# Patient Record
Sex: MALE | Race: WHITE | Employment: UNEMPLOYED | URBAN - METROPOLITAN AREA
[De-identification: names, ages, dates, MRNs, and addresses within clinical notes are randomized per-mention and may not be internally consistent; named-entity substitution may affect disease eponyms.]

---

## 2018-08-22 ENCOUNTER — APPOINTMENT (OUTPATIENT)
Dept: CT IMAGING | Age: 58
DRG: 872 | End: 2018-08-22
Attending: EMERGENCY MEDICINE
Payer: MEDICARE

## 2018-08-22 ENCOUNTER — HOSPITAL ENCOUNTER (INPATIENT)
Age: 58
LOS: 4 days | Discharge: NURSING, REHAB OR LONG-TERM CARE | DRG: 872 | End: 2018-08-26
Attending: EMERGENCY MEDICINE | Admitting: FAMILY MEDICINE
Payer: MEDICARE

## 2018-08-22 ENCOUNTER — APPOINTMENT (OUTPATIENT)
Dept: GENERAL RADIOLOGY | Age: 58
DRG: 872 | End: 2018-08-22
Attending: EMERGENCY MEDICINE
Payer: MEDICARE

## 2018-08-22 DIAGNOSIS — T83.511A URINARY TRACT INFECTION ASSOCIATED WITH CATHETERIZATION OF URINARY TRACT, UNSPECIFIED INDWELLING URINARY CATHETER TYPE, INITIAL ENCOUNTER (HCC): Primary | ICD-10-CM

## 2018-08-22 DIAGNOSIS — N39.0 URINARY TRACT INFECTION ASSOCIATED WITH CATHETERIZATION OF URINARY TRACT, UNSPECIFIED INDWELLING URINARY CATHETER TYPE, INITIAL ENCOUNTER (HCC): Primary | ICD-10-CM

## 2018-08-22 PROBLEM — K56.41 FECAL IMPACTION (HCC): Status: ACTIVE | Noted: 2018-08-22

## 2018-08-22 PROBLEM — K59.00 CONSTIPATION: Status: ACTIVE | Noted: 2018-08-22

## 2018-08-22 PROBLEM — A41.9 SEPSIS (HCC): Status: ACTIVE | Noted: 2018-08-22

## 2018-08-22 PROBLEM — K44.9 HIATAL HERNIA: Status: ACTIVE | Noted: 2018-08-22

## 2018-08-22 LAB
ALBUMIN SERPL-MCNC: 3.5 G/DL (ref 3.5–5)
ALBUMIN/GLOB SERPL: 0.8 {RATIO} (ref 1.1–2.2)
ALP SERPL-CCNC: 102 U/L (ref 45–117)
ALT SERPL-CCNC: 35 U/L (ref 12–78)
ANION GAP SERPL CALC-SCNC: 7 MMOL/L (ref 5–15)
APPEARANCE UR: ABNORMAL
AST SERPL-CCNC: 20 U/L (ref 15–37)
BACTERIA URNS QL MICRO: ABNORMAL /HPF
BASOPHILS # BLD: 0 K/UL (ref 0–0.1)
BASOPHILS NFR BLD: 0 % (ref 0–1)
BILIRUB SERPL-MCNC: 0.3 MG/DL (ref 0.2–1)
BILIRUB UR QL: NEGATIVE
BUN SERPL-MCNC: 19 MG/DL (ref 6–20)
BUN/CREAT SERPL: 21 (ref 12–20)
CALCIUM SERPL-MCNC: 8.7 MG/DL (ref 8.5–10.1)
CHLORIDE SERPL-SCNC: 105 MMOL/L (ref 97–108)
CO2 SERPL-SCNC: 24 MMOL/L (ref 21–32)
COLOR UR: YELLOW
CREAT SERPL-MCNC: 0.9 MG/DL (ref 0.7–1.3)
DIFFERENTIAL METHOD BLD: ABNORMAL
EOSINOPHIL # BLD: 0.1 K/UL (ref 0–0.4)
EOSINOPHIL NFR BLD: 1 % (ref 0–7)
EPITH CASTS URNS QL MICRO: ABNORMAL /LPF
ERYTHROCYTE [DISTWIDTH] IN BLOOD BY AUTOMATED COUNT: 16.3 % (ref 11.5–14.5)
GLOBULIN SER CALC-MCNC: 4.5 G/DL (ref 2–4)
GLUCOSE SERPL-MCNC: 106 MG/DL (ref 65–100)
GLUCOSE UR STRIP.AUTO-MCNC: NEGATIVE MG/DL
HCT VFR BLD AUTO: 40.2 % (ref 36.6–50.3)
HGB BLD-MCNC: 12.5 G/DL (ref 12.1–17)
HGB UR QL STRIP: ABNORMAL
IMM GRANULOCYTES # BLD: 0 K/UL (ref 0–0.04)
IMM GRANULOCYTES NFR BLD AUTO: 0 % (ref 0–0.5)
KETONES UR QL STRIP.AUTO: NEGATIVE MG/DL
LEUKOCYTE ESTERASE UR QL STRIP.AUTO: ABNORMAL
LIPASE SERPL-CCNC: 60 U/L (ref 73–393)
LYMPHOCYTES # BLD: 2.2 K/UL (ref 0.8–3.5)
LYMPHOCYTES NFR BLD: 17 % (ref 12–49)
MCH RBC QN AUTO: 27.5 PG (ref 26–34)
MCHC RBC AUTO-ENTMCNC: 31.1 G/DL (ref 30–36.5)
MCV RBC AUTO: 88.4 FL (ref 80–99)
MONOCYTES # BLD: 0.9 K/UL (ref 0–1)
MONOCYTES NFR BLD: 7 % (ref 5–13)
MUCOUS THREADS URNS QL MICRO: ABNORMAL /LPF
NEUTS SEG # BLD: 10 K/UL (ref 1.8–8)
NEUTS SEG NFR BLD: 75 % (ref 32–75)
NITRITE UR QL STRIP.AUTO: POSITIVE
NRBC # BLD: 0 K/UL (ref 0–0.01)
NRBC BLD-RTO: 0 PER 100 WBC
PH UR STRIP: 7 [PH] (ref 5–8)
PLATELET # BLD AUTO: 661 K/UL (ref 150–400)
PMV BLD AUTO: 9 FL (ref 8.9–12.9)
POTASSIUM SERPL-SCNC: 4.5 MMOL/L (ref 3.5–5.1)
PROT SERPL-MCNC: 8 G/DL (ref 6.4–8.2)
PROT UR STRIP-MCNC: 30 MG/DL
RBC # BLD AUTO: 4.55 M/UL (ref 4.1–5.7)
RBC #/AREA URNS HPF: ABNORMAL /HPF (ref 0–5)
RBC MORPH BLD: ABNORMAL
RBC MORPH BLD: ABNORMAL
SODIUM SERPL-SCNC: 136 MMOL/L (ref 136–145)
SP GR UR REFRACTOMETRY: 1.01 (ref 1–1.03)
UROBILINOGEN UR QL STRIP.AUTO: 0.2 EU/DL (ref 0.2–1)
WBC # BLD AUTO: 13.2 K/UL (ref 4.1–11.1)
WBC URNS QL MICRO: >100 /HPF (ref 0–4)

## 2018-08-22 PROCEDURE — 51702 INSERT TEMP BLADDER CATH: CPT

## 2018-08-22 PROCEDURE — 85025 COMPLETE CBC W/AUTO DIFF WBC: CPT | Performed by: EMERGENCY MEDICINE

## 2018-08-22 PROCEDURE — 74011000258 HC RX REV CODE- 258: Performed by: EMERGENCY MEDICINE

## 2018-08-22 PROCEDURE — 80053 COMPREHEN METABOLIC PANEL: CPT | Performed by: EMERGENCY MEDICINE

## 2018-08-22 PROCEDURE — 36415 COLL VENOUS BLD VENIPUNCTURE: CPT | Performed by: EMERGENCY MEDICINE

## 2018-08-22 PROCEDURE — 96365 THER/PROPH/DIAG IV INF INIT: CPT

## 2018-08-22 PROCEDURE — 99284 EMERGENCY DEPT VISIT MOD MDM: CPT

## 2018-08-22 PROCEDURE — 74011636320 HC RX REV CODE- 636/320: Performed by: EMERGENCY MEDICINE

## 2018-08-22 PROCEDURE — 96360 HYDRATION IV INFUSION INIT: CPT

## 2018-08-22 PROCEDURE — 83690 ASSAY OF LIPASE: CPT | Performed by: EMERGENCY MEDICINE

## 2018-08-22 PROCEDURE — 77030034849

## 2018-08-22 PROCEDURE — 74177 CT ABD & PELVIS W/CONTRAST: CPT

## 2018-08-22 PROCEDURE — 65210000002 HC RM PRIVATE GYN

## 2018-08-22 PROCEDURE — 81001 URINALYSIS AUTO W/SCOPE: CPT | Performed by: EMERGENCY MEDICINE

## 2018-08-22 PROCEDURE — 71045 X-RAY EXAM CHEST 1 VIEW: CPT

## 2018-08-22 PROCEDURE — 74011250636 HC RX REV CODE- 250/636: Performed by: EMERGENCY MEDICINE

## 2018-08-22 RX ORDER — NORTRIPTYLINE HYDROCHLORIDE 10 MG/5ML
50 SOLUTION ORAL
COMMUNITY

## 2018-08-22 RX ORDER — SODIUM CHLORIDE 0.9 % (FLUSH) 0.9 %
10 SYRINGE (ML) INJECTION
Status: COMPLETED | OUTPATIENT
Start: 2018-08-22 | End: 2018-08-22

## 2018-08-22 RX ORDER — FERROUS SULFATE 220 (44)/5
8 SOLUTION, ORAL ORAL DAILY
COMMUNITY
End: 2018-08-26

## 2018-08-22 RX ORDER — MELATONIN
2000 DAILY
COMMUNITY

## 2018-08-22 RX ORDER — HYDROCODONE BITARTRATE AND ACETAMINOPHEN 7.5; 325 MG/15ML; MG/15ML
5 SOLUTION ORAL
COMMUNITY
End: 2022-08-30

## 2018-08-22 RX ORDER — ASCORBIC ACID 500 MG
500 TABLET ORAL DAILY
COMMUNITY

## 2018-08-22 RX ORDER — ACETAMINOPHEN 160 MG/5ML
640 LIQUID ORAL
COMMUNITY

## 2018-08-22 RX ORDER — CHOLESTYRAMINE 4 G/4.8G
4 POWDER, FOR SUSPENSION ORAL
COMMUNITY
End: 2018-08-26

## 2018-08-22 RX ADMIN — SODIUM CHLORIDE 1000 ML: 900 INJECTION, SOLUTION INTRAVENOUS at 19:00

## 2018-08-22 RX ADMIN — IOPAMIDOL 100 ML: 755 INJECTION, SOLUTION INTRAVENOUS at 19:52

## 2018-08-22 RX ADMIN — Medication 10 ML: at 19:52

## 2018-08-22 RX ADMIN — SODIUM CHLORIDE 100 ML: 900 INJECTION, SOLUTION INTRAVENOUS at 19:52

## 2018-08-22 RX ADMIN — MEROPENEM 1 G: 1 INJECTION, POWDER, FOR SOLUTION INTRAVENOUS at 20:24

## 2018-08-22 NOTE — Clinical Note
Status[de-identified] Inpatient [101] Type of Bed: Telemetry [19] Inpatient Hospitalization Certified Necessary for the Following Reasons: 3. Patient receiving treatment that can only be provided in an inpatient setting (further clarification in H&P documentation) Admitting Diagnosis: UTI (urinary tract infection) [[id number]] Admitting Physician: Cali James Attending Physician: Cali James Estimated Length of Stay: 2 Midnights Discharge Plan[de-identified] Home with Office Follow-up

## 2018-08-22 NOTE — ED TRIAGE NOTES
Patient presents from [place] home via EMS with complaints of Abdominal pain.  Per EMs patient has peg tube, but mother still likes to give him food and [facility] is [facility] if that is what is going on

## 2018-08-22 NOTE — IP AVS SNAPSHOT
[address] 
[phone] Patient: Vidhya Gillis MRN: [id number] :1960 You are allergic to the following Allergen Reactions Avelox (Moxifloxacin) Rash Demerol (Meperidine) Other (comments) Spastic movements Amikacin Unknown (comments) Amoxicillin Rash Betadine Surgi-Prep Rash Mother states this does not happen all the time and if betadine is washed off, it's ok Ciprofloxacin Unknown (comments) Effexor (Venlafaxine) Unknown (comments) Fentanyl Unknown (comments) Keflex (Cephalexin) Rash Lyrica (Pregabalin) Unknown (comments) Morphine Unknown (comments) Neurontin (Gabapentin) Unknown (comments) Prozac (Fluoxetine) Unknown (comments) Reglan (Metoclopramide) Unknown (comments) Serzone (Nefazodone) Unknown (comments) Sulfa (Sulfonamide Antibiotics) Unknown (comments) Tetracycline Unknown (comments) Tricyclamol Chloride Unknown (comments) Valium (Diazepam) Unknown (comments) Tolerates Lorazepam  
  
Recent Documentation Height Weight BMI Smoking Status 1.702 m 46.3 kg 15.98 kg/m2 Never Smoker Emergency Contacts  (Rel.) Home Phone Work Phone Mobile Phone Hanny Pop (Mother) [phone] -- -- Dc Borja (Brother) [phone] -- -- About your hospitalization You were admitted on:  2018 You last received care in the:  [facility] You were discharged on:  2018 Why you were hospitalized Your primary diagnosis was:  Abdominal Pain Your diagnoses also included:  Uti (Urinary Tract Infection), Sepsis ([facility]), Constipation, Fecal Impaction ([facility]), Hiatal Hernia, Quadriplegic Cerebral Palsy (Hcc), Cp (Cerebral Palsy) ([facility]) Providers Seen During Your Hospitalization Provider Specialty Primary office phone Araseli Chance MD Emergency Medicine [phone] Homer Alvarado MD Family Practice [phone] Pavan Mcgrath MD Internal Medicine [phone] Your Primary Care Physician (PCP) Primary Care Physician Office Phone Office Fax Isela Parker [phone] Follow-up Information Follow up With Details Comments Contact Info Parvez Obrien MD [facility] 1 [facility] 
[phone] Appointments for Next 14 days 8/31/2018  1:15 PM  NEW PATIENT [employer] [facility] My Medications STOP taking these medications   
 bismuth subsalicylate 213 KM/60 mL suspension Commonly known as:  PEPTO-BISMOL  
   
  
 ferrous sulfate 220 mg (44 mg iron)/5 mL solution PREVALITE 4 gram packet Generic drug:  cholestyramine-aspartame TAKE these medications as instructed Instructions Each Dose to Equal  
 Morning Noon Evening Bedtime  
 acetaminophen 160 mg/5 mL liquid Commonly known as:  TYLENOL Your last dose was: Your next dose is:    
   
   
 640 mg by Per G Tube route every four (4) hours as needed for Fever or Pain. 640 mg  
    
   
   
   
  
 ascorbic acid (vitamin C) 500 mg tablet Commonly known as:  VITAMIN C Your last dose was: Your next dose is:    
   
   
 500 mg by Per G Tube route daily. 500 mg  
    
   
   
   
  
 ertapenem 1 gram injection Commonly known as:  Nicholas Sheppard Start taking on:  8/27/2018 Your last dose was: Your next dose is:    
   
   
 1 g by IntraVENous route every twenty-four (24) hours for 4 days.  Mix with lidocaine for order: Ertapenum 1 g vial for IM injection with lidocaine 1% 3.2 mL  Indications: Klebsiella Complicated UTI  
 1 g  
    
   
   
   
  
 FLONASE 50 mcg/actuation nasal spray Generic drug:  fluticasone Your last dose was: Your next dose is: 2 Sprays by Both Nostrils route daily. 2 [place] HYDROcodone-acetaminophen 0.5-21.7 mg/mL oral solution Commonly known as:  HYCET Your last dose was: Your next dose is:    
   
   
 5 mL by Per G Tube route four (4) times daily as needed for Pain. 5 mL LORazepam 1 mg tablet Commonly known as:  ATIVAN Your last dose was: Your next dose is: 0.75 mg by Per G Tube route every six (6) hours. 0.75 mg MAXALT 10 mg tablet Generic drug:  rizatriptan Your last dose was: Your next dose is:    
   
   
 10 mg by SubLINGual route as needed for Migraine. May repeat dose after 2 hours not to exceed 3 tablets per 24hr 10 mg [address] [facility] [address] Generic drug:  Esomeprazole Magnesium Your last dose was: Your next dose is:    
   
   
 40 mg by Per G Tube route daily. Indications: gastroesophageal reflux disease 40 mg  
    
   
   
   
  
 nortriptyline 10 mg/5 mL solution Commonly known as:  PAMELOR Your last dose was: Your next dose is:    
   
   
 50 mg by Per G Tube route nightly. 50 mg  
    
   
   
   
  
 VITAMIN D3 1,000 unit tablet Generic drug:  cholecalciferol Your last dose was: Your next dose is:    
   
   
 2,000 Units by Per G Tube route daily. Indications: Vitamin D Deficiency 2000 Units Where to Get Your Medications Information on where to get these meds will be given to you by the nurse or doctor. ! Ask your nurse or doctor about these medications  
  ertapenem 1 gram injection Discharge Instructions Discharge Summary  
  
PATIENT ID: Dillon Garsia MRN: [id number] YOB: 1960 DATE OF ADMISSION: 8/22/2018  5:40 PM   
DATE OF DISCHARGE: 8/26/2018 PRIMARY CARE PROVIDER: Scott Webb MD  
ATTENDING PHYSICIAN: Inderjit Medrano MD 
DISCHARGING PROVIDER: Elizabeth Gomez NP. To contact this individual call [phone] and ask the  to page. If unavailable ask to be transferred the [address]:  
Pt presented to the ED with abdominal pain. Patients brother reported that he had been having abdominal pain x 1 day. Other symptoms were nausea and non-bloody diarrhea. 
  
DISCHARGE DIAGNOSES / PLAN:   
  
Sepsis (leukocytosis and tachycardia with UTI) (POA):  
- received 1 Liter bolus in ED 
- lactic acid 0.6 
- pt started on meropenum in the ED, as he has multiple allergies and there were reports he had ESBL in his urine in the past 
- no blood cultures obtained on admit 
- afebrile and leukocytosis normalized 
- urine culture with ESBL Klebsiella Pneumoniae. Sensitive to meropenum.  
- Plans for ertapenum on discharge x 4 days (IM) as Midline placement unsuccessful 
  
Abdominal Pain:  
- CT abdomen showed a fecal impaction.   
- has received enema with good results, last BM 8/23 
- Pain resolved 
   
Fecal Impaction/Hx of Diarrhea - pt on pepto bismol and cholestyramine at home for chronic diarrhea 
- on iron supplements daily 
- [facility] with [facility]: will stop all pepto/iron supplements and questran on discharge. She will monitor and adjust meds as necessary. 
   
UTI complicated:  
- s/p radical cystoprostatectomy with ileo neobladder requiring intermittent catheterization and hx ESBL  
- usually has to be straight cathed every 6 hours at [facility] and follow urine culture. Growing GNR (colony count 40,000 after receiving 2 doses of merrem). UC with ESBL Klebsiella Pneumoniae 
- plan for IM Ertapenum as above 
  
Hx Dysphagia:  
- PEG feedings have resumed with flushes - RD has seen - appreciate consultation - pt does eat some food at the [place] home. Also drinks liquids  
   
Hiatal hernia:  
- large per CT abd report 
- continue PPI 
   
Hx Cerebral palsy / chronic pain:  
- pain control as needed - Pamelor 
- ativan  
   
Functional Quadriplegia:  
- due to the above. - turn/repositioning and support care for comfort and ADLs 
   
Hx bladder cancer:  
- s/p radical cystoprostatectomy with ileo neobladder requiring intermittent catheterization   
- remove Short prior to d/c, resume intermittent catheterization 
   
Hx anxiety/depression: ativan  
   
Underweight: - BMI 17.4 
- pt receiving TF 
- dietitian has seen/evaluated and ordered TF  
  
FOLLOW UP APPOINTMENTS:   
Follow-up Information Follow up With Details Comments Contact Info  
  Steven Penn MD In 1 week or Dr. Shira Burns [address] 
[phone] ADDITIONAL CARE RECOMMENDATIONS:  
- to have IM doses of Ertapenum over the next 4 days to complete antibiotics course 
  
Resume straight cath schedule at [address]: po diet for pleasure TF: 1 bottle Ensure Enlive (240 mL) 4x/day (0700, 1100, 1500, 1900) + 250 mL flush after feeding 
  
ACTIVITY: Activity as tolerated 
  
DISCHARGE MEDICATIONS: 
     
Current Discharge Medication List  
   
     
START taking these medications  
  Details  
ertapenem (INVANZ) 1 gram injection 1 g by IntraVENous route every twenty-four (24) hours for 4 days. Mix with lidocaine for order: Ertapenum 1 g vial for IM injection with lidocaine 1% 3.2 mL  Indications: Klebsiella Complicated UTI Qty: 4 g, Refills: 0  
   
   
     
CONTINUE these medications which have NOT CHANGED  
  Details  
nortriptyline (PAMELOR) 10 mg/5 mL solution 50 mg by Per G Tube route nightly.  
   
ascorbic acid, vitamin C, (VITAMIN C) 500 mg tablet 500 mg by Per G Tube route daily.  
   
cholecalciferol (VITAMIN D3) 1,000 unit tablet 2,000 Units by Per G Tube route daily. Indications: Vitamin D Deficiency  
   
acetaminophen (TYLENOL) 160 mg/5 mL liquid 640 mg by Per G Tube route every four (4) hours as needed for Fever or Pain.  
   
HYDROcodone-acetaminophen (HYCET) 0.5-21.7 mg/mL oral solution 5 mL by Per G Tube route four (4) times daily as needed for Pain.  
   
LORazepam (ATIVAN) 1 mg tablet 0.75 mg by Per G Tube route every six (6) hours.  
   
fluticasone (FLONASE) 50 mcg/actuation nasal spray 2 Sprays by Both Nostrils route daily.  
   
Esomeprazole Magnesium (NEXIUM PACKET) 40 mg by Per G Tube route daily. Indications: gastroesophageal reflux disease  
   
rizatriptan (MAXALT) 10 mg tablet 10 mg by SubLINGual route as needed for Migraine. May repeat dose after 2 hours not to exceed 3 tablets per 24hr  
   
   
    
STOP taking these medications  
   
  ferrous sulfate 220 mg (44 mg iron)/5 mL solution Comments:  
Reason for Stopping:   
     
  cholestyramine-aspartame (PREVALITE) 4 gram packet Comments:  
Reason for Stopping:   
     
  bismuth subsalicylate (PEPTO-BISMOL) 262 mg/15 mL suspension Comments:  
Reason for Stopping:   
     
   
  
NOTIFY YOUR PHYSICIAN FOR ANY OF THE FOLLOWING:  
Fever over 101 degrees for 24 hours. Chest pain, shortness of breath, fever, chills, nausea, vomiting, diarrhea, change in mentation, falling, weakness, bleeding. Severe pain or pain not relieved by medications.  
Or, any other signs or symptoms that you may have questions about. 
  
DISPOSITION: 
   Home With: 
  OT   PT   HH   RN  
  
  Long term SNF/Inpatient Rehab  
  Independent/assisted living  
  Hospice  
xx Other: [facility]  
  
PATIENT CONDITION AT DISCHARGE:  
Functional status  
xx Poor (baseline)  
  Deconditioned   
  Independent   
  
Cognition  
xx  Lucid   
  Forgetful   
  Dementia   
  
Catheters/lines (plus indication)  
  Short   
  PICC   
xx PEG (TF)  
  None   
  
Code status  
   Full code   
xx DNR   
  
PHYSICAL EXAMINATION AT DISCHARGE: 
 /68 (BP 1 Location: Left arm, BP Patient Position: At rest)  Pulse 98  Temp 97.6 °F (36.4 °C)  Resp 18  Ht 5' 7\" (1.702 m)  Wt 46.3 kg (102 lb)  SpO2 98%  BMI 15.98 kg/m2 
  
Pt in bed, pts mother at bedside. Preparing for discharge today and discussed plan for IM abx.  has also been working on outpatient IM abx to start tomorrow.  
  
Constitutional:  No acute distress, cooperative, pleasant   
ENT:  Oral mucous membranes dry Resp:  No accessory muscle use and on RA  
 GI:  + BM 8/23  
 Musculoskeletal:  No edema  
 Neurologic:  Moves all extremities.  AAOx3. UE contractures. Abnormal head/body movements due to CP. Impaired speech Lines: Short placed on admit, draining clear yellow urine. Short to be removed prior to d/c.  
   
CHRONIC MEDICAL DIAGNOSES: 
Problem List as of 8/26/2018  Date Reviewed: 1/2/2014  
          Codes Class Noted - Resolved  
  UTI (urinary tract infection) ICD-10-CM: N39.0 ICD-9-CM: 599.0   8/22/2018 - Present  
     
  Sepsis ([facility] [address] ICD-10-CM: A41.9 ICD-9-CM: 038.9, 995.91   8/22/2018 - Present  
     
  Constipation ICD-10-CM: K59.00 ICD-9-CM: 564.00   8/22/2018 - Present  
     
  Fecal impaction (HCC) ICD-10-CM: K56.41 
ICD-9-CM: 560.32   8/22/2018 - Present  
     
  Hiatal hernia ICD-10-CM: K44.9 ICD-9-CM: 385. 3   8/22/2018 - Present  
     
  Abdominal pain, LLQ (left lower quadrant) ICD-10-CM: R10.32 
ICD-9-CM: 789.04   12/5/2011 - Present  
     
  Quadriplegic cerebral palsy (HCC) ICD-10-CM: G80.8 ICD-9-CM: 163. 2   7/28/2011 - Present  
     
  Esophageal stricture ICD-10-CM: K22.2 ICD-9-CM: 034. 3   7/28/2011 - Present  
     
  Abdominal pain ICD-10-CM: R10.9 ICD-9-CM: 789.00   7/25/2011 - Present  
     
  CP (cerebral palsy) (HCC) (Chronic) ICD-10-CM: G80.9 ICD-9-CM: 538. 9   7/25/2011 - Present  
     
  Abdominal pain, other specified site ICD-10-CM: R10.9 ICD-9-CM: 789.09   6/3/2011 - Present  
     
   * (Principal)Abdominal pain ICD-10-CM: R10.9 ICD-9-CM: 789.00   12/9/2010 - Present  
     
  RESOLVED: Contracture of joint of multiple sites ICD-10-CM: M24.50 ICD-9-CM: 718.49   1/3/2014 - 1/3/2014  
     
  RESOLVED: Sepsis(995.91) ICD-10-CM: A41.9 ICD-9-CM: 995.91   8/22/2013 - 8/25/2013  
     
  RESOLVED: UTI (lower urinary tract infection) ICD-10-CM: N39.0 ICD-9-CM: 599.0   8/22/2013 - 8/25/2013  
     
  RESOLVED: Tachycardia ICD-10-CM: R00.0 ICD-9-CM: 223. 0   8/22/2013 - 8/25/2013  
     
  RESOLVED: Thrombocytosis (HCC) ICD-10-CM: D47.3 ICD-9-CM: 238.71   8/22/2013 - 8/25/2013  
     
  RESOLVED: Unspecified constipation ICD-10-CM: K59.00 ICD-9-CM: 564.00   5/13/2013 - 5/15/2013  
     
  RESOLVED: Sepsis(995.91) ICD-10-CM: A41.9 ICD-9-CM: 995.91   7/14/2012 - 7/21/2012  
     
  RESOLVED: UTI (lower urinary tract infection) ICD-10-CM: N39.0 ICD-9-CM: 599.0   7/14/2012 - 7/21/2012  
     
  RESOLVED: Abdominal pain, other specified site ICD-10-CM: R10.9 ICD-9-CM: 789.09   2/14/2012 - 3/3/2012  
     
  
  
Greater than 30 minutes were spent with the patient on counseling and coordination of care 
  
Signed:  
Leilani Natarajan NP 
8/26/2018 10:40 AM 
  
   
   
  
     
 
 
 
Discharge Orders None [employer] Announcement We are excited to announce that we are making your provider's discharge notes available to you in [employer]. You will see these notes when they are completed and signed by the physician that discharged you from your recent hospital stay. If you have any questions or concerns about any information you see in [employer], please call the Health Information Department where you were seen or reach out to your Primary Care Provider for more information about your plan of care. Introducing [facility] SERVICES! [employer] introduces [employer] patient portal. Now you can access parts of your medical record, email your doctor's office, and request medication refills online. 1. In your internet browser, go to [email or web address] 2. Click on the First Time User? Click Here link in the Sign In box. You will see the New Member Sign Up page. 3. Enter your [employer] Access Code exactly as it appears below. You will not need to use this code after youve completed the sign-up process. If you do not sign up before the expiration date, you must request a new code. · [employer] Access Code: [email or web address] Expires: 11/20/2018  5:45 PM 
 
4. Enter the last four digits of your Social Security Number (xxxx) and Date of Birth (mm/dd/yyyy) as indicated and click Submit. You will be taken to the next sign-up page. 5. Create a [employer] ID. This will be your [employer] login ID and cannot be changed, so think of one that is secure and easy to remember. 6. Create a [employer] password. You can change your password at any time. 7. Enter your Password Reset Question and Answer. This can be used at a later time if you forget your password. 8. Enter your e-mail address. You will receive e-mail notification when new information is available in [address] 9. Click Sign Up. You can now view and download portions of your medical record. 10. Click the Download Summary menu link to download a portable copy of your medical information. If you have questions, please visit the Frequently Asked Questions section of the [employer] website. Remember, [employer] is NOT to be used for urgent needs. For medical emergencies, dial 911. Now available from your iPhone and Android! General Information Please provide this summary of care documentation to your next provider. Patient Signature:  ____________________________________________________________ Date:  ____________________________________________________________  
  
Donna  Provider Signature:  ____________________________________________________________ Date:  ____________________________________________________________

## 2018-08-22 NOTE — ED NOTES
Bedside shift change report given to [facility] (oncoming nurse) by Ayush Reyes (offgoing nurse). Report included the following information SBAR and Kardex.

## 2018-08-23 LAB
ANION GAP SERPL CALC-SCNC: 8 MMOL/L (ref 5–15)
BASOPHILS # BLD: 0.1 K/UL (ref 0–0.1)
BASOPHILS NFR BLD: 1 % (ref 0–1)
BUN SERPL-MCNC: 14 MG/DL (ref 6–20)
BUN/CREAT SERPL: 21 (ref 12–20)
CALCIUM SERPL-MCNC: 7.9 MG/DL (ref 8.5–10.1)
CHLORIDE SERPL-SCNC: 112 MMOL/L (ref 97–108)
CO2 SERPL-SCNC: 20 MMOL/L (ref 21–32)
CREAT SERPL-MCNC: 0.68 MG/DL (ref 0.7–1.3)
DIFFERENTIAL METHOD BLD: ABNORMAL
EOSINOPHIL # BLD: 0.1 K/UL (ref 0–0.4)
EOSINOPHIL NFR BLD: 1 % (ref 0–7)
ERYTHROCYTE [DISTWIDTH] IN BLOOD BY AUTOMATED COUNT: 16.3 % (ref 11.5–14.5)
GLUCOSE SERPL-MCNC: 114 MG/DL (ref 65–100)
HCT VFR BLD AUTO: 35.8 % (ref 36.6–50.3)
HGB BLD-MCNC: 11.3 G/DL (ref 12.1–17)
IMM GRANULOCYTES # BLD: 0.1 K/UL (ref 0–0.04)
IMM GRANULOCYTES NFR BLD AUTO: 1 % (ref 0–0.5)
LACTATE SERPL-SCNC: 0.6 MMOL/L (ref 0.4–2)
LYMPHOCYTES # BLD: 1.7 K/UL (ref 0.8–3.5)
LYMPHOCYTES NFR BLD: 15 % (ref 12–49)
MCH RBC QN AUTO: 27.6 PG (ref 26–34)
MCHC RBC AUTO-ENTMCNC: 31.6 G/DL (ref 30–36.5)
MCV RBC AUTO: 87.3 FL (ref 80–99)
MONOCYTES # BLD: 0.8 K/UL (ref 0–1)
MONOCYTES NFR BLD: 8 % (ref 5–13)
NEUTS SEG # BLD: 8.4 K/UL (ref 1.8–8)
NEUTS SEG NFR BLD: 76 % (ref 32–75)
NRBC # BLD: 0 K/UL (ref 0–0.01)
NRBC BLD-RTO: 0 PER 100 WBC
PLATELET # BLD AUTO: 583 K/UL (ref 150–400)
PMV BLD AUTO: 9.3 FL (ref 8.9–12.9)
POTASSIUM SERPL-SCNC: 3.5 MMOL/L (ref 3.5–5.1)
RBC # BLD AUTO: 4.1 M/UL (ref 4.1–5.7)
SODIUM SERPL-SCNC: 140 MMOL/L (ref 136–145)
WBC # BLD AUTO: 11 K/UL (ref 4.1–11.1)

## 2018-08-23 PROCEDURE — 87086 URINE CULTURE/COLONY COUNT: CPT | Performed by: NURSE PRACTITIONER

## 2018-08-23 PROCEDURE — 65210000002 HC RM PRIVATE GYN

## 2018-08-23 PROCEDURE — 83605 ASSAY OF LACTIC ACID: CPT

## 2018-08-23 PROCEDURE — 74011000258 HC RX REV CODE- 258: Performed by: FAMILY MEDICINE

## 2018-08-23 PROCEDURE — 65270000029 HC RM PRIVATE

## 2018-08-23 PROCEDURE — 74011250637 HC RX REV CODE- 250/637: Performed by: FAMILY MEDICINE

## 2018-08-23 PROCEDURE — 36415 COLL VENOUS BLD VENIPUNCTURE: CPT

## 2018-08-23 PROCEDURE — 87077 CULTURE AEROBIC IDENTIFY: CPT | Performed by: NURSE PRACTITIONER

## 2018-08-23 PROCEDURE — 74011250636 HC RX REV CODE- 250/636: Performed by: FAMILY MEDICINE

## 2018-08-23 PROCEDURE — 85025 COMPLETE CBC W/AUTO DIFF WBC: CPT

## 2018-08-23 PROCEDURE — 87186 SC STD MICRODIL/AGAR DIL: CPT | Performed by: NURSE PRACTITIONER

## 2018-08-23 PROCEDURE — 3E0G76Z INTRODUCTION OF NUTRITIONAL SUBSTANCE INTO UPPER GI, VIA NATURAL OR ARTIFICIAL OPENING: ICD-10-PCS | Performed by: HOSPITALIST

## 2018-08-23 PROCEDURE — 74011000250 HC RX REV CODE- 250: Performed by: FAMILY MEDICINE

## 2018-08-23 PROCEDURE — 80048 BASIC METABOLIC PNL TOTAL CA: CPT

## 2018-08-23 RX ORDER — LORAZEPAM 0.5 MG/1
0.75 TABLET ORAL EVERY 6 HOURS
Status: DISCONTINUED | OUTPATIENT
Start: 2018-08-23 | End: 2018-08-26 | Stop reason: HOSPADM

## 2018-08-23 RX ORDER — BISMUTH SUBSALICYLATE 262 MG/15ML
30 LIQUID ORAL 2 TIMES DAILY
Status: DISCONTINUED | OUTPATIENT
Start: 2018-08-23 | End: 2018-08-23

## 2018-08-23 RX ORDER — SODIUM CHLORIDE 0.9 % (FLUSH) 0.9 %
5-10 SYRINGE (ML) INJECTION AS NEEDED
Status: DISCONTINUED | OUTPATIENT
Start: 2018-08-23 | End: 2018-08-26 | Stop reason: HOSPADM

## 2018-08-23 RX ORDER — DEXTROSE MONOHYDRATE AND SODIUM CHLORIDE 5; .9 G/100ML; G/100ML
50 INJECTION, SOLUTION INTRAVENOUS CONTINUOUS
Status: DISCONTINUED | OUTPATIENT
Start: 2018-08-23 | End: 2018-08-26 | Stop reason: HOSPADM

## 2018-08-23 RX ORDER — NORTRIPTYLINE HYDROCHLORIDE 25 MG/1
50 CAPSULE ORAL
Status: DISCONTINUED | OUTPATIENT
Start: 2018-08-23 | End: 2018-08-26 | Stop reason: HOSPADM

## 2018-08-23 RX ORDER — ESOMEPRAZOLE MAGNESIUM 20 MG/1
40 FOR SUSPENSION ORAL DAILY
Status: DISCONTINUED | OUTPATIENT
Start: 2018-08-23 | End: 2018-08-23 | Stop reason: CLARIF

## 2018-08-23 RX ORDER — FERROUS SULFATE 300 MG/5ML
4 LIQUID (ML) ORAL DAILY
Status: DISCONTINUED | OUTPATIENT
Start: 2018-08-23 | End: 2018-08-23

## 2018-08-23 RX ORDER — HYDROCODONE BITARTRATE AND ACETAMINOPHEN 7.5; 325 MG/15ML; MG/15ML
5 SOLUTION ORAL
Status: DISCONTINUED | OUTPATIENT
Start: 2018-08-23 | End: 2018-08-26 | Stop reason: HOSPADM

## 2018-08-23 RX ORDER — FLUTICASONE PROPIONATE 50 MCG
2 SPRAY, SUSPENSION (ML) NASAL DAILY
Status: DISCONTINUED | OUTPATIENT
Start: 2018-08-23 | End: 2018-08-26 | Stop reason: HOSPADM

## 2018-08-23 RX ORDER — SODIUM CHLORIDE 0.9 % (FLUSH) 0.9 %
5-10 SYRINGE (ML) INJECTION EVERY 8 HOURS
Status: DISCONTINUED | OUTPATIENT
Start: 2018-08-23 | End: 2018-08-26 | Stop reason: HOSPADM

## 2018-08-23 RX ORDER — ENOXAPARIN SODIUM 100 MG/ML
40 INJECTION SUBCUTANEOUS
Status: DISCONTINUED | OUTPATIENT
Start: 2018-08-23 | End: 2018-08-26 | Stop reason: HOSPADM

## 2018-08-23 RX ADMIN — NORTRIPTYLINE HYDROCHLORIDE 50 MG: 25 CAPSULE ORAL at 06:44

## 2018-08-23 RX ADMIN — MEROPENEM 500 MG: 500 INJECTION, POWDER, FOR SOLUTION INTRAVENOUS at 06:44

## 2018-08-23 RX ADMIN — Medication 10 ML: at 22:25

## 2018-08-23 RX ADMIN — PANTOPRAZOLE SODIUM 40 MG: 40 TABLET, DELAYED RELEASE ORAL at 11:52

## 2018-08-23 RX ADMIN — DEXTROSE MONOHYDRATE AND SODIUM CHLORIDE 100 ML/HR: 5; .9 INJECTION, SOLUTION INTRAVENOUS at 22:25

## 2018-08-23 RX ADMIN — LORAZEPAM 0.75 MG: 0.5 TABLET ORAL at 09:14

## 2018-08-23 RX ADMIN — Medication 10 ML: at 15:22

## 2018-08-23 RX ADMIN — Medication 5 ML: at 06:45

## 2018-08-23 RX ADMIN — MINERAL SUPPLEMENT IRON 300 MG / 5 ML STRENGTH LIQUID 100 PER BOX UNFLAVORED 240 MG: at 09:20

## 2018-08-23 RX ADMIN — MEROPENEM 500 MG: 500 INJECTION, POWDER, FOR SOLUTION INTRAVENOUS at 11:42

## 2018-08-23 RX ADMIN — ENOXAPARIN SODIUM 40 MG: 100 INJECTION SUBCUTANEOUS at 22:25

## 2018-08-23 RX ADMIN — DEXTROSE MONOHYDRATE AND SODIUM CHLORIDE 100 ML/HR: 5; .9 INJECTION, SOLUTION INTRAVENOUS at 00:45

## 2018-08-23 RX ADMIN — NORTRIPTYLINE HYDROCHLORIDE 50 MG: 25 CAPSULE ORAL at 22:25

## 2018-08-23 RX ADMIN — FAMOTIDINE 20 MG: 10 INJECTION, SOLUTION INTRAVENOUS at 09:15

## 2018-08-23 RX ADMIN — Medication 5 ML: at 00:48

## 2018-08-23 RX ADMIN — LORAZEPAM 0.75 MG: 0.5 TABLET ORAL at 15:21

## 2018-08-23 RX ADMIN — MEROPENEM 500 MG: 500 INJECTION, POWDER, FOR SOLUTION INTRAVENOUS at 18:13

## 2018-08-23 RX ADMIN — LORAZEPAM 0.75 MG: 0.5 TABLET ORAL at 01:43

## 2018-08-23 RX ADMIN — ENOXAPARIN SODIUM 40 MG: 100 INJECTION SUBCUTANEOUS at 00:45

## 2018-08-23 RX ADMIN — LORAZEPAM 0.75 MG: 0.5 TABLET ORAL at 20:45

## 2018-08-23 RX ADMIN — Medication 30 ML: at 09:20

## 2018-08-23 NOTE — PROGRESS NOTES
NUTRITION COMPLETE ASSESSMENT    RECOMMENDATIONS:   1. Resume normal tube feeding regimen will substitute Ensure Enlive Vanilla while in the hospital)  Ensure Enlive, 240 mL (1 bottle) 4x/day (0700, 1100, 1500, 1900) + 250 mL flush after each feeding    2. Weekly weight     Interventions/Plan:   Food/Nutrient Delivery:  EN support via PEG as sole source of nutrition     Assessment:   Reason for Assessment:   [x] Provider Consult  [x] BPA/MST Referral     Diet: NPO  Nutritionally Significant Medications: [x] Reviewed & Includes: D5 0.9% NaCl @ 100 mL/hr; ferrous sulfate 300 mg daily; merrem q 6 hours; protonix daily      Pre-Hospitalization:  Diet at Home: Regular (mom feeds once/day)    Current Hospitalization:   Fluid Restriction:  N/A  Appetite:  N/A  PO Ability: Total feed      Subjective:  Spoke with pt and brother at the bedside. Brother is very helpful with translating pt's report. The pt is alert and understands all questions and conversation. Brother states his mom goes to [facility] daily to feed him Regular foods (he is NPO in the hospital). Objective:  Chart reviewed, discussed with RN and NP. Pt admitted with abdominal pain. PMHx: CP, PEG dependent, bladder cancer, cholecystectomy, others noted. Pt is usually on Ensure Plus Vanilla, 4x/day + 250 mL flush after each feeding. Also ordered for 30 mL before and after each med. Pt found to have UTI and fecal impaction. He was having loose stools prior to admission (suspect having stool around the impaction) and had another large bowel movement this morning. May need a bowel regimen short term going forward. Intake at the facility (liquids and regular foods, mostly from his mother) is limited, but for pleasure. He admits that he was having fullness and nausea with large volume feedings.   Initially suggested  his free water flushes; however, by the end of the visit, the pt preferred to continue with his usual tube feeding regimen. Home tube feedings provide 1400 kcal (28 kcal/kg), 52 g protein (1 g/kg), 1710 mL total free water (tf + flush)-- meeting 99% and 95% of estimated kcal and protein needs, respectively. While in the hospital, will substitute Ensure Enlive vanilla. This will increase protein provided to 80 g/day (1.6 g/kg). Will monitor labs and tolerance. Estimated Nutrition Needs:   Kcals/day: 1411 Kcals/day (2321-5252 kcal/day (28-30 kcal/kg))  Protein: 55 g (55-60 g/day (1.1-1.2 g/kg))  Fluid: 1500 ml (30 mL/kg)  Based On: Kcal/kg - specify (Comment)  Weight Used: Actual wt (50.4 kg)    Pt expected to meet estimated nutrient needs:  [x]   Yes     []  No [] Unable to predict at this time    Nutrition Diagnosis:   1. Altered GI function related to unknown etiology as evidenced by fecal impaction    Goals:     Pt to meet eat least 90% of estimated needs via enteral nutrition support over the next 5-7 days     Monitoring & Evaluation:    - Enteral/parenteral nutrition intake   - Weight/weight change     Previous Nutrition Goals Met:   N/A  Previous Recommendations:    N/A    Education & Discharge Needs:   [] None Identified   [x] Identified and addressed    [x] Participated in care plan, discharge planning, and/or interdisciplinary rounds        Cultural, Methodist and ethnic food preferences identified: None    Skin Integrity: [x]Intact  []Other  Edema: [x]None []Other  Last BM: 8/23/18  Food Allergies: [x]None []Other  Diet Restrictions: Cultural/B[employer]ist Preference(s): None     Anthropometrics:    Weight Loss Metrics 8/23/2018 1/2/2014 12/18/2013 8/25/2013 8/15/2013 8/8/2013 5/13/2013   Today's Wt 111 lb 1.8 oz 99 lb 102 lb 113 lb 1.5 oz 117 lb 15.1 oz 118 lb 115 lb   BMI 17.4 kg/m2 16.47 kg/m2 16.97 kg/m2 18.82 kg/m2 19.63 kg/m2 19.64 kg/m2 18.57 kg/m2      Weight Source: Bed  Height: 5' 7\" (170.2 cm),    Body mass index is 17.4 kg/(m^2).    ,    Usual Body Weight:  (125# (2013)),      Labs:    Lab Results   Component Value Date/Time    Sodium 140 08/23/2018 05:05 AM    Potassium 3.5 08/23/2018 05:05 AM    Chloride 112 (H) 08/23/2018 05:05 AM    CO2 20 (L) 08/23/2018 05:05 AM    Glucose 114 (H) 08/23/2018 05:05 AM    BUN 14 08/23/2018 05:05 AM    Creatinine 0.68 (L) 08/23/2018 05:05 AM    Calcium 7.9 (L) 08/23/2018 05:05 AM    Magnesium 1.7 08/23/2013 03:58 AM    Phosphorus 3.1 08/23/2013 03:58 AM    Albumin 3.5 08/22/2018 06:20 PM     Lab Results   Component Value Date/Time    Hemoglobin A1c 5.1 05/14/2013 08:30 AM     Ahsan Sanders [address]

## 2018-08-23 NOTE — PROGRESS NOTES
Hospital follow-up new PCP transitional care appointment has been scheduled with Dr. Chris Walters for Friday, 8/31/18 at 1:15 p.m. Pending patient discharge.   Rae Gong, Care Management Specialist.

## 2018-08-23 NOTE — CDMP QUERY
Please clarify if this patient is (was) being treated/managed for:     => Functional Quadriplegia in the setting of cerebral palsy requiring pressure injury precautions (turn/reposition every 2 hrs with pillows and wedges, float heels, etc)  => Other explanation of clinical findings  => Clinically Undetermined (no explanation for clinical findings)    The medical record reflects the following clinical findings, treatment, and risk factors. Risk Factors:  CP  Clinical Indicators:  H&P- Contracture UL and LL; Nurse Flowsheet- documents atrophy/contractures of extremities; immobile  Treatment: pressure injury precautions    Please clarify and document your clinical opinion in the progress notes and discharge summary including the definitive and/or presumptive diagnosis, (suspected or probable), related to the above clinical findings. Please include clinical findings supporting your diagnosis. REFERENCE: Functional quadriplegia is the inability to use ones limbs and ambulate due to extreme debility or frailty by another medical condition (e.g. dementia, arthritis, contractures, etc.) without physical injury or damage to the spinal cord. Typically the patient requires total care.   The usual findings are: bedridden, inability to turn, unable to feed or groom self, urinary/fecal incontinence, flexion contractures.       Thank you,    Migdalia Victor RN  [facility]  [phone]

## 2018-08-23 NOTE — ED PROVIDER NOTES
HPI Comments: 62 y.o. male with past medical history significant for cerebral palsy, bladder cancer, and hernia who presents from the [facility] via EMS with chief complaint of abdominal pain. Pt's family member reports pt has been c/o abdominal pain for 1 day accompanied by N/V/D. He states Pt had bladder cancer removed >20 years ago and he gets recurrent adhesions (had obstruction in the past). Per family member, Pt is able to swallow but occasionally requires esophageal dilation. Pt's family member denies fever and bloody stool. There are no other acute medical concerns at this time. PCP: Myra Ruth MD    Full history, physical exam, and ROS unable to be obtained due to:  Condition of Pt. Note written by Noah Choe, as dictated by Julio Leavitt MD 6:29 PM    The history is provided by the patient. Past Medical History:   Diagnosis Date    Anxiety     Bladder cancer ([address]     Bladder cancer ([address]     Cancer ([address]     BLADDER    CP (cerebral palsy) ([address]     Depression     Hernia     Nausea & vomiting     Other ill-defined conditions(799.89)     Cerebral palsy    Psychiatric disorder     ANXIETY DEPRESSION    Unspecified constipation 5/13/2013       Past Surgical History:   Procedure Laterality Date    HX APPENDECTOMY      HX CHOLECYSTECTOMY      [place] HR, PEG TUBE 2012    HX ORTHOPAEDIC  2013    Right knee surgery on 8/15 with Dr. Chelsi Oneil.     HX UROLOGICAL      BLADDER REMOVED, HAS NADYA-BLADDER     LAP,CHOLECYSTECTOMY      LAP,LYSIS OF ADHESIONS      CT LAP PROCEDURE, UNLISTED, BLADDER      neobladder    CT REMOVE MESH FROM ABD WALL      due to infection         Family History:   Problem Relation Age of Onset    Heart Disease Mother      TACHYCARDIA    Anesth Problems Neg Hx        Social History     Social History    Marital status: SINGLE     Spouse name: N/A    Number of children: N/A    Years of education: N/A     Occupational History    Not on file. Social History Main Topics    Smoking status: Never Smoker    Smokeless tobacco: Never Used    Alcohol use No    Drug use: No    Sexual activity: Not on file     Other Topics Concern    Not on file     Social History Narrative         ALLERGIES: Avelox [moxifloxacin]; Demerol [meperidine]; Amikacin; Amoxicillin; Betadine surgi-prep; Ciprofloxacin; Effexor [venlafaxine]; Fentanyl; Keflex [cephalexin]; Lyrica [pregabalin]; Morphine; Neurontin [gabapentin]; Prozac [fluoxetine]; Reglan [metoclopramide]; Serzone [nefazodone]; Sulfa (sulfonamide antibiotics); Tetracycline; Tricyclamol chloride; and Valium [diazepam]    Review of Systems   Unable to perform ROS: Other       Vitals:    08/22/18 1747   BP: (!) 136/113   Pulse: (!) 118   Resp: 18   Temp: 97.5 °F (36.4 °C)   SpO2: 100%            Physical Exam   Constitutional: He appears well-developed. No distress. HENT:   Head: Normocephalic and atraumatic. Eyes: Pupils are equal, round, and reactive to light. No scleral icterus. Neck: Normal range of motion. Neck supple. Cardiovascular: Normal rate and regular rhythm. Pulmonary/Chest: Effort normal and breath sounds normal.   Abdominal: He exhibits no distension. There is no tenderness. There is no rebound and no guarding. Pump over R abdomen. Abdomen is tense w/ many surgical scars. Musculoskeletal: Normal range of motion. Neurological: He is alert. Skin: Skin is warm and dry. He is not diaphoretic. Psychiatric: He has a normal mood and affect. His behavior is normal. Thought content normal.   Nursing note and vitals reviewed. Note written by Noah Castorena, as dictated by Jalyn Kim MD 6:29 PM    MDM  Number of Diagnoses or Management Options  Urinary tract infection associated with catheterization of urinary tract, unspecified indwelling urinary catheter type, initial encounter [facility]): new and requires workup  Diagnosis management comments:  The patient is resting comfortably and feels better, is alert and in no distress. The history, exam, diagnostic testing, and current condition do not suggest acute appendicitis, bowel obstruction, incarcerated hernia, acute cholecystitis, bowel perforation, major gastrointestinal bleeding, or severe diverticulitis. Workup significant for complicated UTI with associated tachycardia. Patient admitted for additional workup. ED Course       Procedures      PROGRESS NOTE:  9:13 PM  Pt is still tachycardic. Plan to admit Pt. CONSULT NOTE:  9:21 PM Fallon Zepeda MD spoke with Dr. Chaz Mendiola, Consult for Hospitalist.  Discussed available diagnostic tests and clinical findings. Dr. Chza Mendiola will admit Pt.

## 2018-08-23 NOTE — PROGRESS NOTES
Hospitalist Progress Note  Jose Cruz NP  Answering service: [phone] OR 36 from in house phone  Cell: [phone]   Date of Service:  2018  NAME:  Sascha Saravia  :  1960  MRN:  [id number]    Admission Summary:   Pt presented to the ED with abdominal pain. Patients brother reported that he had been having abdominal pain x 1 day. Other symptoms were nausea and non-bloody diarrhea. Interval history / Subjective:      Pt in bed, his brother is at his bedside. No new complaints and feeling better today after having 2 BMs. Discussed plan of care with Dr. Osmar Dugan from the [facility]. Updated her on current labs results and discussed CT scan. She indicates pt has chronic diarrhea and the only medication that has changed is the liquids Iron she started a few months ago. Assessment & Plan:     Sepsis (leukocytosis and tachycardia with UTI) (POA):   - received 1 Liter bolus in ED  - lactic acid 0.6  - pt started on meropenum in the ED, as he has multiple allergies. Reported ESBL in his urine.  - no blood cultures obtained on admit  - urine culture was ordered this am  - no fever, leukocytosis has normalized     Abdominal Pain:   - CT abdomen showed a fecal impaction.    - has received an enema with good results and had another BM this am  - Pain control as needed - he reports pain is controlled - just has a small amount of suprapubic pain    Fecal Impaction/Hx of Diarrhea  - pt on pepto bismol and cholestyramine at home for chronic diarrhea  - on iron supplements daily  - [facility]: will stop all pepto/iron supplements and questran on discharge. She will monitor and adjust meds as necessary.     UTI complicated:   - s/p radical cystoprostatectomy with ileo neobladder requiring intermittent catheterization and hx ESBL   - usually has to be straight cathed every 6 hours at [address] and follow urine culture  - noted urine culture was ordered but not pending: re-ordered for culture in lab      Hx Dysphagia:   - PEG tube for feeding, may resume feedings (RD to order)  - nutritional consulted  - pt does eat some food at the [place] home. Also drinks liquids      Hiatal hernia:   - large per CT abd report  - on nexium at home, continuing PPI  - stop pepcid     Hx Cerebral palsy / chronic pain:   - pain control as needed  - Pamelor  - ativan      Hx bladder cancer:   - s/p radical cystoprostatectomy with ileo neobladder requiring intermittent catheterization       Hx anxiety/depression: ativan      Code Status: DNR  DVT prophylaxis: lovenox  [facility]     Plan of care discussed with pts brother, patient, RD and RN    Spoke with Dr. Sarai De Jesus from the [address] - pt has baseline anxiety and a hx of tachycardia with HR up to 120's. Hospital Problems  Date Reviewed: 1/2/2014          Codes Class Noted POA    UTI (urinary tract infection) ICD-10-CM: N39.0  ICD-9-CM: 599.0  8/22/2018 Unknown        Sepsis ([facility] ICD-10-CM: A41.9  ICD-9-CM: 038.9, 995.91  8/22/2018 Yes        Constipation ICD-10-CM: K59.00  ICD-9-CM: 564.00  8/22/2018 Yes        Fecal impaction ([facility] ICD-10-CM: K56.41  ICD-9-CM: 560.32  8/22/2018 Unknown        Hiatal hernia ICD-10-CM: K44.9  ICD-9-CM: 553.3  8/22/2018 Yes        Quadriplegic cerebral palsy ([facility] ICD-10-CM: G80.8  ICD-9-CM: 343.2  7/28/2011 Yes        CP (cerebral palsy) (HCC) (Chronic) ICD-10-CM: G80.9  ICD-9-CM: 343.9  7/25/2011 Yes        * (Principal)Abdominal pain ICD-10-CM: R10.9  ICD-9-CM: 789.00  12/9/2010 Yes            Review of Systems:   Denies HA. No chest pain or pressure. No respiratory complaints. Abdominal pain for the most part is resolved, small amount located suprapubically. Vital Signs:    Last 24hrs VS reviewed since prior progress note.  Most recent are:  Visit Vitals    /78 (BP 1 Location: Left arm, BP Patient Position: At rest)    Pulse (!) 107    Temp 97.7 °F (36.5 °C)    Resp 16    Ht 5' 7\" (1.702 m)    Wt 50.4 kg (111 lb 1.8 oz)    SpO2 98%    BMI 17.4 kg/m2       Intake/Output Summary (Last 24 hours) at 08/23/18 0920  Last data filed at 08/23/18 0730   Gross per 24 hour   Intake                0 ml   Output              900 ml   Net             -900 ml        Physical Examination:         Constitutional:  No acute distress, cooperative, pleasant    ENT:  Oral mucous membranes dry   Resp:  CTA bilaterally. No wheezing/rhonchi/rales. No accessory muscle use and on RA   CV:  Regular rhythm, no murmurs. Tele review: ST    GI:  Firm, multiple scarred areas and large device can be palpated to R lower abdomen. Tender suprapubically. Normoactive bowel sounds + BM    Musculoskeletal:  No edema, warm, 2+ pulses throughout    Neurologic:  Moves all extremities. AAOx3. UE contractures. Abnormal head/body movements due to CP. Impaired speech    Lines: Short placed on admit, draining clear yellow urine       Data Review:   Review and/or order of clinical lab test  Review and/or order of tests in the radiology section of CPT  Review and/or order of tests in the medicine section of CPT    Labs:     Recent Labs      08/23/18   0505  08/22/18   1820   WBC  11.0  13.2*   HGB  11.3*  12.5   HCT  35.8*  40.2   PLT  583*  661*     Recent Labs      08/23/18   0505  08/22/18   1820   NA  140  136   K  3.5  4.5   CL  112*  105   CO2  20*  24   BUN  14  19   CREA  0.68*  0.90   GLU  114*  106*   CA  7.9*  8.7     Recent Labs      08/22/18   1820   SGOT  20   ALT  35   AP  102   TBILI  0.3   TP  8.0   ALB  3.5   GLOB  4.5*   LPSE  60*     No results for input(s): INR, PTP, APTT in the last 72 hours. No lab exists for component: INREXT   No results for input(s): FE, TIBC, PSAT, FERR in the last 72 hours. No results found for: FOL, RBCF   No results for input(s): PH, PCO2, PO2 in the last 72 hours.   No results for input(s): CPK, CKNDX, TROIQ in the last 72 hours.    No lab exists for component: CPKMB  No results found for: CHOL, CHOLX, CHLST, CHOLV, HDL, LDL, LDLC, DLDLP, TGLX, TRIGL, TRIGP, CHHD, CHHDX  Lab Results   Component Value Date/Time    Glucose (POC) 114 (H) 03/02/2012 12:39 PM    Glucose (POC) 88 03/02/2012 06:00 AM    Glucose (POC) 117 (H) 03/01/2012 08:41 PM    Glucose (POC) 116 (H) 03/01/2012 04:50 PM    Glucose (POC) 117 (H) 03/01/2012 11:40 AM     Lab Results   Component Value Date/Time    Color YELLOW 08/22/2018 08:14 PM    Appearance (A) 08/22/2018 08:14 PM     Macroscopic performed on spun urine due to gross blood  or mucus    Specific gravity 1.010 08/22/2018 08:14 PM    Specific gravity 1.010 08/24/2013 09:30 AM    pH (UA) 7.0 08/22/2018 08:14 PM    Protein 30 (A) 08/22/2018 08:14 PM    Glucose NEGATIVE  08/22/2018 08:14 PM    Ketone NEGATIVE  08/22/2018 08:14 PM    Bilirubin NEGATIVE  08/22/2018 08:14 PM    Urobilinogen 0.2 08/22/2018 08:14 PM    Nitrites POSITIVE (A) 08/22/2018 08:14 PM    Leukocyte Esterase MODERATE (A) 08/22/2018 08:14 PM    Epithelial cells FEW 08/22/2018 08:14 PM    Bacteria 3+ (A) 08/22/2018 08:14 PM    WBC >100 (H) 08/22/2018 08:14 PM    RBC 5-10 08/22/2018 08:14 PM         Medications Reviewed:     Current Facility-Administered Medications   Medication Dose Route Frequency    sodium chloride (NS) flush 5-10 mL  5-10 mL IntraVENous Q8H    sodium chloride (NS) flush 5-10 mL  5-10 mL IntraVENous PRN    dextrose 5% and 0.9% NaCl infusion  100 mL/hr IntraVENous CONTINUOUS    enoxaparin (LOVENOX) injection 40 mg  40 mg SubCUTAneous QHS    HYDROcodone-acetaminophen (HYCET) 0.5-21.7 mg/mL oral solution 2.5 mg  5 mL Per G Tube Q6H PRN    LORazepam (ATIVAN) tablet 0.75 mg  0.75 mg Per G Tube Q6H    nortriptyline (PAMELOR) capsule 50 mg  50 mg Per G Tube QHS    ferrous sulfate 300 mg (60 mg iron)/5 mL oral syrup 240 mg  4 mL Per G Tube DAILY    fluticasone (FLONASE) 50 mcg/actuation nasal spray 2 Spray  2 Spray Both Nostrils DAILY    acetaminophen (TYLENOL) solution 650 mg  650 mg Per G Tube Q4H PRN    famotidine (PF) (PEPCID) 20 mg in sodium chloride 0.9% 10 mL injection  20 mg IntraVENous Q12H    bismuth subsalicylate (PEPTO-BISMOL) oral suspension 30 mL  30 mL Per G Tube BID    pantoprazole (PROTONIX) 2 mg/mL oral suspension 40 mg  40 mg Per G Tube DAILY    meropenem (MERREM) 500 mg in 0.9% sodium chloride (MBP/ADV) 50 mL  500 mg IntraVENous Q6H     ______________________________________________________________________  EXPECTED LENGTH OF STAY: - - -  ACTUAL LENGTH OF STAY:          [address], NP

## 2018-08-23 NOTE — CDMP QUERY
Patient is noted to have a BMI of 17.4. Please clarify if this patient is:     => Underweight  => Cachexia  => Failure to Thrive  => Other explanation of clinical findings  => Clinically Undetermined (no explanation for clinical findings)    Presentation:  BMI: 17.4    Ht: 5' 7\" (1.702 m)    Wt: 50.4 kg (111 lb 1.8 oz)    Please clarify and document your clinical opinion in the progress notes and discharge summary, including the definitive and or presumptive diagnosis, (suspected or probable), related to the above clinical findings. Please include clinical findings supporting your diagnosis.        Thank you,    Radha Timmons RN  [facility]  [phone]

## 2018-08-23 NOTE — PROGRESS NOTES
Problem: Falls - Risk of  Goal: *Absence of Falls  Document Ira Fall Risk and appropriate interventions in the flowsheet. Outcome: Resolved/Met Date Met: 08/23/18  Fall Risk Interventions:                 Elimination Interventions: Call light in reach             Problem: Pressure Injury - Risk of  Goal: *Prevention of pressure injury  Document Rick Scale and appropriate interventions in the flowsheet. Outcome: Progressing Towards Goal  Pressure Injury Interventions:  Sensory Interventions: Assess changes in LOC, Check visual cues for pain, Minimize linen layers, Float heels, Monitor skin under medical devices, Keep linens dry and wrinkle-free, Pad between skin to skin, Turn and reposition approx. every two hours (pillows and wedges if needed)    Moisture Interventions: Apply protective barrier, creams and emollients    Activity Interventions:  (rotate in bed)    Mobility Interventions: Turn and reposition approx.  every two hours(pillow and wedges)    Nutrition Interventions: Discuss nutritional consult with provider

## 2018-08-23 NOTE — PROGRESS NOTES
Admission Medication Reconciliation:    [facility]    Comments/Recommendations: Updated PTA meds/reviewed patient's allergies. 1)  Obtained a medication administration record from [facility] [employer] [facility]. Patient has a G-tube in place    2)  Medication changes (since last review): Added  -Ferrous sulfate 220 mg (44 mg iron)/5 mL solution 8 mL by Per G Tube daily.  -Nortriptyline 10 mg/5 mL solution 50 mg by Per G Tube nightly.  -Cholestyramine-aspartame 4 gram packet 4 g by Per G Tube nightly.  -Cholecalciferol 2,000 Units by Per G Tube daily  -Acetaminophen 160 mg/5 mL liquid 640 mg by Per G Tube q4h prn fever or pain  -Hydrocodone-acetaminophen 0.5-21.7 mg/mL oral solution 5 mL by Per G Tube QID prn pain    Adjusted  -Bismuth subsalicylate 401 SI/04 mL suspension 30 mL by mouth prn --> BID  -Esomeprazole packet 40mg by G Tube BID --> once daily  -Lorazepam 1mg po QID --> 0.75 mg by Per G Tube q6h  -Rizatriptan 10mg SL as directed --> prn migraine    Removed  -acetaminophen 650 mg suppository   -baclofen 10 mg tablet   -baclofen 20 mg tablet   -Bifidobacterium Infantis 4 mg cap   -bisacodyl 10 mg suppository    -chlorhexidine 0.12 % solution  -dimethicone 1 % topical cream  -diphenhydrAMINE 12.5 mg/5 mL  -epinephrine 0.3 mg/0.3 mL (1:1,000) injection    -hydrocodone-acetaminophen 0.5-33.3 mg/mL Soln oral solution  -hydrocodone-acetaminophen 2.5-167 mg/5 mL oral solution  -ipratropium/albuterol nebulization     -magnesium hydroxide 400 mg/5 mL suspension  -mupirocin calcium 2 % topical cream    -nystatin-triamcinolone topical cream    -ondansetron hcl 4 mg/5 mL oral solution    -polyethylene glycol 17 gram packet     -simethicone 40 mg/0.6 mL drops       Allergies: Avelox [moxifloxacin]; Demerol [meperidine]; Amikacin; Amoxicillin; Betadine surgi-prep; Ciprofloxacin; Effexor [venlafaxine]; Fentanyl; Keflex [cephalexin]; Lyrica [pregabalin]; Morphine; Neurontin [gabapentin];  Prozac [fluoxetine]; Reglan [metoclopramide]; Serzone [nefazodone]; Sulfa (sulfonamide antibiotics); Tetracycline; Tricyclamol chloride; and Valium [diazepam]    Significant PMH/Disease States:   Past Medical History:   Diagnosis Date    Anxiety     Bladder cancer ([facility]     Bladder cancer ([facility]     Cancer ([facility]     BLADDER    CP (cerebral palsy) ([facility]     Depression     Hernia     Nausea & vomiting     Other ill-defined conditions(799.89)     Cerebral palsy    Psychiatric disorder     ANXIETY DEPRESSION    Unspecified constipation 2013       Chief Complaint for this Admission:    Chief Complaint   Patient presents with    Abdominal Pain       Prior to Admission Medications:   Prior to Admission Medications   Prescriptions Last Dose Informant Patient Reported? Taking? Esomeprazole Magnesium (NEXIUM PACKET) 2018 at AM  Yes Yes   Si mg by Per G Tube route daily. Indications: gastroesophageal reflux disease   HYDROcodone-acetaminophen (HYCET) 0.5-21.7 mg/mL oral solution 8/15/2018 at 1640  Yes Yes   Si mL by Per G Tube route four (4) times daily as needed for Pain. LORazepam (ATIVAN) 1 mg tablet 2018 at 1200  Yes Yes   Si.75 mg by Per G Tube route every six (6) hours. acetaminophen (TYLENOL) 160 mg/5 mL liquid 2018 at 1622  Yes Yes   Si mg by Per G Tube route every four (4) hours as needed for Fever or Pain. ascorbic acid, vitamin C, (VITAMIN C) 500 mg tablet 2018 at 1200  Yes Yes   Si mg by Per G Tube route daily. bismuth subsalicylate (PEPTO-BISMOL) 262 mg/15 mL suspension 2018 at AM  Yes Yes   Si mL by Per G Tube route two (2) times a day. Following loose stool   Indications: diarrhea   cholecalciferol (VITAMIN D3) 1,000 unit tablet 2018 at AM  Yes Yes   Si,000 Units by Per G Tube route daily.  Indications: Vitamin D Deficiency   cholestyramine-aspartame (PREVALITE) 4 gram packet 2018 at HS  Yes Yes   Si g by Per G Tube route nightly. ferrous sulfate 220 mg (44 mg iron)/5 mL solution 2018 at 1200  Yes Yes   Si mL by Per G Tube route daily. fluticasone (FLONASE) 50 mcg/actuation nasal spray 2018 at AM  Yes Yes   Si Sprays by Both Nostrils route daily. nortriptyline (PAMELOR) 10 mg/5 mL solution 2018 at 0000  Yes Yes   Si mg by Per G Tube route nightly. rizatriptan (MAXALT) 10 mg tablet 2018 at 0926  Yes No   Sig: 10 mg by SubLINGual route as needed for Migraine.  May repeat dose after 2 hours not to exceed 3 tablets per 24hr      Facility-Administered Medications: None

## 2018-08-23 NOTE — PROGRESS NOTES
Problem: Discharge Planning  Goal: *Discharge to safe environment  Outcome: Progressing Towards Goal  See CM Notes.  CRM: Juan Manuel Hernández, MPH; Z: [phone]

## 2018-08-23 NOTE — PROGRESS NOTES
Reason for Admission:   UTI                   RRAT Score:          11           Plan for utilizing home health:      Patient has been a long term resident (34 years) of The [address] [place] [address]; CM will initiate a return referral via [employer]                    Likelihood of Readmission:  Moderate - NOTE: patient's mother noted that his urine is colonized; CM disclosed information to bedside nursing                         Transition of Care Plan:      Patient lives at [facility], and plan is for patient to return to the facility. Patient's room is on the second floor with elevator access and 24/7 support. CM called the [address] [place] [address] ([phone]) to notify of potential discharge for tomorrow, 8/24/18. CM will put patient on WILL CALL LIST with [facility] via [employer]. Patient will likely discharge tomorrow afternoon. Care Management Interventions  PCP Verified by CM:  Yes (Patient is actually followed by Dr. Garey Sandhoff )  Last Visit to PCP: 08/22/18  Palliative Care Criteria Met (RRAT>21 & CHF Dx)?: No  Mode of Transport at Discharge: BLS (Will require transport; CM will place patient on [facility] WILL CALL LIST)  Transition of Care Consult (CM Consult): [facility], Discharge Planning (Patient has lived at the [address] [place] [address] for 34 years)  MyChart Signup: No  Discharge Durable Medical Equipment: No (patient has extensive DME, including wheelchair that is not at bedside)  Health Maintenance Reviewed: Yes (CM met with patient with mother at bedside; mother did assessment)  Physical Therapy Consult: No  Occupational Therapy Consult: No  Speech Therapy Consult: No  Current Support Network: [address] [place] [address], Other, Has Personal Caregivers, Family Lives [place] (Lives at The [address] [place] [address] with family supoprt nearby)  Confirm Follow Up Transport: [address] (The [address] [place] [address] transports patient)  Plan discussed with Pt/Family/Caregiver: Yes (mother at bedside)  [place] of Choice Offered: Yes (Return to The [email or web address] [facility])  [address] Provided?: No  Discharge Location  Discharge Placement: [address] (The [address])     CRM: Madina Rogers MPH; Z: [phone]

## 2018-08-23 NOTE — ROUTINE PROCESS
TRANSFER - OUT REPORT:    Verbal report given to RN(name) on Sascha Saravia  being transferred to (unit) for routine progression of care       Report consisted of patients Situation, Background, Assessment and   Recommendations(SBAR). Information from the following report(s) SBAR and ED Summary was reviewed with the receiving nurse. Lines:   Peripheral IV 08/22/18 Left Antecubital (Active)   Site Assessment Clean, dry, & intact 8/22/2018  6:31 PM   Phlebitis Assessment 0 8/22/2018  6:31 PM   Infiltration Assessment 0 8/22/2018  6:31 PM   Dressing Status Clean, dry, & intact 8/22/2018  6:31 PM       Peripheral IV 08/22/18 Left Forearm (Active)   Site Assessment Clean, dry, & intact 8/22/2018  7:37 PM   Phlebitis Assessment 0 8/22/2018  7:37 PM   Infiltration Assessment 0 8/22/2018  7:37 PM   Dressing Status Clean, dry, & intact 8/22/2018  7:37 PM   Hub Color/Line Status Blue;Flushed 8/22/2018  7:37 PM        Opportunity for questions and clarification was provided.       Patient transported with:   [employer]

## 2018-08-23 NOTE — H&P
History and Physical    Patient: Fan Nolasco               Sex: male          DOA: 8/22/2018       YOB: 1960      Age:  62 y.o.        LOS:  LOS: 0 days        Chief Complaint   Patient presents with    Abdominal Pain         HPI:     Fan Nolasco is a 62 y.o. male with hx bladder cancer, cerebral palsy, anxiety, depression, peg tube who presents with abdominal pain. Patient brother report he had abdominal pain onset 1 day. He also had nausea, non bloody diarrhea. He was afebrile in ED and tachycardia. CT abdomen shows fecal impaction. ED started him on meropenem for UTI /sepsis. Past Medical History:   Diagnosis Date    Anxiety     Bladder cancer ([address]     [facility]     Cancer ([facility]     BLADDER    CP (cerebral palsy) (HCC)     Depression     Hernia     Nausea & vomiting     Other ill-defined conditions(799.89)     Cerebral palsy    Psychiatric disorder     ANXIETY DEPRESSION    Unspecified constipation 5/13/2013   . Past Surgical History:   Procedure Laterality Date    HX APPENDECTOMY      HX CHOLECYSTECTOMY      [place] HR, PEG TUBE 2012    HX ORTHOPAEDIC  2013    Right knee surgery on 8/15 with Dr. Jere Rosen.  HX UROLOGICAL      BLADDER REMOVED, HAS NADYA-BLADDER     LAP,CHOLECYSTECTOMY      LAP,LYSIS OF ADHESIONS      [place] LAP PROCEDURE, UNLISTED, BLADDER      neobladder    WA REMOVE MESH FROM ABD WALL      due to infection       No current facility-administered medications on file prior to encounter. Current Outpatient Prescriptions on File Prior to Encounter   Medication Sig Dispense Refill    LORazepam (ATIVAN) 1 mg tablet 0.75 mg by Per G Tube route every six (6) hours.  fluticasone (FLONASE) 50 mcg/actuation nasal spray 2 Sprays by Both Nostrils route daily.  Esomeprazole Magnesium (NEXIUM PACKET) 40 mg by Per G Tube route daily.  Indications: gastroesophageal reflux disease      bismuth subsalicylate (PEPTO-BISMOL) 262 mg/15 mL suspension 30 mL by Per G Tube route two (2) times a day. Following loose stool   Indications: diarrhea      rizatriptan (MAXALT) 10 mg tablet 10 mg by SubLINGual route as needed for Migraine. May repeat dose after 2 hours not to exceed 3 tablets per 24hr         Social History     Social History    Marital status: SINGLE     Spouse name: N/A    Number of children: N/A    Years of education: N/A     Occupational History    Not on file. Social History Main Topics    Smoking status: Never Smoker    Smokeless tobacco: Never Used    Alcohol use No    Drug use: No    Sexual activity: Not on file     Other Topics Concern    Not on file     Social History Narrative       Prior to Admission Medications   Prescriptions Last Dose Informant Patient Reported? Taking? Bifidobacterium Infantis (ALIGN) 4 mg cap   Yes No   Sig: by Peg Tube route two (2) times a day. EPINEPHrine (EPIPEN) 0.3 mg/0.3 mL (1:1,000) injection   Yes No   Si.3 mg by IntraMUSCular route once as needed. Indications: ANAPHYLAXIS   Esomeprazole Magnesium (NEXIUM PACKET)   Yes No   Si mg by Per G Tube route two (2) times a day. HYDROcodone-acetaminophen (LORTAB) 0.5-33.3 mg/mL Soln oral solution   No No   Sig: Take 20 mL by mouth every four (4) hours as needed. HYDROcodone-acetaminophen (LORTAB) 2.5-167 mg/5 mL oral solution   No No   Sig: Take 15 mL by mouth every three (3) hours as needed for Pain. IPRATROPIUM/ALBUTEROL SULFATE (DUONEB IN)   Yes No   Sig: Take 1 Dose by inhalation every four (4) hours as needed. LORazepam (ATIVAN) 1 mg tablet   Yes No   Sig: Take 1 mg by mouth four (4) times daily. acetaminophen (TYLENOL) 650 mg suppository   Yes No   Sig: Insert 650 mg into rectum every four (4) hours as needed. Indications: FEVER, PAIN   baclofen (LIORESAL) 10 mg tablet   Yes No   Sig: Take 10 mg by mouth daily.    baclofen (LIORESAL) 20 mg tablet   Yes No   Sig: Take 20 mg by mouth two (2) times a day. bisacodyl (DULCOLAX) 10 mg suppository   No No   Sig: Insert 10 mg into rectum daily as needed (Constipation). bismuth subsalicylate (PEPTO-BISMOL) 262 mg/15 mL suspension   Yes No   Sig: Take 30 mL by mouth as needed. Following loose stool   Indications: DIARRHEA   chlorhexidine (PERIDEX) 0.12 % solution   Yes No   Sig: 15 mL by Swish and Spit route two (2) times a day. dimethicone (PROSHIELD PLUS SKIN PROTECTANT) 1 % topical cream   Yes No   Sig: Apply  to affected area four (4) times daily as needed. diphenhydrAMINE (BENADRYL) 12.5 mg/5 mL   Yes No   Sig: Take 25 mg by mouth as needed. Prior to allergy shots every 8 weeks   fluticasone (FLONASE) 50 mcg/actuation nasal spray   Yes No   Si Sprays by Both Nostrils route daily. magnesium hydroxide (SALAZAR MILK OF MAGNESIA) 400 mg/5 mL suspension   Yes No   Sig: Take 30 mL by mouth daily as needed. mupirocin calcium (BACTROBAN) 2 % topical cream   Yes No   Sig: Apply  to affected area two (2) times a day. To peg site   nystatin-triamcinolone (MYCOLOG II) topical cream   Yes No   Sig: Apply  to affected area two (2) times a day. To peg site   ondansetron hcl (ZOFRAN) 4 mg/5 mL oral solution   Yes No   Si mg by Peg Tube route four (4) times daily as needed. Every 4-6 hrs prn   polyethylene glycol (MIRALAX) 17 gram packet   Yes No   Si g by Peg Tube route daily. rizatriptan (MAXALT) 10 mg tablet   Yes No   Sig: 10 mg by SubLINGual route as directed. Migraine-maxalt 10 mg disintegrating tab-may repeat dose not less than 2 hrs from prior dose x2- no more than 3 pills/24 hrs.give sl not via peg   simethicone (MYLICON) 41 YO/0.7 mL drops   Yes No   Sig: Take  mg by mouth four (4) times daily as needed.       Facility-Administered Medications: None       Family History   Problem Relation Age of Onset    Heart Disease Mother      TACHYCARDIA    Anesth Problems Neg Hx        Allergies   Allergen Reactions    Avelox [Moxifloxacin] Rash    Demerol [Meperidine] Other (comments)     Spastic movements    Amikacin Unknown (comments)    Amoxicillin Rash    Betadine Surgi-Prep Rash     Mother states this does not happen all the time and if betadine is washed off, it's ok    Ciprofloxacin Unknown (comments)    Effexor [Venlafaxine] Unknown (comments)    Fentanyl Unknown (comments)    Keflex [Cephalexin] Rash    Lyrica [Pregabalin] Unknown (comments)    Morphine Unknown (comments)    Neurontin [Gabapentin] Unknown (comments)    Prozac [Fluoxetine] Unknown (comments)    Reglan [Metoclopramide] Unknown (comments)    Serzone [Nefazodone] Unknown (comments)    Sulfa (Sulfonamide Antibiotics) Unknown (comments)    Tetracycline Unknown (comments)    Tricyclamol Chloride Unknown (comments)    Valium [Diazepam] Unknown (comments)     Tolerates Lorazepam       Review of Systems  Review of Systems   Unable to perform ROS: Patient nonverbal       Physical Exam:       Visit Vitals    BP (!) 136/113 (BP 1 Location: Left arm, BP Patient Position: At rest)    Pulse (!) 118    Temp 97.5 °F (36.4 °C)    Resp 18    SpO2 100%         Physical Exam   Constitutional: No distress. HENT:   Head: Normocephalic and atraumatic. Eyes: Pupils are equal, round, and reactive to light. No scleral icterus. Cardiovascular: Normal rate, regular rhythm and normal heart sounds. No murmur heard. Pulmonary/Chest: Effort normal and breath sounds normal. No respiratory distress. He has no wheezes. He has no rales. Abdominal: He exhibits distension. There is no tenderness. There is rigidity. There is no guarding. Firm , peg tube mid abdomen   Musculoskeletal: He exhibits no edema. Contracture UL and LL   Neurological: He is alert. Skin: Skin is warm. No rash noted. He is not diaphoretic. No erythema. No pallor. Psychiatric: He has a normal mood and affect. Ancillary Studies:   All lab and imaging reviewed for the past 24 hours. Recent Results (from the past 24 hour(s))   CBC WITH AUTOMATED DIFF    Collection Time: 08/22/18  6:20 PM   Result Value Ref Range    WBC 13.2 (H) 4.1 - 11.1 K/uL    RBC 4.55 4.10 - 5.70 M/uL    HGB 12.5 12.1 - 17.0 g/dL    HCT 40.2 36.6 - 50.3 %    MCV 88.4 80.0 - 99.0 FL    MCH 27.5 26.0 - 34.0 PG    MCHC 31.1 30.0 - 36.5 g/dL    RDW 16.3 (H) 11.5 - 14.5 %    PLATELET 260 (H) 131 - 400 K/uL    MPV 9.0 8.9 - 12.9 FL    NRBC 0.0 0  WBC    ABSOLUTE NRBC 0.00 0.00 - 0.01 K/uL    NEUTROPHILS 75 32 - 75 %    LYMPHOCYTES 17 12 - 49 %    MONOCYTES 7 5 - 13 %    EOSINOPHILS 1 0 - 7 %    BASOPHILS 0 0 - 1 %    IMMATURE GRANULOCYTES 0 0.0 - 0.5 %    ABS. NEUTROPHILS 10.0 (H) 1.8 - 8.0 K/UL    ABS. LYMPHOCYTES 2.2 0.8 - 3.5 K/UL    ABS. MONOCYTES 0.9 0.0 - 1.0 K/UL    ABS. EOSINOPHILS 0.1 0.0 - 0.4 K/UL    ABS. BASOPHILS 0.0 0.0 - 0.1 K/UL    ABS. IMM. GRANS. 0.0 0.00 - 0.04 K/UL    DF SMEAR SCANNED      RBC COMMENTS ANISOCYTOSIS  1+        RBC COMMENTS SUDARSHAN CELLS  PRESENT       METABOLIC PANEL, COMPREHENSIVE    Collection Time: 08/22/18  6:20 PM   Result Value Ref Range    Sodium 136 136 - 145 mmol/L    Potassium 4.5 3.5 - 5.1 mmol/L    Chloride 105 97 - 108 mmol/L    CO2 24 21 - 32 mmol/L    Anion gap 7 5 - 15 mmol/L    Glucose 106 (H) 65 - 100 mg/dL    BUN 19 6 - 20 MG/DL    Creatinine 0.90 0.70 - 1.30 MG/DL    BUN/Creatinine ratio 21 (H) 12 - 20      GFR est AA >60 >60 ml/min/1.73m2    GFR est non-AA >60 >60 ml/min/1.73m2    Calcium 8.7 8.5 - 10.1 MG/DL    Bilirubin, total 0.3 0.2 - 1.0 MG/DL    ALT (SGPT) 35 12 - 78 U/L    AST (SGOT) 20 15 - 37 U/L    Alk.  phosphatase 102 45 - 117 U/L    Protein, total 8.0 6.4 - 8.2 g/dL    Albumin 3.5 3.5 - 5.0 g/dL    Globulin 4.5 (H) 2.0 - 4.0 g/dL    A-G Ratio 0.8 (L) 1.1 - 2.2     LIPASE    Collection Time: 08/22/18  6:20 PM   Result Value Ref Range    Lipase 60 (L) 73 - 393 U/L   URINALYSIS W/ RFLX MICROSCOPIC    Collection Time: 08/22/18  8:14 PM   Result Value Ref Range    Color YELLOW      Appearance (A) CLEAR       Macroscopic performed on spun urine due to gross blood  or mucus    Specific gravity 1.010 1.003 - 1.030      pH (UA) 7.0 5.0 - 8.0      Protein 30 (A) NEG mg/dL    Glucose NEGATIVE  NEG mg/dL    Ketone NEGATIVE  NEG mg/dL    Bilirubin NEGATIVE  NEG      Blood SMALL (A) NEG      Urobilinogen 0.2 0.2 - 1.0 EU/dL    Nitrites POSITIVE (A) NEG      Leukocyte Esterase MODERATE (A) NEG     URINE MICROSCOPIC ONLY    Collection Time: 08/22/18  8:14 PM   Result Value Ref Range    WBC >100 (H) 0 - 4 /hpf    RBC 5-10 0 - 5 /hpf    Epithelial cells FEW FEW /lpf    Bacteria 3+ (A) NEG /hpf    Mucus 4+ (A) NEG /lpf       Assessment/Plan     Principal Problem:    Abdominal pain (12/9/2010)    Active Problems:    CP (cerebral palsy) ([address] (7/25/2011)      Quadriplegic cerebral palsy ([address] (7/28/2011)      UTI (urinary tract infection) (8/22/2018)      Sepsis ([address] (8/22/2018)      Constipation (8/22/2018)      Fecal impaction ([address] (8/22/2018)      Hiatal hernia (8/22/2018)      Hx bladder cancer   Esophageal stricture   Hx Dysphagia   Chronic pain     PLAN:    Abdominal Pain   - 2/2 fecal impaction and UTI   - Pain control as needed  - enema for fecal impaction     Fecal impaction   - Enema   - stool softerners    UTI complicated   - s/p radical cystoprostatectomy with ileo neobladder requiring intermittent catheterization and hx ESBL   - Continue Merrem   - follow urine culture     Hx Dysphagia   - Peg tube for feeding   - nutritional consult     sepsis   - leukocytosis and tachycardia with UTI   - Follow blood and urine culture     Hiatal hernia   - large per CT abd report  - Famotidine     Hx Cerebral palsy / chronic pain   - pain control as needed  - Pamelor  - ativan     Hx bladder cancer   - s/p radical cystoprostatectomy with ileo neobladder requiring intermittent catheterization      Hx anxiety/depression  - ativan       DVT prophylaxis     DNR      Nadine ALICEA Maral Blevins MD  8/22/2018  9:37 PM

## 2018-08-23 NOTE — PROGRESS NOTES
Day #1 of Merrem  Indication:  UTI/Sepsis  Current regimen:  1 gram q8h  Abx regimen: Merrem  Recent Labs      18   1820   WBC  13.2*   CREA  0.90   BUN  19     Est CrCl: 65 ml/min  Temp (24hrs), Av.2 °F (36.8 °C), Min:97.5 °F (36.4 °C), Max:98.8 °F (37.1 °C)    Cultures: none    Plan: Change to 500 mg q6h

## 2018-08-24 PROCEDURE — 65270000029 HC RM PRIVATE

## 2018-08-24 PROCEDURE — 74011250637 HC RX REV CODE- 250/637: Performed by: FAMILY MEDICINE

## 2018-08-24 PROCEDURE — 74011000258 HC RX REV CODE- 258: Performed by: FAMILY MEDICINE

## 2018-08-24 PROCEDURE — 74011250636 HC RX REV CODE- 250/636: Performed by: FAMILY MEDICINE

## 2018-08-24 RX ADMIN — MEROPENEM 500 MG: 500 INJECTION, POWDER, FOR SOLUTION INTRAVENOUS at 11:05

## 2018-08-24 RX ADMIN — Medication 10 ML: at 22:15

## 2018-08-24 RX ADMIN — PANTOPRAZOLE SODIUM 40 MG: 40 TABLET, DELAYED RELEASE ORAL at 09:00

## 2018-08-24 RX ADMIN — MEROPENEM 500 MG: 500 INJECTION, POWDER, FOR SOLUTION INTRAVENOUS at 00:28

## 2018-08-24 RX ADMIN — LORAZEPAM 0.75 MG: 0.5 TABLET ORAL at 07:00

## 2018-08-24 RX ADMIN — LORAZEPAM 0.75 MG: 0.5 TABLET ORAL at 15:10

## 2018-08-24 RX ADMIN — HYDROCODONE BITARTRATE, ACETAMINOPHEN 2.5 MG: 325; 7.5 SOLUTION ORAL at 05:41

## 2018-08-24 RX ADMIN — MEROPENEM 500 MG: 500 INJECTION, POWDER, FOR SOLUTION INTRAVENOUS at 05:36

## 2018-08-24 RX ADMIN — ENOXAPARIN SODIUM 40 MG: 100 INJECTION SUBCUTANEOUS at 22:15

## 2018-08-24 RX ADMIN — MEROPENEM 500 MG: 500 INJECTION, POWDER, FOR SOLUTION INTRAVENOUS at 18:00

## 2018-08-24 RX ADMIN — HYDROCODONE BITARTRATE, ACETAMINOPHEN 2.5 MG: 325; 7.5 SOLUTION ORAL at 11:05

## 2018-08-24 RX ADMIN — NORTRIPTYLINE HYDROCHLORIDE 50 MG: 25 CAPSULE ORAL at 22:14

## 2018-08-24 RX ADMIN — LORAZEPAM 0.75 MG: 0.5 TABLET ORAL at 22:14

## 2018-08-24 RX ADMIN — Medication 10 ML: at 15:10

## 2018-08-24 NOTE — PROGRESS NOTES
Physical Therapy Screening:  Services are not indicated at this time. [facility] for [facility] therapy based on results obtained during the nursing admission assessment. The patients chart was reviewed and the patient is not appropriate for a skilled therapy evaluation at this time. Please consult physical therapy if any therapy needs arise. Thank you.     Nicholas Tavarez, PT

## 2018-08-24 NOTE — PROGRESS NOTES
Bedside and Verbal shift change report given to Homer Chapa (oncoming nurse) by Blair Patel (offgoing nurse). Report included the following information SBAR and Kardex.

## 2018-08-24 NOTE — PROGRESS NOTES
followed up regarding a will-call ambulance set up with [facility]. I verified this stretcher  with Maryann Vee at [facility] and the confirmation number is [id number].  participated in [facility] rounds this am.  Currently I don't have a discharge order and if patient is discharged I will notify the nursing supervisor at the [facility]. Their phone is [phone].

## 2018-08-24 NOTE — PROGRESS NOTES
met with patient and his mom  Who was at the bedside. Per Kvng Song NP she is awaiting urine culture results. I did speak with Kelby Cerna RN at [facility] ph [phone]. They can supply the drug Merrem if patient returns back to his home which is the [facility] I did speak with the nursing supervisor at the [facility] and per [facility] ph [phone] they can infuse the drug. She is aware that patient has a peripheral IV site and if labs are needed the use [employer].  Per Darriusjoe hernadez they usually draw labs on Wed,however if they need other labs this can be set up. She will be in touch with me this afternoon. The evening supervisor is Shayne Washburn RN. And I will follow up with him  As I hear any additional information.

## 2018-08-24 NOTE — PROGRESS NOTES
TRANSFER - IN REPORT:    Verbal report received from [id number] [facility] RN(name) on Roge Carias  being received from 3N(unit) for routine progression of care      Report consisted of patients Situation, Background, Assessment and   Recommendations(SBAR). Information from the following report(s) SBAR, Kardex, ED Summary, Intake/Output, MAR and Recent Results was reviewed with the receiving nurse. Opportunity for questions and clarification was provided. Assessment completed upon patients arrival to unit and care assumed.          Primary Nurse Doris Saraiva and Joleen Ariza RN performed a dual skin assessment on this patient Impairment noted- see wound doc flow sheet  Rick score is 11    Bandage mid, right back   Inside knees red, blanchable  abrasion, red, blanchable R hand on knuckles  Red, blanchable spot R ABD   PEG  R elbow pink, blanchable   Scab bottom lip

## 2018-08-24 NOTE — PROGRESS NOTES
Hospitalist Progress Note  Riky Singletary NP  Answering service: [phone] from in house phone  Cell: [phone]   Date of Service:  2018  NAME:  Lanre Green  :  1960  MRN:  [id number]    Admission Summary:   Pt presented to the ED with abdominal pain. Patients brother reported that he had been having abdominal pain x 1 day. Other symptoms were nausea and non-bloody diarrhea. Interval history / Subjective:      Pt in bed, mother at bedside - updated her on plan of care - we are waiting on sensitivities for UTI and then can d/c back to [facility]. Sent MD (Carmelina) a message reporting the same. Pt still with mild suprapubic pain today - no other complaints. Assessment & Plan:     Sepsis (leukocytosis and tachycardia with UTI) (POA):   - received 1 Liter bolus in ED  - lactic acid 0.6  - pt started on meropenum in the ED, as he has multiple allergies. Reported ESBL in his urine previously  - no blood cultures obtained on admit  - urine culture pending  - no fever, leukocytosis has normalized     Abdominal Pain:   - CT abdomen showed a fecal impaction.    - has received an enema with good results and had another BM this am  - Pain control as needed - he reports pain is controlled - just has a small amount of suprapubic pain    Fecal Impaction/Hx of Diarrhea  - pt on pepto bismol and cholestyramine at home for chronic diarrhea  - on iron supplements daily  - [facility]: will stop all pepto/iron supplements and questran on discharge. She will monitor and adjust meds as necessary. UTI complicated:   - s/p radical cystoprostatectomy with ileo neobladder requiring intermittent catheterization and hx ESBL   - usually has to be straight cathed every 6 hours at [address] and follow urine culture.  Currently growing GNR (colony count 40,000 after receiving 2 doses of merrem)    Hx Dysphagia: - PEG feedings have resumed with flushes  - RD has seen - appreciate consultation  - pt does eat some food at the [place] home. Also drinks liquids      Hiatal hernia:   - large per CT abd report  - on nexium at home, continuing PPI     Hx Cerebral palsy / chronic pain:   - pain control as needed  - Pamelor  - ativan      Functional Quadriplegia:   - due to the above. - turn/repositioning and support care for comfort and ADLs    Hx bladder cancer:   - s/p radical cystoprostatectomy with ileo neobladder requiring intermittent catheterization       Hx anxiety/depression: ativan     Underweight:  - BMI 17.4  - pt receiving TF  - dietitian has seen/evaluated and ordered TF     Code Status: DNR  DVT prophylaxis: lovenox  [facility]     Plan of care discussed with pts mother, patient, RD,  and Ninfa Nyhan MD (Dr. Stevie Alaniz)     Hospital Problems  Date Reviewed: 1/2/2014          Codes Class Noted POA    UTI (urinary tract infection) ICD-10-CM: N39.0  ICD-9-CM: 599.0  8/22/2018 Unknown        Sepsis ([facility] ICD-10-CM: A41.9  ICD-9-CM: 038.9, 995.91  8/22/2018 Yes        Constipation ICD-10-CM: K59.00  ICD-9-CM: 564.00  8/22/2018 Yes        Fecal impaction ([facility] ICD-10-CM: K56.41  ICD-9-CM: 560.32  8/22/2018 Unknown        Hiatal hernia ICD-10-CM: K44.9  ICD-9-CM: 553.3  8/22/2018 Yes        Quadriplegic cerebral palsy ([facility] ICD-10-CM: G80.8  ICD-9-CM: 343.2  7/28/2011 Yes        CP (cerebral palsy) ([facility] (Chronic) ICD-10-CM: G80.9  ICD-9-CM: 343.9  7/25/2011 Yes        * (Principal)Abdominal pain ICD-10-CM: R10.9  ICD-9-CM: 789.00  12/9/2010 Yes            Review of Systems:   Denies HA. No chest pain or pressure. No respiratory complaints. Still mild pain suprapubically. Vital Signs:    Last 24hrs VS reviewed since prior progress note.  Most recent are:  Visit Vitals    /71 (BP 1 Location: Left arm, BP Patient Position: At rest)    Pulse 96    Temp 98.2 °F (36.8 °C)    Resp 16    Ht 5' 7\" (1.702 m)    Wt 50.4 kg (111 lb 1.8 oz)    SpO2 96%    BMI 17.4 kg/m2       Intake/Output Summary (Last 24 hours) at 08/24/18 1139  Last data filed at 08/24/18 0910   Gross per 24 hour   Intake               95 ml   Output             2450 ml   Net            -2355 ml      Physical Examination:         Constitutional:  No acute distress, cooperative, pleasant    ENT:  Oral mucous membranes dry   Resp:  CTA bilaterally. No accessory muscle use and on RA   CV:  Regular rhythm, no murmurs. GI:  Firm, multiple scarred areas and large device can be palpated to R lower abdomen. Tender suprapubically. Normoactive bowel sounds + BM 8/23    Musculoskeletal:  No edema, warm, 2+ pulses throughout    Neurologic:  Moves all extremities. AAOx3. UE contractures. Abnormal head/body movements due to CP. Impaired speech    Lines: Short placed on admit, draining clear yellow urine       Data Review:   Review and/or order of clinical lab test  Review and/or order of tests in the radiology section of CPT  Review and/or order of tests in the medicine section of CPT    Labs:     Recent Labs      08/23/18   0505  08/22/18   1820   WBC  11.0  13.2*   HGB  11.3*  12.5   HCT  35.8*  40.2   PLT  583*  661*     Recent Labs      08/23/18   0505  08/22/18   1820   NA  140  136   K  3.5  4.5   CL  112*  105   CO2  20*  24   BUN  14  19   CREA  0.68*  0.90   GLU  114*  106*   CA  7.9*  8.7     Recent Labs      08/22/18   1820   SGOT  20   ALT  35   AP  102   TBILI  0.3   TP  8.0   ALB  3.5   GLOB  4.5*   LPSE  60*     No results for input(s): INR, PTP, APTT in the last 72 hours. No lab exists for component: INREXT, INREXT   No results for input(s): FE, TIBC, PSAT, FERR in the last 72 hours. No results found for: FOL, RBCF   No results for input(s): PH, PCO2, PO2 in the last 72 hours. No results for input(s): CPK, CKNDX, TROIQ in the last 72 hours.     No lab exists for component: CPKMB  No results found for: CHOL, [facility], CHLST, [address], HDL, LDL, LDLC, DLDLP, TGLX, TRIGL, TRIGP, CHHD, CHHDX  Lab Results   Component Value Date/Time    Glucose (POC) 114 (H) 03/02/2012 12:39 PM    Glucose (POC) 88 03/02/2012 06:00 AM    Glucose (POC) 117 (H) 03/01/2012 08:41 PM    Glucose (POC) 116 (H) 03/01/2012 04:50 PM    Glucose (POC) 117 (H) 03/01/2012 11:40 AM     Lab Results   Component Value Date/Time    Color YELLOW 08/22/2018 08:14 PM    Appearance (A) 08/22/2018 08:14 PM     Macroscopic performed on spun urine due to gross blood  or mucus    Specific gravity 1.010 08/22/2018 08:14 PM    Specific gravity 1.010 08/24/2013 09:30 AM    pH (UA) 7.0 08/22/2018 08:14 PM    Protein 30 (A) 08/22/2018 08:14 PM    Glucose NEGATIVE  08/22/2018 08:14 PM    Ketone NEGATIVE  08/22/2018 08:14 PM    Bilirubin NEGATIVE  08/22/2018 08:14 PM    Urobilinogen 0.2 08/22/2018 08:14 PM    Nitrites POSITIVE (A) 08/22/2018 08:14 PM    Leukocyte Esterase MODERATE (A) 08/22/2018 08:14 PM    Epithelial cells FEW 08/22/2018 08:14 PM    Bacteria 3+ (A) 08/22/2018 08:14 PM    WBC >100 (H) 08/22/2018 08:14 PM    RBC 5-10 08/22/2018 08:14 PM     Medications Reviewed:     Current Facility-Administered Medications   Medication Dose Route Frequency    sodium chloride (NS) flush 5-10 mL  5-10 mL IntraVENous Q8H    sodium chloride (NS) flush 5-10 mL  5-10 mL IntraVENous PRN    dextrose 5% and 0.9% NaCl infusion  100 mL/hr IntraVENous CONTINUOUS    enoxaparin (LOVENOX) injection 40 mg  40 mg SubCUTAneous QHS    HYDROcodone-acetaminophen (HYCET) 0.5-21.7 mg/mL oral solution 2.5 mg  5 mL Per G Tube Q6H PRN    LORazepam (ATIVAN) tablet 0.75 mg  0.75 mg Per G Tube Q6H    nortriptyline (PAMELOR) capsule 50 mg  50 mg Per G Tube QHS    fluticasone (FLONASE) 50 mcg/actuation nasal spray 2 Spray  2 Spray Both Nostrils DAILY    acetaminophen (TYLENOL) solution 650 mg  650 mg Per G Tube Q4H PRN    pantoprazole (PROTONIX) 2 mg/mL oral suspension 40 mg  40 mg Per G Tube DAILY    meropenem (MERREM) 500 mg in 0.9% sodium chloride (MBP/ADV) 50 mL  500 mg IntraVENous Q6H   ____________________________________________________________________  EXPECTED LENGTH OF STAY: 3d 16h  ACTUAL LENGTH OF STAY:          Dereck 48, NP

## 2018-08-24 NOTE — PROGRESS NOTES
[facility] and I spoke with Bisi Rizo who is the nurse on patient's floor. I was unable to speak with Omari Laboy RN and I updated Bisi Rizo on patient and that cultures are still pending at this time. I have asked weekend care management on my hand-off list to follow up as needed for a smooth transition back to his residence via stretcher. I also alerted Robson Ray that at this time patient is not being discharged until final cultures obtained. I have confirmed WILL-CALL for stretcher with [facility] this pm and the confirmation is in my earlier note.

## 2018-08-24 NOTE — WOUND CARE
Wound Consult:  New Patient Visit. Chart reviewed. Consulted for skin check, redness to pressure areas. Spoke with patients nurses at change of shift and we turned and assessed patient. Patient is resting on a Versacare bed with accumax mattress. Pleasant gentleman with CP; alert and appropriate. Assessment:  Right hip - area of blanching pink intact skin upon turning off this side to reposition. Sacrum/buttocks - no redness noted. Back, left hip and heels/feet - no redness noted. Pillows being used between knees and to off load heels/feet. PEG site is clean and dry. Has a small abrasion to left upper lip. Skin Care Recommendations:  1. Minimize friction/shear: minimize layers of linen/pads under patient. Current surface is pressure redistribution foam  - discussed with patient's nurse Jeanne and patient's mom - if not discharging home to [facility] [place] today will order air support surface for patient - will check back with Jeanne at lunch time. 2. Off load pressure/reposition: continue to turn and reposition approximately every 2 hours; float heels or use Prevalon boots. 3. Manage Moisture - keep skin folds dry; incontinence skin care as needed; appropriate sized briefs if needed; carrera in use to contain urine. 4. Continue to monitor nutrition, pain, and skin risk scale, and skin assessment. Plan: We will continue to reassess routinely and as needed. Air mattress if not discharging today. Addendum: Placed order for air mattress - envision on versacare frame - #[id number].   Palmyra Cody, [facility] Office [phone]  Pager [phone]

## 2018-08-24 NOTE — PROGRESS NOTES
Patient transferred from  and is being treated for sepsis/UTI with history of Cerebral Palsy. Patient is a long term resident of [facility]. CM to follow hospital course and assist with transition back to home when appropriate.     [phone]

## 2018-08-25 PROCEDURE — 74011000258 HC RX REV CODE- 258: Performed by: FAMILY MEDICINE

## 2018-08-25 PROCEDURE — 74011250637 HC RX REV CODE- 250/637: Performed by: FAMILY MEDICINE

## 2018-08-25 PROCEDURE — 65270000029 HC RM PRIVATE

## 2018-08-25 PROCEDURE — 76937 US GUIDE VASCULAR ACCESS: CPT

## 2018-08-25 PROCEDURE — C1751 CATH, INF, PER/CENT/MIDLINE: HCPCS

## 2018-08-25 PROCEDURE — 74011250636 HC RX REV CODE- 250/636: Performed by: FAMILY MEDICINE

## 2018-08-25 RX ADMIN — MEROPENEM 500 MG: 500 INJECTION, POWDER, FOR SOLUTION INTRAVENOUS at 23:34

## 2018-08-25 RX ADMIN — NORTRIPTYLINE HYDROCHLORIDE 50 MG: 25 CAPSULE ORAL at 22:21

## 2018-08-25 RX ADMIN — Medication 10 ML: at 11:09

## 2018-08-25 RX ADMIN — LORAZEPAM 0.75 MG: 0.5 TABLET ORAL at 04:20

## 2018-08-25 RX ADMIN — DEXTROSE MONOHYDRATE AND SODIUM CHLORIDE 100 ML/HR: 5; .9 INJECTION, SOLUTION INTRAVENOUS at 07:02

## 2018-08-25 RX ADMIN — LORAZEPAM 0.75 MG: 0.5 TABLET ORAL at 16:36

## 2018-08-25 RX ADMIN — MEROPENEM 500 MG: 500 INJECTION, POWDER, FOR SOLUTION INTRAVENOUS at 17:12

## 2018-08-25 RX ADMIN — PANTOPRAZOLE SODIUM 40 MG: 40 TABLET, DELAYED RELEASE ORAL at 09:00

## 2018-08-25 RX ADMIN — HYDROCODONE BITARTRATE, ACETAMINOPHEN 2.5 MG: 325; 7.5 SOLUTION ORAL at 07:02

## 2018-08-25 RX ADMIN — LORAZEPAM 0.75 MG: 0.5 TABLET ORAL at 22:21

## 2018-08-25 RX ADMIN — HYDROCODONE BITARTRATE, ACETAMINOPHEN 2.5 MG: 325; 7.5 SOLUTION ORAL at 00:31

## 2018-08-25 RX ADMIN — MEROPENEM 500 MG: 500 INJECTION, POWDER, FOR SOLUTION INTRAVENOUS at 00:31

## 2018-08-25 RX ADMIN — MEROPENEM 500 MG: 500 INJECTION, POWDER, FOR SOLUTION INTRAVENOUS at 11:08

## 2018-08-25 RX ADMIN — Medication 10 ML: at 07:02

## 2018-08-25 RX ADMIN — Medication 10 ML: at 22:21

## 2018-08-25 RX ADMIN — LORAZEPAM 0.5 MG: 0.5 TABLET ORAL at 10:32

## 2018-08-25 RX ADMIN — ENOXAPARIN SODIUM 40 MG: 100 INJECTION SUBCUTANEOUS at 22:21

## 2018-08-25 RX ADMIN — MEROPENEM 500 MG: 500 INJECTION, POWDER, FOR SOLUTION INTRAVENOUS at 07:02

## 2018-08-25 NOTE — PROGRESS NOTES
Bedside shift change report given to Jackson Hannon RN (oncoming nurse) by Waqas Aldrich RN (offgoing nurse). Report included the following information SBAR, Kardex, Intake/Output, MAR, Recent Results and Med Rec Status.

## 2018-08-25 NOTE — PROGRESS NOTES
Bedside shift change report given to Angle Wiggins RN (oncoming nurse) by Adalberto Brito RN (offgoing nurse). Report included the following information SBAR, Kardex, Intake/Output, MAR, Recent Results and Med Rec Status.

## 2018-08-25 NOTE — PROGRESS NOTES
At bedside to place midline in patient per order received. Per RN patient to be discharged and to have 4 more days of antibiotics. Unsuccessful x 2 attempts. Patients muscles tense and makes it difficult to advance needle. Patient mother reports access difficulties in the past and asked about sedation. Primary RN aware of failed attempts; to speak to provider about other options. PIV in right lower arm working at this time; unsure if facility could use to finish treatment.

## 2018-08-25 NOTE — PROGRESS NOTES
Bedside and Verbal shift change report given to Adalberto Brito (oncoming nurse) by Angle Wiggins (offgoing nurse). Report included the following information SBAR and Kardex.

## 2018-08-25 NOTE — PROGRESS NOTES
Hospitalist Progress Note  [place] JOLENE [facility]  Answering service: [phone] from in house phone  Cell: [phone]   Date of Service:  2018  NAME:  Roge Carias  :  1960  MRN:  [id number]    Admission Summary:   Pt presented to the ED with abdominal pain. Patients brother reported that he had been having abdominal pain x 1 day. Other symptoms were nausea and non-bloody diarrhea. Interval history / Subjective:      Pt in bed, mother at bedside - updated her on plan of care - we are waiting on sensitivities for UTI   Sent MD (Carmelina) a message reporting the same. No significant complaints today. Assessment & Plan:     Sepsis (leukocytosis and tachycardia with UTI) (POA):   - received 1 Liter bolus in ED  - lactic acid 0.6  - pt started on meropenum in the ED, as he has multiple allergies. Reported ESBL in his urine previously  - no blood cultures obtained on admit  - urine culture pending - called microbiology, cultures not results but they believe the specimen to contain ESBL  - no fever, leukocytosis normalized     Abdominal Pain:   - CT abdomen showed a fecal impaction.    - has received an enema with good results and had another BM this am  - Pain control as needed - he reports pain is controlled - just has a small amount of suprapubic pain    Fecal Impaction/Hx of Diarrhea  - pt on pepto bismol and cholestyramine at home for chronic diarrhea  - on iron supplements daily  - [facility]: will stop all pepto/iron supplements and questran on discharge. She will monitor and adjust meds as necessary. UTI complicated:   - s/p radical cystoprostatectomy with ileo neobladder requiring intermittent catheterization and hx ESBL   - usually has to be straight cathed every 6 hours at [address] and follow urine culture.  Growing GNR (colony count 40,000 after receiving 2 doses of merrem)  - plan for Midline  - consult to  ofr IV abx (probably Ertapenum)    Hx Dysphagia:   - PEG feedings have resumed with flushes  - RD has seen - appreciate consultation  - pt does eat some food at the [place] home. Also drinks liquids      Hiatal hernia:   - large per CT abd report  - on nexium at home, continuing PPI     Hx Cerebral palsy / chronic pain:   - pain control as needed  - Pamelor  - ativan      Functional Quadriplegia:   - due to the above. - turn/repositioning and support care for comfort and ADLs    Hx bladder cancer:   - s/p radical cystoprostatectomy with ileo neobladder requiring intermittent catheterization       Hx anxiety/depression: ativan     Underweight:  - BMI 17.4  - pt receiving TF  - dietitian has seen/evaluated and ordered TF     Code Status: DNR  DVT prophylaxis: lovenox  [facility]     Plan of care discussed with pts mother, patient, Gena Lynn MD (Dr. Blanca Oakes)     Hospital Problems  Date Reviewed: 1/2/2014          Codes Class Noted POA    UTI (urinary tract infection) ICD-10-CM: N39.0  ICD-9-CM: 599.0  8/22/2018 Unknown        Sepsis ([facility] ICD-10-CM: A41.9  ICD-9-CM: 038.9, 995.91  8/22/2018 Yes        Constipation ICD-10-CM: K59.00  ICD-9-CM: 564.00  8/22/2018 Yes        Fecal impaction ([facility] ICD-10-CM: K56.41  ICD-9-CM: 560.32  8/22/2018 Unknown        Hiatal hernia ICD-10-CM: K44.9  ICD-9-CM: 553.3  8/22/2018 Yes        Quadriplegic cerebral palsy ([facility] ICD-10-CM: G80.8  ICD-9-CM: 343.2  7/28/2011 Yes        CP (cerebral palsy) ([facility] (Chronic) ICD-10-CM: G80.9  ICD-9-CM: 343.9  7/25/2011 Yes        * (Principal)Abdominal pain ICD-10-CM: R10.9  ICD-9-CM: 789.00  12/9/2010 Yes            Review of Systems:   Denies HA. No chest pain or pressure. No respiratory complaints. Still very mild pain suprapubically. Vital Signs:    Last 24hrs VS reviewed since prior progress note.  Most recent are:  Visit Vitals    /86 (BP 1 Location: Left arm, BP Patient Position: At rest)    Pulse 85    Temp 97.9 °F (36.6 °C)    Resp 16    Ht 5' 7\" (1.702 m)    Wt 46.3 kg (102 lb)    SpO2 99%    BMI 15.98 kg/m2       Intake/Output Summary (Last 24 hours) at 08/25/18 1602  Last data filed at 08/25/18 1426   Gross per 24 hour   Intake              310 ml   Output             2375 ml   Net            -2065 ml      Physical Examination:         Constitutional:  No acute distress, cooperative, pleasant    ENT:  Oral mucous membranes dry   Resp:  No accessory muscle use and on RA    GI:  + BM 8/23    Musculoskeletal:  No edema    Neurologic:  Moves all extremities. AAOx3. UE contractures. Abnormal head/body movements due to CP. Impaired speech    Lines: Short placed on admit, draining clear yellow urine       Data Review:   Review and/or order of clinical lab test  Review and/or order of tests in the radiology section of CPT  Review and/or order of tests in the medicine section of CPT    Labs:     Recent Labs      08/23/18   0505  08/22/18   1820   WBC  11.0  13.2*   HGB  11.3*  12.5   HCT  35.8*  40.2   PLT  583*  661*     Recent Labs      08/23/18   0505  08/22/18   1820   NA  140  136   K  3.5  4.5   CL  112*  105   CO2  20*  24   BUN  14  19   CREA  0.68*  0.90   GLU  114*  106*   CA  7.9*  8.7     Recent Labs      08/22/18   1820   SGOT  20   ALT  35   AP  102   TBILI  0.3   TP  8.0   ALB  3.5   GLOB  4.5*   LPSE  60*     No results for input(s): INR, PTP, APTT in the last 72 hours. No lab exists for component: INREXT, INREXT   No results for input(s): FE, TIBC, PSAT, FERR in the last 72 hours. No results found for: FOL, RBCF   No results for input(s): PH, PCO2, PO2 in the last 72 hours. No results for input(s): CPK, CKNDX, TROIQ in the last 72 hours.     No lab exists for component: CPKMB  No results found for: CHOL, CHOLX, CHLST, CHOLV, HDL, LDL, LDLC, DLDLP, TGLX, TRIGL, TRIGP, CHHD, CHHDX  Lab Results   Component Value Date/Time    Glucose (POC) 114 (H) 03/02/2012 12:39 PM Glucose (POC) 88 03/02/2012 06:00 AM    Glucose (POC) 117 (H) 03/01/2012 08:41 PM    Glucose (POC) 116 (H) 03/01/2012 04:50 PM    Glucose (POC) 117 (H) 03/01/2012 11:40 AM     Lab Results   Component Value Date/Time    Color YELLOW 08/22/2018 08:14 PM    Appearance (A) 08/22/2018 08:14 PM     Macroscopic performed on spun urine due to gross blood  or mucus    Specific gravity 1.010 08/22/2018 08:14 PM    Specific gravity 1.010 08/24/2013 09:30 AM    pH (UA) 7.0 08/22/2018 08:14 PM    Protein 30 (A) 08/22/2018 08:14 PM    Glucose NEGATIVE  08/22/2018 08:14 PM    Ketone NEGATIVE  08/22/2018 08:14 PM    Bilirubin NEGATIVE  08/22/2018 08:14 PM    Urobilinogen 0.2 08/22/2018 08:14 PM    Nitrites POSITIVE (A) 08/22/2018 08:14 PM    Leukocyte Esterase MODERATE (A) 08/22/2018 08:14 PM    Epithelial cells FEW 08/22/2018 08:14 PM    Bacteria 3+ (A) 08/22/2018 08:14 PM    WBC >100 (H) 08/22/2018 08:14 PM    RBC 5-10 08/22/2018 08:14 PM     Medications Reviewed:     Current Facility-Administered Medications   Medication Dose Route Frequency    sodium chloride (NS) flush 5-10 mL  5-10 mL IntraVENous Q8H    sodium chloride (NS) flush 5-10 mL  5-10 mL IntraVENous PRN    dextrose 5% and 0.9% NaCl infusion  100 mL/hr IntraVENous CONTINUOUS    enoxaparin (LOVENOX) injection 40 mg  40 mg SubCUTAneous QHS    HYDROcodone-acetaminophen (HYCET) 0.5-21.7 mg/mL oral solution 2.5 mg  5 mL Per G Tube Q6H PRN    LORazepam (ATIVAN) tablet 0.75 mg  0.75 mg Per G Tube Q6H    nortriptyline (PAMELOR) capsule 50 mg  50 mg Per G Tube QHS    fluticasone (FLONASE) 50 mcg/actuation nasal spray 2 Spray  2 Spray Both Nostrils DAILY    acetaminophen (TYLENOL) solution 650 mg  650 mg Per G Tube Q4H PRN    pantoprazole (PROTONIX) 2 mg/mL oral suspension 40 mg  40 mg Per G Tube DAILY    meropenem (MERREM) 500 mg in 0.9% sodium chloride (MBP/ADV) 50 mL  500 mg IntraVENous Q6H ____________________________________________________________________  EXPECTED LENGTH OF STAY: 3d 16h  ACTUAL LENGTH OF STAY:          [address] [facility], NP

## 2018-08-25 NOTE — PROGRESS NOTES
Care Management - Received call from Josey Lazo NP. Patient will be ready for discharge tomorrow. Patient resides at [facility]. Patient will need 3 days of Ertapenum IM beginning 6-27-18. [facility]. Staff was unable to obtain any other type of IV access due to his CP. Dr. Flash Mariscal from [facility] said they can administer the IM Ertapenum if [facility] can provide it. Patient for discharge 8-26-18. Explained patient will need to receive the first dose here before he leaves to ensure he does not have a reaction to it. Spoke with ED pharmacist Yair Clark. He checked the pharmacy here. The pharmacy here does Ertapenum and it can be given IM up to 7 days. Sent referral to [facility] via [employer]. Spoke with Oneida Flynn with [facility] [phone]). She will look up the policy when she returns to the office tomorrow morning and will notify me by 9:30 AM, but she thinks that they are able to do Ertapenum IM. Await call from [facility]. JAYCOB Mirza    19:24 Oneida Flynn from [facility] called back. They can provide the antibiotic. They have provide this patient with IV medication in the past. Updated Josey Lazo. She will write the order for the IV antibiotics outpatient.    JAYCOB Mirza

## 2018-08-26 VITALS
HEIGHT: 67 IN | TEMPERATURE: 97.4 F | OXYGEN SATURATION: 98 % | DIASTOLIC BLOOD PRESSURE: 80 MMHG | RESPIRATION RATE: 18 BRPM | BODY MASS INDEX: 16.01 KG/M2 | HEART RATE: 89 BPM | WEIGHT: 102 LBS | SYSTOLIC BLOOD PRESSURE: 121 MMHG

## 2018-08-26 LAB
ANION GAP SERPL CALC-SCNC: 7 MMOL/L (ref 5–15)
BACTERIA SPEC CULT: ABNORMAL
BUN SERPL-MCNC: 6 MG/DL (ref 6–20)
BUN/CREAT SERPL: 14 (ref 12–20)
CALCIUM SERPL-MCNC: 7.9 MG/DL (ref 8.5–10.1)
CC UR VC: ABNORMAL
CHLORIDE SERPL-SCNC: 103 MMOL/L (ref 97–108)
CO2 SERPL-SCNC: 30 MMOL/L (ref 21–32)
CREAT SERPL-MCNC: 0.42 MG/DL (ref 0.7–1.3)
ERYTHROCYTE [DISTWIDTH] IN BLOOD BY AUTOMATED COUNT: 16 % (ref 11.5–14.5)
GLUCOSE SERPL-MCNC: 86 MG/DL (ref 65–100)
HCT VFR BLD AUTO: 35.6 % (ref 36.6–50.3)
HGB BLD-MCNC: 11.1 G/DL (ref 12.1–17)
MCH RBC QN AUTO: 27.5 PG (ref 26–34)
MCHC RBC AUTO-ENTMCNC: 31.2 G/DL (ref 30–36.5)
MCV RBC AUTO: 88.1 FL (ref 80–99)
NRBC # BLD: 0 K/UL (ref 0–0.01)
NRBC BLD-RTO: 0 PER 100 WBC
PLATELET # BLD AUTO: 516 K/UL (ref 150–400)
PMV BLD AUTO: 9.4 FL (ref 8.9–12.9)
POTASSIUM SERPL-SCNC: 3.8 MMOL/L (ref 3.5–5.1)
RBC # BLD AUTO: 4.04 M/UL (ref 4.1–5.7)
SERVICE CMNT-IMP: ABNORMAL
SODIUM SERPL-SCNC: 140 MMOL/L (ref 136–145)
WBC # BLD AUTO: 5.1 K/UL (ref 4.1–11.1)

## 2018-08-26 PROCEDURE — 74011250637 HC RX REV CODE- 250/637: Performed by: FAMILY MEDICINE

## 2018-08-26 PROCEDURE — 80048 BASIC METABOLIC PNL TOTAL CA: CPT | Performed by: NURSE PRACTITIONER

## 2018-08-26 PROCEDURE — 36415 COLL VENOUS BLD VENIPUNCTURE: CPT | Performed by: NURSE PRACTITIONER

## 2018-08-26 PROCEDURE — 85027 COMPLETE CBC AUTOMATED: CPT | Performed by: NURSE PRACTITIONER

## 2018-08-26 PROCEDURE — 74011250636 HC RX REV CODE- 250/636: Performed by: FAMILY MEDICINE

## 2018-08-26 PROCEDURE — 74011000258 HC RX REV CODE- 258: Performed by: FAMILY MEDICINE

## 2018-08-26 RX ORDER — ERTAPENEM 1 G/1
1 INJECTION, POWDER, LYOPHILIZED, FOR SOLUTION INTRAMUSCULAR; INTRAVENOUS EVERY 24 HOURS
Qty: 4 G | Refills: 0 | Status: SHIPPED | OUTPATIENT
Start: 2018-08-27 | End: 2018-08-31

## 2018-08-26 RX ADMIN — LORAZEPAM 0.75 MG: 0.5 TABLET ORAL at 11:41

## 2018-08-26 RX ADMIN — PANTOPRAZOLE SODIUM 40 MG: 40 TABLET, DELAYED RELEASE ORAL at 09:00

## 2018-08-26 RX ADMIN — MEROPENEM 500 MG: 500 INJECTION, POWDER, FOR SOLUTION INTRAVENOUS at 06:52

## 2018-08-26 RX ADMIN — LORAZEPAM 0.75 MG: 0.5 TABLET ORAL at 04:37

## 2018-08-26 RX ADMIN — MEROPENEM 500 MG: 500 INJECTION, POWDER, FOR SOLUTION INTRAVENOUS at 12:00

## 2018-08-26 RX ADMIN — HYDROCODONE BITARTRATE, ACETAMINOPHEN 2.5 MG: 325; 7.5 SOLUTION ORAL at 01:24

## 2018-08-26 RX ADMIN — Medication 10 ML: at 06:53

## 2018-08-26 NOTE — DISCHARGE SUMMARY
Discharge Summary     PATIENT ID: Onofre Armendariz  MRN: [id number]   YOB: 1960    DATE OF ADMISSION: 8/22/2018  5:40 PM    DATE OF DISCHARGE: 8/26/2018  PRIMARY CARE PROVIDER: Alexsander Acuna MD   ATTENDING PHYSICIAN: Sydney Martin MD  DISCHARGING PROVIDER: Solo Grant NP. To contact this individual call [phone] and ask the  to page. If unavailable ask to be transferred the Adult Hospitalist Department. [id number] [facility] COURSE:   Pt presented to the ED with abdominal pain. Patients brother reported that he had been having abdominal pain x 1 day. Other symptoms were nausea and non-bloody diarrhea. DISCHARGE DIAGNOSES / PLAN:      Sepsis (leukocytosis and tachycardia with UTI) (POA):   - received 1 Liter bolus in ED  - lactic acid 0.6  - pt started on meropenum in the ED, as he has multiple allergies and there were reports he had ESBL in his urine in the past  - no blood cultures obtained on admit  - afebrile and leukocytosis normalized  - urine culture with ESBL Klebsiella Pneumoniae. Sensitive to meropenum.   - Plans for ertapenum on discharge x 4 days (IM) as Midline placement unsuccessful    Abdominal Pain:   - CT abdomen showed a fecal impaction.    - has received enema with good results, last BM 8/23  - Pain resolved     Fecal Impaction/Hx of Diarrhea  - pt on pepto bismol and cholestyramine at home for chronic diarrhea  - on iron supplements daily  - [facility]: will stop all pepto/iron supplements and questran on discharge. She will monitor and adjust meds as necessary.     UTI complicated:   - s/p radical cystoprostatectomy with ileo neobladder requiring intermittent catheterization and hx ESBL   - usually has to be straight cathed every 6 hours at [address] and follow urine culture. Growing GNR (colony count 40,000 after receiving 2 doses of merrem).  UC with ESBL Klebsiella Pneumoniae  - plan for IM Ertapenum as above     Hx Dysphagia:   - PEG feedings have resumed with flushes  - RD has seen - appreciate consultation  - pt does eat some food at the [place] home. Also drinks liquids       Hiatal hernia:   - large per CT abd report  - continue PPI      Hx Cerebral palsy / chronic pain:   - pain control as needed  - Pamelor  - ativan       Functional Quadriplegia:   - due to the above. - turn/repositioning and support care for comfort and ADLs     Hx bladder cancer:   - s/p radical cystoprostatectomy with ileo neobladder requiring intermittent catheterization    - remove Short prior to d/c, resume intermittent catheterization      Hx anxiety/depression: ativan      Underweight:  - BMI 17.4  - pt receiving TF  - dietitian has seen/evaluated and ordered TF     FOLLOW UP APPOINTMENTS:    Follow-up Information     Follow up With Details Comments Contact Info    Ember Tran MD In 1 week or Dr. Coco Philippe [address] [place] [id number] [phone]           ADDITIONAL CARE RECOMMENDATIONS:   - to have IM doses of Ertapenum over the next 4 days to complete antibiotics course    Resume straight cath schedule at [address]: po diet for pleasure  TF: 1 bottle Ensure Enlive (240 mL) 4x/day (0700, 1100, 1500, 1900) + 250 mL flush after feeding    ACTIVITY: Activity as tolerated    DISCHARGE MEDICATIONS:  Current Discharge Medication List      START taking these medications    Details   ertapenem (INVANZ) 1 gram injection 1 g by IntraVENous route every twenty-four (24) hours for 4 days. Mix with lidocaine for order: Ertapenum 1 g vial for IM injection with lidocaine 1% 3.2 mL  Indications: Klebsiella Complicated UTI  Qty: 4 g, Refills: 0         CONTINUE these medications which have NOT CHANGED    Details   nortriptyline (PAMELOR) 10 mg/5 mL solution 50 mg by Per G Tube route nightly. ascorbic acid, vitamin C, (VITAMIN C) 500 mg tablet 500 mg by Per G Tube route daily.       cholecalciferol (VITAMIN D3) 1,000 unit tablet 2,000 Units by Per G Tube route daily. Indications: Vitamin D Deficiency      acetaminophen (TYLENOL) 160 mg/5 mL liquid 640 mg by Per G Tube route every four (4) hours as needed for Fever or Pain. HYDROcodone-acetaminophen (HYCET) 0.5-21.7 mg/mL oral solution 5 mL by Per G Tube route four (4) times daily as needed for Pain. LORazepam (ATIVAN) 1 mg tablet 0.75 mg by Per G Tube route every six (6) hours. fluticasone (FLONASE) 50 mcg/actuation nasal spray 2 Sprays by Both Nostrils route daily. Esomeprazole Magnesium (NEXIUM PACKET) 40 mg by Per G Tube route daily. Indications: gastroesophageal reflux disease      rizatriptan (MAXALT) 10 mg tablet 10 mg by SubLINGual route as needed for Migraine. May repeat dose after 2 hours not to exceed 3 tablets per 24hr         STOP taking these medications       ferrous sulfate 220 mg (44 mg iron)/5 mL solution Comments:   Reason for Stopping:         cholestyramine-aspartame (PREVALITE) 4 gram packet Comments:   Reason for Stopping:         bismuth subsalicylate (PEPTO-BISMOL) 262 mg/15 mL suspension Comments:   Reason for Stopping:             NOTIFY YOUR PHYSICIAN FOR ANY OF THE FOLLOWING:   Fever over 101 degrees for 24 hours. Chest pain, shortness of breath, fever, chills, nausea, vomiting, diarrhea, change in mentation, falling, weakness, bleeding. Severe pain or pain not relieved by medications. Or, any other signs or symptoms that you may have questions about.     DISPOSITION:    Home With:   OT  PT  HH  RN       Long term SNF/Inpatient Rehab    Independent/assisted living    Hospice   xx Other: [address] AT DISCHARGE:   Functional status   xx Poor (baseline)    Deconditioned     Independent      Cognition   xx  Lucid     Forgetful     Dementia      Catheters/lines (plus indication)    Short     PICC    xx PEG (TF)    None      Code status     Full code    xx DNR      PHYSICAL EXAMINATION AT DISCHARGE:  /68 (BP 1 Location: Left arm, BP Patient Position: At rest)  Pulse 98  Temp 97.6 °F (36.4 °C)  Resp 18  Ht 5' 7\" (1.702 m)  Wt 46.3 kg (102 lb)  SpO2 98%  BMI 15.98 kg/m2    Pt in bed, pts mother at bedside. Preparing for discharge today and discussed plan for IM abx.  has also been working on outpatient IM abx to start tomorrow. Constitutional:  No acute distress, cooperative, pleasant    ENT:  Oral mucous membranes dry   Resp:  No accessory muscle use and on RA    GI:  + BM 8/23    Musculoskeletal:  No edema    Neurologic:  Moves all extremities. AAOx3. UE contractures. Abnormal head/body movements due to CP. Impaired speech    Lines: Short placed on admit, draining clear yellow urine.  Short to be removed prior to d/c.       CHRONIC MEDICAL DIAGNOSES:  Problem List as of 8/26/2018  Date Reviewed: 1/2/2014          Codes Class Noted - Resolved    UTI (urinary tract infection) ICD-10-CM: N39.0  ICD-9-CM: 599.0  8/22/2018 - Present        Sepsis ([facility] ICD-10-CM: A41.9  ICD-9-CM: 038.9, 995.91  8/22/2018 - Present        Constipation ICD-10-CM: K59.00  ICD-9-CM: 564.00  8/22/2018 - Present        Fecal impaction ([facility] ICD-10-CM: K56.41  ICD-9-CM: 560.32  8/22/2018 - Present        Hiatal hernia ICD-10-CM: K44.9  ICD-9-CM: 553.3  8/22/2018 - Present        Abdominal pain, LLQ (left lower quadrant) ICD-10-CM: R10.32  ICD-9-CM: 789.04  12/5/2011 - Present        Quadriplegic cerebral palsy ([facility] ICD-10-CM: G80.8  ICD-9-CM: 343.2  7/28/2011 - Present        Esophageal stricture ICD-10-CM: K22.2  ICD-9-CM: 530.3  7/28/2011 - Present        Abdominal pain ICD-10-CM: R10.9  ICD-9-CM: 789.00  7/25/2011 - Present        CP (cerebral palsy) (HCC) (Chronic) ICD-10-CM: G80.9  ICD-9-CM: 343.9  7/25/2011 - Present        Abdominal pain, other specified site ICD-10-CM: R10.9  ICD-9-CM: 789.09  6/3/2011 - Present        * (Principal)Abdominal pain ICD-10-CM: R10.9  ICD-9-CM: 789.00  12/9/2010 - Present        RESOLVED: Contracture of joint of multiple sites ICD-10-CM: M24.50  ICD-9-CM: 718.49  1/3/2014 - 1/3/2014        RESOLVED: Sepsis(995.91) ICD-10-CM: A41.9  ICD-9-CM: 995.91  8/22/2013 - 8/25/2013        RESOLVED: UTI (lower urinary tract infection) ICD-10-CM: N39.0  ICD-9-CM: 599.0  8/22/2013 - 8/25/2013        RESOLVED: Tachycardia ICD-10-CM: R00.0  ICD-9-CM: 785.0  8/22/2013 - 8/25/2013        RESOLVED: Thrombocytosis ([address] ICD-10-CM: D47.3  ICD-9-CM: 238.71  8/22/2013 - 8/25/2013        RESOLVED: Unspecified constipation ICD-10-CM: K59.00  ICD-9-CM: 564.00  5/13/2013 - 5/15/2013        RESOLVED: Sepsis(995.91) ICD-10-CM: A41.9  ICD-9-CM: 995.91  7/14/2012 - 7/21/2012        RESOLVED: UTI (lower urinary tract infection) ICD-10-CM: N39.0  ICD-9-CM: 599.0  7/14/2012 - 7/21/2012        RESOLVED: Abdominal pain, other specified site ICD-10-CM: R10.9  ICD-9-CM: 789.09  2/14/2012 - 3/3/2012            Greater than 30 minutes were spent with the patient on counseling and coordination of care    Signed:   Julián Nieves NP  8/26/2018  10:40 AM

## 2018-08-26 NOTE — PROGRESS NOTES
Called report to Patrick - Discussed meds pos ESBL culture and Ertapenem doses along w history  IV and carrera cath removed

## 2018-08-26 NOTE — DISCHARGE INSTRUCTIONS
Discharge Summary      PATIENT ID: Edith Leventhal  MRN: [id number]   YOB: 1960    DATE OF ADMISSION: 8/22/2018  5:40 PM    DATE OF DISCHARGE: 8/26/2018  PRIMARY CARE PROVIDER: Yudelka Murphy MD   ATTENDING PHYSICIAN: Lali Ivy MD  DISCHARGING PROVIDER: Nicho Rapp NP. To contact this individual call [phone] and ask the  to page. If unavailable ask to be transferred the [address] [place] [address] Street:   Pt presented to the ED with abdominal pain. Patients brother reported that he had been having abdominal pain x 1 day. Other symptoms were nausea and non-bloody diarrhea.     DISCHARGE DIAGNOSES / PLAN:       Sepsis (leukocytosis and tachycardia with UTI) (POA):   - received 1 Liter bolus in ED  - lactic acid 0.6  - pt started on meropenum in the ED, as he has multiple allergies and there were reports he had ESBL in his urine in the past  - no blood cultures obtained on admit  - afebrile and leukocytosis normalized  - urine culture with ESBL Klebsiella Pneumoniae. Sensitive to meropenum.   - Plans for ertapenum on discharge x 4 days (IM) as Midline placement unsuccessful     Abdominal Pain:   - CT abdomen showed a fecal impaction.    - has received enema with good results, last BM 8/23  - Pain resolved      Fecal Impaction/Hx of Diarrhea  - pt on pepto bismol and cholestyramine at home for chronic diarrhea  - on iron supplements daily  - [facility]: will stop all pepto/iron supplements and questran on discharge. She will monitor and adjust meds as necessary.      UTI complicated:   - s/p radical cystoprostatectomy with ileo neobladder requiring intermittent catheterization and hx ESBL   - usually has to be straight cathed every 6 hours at [address] and follow urine culture. Growing GNR (colony count 40,000 after receiving 2 doses of merrem).  UC with ESBL Klebsiella Pneumoniae  - plan for IM Ertapenum as above     Hx Dysphagia:   - PEG feedings have resumed with flushes  - RD has seen - appreciate consultation  - pt does eat some food at the [place] home. Also drinks liquids       Hiatal hernia:   - large per CT abd report  - continue PPI      Hx Cerebral palsy / chronic pain:   - pain control as needed  - Pamelor  - ativan       Functional Quadriplegia:   - due to the above. - turn/repositioning and support care for comfort and ADLs      Hx bladder cancer:   - s/p radical cystoprostatectomy with ileo neobladder requiring intermittent catheterization    - remove Short prior to d/c, resume intermittent catheterization      Hx anxiety/depression: ativan       Underweight:  - BMI 17.4  - pt receiving TF  - dietitian has seen/evaluated and ordered TF      FOLLOW UP APPOINTMENTS:    Follow-up Information     Follow up With Details Comments Contact Info     Maren Pinon MD In 1 week or Dr. Timothy De Luna [address]  [phone]         ADDITIONAL CARE RECOMMENDATIONS:   - to have IM doses of Ertapenum over the next 4 days to complete antibiotics course     Resume straight cath schedule at [facility]: po diet for pleasure  TF: 1 bottle Ensure Enlive (240 mL) 4x/day (0700, 1100, 1500, 1900) + 250 mL flush after feeding     ACTIVITY: Activity as tolerated     DISCHARGE MEDICATIONS:        Current Discharge Medication List             START taking these medications     Details   ertapenem (INVANZ) 1 gram injection 1 g by IntraVENous route every twenty-four (24) hours for 4 days.  Mix with lidocaine for order: Ertapenum 1 g vial for IM injection with lidocaine 1% 3.2 mL  Indications: Klebsiella Complicated UTI  Qty: 4 g, Refills: 0                 CONTINUE these medications which have NOT CHANGED     Details   nortriptyline (PAMELOR) 10 mg/5 mL solution 50 mg by Per G Tube route nightly.       ascorbic acid, vitamin C, (VITAMIN C) 500 mg tablet 500 mg by Per G Tube route daily.       cholecalciferol (VITAMIN D3) 1,000 unit tablet 2,000 Units by Per G Tube route daily. Indications: Vitamin D Deficiency       acetaminophen (TYLENOL) 160 mg/5 mL liquid 640 mg by Per G Tube route every four (4) hours as needed for Fever or Pain.       HYDROcodone-acetaminophen (HYCET) 0.5-21.7 mg/mL oral solution 5 mL by Per G Tube route four (4) times daily as needed for Pain.       LORazepam (ATIVAN) 1 mg tablet 0.75 mg by Per G Tube route every six (6) hours.       fluticasone (FLONASE) 50 mcg/actuation nasal spray 2 Sprays by Both Nostrils route daily.       Esomeprazole Magnesium (NEXIUM PACKET) 40 mg by Per G Tube route daily. Indications: gastroesophageal reflux disease       rizatriptan (MAXALT) 10 mg tablet 10 mg by SubLINGual route as needed for Migraine. May repeat dose after 2 hours not to exceed 3 tablets per 24hr                STOP taking these medications         ferrous sulfate 220 mg (44 mg iron)/5 mL solution Comments:   Reason for Stopping:            cholestyramine-aspartame (PREVALITE) 4 gram packet Comments:   Reason for Stopping:            bismuth subsalicylate (PEPTO-BISMOL) 262 mg/15 mL suspension Comments:   Reason for Stopping:                 NOTIFY YOUR PHYSICIAN FOR ANY OF THE FOLLOWING:   Fever over 101 degrees for 24 hours. Chest pain, shortness of breath, fever, chills, nausea, vomiting, diarrhea, change in mentation, falling, weakness, bleeding. Severe pain or pain not relieved by medications.   Or, any other signs or symptoms that you may have questions about.     DISPOSITION:     Home With:    OT   PT   HH   RN        Long term SNF/Inpatient Rehab     Independent/assisted living     Hospice   xx Other: [facility]      PATIENT CONDITION AT DISCHARGE:   Functional status   xx Poor (baseline)     Deconditioned      Independent       Cognition   xx  Lucid      Forgetful      Dementia       Catheters/lines (plus indication)     Short      PICC    xx PEG (TF)     None       Code status      Full code xx DNR       PHYSICAL EXAMINATION AT DISCHARGE:  /68 (BP 1 Location: Left arm, BP Patient Position: At rest)  Pulse 98  Temp 97.6 °F (36.4 °C)  Resp 18  Ht 5' 7\" (1.702 m)  Wt 46.3 kg (102 lb)  SpO2 98%  BMI 15.98 kg/m2     Pt in bed, pts mother at bedside. Preparing for discharge today and discussed plan for IM abx.  has also been working on outpatient IM abx to start tomorrow.      Constitutional:  No acute distress, cooperative, pleasant    ENT:  Oral mucous membranes dry   Resp:  No accessory muscle use and on RA    GI:  + BM 8/23    Musculoskeletal:  No edema    Neurologic: Ikro all extremities.  AAOx3. UE contractures. Abnormal head/body movements due to CP. Impaired speech    Lines: Short placed on admit, draining clear yellow urine. Short to be removed prior to d/c.       CHRONIC MEDICAL DIAGNOSES:  Problem List as of 8/26/2018  Date Reviewed: 1/2/2014             Codes Class Noted - Resolved     UTI (urinary tract infection) ICD-10-CM: N39.0  ICD-9-CM: 599.0   8/22/2018 - Present           Sepsis ([address] ICD-10-CM: A41.9  ICD-9-CM: 038.9, 995.91   8/22/2018 - Present           Constipation ICD-10-CM: K59.00  ICD-9-CM: 564.00   8/22/2018 - Present           Fecal impaction (HCC) ICD-10-CM: K56.41  ICD-9-CM: 560.32   8/22/2018 - Present           Hiatal hernia ICD-10-CM: K44.9  ICD-9-CM: 090. 3   8/22/2018 - Present           Abdominal pain, LLQ (left lower quadrant) ICD-10-CM: R10.32  ICD-9-CM: 789.04   12/5/2011 - Present           Quadriplegic cerebral palsy (HCC) ICD-10-CM: G80.8  ICD-9-CM: 190. 2   7/28/2011 - Present           Esophageal stricture ICD-10-CM: K22.2  ICD-9-CM: 530. 3   7/28/2011 - Present           Abdominal pain ICD-10-CM: R10.9  ICD-9-CM: 789.00   7/25/2011 - Present           CP (cerebral palsy) (HCC) (Chronic) ICD-10-CM: G80.9  ICD-9-CM: 343. 9   7/25/2011 - Present           Abdominal pain, other specified site ICD-10-CM: R10.9  ICD-9-CM: 789.09   6/3/2011 - Present           * (Principal)Abdominal pain ICD-10-CM: R10.9  ICD-9-CM: 789.00   12/9/2010 - Present           RESOLVED: Contracture of joint of multiple sites ICD-10-CM: M24.50  ICD-9-CM: 718.49   1/3/2014 - 1/3/2014           RESOLVED: Sepsis(995.91) ICD-10-CM: A41.9  ICD-9-CM: 995.91   8/22/2013 - 8/25/2013           RESOLVED: UTI (lower urinary tract infection) ICD-10-CM: N39.0  ICD-9-CM: 599.0   8/22/2013 - 8/25/2013           RESOLVED: Tachycardia ICD-10-CM: R00.0  ICD-9-CM: 879. 0   8/22/2013 - 8/25/2013           RESOLVED: Thrombocytosis ([address] ICD-10-CM: D47.3  ICD-9-CM: 238.71   8/22/2013 - 8/25/2013           RESOLVED: Unspecified constipation ICD-10-CM: K59.00  ICD-9-CM: 564.00   5/13/2013 - 5/15/2013           RESOLVED: Sepsis(995.91) ICD-10-CM: A41.9  ICD-9-CM: 995.91   7/14/2012 - 7/21/2012           RESOLVED: UTI (lower urinary tract infection) ICD-10-CM: N39.0  ICD-9-CM: 599.0   7/14/2012 - 7/21/2012           RESOLVED: Abdominal pain, other specified site ICD-10-CM: R10.9  ICD-9-CM: 789.09   2/14/2012 - 3/3/2012               Greater than 30 minutes were spent with the patient on counseling and coordination of care     Signed:   Naina Escamilla NP  8/26/2018  10:40 AM

## 2018-08-26 NOTE — PROGRESS NOTES
Care Management - Patient is ready for discharge. Per Natanael Huddleston NP patient is ready for discharge. She has talked to his mother. She is aware of the discharge. Patient has had enough Merepenum today. He will begin the Ertapenem IM tomorrow. Spoke with Maged Laurent, nursing supervisor at [facility] (phone [phone]; fax [phone]). They will accept the patient today. She requested information be faxed to her. Faxed requested information. She requested patient arrive before 2 PM or after 4 PM.    Faxed Ertapenem. prescription to [facility] ([phone]). Spoke with Renetta Singletary. She will begin working on it. Ambulance arranged for 4:00 PM with [facility] ([phone]) via telephone call as they were not able to open the [facility] referral. Placed PCS on chart. Chantel at [facility] called back. When she spoke with facility, they told her that they have to use [facility] for all of their medications, even IV. Chantel sent referral to [facility] and spoke with pharmacistSung. Number for [facility] is [phone].     13:48 Spoke with Aviva Campos at [facility]. He has received all the information and they will deliver the medication to the facility.   JAYCOB Dykes

## 2018-08-26 NOTE — PROGRESS NOTES
MEWS 3 reported to [facility] NP - no new orders received - pt generally runs tachy    Bedside and Verbal shift change report given to Rafy Christianson (oncoming nurse) by Rhianna Sales (offgoing nurse). Report included the following information SBAR and Kardex.

## 2021-03-18 ENCOUNTER — APPOINTMENT (OUTPATIENT)
Dept: GENERAL RADIOLOGY | Age: 61
DRG: 871 | End: 2021-03-18
Attending: EMERGENCY MEDICINE
Payer: MEDICARE

## 2021-03-18 ENCOUNTER — APPOINTMENT (OUTPATIENT)
Dept: INTERVENTIONAL RADIOLOGY/VASCULAR | Age: 61
DRG: 871 | End: 2021-03-18
Attending: STUDENT IN AN ORGANIZED HEALTH CARE EDUCATION/TRAINING PROGRAM
Payer: MEDICARE

## 2021-03-18 ENCOUNTER — HOSPITAL ENCOUNTER (INPATIENT)
Age: 61
LOS: 7 days | Discharge: HOME | DRG: 871 | End: 2021-03-25
Attending: EMERGENCY MEDICINE | Admitting: FAMILY MEDICINE
Payer: MEDICARE

## 2021-03-18 ENCOUNTER — APPOINTMENT (OUTPATIENT)
Dept: CT IMAGING | Age: 61
DRG: 871 | End: 2021-03-18
Attending: EMERGENCY MEDICINE
Payer: MEDICARE

## 2021-03-18 DIAGNOSIS — N20.0 KIDNEY STONES: ICD-10-CM

## 2021-03-18 DIAGNOSIS — N17.9 AKI (ACUTE KIDNEY INJURY) (HCC): ICD-10-CM

## 2021-03-18 DIAGNOSIS — N13.4 HYDROURETER: ICD-10-CM

## 2021-03-18 DIAGNOSIS — R31.9 URINARY TRACT INFECTION WITH HEMATURIA, SITE UNSPECIFIED: ICD-10-CM

## 2021-03-18 DIAGNOSIS — A41.9 SEPSIS, DUE TO UNSPECIFIED ORGANISM, UNSPECIFIED WHETHER ACUTE ORGAN DYSFUNCTION PRESENT (HCC): Primary | ICD-10-CM

## 2021-03-18 DIAGNOSIS — N39.0 URINARY TRACT INFECTION WITH HEMATURIA, SITE UNSPECIFIED: ICD-10-CM

## 2021-03-18 PROBLEM — R65.20 SEVERE SEPSIS (HCC): Status: ACTIVE | Noted: 2021-03-18

## 2021-03-18 LAB
ALBUMIN SERPL-MCNC: 3.1 G/DL (ref 3.5–5)
ALBUMIN/GLOB SERPL: 0.6 {RATIO} (ref 1.1–2.2)
ALP SERPL-CCNC: 85 U/L (ref 45–117)
ALT SERPL-CCNC: 33 U/L (ref 12–78)
ANION GAP SERPL CALC-SCNC: 4 MMOL/L (ref 5–15)
APPEARANCE UR: ABNORMAL
AST SERPL-CCNC: 12 U/L (ref 15–37)
ATRIAL RATE: 133 BPM
BACTERIA URNS QL MICRO: ABNORMAL /HPF
BASOPHILS # BLD: 0.1 K/UL (ref 0–0.1)
BASOPHILS NFR BLD: 1 % (ref 0–1)
BILIRUB SERPL-MCNC: 0.5 MG/DL (ref 0.2–1)
BILIRUB UR QL: NEGATIVE
BUN SERPL-MCNC: 49 MG/DL (ref 6–20)
BUN/CREAT SERPL: 36 (ref 12–20)
CALCIUM SERPL-MCNC: 9.5 MG/DL (ref 8.5–10.1)
CALCULATED P AXIS, ECG09: 52 DEGREES
CALCULATED R AXIS, ECG10: 43 DEGREES
CALCULATED T AXIS, ECG11: 31 DEGREES
CHLORIDE SERPL-SCNC: 112 MMOL/L (ref 97–108)
CO2 SERPL-SCNC: 30 MMOL/L (ref 21–32)
COLOR UR: ABNORMAL
COMMENT, HOLDF: NORMAL
CREAT SERPL-MCNC: 1.36 MG/DL (ref 0.7–1.3)
DIAGNOSIS, 93000: NORMAL
DIFFERENTIAL METHOD BLD: ABNORMAL
EOSINOPHIL # BLD: 0 K/UL (ref 0–0.4)
EOSINOPHIL NFR BLD: 0 % (ref 0–7)
EPITH CASTS URNS QL MICRO: ABNORMAL /LPF
ERYTHROCYTE [DISTWIDTH] IN BLOOD BY AUTOMATED COUNT: 13.4 % (ref 11.5–14.5)
GLOBULIN SER CALC-MCNC: 5.6 G/DL (ref 2–4)
GLUCOSE SERPL-MCNC: 105 MG/DL (ref 65–100)
GLUCOSE UR STRIP.AUTO-MCNC: NEGATIVE MG/DL
HCT VFR BLD AUTO: 45 % (ref 36.6–50.3)
HGB BLD-MCNC: 13.8 G/DL (ref 12.1–17)
HGB UR QL STRIP: ABNORMAL
IMM GRANULOCYTES # BLD AUTO: 0 K/UL (ref 0–0.04)
IMM GRANULOCYTES NFR BLD AUTO: 0 % (ref 0–0.5)
KETONES UR QL STRIP.AUTO: NEGATIVE MG/DL
LACTATE SERPL-SCNC: 1.8 MMOL/L (ref 0.4–2)
LACTATE SERPL-SCNC: 3.4 MMOL/L (ref 0.4–2)
LEUKOCYTE ESTERASE UR QL STRIP.AUTO: ABNORMAL
LYMPHOCYTES # BLD: 2.8 K/UL (ref 0.8–3.5)
LYMPHOCYTES NFR BLD: 21 % (ref 12–49)
MCH RBC QN AUTO: 29.5 PG (ref 26–34)
MCHC RBC AUTO-ENTMCNC: 30.7 G/DL (ref 30–36.5)
MCV RBC AUTO: 96.2 FL (ref 80–99)
MONOCYTES # BLD: 1.4 K/UL (ref 0–1)
MONOCYTES NFR BLD: 11 % (ref 5–13)
NEUTS SEG # BLD: 8.7 K/UL (ref 1.8–8)
NEUTS SEG NFR BLD: 67 % (ref 32–75)
NITRITE UR QL STRIP.AUTO: NEGATIVE
NRBC # BLD: 0 K/UL (ref 0–0.01)
NRBC BLD-RTO: 0 PER 100 WBC
OTHER,OTHU: ABNORMAL
P-R INTERVAL, ECG05: 98 MS
PH UR STRIP: 8.5 [PH] (ref 5–8)
PLATELET # BLD AUTO: 473 K/UL (ref 150–400)
PMV BLD AUTO: 10.9 FL (ref 8.9–12.9)
POTASSIUM SERPL-SCNC: 4.3 MMOL/L (ref 3.5–5.1)
PROT SERPL-MCNC: 8.7 G/DL (ref 6.4–8.2)
PROT UR STRIP-MCNC: ABNORMAL MG/DL
Q-T INTERVAL, ECG07: 308 MS
QRS DURATION, ECG06: 64 MS
QTC CALCULATION (BEZET), ECG08: 458 MS
RBC # BLD AUTO: 4.68 M/UL (ref 4.1–5.7)
RBC #/AREA URNS HPF: ABNORMAL /HPF (ref 0–5)
SAMPLES BEING HELD,HOLD: NORMAL
SODIUM SERPL-SCNC: 146 MMOL/L (ref 136–145)
SP GR UR REFRACTOMETRY: 1.02 (ref 1–1.03)
UR CULT HOLD, URHOLD: NORMAL
UROBILINOGEN UR QL STRIP.AUTO: 0.2 EU/DL (ref 0.2–1)
VENTRICULAR RATE, ECG03: 133 BPM
WBC # BLD AUTO: 13 K/UL (ref 4.1–11.1)
WBC URNS QL MICRO: >100 /HPF (ref 0–4)

## 2021-03-18 PROCEDURE — 74177 CT ABD & PELVIS W/CONTRAST: CPT

## 2021-03-18 PROCEDURE — 51798 US URINE CAPACITY MEASURE: CPT

## 2021-03-18 PROCEDURE — 74011250636 HC RX REV CODE- 250/636: Performed by: EMERGENCY MEDICINE

## 2021-03-18 PROCEDURE — 83605 ASSAY OF LACTIC ACID: CPT

## 2021-03-18 PROCEDURE — 87186 SC STD MICRODIL/AGAR DIL: CPT

## 2021-03-18 PROCEDURE — 87086 URINE CULTURE/COLONY COUNT: CPT

## 2021-03-18 PROCEDURE — 74011000636 HC RX REV CODE- 636: Performed by: EMERGENCY MEDICINE

## 2021-03-18 PROCEDURE — 81001 URINALYSIS AUTO W/SCOPE: CPT

## 2021-03-18 PROCEDURE — 99285 EMERGENCY DEPT VISIT HI MDM: CPT

## 2021-03-18 PROCEDURE — 93005 ELECTROCARDIOGRAM TRACING: CPT

## 2021-03-18 PROCEDURE — 80053 COMPREHEN METABOLIC PANEL: CPT

## 2021-03-18 PROCEDURE — 96375 TX/PRO/DX INJ NEW DRUG ADDON: CPT

## 2021-03-18 PROCEDURE — 74011000258 HC RX REV CODE- 258: Performed by: STUDENT IN AN ORGANIZED HEALTH CARE EDUCATION/TRAINING PROGRAM

## 2021-03-18 PROCEDURE — 96367 TX/PROPH/DG ADDL SEQ IV INF: CPT

## 2021-03-18 PROCEDURE — 36415 COLL VENOUS BLD VENIPUNCTURE: CPT

## 2021-03-18 PROCEDURE — 96365 THER/PROPH/DIAG IV INF INIT: CPT

## 2021-03-18 PROCEDURE — 02HV33Z INSERTION OF INFUSION DEVICE INTO SUPERIOR VENA CAVA, PERCUTANEOUS APPROACH: ICD-10-PCS | Performed by: ANESTHESIOLOGY

## 2021-03-18 PROCEDURE — 85025 COMPLETE CBC W/AUTO DIFF WBC: CPT

## 2021-03-18 PROCEDURE — 87077 CULTURE AEROBIC IDENTIFY: CPT

## 2021-03-18 PROCEDURE — 74011000258 HC RX REV CODE- 258: Performed by: EMERGENCY MEDICINE

## 2021-03-18 PROCEDURE — 65270000029 HC RM PRIVATE

## 2021-03-18 PROCEDURE — 87040 BLOOD CULTURE FOR BACTERIA: CPT

## 2021-03-18 PROCEDURE — 74011250636 HC RX REV CODE- 250/636: Performed by: STUDENT IN AN ORGANIZED HEALTH CARE EDUCATION/TRAINING PROGRAM

## 2021-03-18 PROCEDURE — 71045 X-RAY EXAM CHEST 1 VIEW: CPT

## 2021-03-18 RX ORDER — LIDOCAINE HYDROCHLORIDE 20 MG/ML
20 INJECTION, SOLUTION INFILTRATION; PERINEURAL
Status: CANCELLED | OUTPATIENT
Start: 2021-03-18 | End: 2021-03-19

## 2021-03-18 RX ORDER — ONDANSETRON 2 MG/ML
INJECTION INTRAMUSCULAR; INTRAVENOUS
Status: DISPENSED
Start: 2021-03-18 | End: 2021-03-19

## 2021-03-18 RX ORDER — LIDOCAINE HYDROCHLORIDE 20 MG/ML
INJECTION, SOLUTION INFILTRATION; PERINEURAL
Status: DISPENSED
Start: 2021-03-18 | End: 2021-03-19

## 2021-03-18 RX ORDER — ONDANSETRON 2 MG/ML
4 INJECTION INTRAMUSCULAR; INTRAVENOUS
Status: COMPLETED | OUTPATIENT
Start: 2021-03-18 | End: 2021-03-18

## 2021-03-18 RX ORDER — ONDANSETRON 2 MG/ML
4 INJECTION INTRAMUSCULAR; INTRAVENOUS ONCE
Status: COMPLETED | OUTPATIENT
Start: 2021-03-18 | End: 2021-03-18

## 2021-03-18 RX ORDER — SODIUM CHLORIDE 9 MG/ML
150 INJECTION, SOLUTION INTRAVENOUS CONTINUOUS
Status: DISCONTINUED | OUTPATIENT
Start: 2021-03-18 | End: 2021-03-19

## 2021-03-18 RX ADMIN — IOPAMIDOL 100 ML: 755 INJECTION, SOLUTION INTRAVENOUS at 18:56

## 2021-03-18 RX ADMIN — SODIUM CHLORIDE 100 ML/HR: 9 INJECTION, SOLUTION INTRAVENOUS at 23:08

## 2021-03-18 RX ADMIN — ONDANSETRON 4 MG: 2 INJECTION INTRAMUSCULAR; INTRAVENOUS at 15:00

## 2021-03-18 RX ADMIN — SODIUM CHLORIDE 878 ML: 9 INJECTION, SOLUTION INTRAVENOUS at 16:06

## 2021-03-18 RX ADMIN — ONDANSETRON 4 MG: 2 INJECTION INTRAMUSCULAR; INTRAVENOUS at 16:32

## 2021-03-18 RX ADMIN — MEROPENEM 500 MG: 500 INJECTION, POWDER, FOR SOLUTION INTRAVENOUS at 17:40

## 2021-03-18 RX ADMIN — SODIUM CHLORIDE 1000 ML: 9 INJECTION, SOLUTION INTRAVENOUS at 14:40

## 2021-03-18 RX ADMIN — MEROPENEM 500 MG: 500 INJECTION, POWDER, FOR SOLUTION INTRAVENOUS at 23:40

## 2021-03-18 RX ADMIN — CEFTRIAXONE SODIUM 1 G: 1 INJECTION, POWDER, FOR SOLUTION INTRAMUSCULAR; INTRAVENOUS at 16:05

## 2021-03-18 NOTE — ED NOTES
Straight Cath performed using Coude 16f and sterile technique. Unable to obtain urine at this time. Bladder scan performed following straight cath. >200 noted in bladder. Spoke with pt's facility. Instructed Coude 14f in used to straight cath pt Q6H.

## 2021-03-18 NOTE — ED PROVIDER NOTES
Pt is a 60 yo male with hx of cerebral palsy, anxiety, bladder cancer s/p radical cystoprostatectomy with ileo neobladder, who presents with abdominal pain. At mental status baseline per EMS. Answers 'yes' when asked if he has abdominal pain. Unable to localize. Noted to be febrile in ED with prior hx of urosepsis requiring admission in 2018. Past Medical History:   Diagnosis Date    Anxiety     Bladder cancer ([address]     Bladder cancer ([address]     Cancer ([address]     BLADDER    CP (cerebral palsy) ([address]     Depression     Hernia     Nausea & vomiting     Other ill-defined conditions(799.89)     Cerebral palsy    Psychiatric disorder     ANXIETY DEPRESSION    Unspecified constipation 5/13/2013       Past Surgical History:   Procedure Laterality Date    HX APPENDECTOMY      HX CHOLECYSTECTOMY      [place] HR, PEG TUBE 2012    HX ORTHOPAEDIC  2013    Right knee surgery on 8/15 with Dr. Johann Kiser.     HX UROLOGICAL      BLADDER REMOVED, HAS NADYA-BLADDER     MT LAP PROCEDURE, UNLISTED, BLADDER      neobladder    MT LAP,CHOLECYSTECTOMY      MT LAP,LYSIS OF ADHESIONS      MT REMOVE MESH FROM ABD WALL      due to infection         Family History:   Problem Relation Age of Onset    Heart Disease Mother         TACHYCARDIA    Anesth Problems Neg Hx        Social History     Socioeconomic History    Marital status: SINGLE     Spouse name: Not on file    Number of children: Not on file    Years of education: Not on file    Highest education level: Not on file   Occupational History    Not on file   Social Needs    Financial resource strain: Not on file    Food insecurity     Worry: Not on file     Inability: Not on file    Transportation needs     Medical: Not on file     Non-medical: Not on file   Tobacco Use    Smoking status: Never Smoker    Smokeless tobacco: Never Used   Substance and Sexual Activity    Alcohol use: No    Drug use: No    Sexual activity: Not on file Lifestyle    Physical activity     Days per week: Not on file     Minutes per session: Not on file    Stress: Not on file   Relationships    Social connections     Talks on phone: Not on file     Gets together: Not on file     Attends Protestant service: Not on file     Active member of club or organization: Not on file     Attends meetings of clubs or organizations: Not on file     Relationship status: Not on file    Intimate partner violence     Fear of current or ex partner: Not on file     Emotionally abused: Not on file     Physically abused: Not on file     Forced sexual activity: Not on file   Other Topics Concern    Not on file   Social History Narrative    Not on file         ALLERGIES: Avelox [moxifloxacin], Demerol [meperidine], Amikacin, Amoxicillin, Betadine surgi-prep, Ciprofloxacin, Effexor [venlafaxine], Fentanyl, Keflex [cephalexin], Lyrica [pregabalin], Morphine, Neurontin [gabapentin], Prozac [fluoxetine], Reglan [metoclopramide], Serzone [nefazodone], Sulfa (sulfonamide antibiotics), Tetracycline, Tricyclamol chloride, and Valium [diazepam]    Review of Systems   Unable to perform ROS: Patient nonverbal       Vitals:    03/18/21 1425   BP: (!) 132/98   Pulse: (!) 131   Resp: 26   Temp: 99.5 °F (37.5 °C)   SpO2: 100%   Weight: 62.6 kg (138 lb 0.1 oz)   Height: 5' 7\" (1.702 m)            Physical Exam  Vitals signs and nursing note reviewed. Constitutional:       Appearance: He is well-developed. He is ill-appearing. HENT:      Head: Normocephalic. Nose: Nose normal.   Eyes:      Conjunctiva/sclera: Conjunctivae normal.   Neck:      Musculoskeletal: Normal range of motion and neck supple. Cardiovascular:      Rate and Rhythm: Regular rhythm. Tachycardia present. Heart sounds: Normal heart sounds. Pulmonary:      Effort: Pulmonary effort is normal. No respiratory distress. Breath sounds: Normal breath sounds. Abdominal:      General: There is no distension. Palpations: Abdomen is soft. Tenderness: There is no guarding. Comments: PEG tube in place. Ostomy bag in lower abdomen. Stimulator in RLQ palpated. Musculoskeletal: Normal range of motion. General: No deformity. Skin:     General: Skin is warm and dry. Coloration: Skin is pale. Neurological:      Mental Status: He is alert. Mental status is at baseline. [facility]  ED Course as of Mar 18 1935   Thu Mar 18, 2021   1440 ED EKG interpretation:  Rhythm: sinus tachycardia; and regular . Rate (approx.): 133; Axis: normal; ST/T wave: normal; No evidence of acute coronary ischemia. [AL]   6885 Pt with multiple abx allergies. Will consult with pharmacy for broad spectrum abx to cover patient. [AL]      ED Course User Index  [AL] Arcenio Patel MD       Procedures    Perfect Serve Consult for Admission  7:35 PM    ED Room Number: ER24/24  Patient Name and age: Emma Mitchell 61 y.o.  male  Working Diagnosis:   1. Sepsis, due to unspecified organism, unspecified whether acute organ dysfunction present ([facility]    2. VAZQUEZ (acute kidney injury) ([facility]    3. Urinary tract infection with hematuria, site unspecified    4. Hydroureter    5. Kidney stones        COVID-19 Suspicion:  no  Sepsis present:  no  Reassessment needed: no  Code Status:  Full Code  Readmission: no  Isolation Requirements:  no  Recommended Level of Care:  telemetry  Department:[facility] Adult ED - 21   Other:  Pt with urosepsis. Stones in ureter, not obstructing. Ceftriaxone and meropenem given along with IVF. Patient is being admitted to the hospital.  The results of their tests and reasons for their admission have been discussed with them and/or available family. They convey agreement and understanding for the need to be admitted and for their admission diagnosis.

## 2021-03-18 NOTE — ED NOTES
Straight cathed patient without any returned urine. Bladder scan showed 274 mL of urine in the bladder.

## 2021-03-18 NOTE — ED NOTES
Updated [address] [facility] on pt status and plan to admit. Spoke with Chris, RN, Nursing Supervisor.

## 2021-03-18 NOTE — ED TRIAGE NOTES
Patient arrives via EMS from [facility] for abdominal pain. EMS tympanic temp 100.4, HR 130s.   Patient with history of cerebral palsy, nods yes/no, feedings per peg tube

## 2021-03-19 ENCOUNTER — ANESTHESIA (OUTPATIENT)
Dept: INTERVENTIONAL RADIOLOGY/VASCULAR | Age: 61
DRG: 871 | End: 2021-03-19
Payer: MEDICARE

## 2021-03-19 ENCOUNTER — APPOINTMENT (OUTPATIENT)
Dept: INTERVENTIONAL RADIOLOGY/VASCULAR | Age: 61
DRG: 871 | End: 2021-03-19
Attending: STUDENT IN AN ORGANIZED HEALTH CARE EDUCATION/TRAINING PROGRAM
Payer: MEDICARE

## 2021-03-19 ENCOUNTER — ANESTHESIA EVENT (OUTPATIENT)
Dept: INTERVENTIONAL RADIOLOGY/VASCULAR | Age: 61
DRG: 871 | End: 2021-03-19
Payer: MEDICARE

## 2021-03-19 LAB
ANION GAP SERPL CALC-SCNC: 2 MMOL/L (ref 5–15)
BUN SERPL-MCNC: 32 MG/DL (ref 6–20)
BUN/CREAT SERPL: 27 (ref 12–20)
CALCIUM SERPL-MCNC: 8.8 MG/DL (ref 8.5–10.1)
CHLORIDE SERPL-SCNC: 122 MMOL/L (ref 97–108)
CO2 SERPL-SCNC: 29 MMOL/L (ref 21–32)
COMMENT, HOLDF: NORMAL
COVID-19 RAPID TEST, COVR: NOT DETECTED
CREAT SERPL-MCNC: 1.17 MG/DL (ref 0.7–1.3)
GLUCOSE BLD STRIP.AUTO-MCNC: 158 MG/DL (ref 65–100)
GLUCOSE SERPL-MCNC: 112 MG/DL (ref 65–100)
POTASSIUM SERPL-SCNC: 3.7 MMOL/L (ref 3.5–5.1)
SAMPLES BEING HELD,HOLD: NORMAL
SERVICE CMNT-IMP: ABNORMAL
SODIUM SERPL-SCNC: 153 MMOL/L (ref 136–145)
SOURCE, COVRS: NORMAL

## 2021-03-19 PROCEDURE — 0T9330Z DRAINAGE OF RIGHT KIDNEY PELVIS WITH DRAINAGE DEVICE, PERCUTANEOUS APPROACH: ICD-10-PCS | Performed by: RADIOLOGY

## 2021-03-19 PROCEDURE — 82962 GLUCOSE BLOOD TEST: CPT

## 2021-03-19 PROCEDURE — 74011000250 HC RX REV CODE- 250: Performed by: NURSE ANESTHETIST, CERTIFIED REGISTERED

## 2021-03-19 PROCEDURE — 87635 SARS-COV-2 COVID-19 AMP PRB: CPT

## 2021-03-19 PROCEDURE — 74011250636 HC RX REV CODE- 250/636: Performed by: NURSE ANESTHETIST, CERTIFIED REGISTERED

## 2021-03-19 PROCEDURE — 77030011229 HC DIL VESL COON COOK -A

## 2021-03-19 PROCEDURE — 77030026438 HC STYL ET INTUB CARD -A: Performed by: ANESTHESIOLOGY

## 2021-03-19 PROCEDURE — C1894 INTRO/SHEATH, NON-LASER: HCPCS

## 2021-03-19 PROCEDURE — 74011000258 HC RX REV CODE- 258: Performed by: STUDENT IN AN ORGANIZED HEALTH CARE EDUCATION/TRAINING PROGRAM

## 2021-03-19 PROCEDURE — 77030008684 HC TU ET CUF COVD -B: Performed by: ANESTHESIOLOGY

## 2021-03-19 PROCEDURE — C1769 GUIDE WIRE: HCPCS

## 2021-03-19 PROCEDURE — 74011000250 HC RX REV CODE- 250: Performed by: STUDENT IN AN ORGANIZED HEALTH CARE EDUCATION/TRAINING PROGRAM

## 2021-03-19 PROCEDURE — 76210000063 HC OR PH I REC FIRST 0.5 HR

## 2021-03-19 PROCEDURE — 74011000250 HC RX REV CODE- 250: Performed by: RADIOLOGY

## 2021-03-19 PROCEDURE — 74011250636 HC RX REV CODE- 250/636: Performed by: STUDENT IN AN ORGANIZED HEALTH CARE EDUCATION/TRAINING PROGRAM

## 2021-03-19 PROCEDURE — 76937 US GUIDE VASCULAR ACCESS: CPT

## 2021-03-19 PROCEDURE — 74011250637 HC RX REV CODE- 250/637: Performed by: STUDENT IN AN ORGANIZED HEALTH CARE EDUCATION/TRAINING PROGRAM

## 2021-03-19 PROCEDURE — C1729 CATH, DRAINAGE: HCPCS

## 2021-03-19 PROCEDURE — 36415 COLL VENOUS BLD VENIPUNCTURE: CPT

## 2021-03-19 PROCEDURE — 74011000636 HC RX REV CODE- 636: Performed by: RADIOLOGY

## 2021-03-19 PROCEDURE — 2709999900 HC NON-CHARGEABLE SUPPLY

## 2021-03-19 PROCEDURE — 74011250637 HC RX REV CODE- 250/637: Performed by: NURSE PRACTITIONER

## 2021-03-19 PROCEDURE — 76060000034 HC ANESTHESIA 1.5 TO 2 HR

## 2021-03-19 PROCEDURE — 80048 BASIC METABOLIC PNL TOTAL CA: CPT

## 2021-03-19 PROCEDURE — 77030002996 HC SUT SLK J&J -A

## 2021-03-19 PROCEDURE — 65660000000 HC RM CCU STEPDOWN

## 2021-03-19 RX ORDER — DEXMEDETOMIDINE HYDROCHLORIDE 100 UG/ML
INJECTION, SOLUTION INTRAVENOUS AS NEEDED
Status: DISCONTINUED | OUTPATIENT
Start: 2021-03-19 | End: 2021-03-19 | Stop reason: HOSPADM

## 2021-03-19 RX ORDER — ONDANSETRON 2 MG/ML
INJECTION INTRAMUSCULAR; INTRAVENOUS AS NEEDED
Status: DISCONTINUED | OUTPATIENT
Start: 2021-03-19 | End: 2021-03-19 | Stop reason: HOSPADM

## 2021-03-19 RX ORDER — SODIUM CHLORIDE 9 MG/ML
500 INJECTION, SOLUTION INTRAVENOUS ONCE
Status: COMPLETED | OUTPATIENT
Start: 2021-03-19 | End: 2021-03-19

## 2021-03-19 RX ORDER — SODIUM CHLORIDE 9 MG/ML
INJECTION, SOLUTION INTRAVENOUS
Status: DISCONTINUED | OUTPATIENT
Start: 2021-03-19 | End: 2021-03-19 | Stop reason: HOSPADM

## 2021-03-19 RX ORDER — PROPOFOL 10 MG/ML
INJECTION, EMULSION INTRAVENOUS AS NEEDED
Status: DISCONTINUED | OUTPATIENT
Start: 2021-03-19 | End: 2021-03-19 | Stop reason: HOSPADM

## 2021-03-19 RX ORDER — PHENYLEPHRINE HCL IN 0.9% NACL 0.4MG/10ML
SYRINGE (ML) INTRAVENOUS AS NEEDED
Status: DISCONTINUED | OUTPATIENT
Start: 2021-03-19 | End: 2021-03-19 | Stop reason: HOSPADM

## 2021-03-19 RX ORDER — LORAZEPAM 2 MG/ML
1 INJECTION INTRAMUSCULAR ONCE
Status: COMPLETED | OUTPATIENT
Start: 2021-03-19 | End: 2021-03-19

## 2021-03-19 RX ORDER — LIDOCAINE HYDROCHLORIDE 20 MG/ML
20 INJECTION, SOLUTION INFILTRATION; PERINEURAL
Status: COMPLETED | OUTPATIENT
Start: 2021-03-19 | End: 2021-03-19

## 2021-03-19 RX ORDER — DEXAMETHASONE SODIUM PHOSPHATE 4 MG/ML
INJECTION, SOLUTION INTRA-ARTICULAR; INTRALESIONAL; INTRAMUSCULAR; INTRAVENOUS; SOFT TISSUE AS NEEDED
Status: DISCONTINUED | OUTPATIENT
Start: 2021-03-19 | End: 2021-03-19 | Stop reason: HOSPADM

## 2021-03-19 RX ORDER — DEXTROSE MONOHYDRATE AND SODIUM CHLORIDE 5; .45 G/100ML; G/100ML
75 INJECTION, SOLUTION INTRAVENOUS CONTINUOUS
Status: DISCONTINUED | OUTPATIENT
Start: 2021-03-19 | End: 2021-03-20

## 2021-03-19 RX ORDER — HYDROMORPHONE HYDROCHLORIDE 1 MG/ML
INJECTION, SOLUTION INTRAMUSCULAR; INTRAVENOUS; SUBCUTANEOUS AS NEEDED
Status: DISCONTINUED | OUTPATIENT
Start: 2021-03-19 | End: 2021-03-19 | Stop reason: HOSPADM

## 2021-03-19 RX ORDER — ACETAMINOPHEN 650 MG/1
650 SUPPOSITORY RECTAL
Status: COMPLETED | OUTPATIENT
Start: 2021-03-19 | End: 2021-03-19

## 2021-03-19 RX ADMIN — IOPAMIDOL 19 ML: 612 INJECTION, SOLUTION INTRAVENOUS at 12:56

## 2021-03-19 RX ADMIN — ACETAMINOPHEN 650 MG: 650 SUPPOSITORY RECTAL at 00:12

## 2021-03-19 RX ADMIN — SODIUM CHLORIDE 500 ML: 9 INJECTION, SOLUTION INTRAVENOUS at 01:35

## 2021-03-19 RX ADMIN — HYDROMORPHONE HYDROCHLORIDE 0.5 MG: 1 INJECTION, SOLUTION INTRAMUSCULAR; INTRAVENOUS; SUBCUTANEOUS at 13:37

## 2021-03-19 RX ADMIN — DEXTROSE AND SODIUM CHLORIDE 75 ML/HR: 5; 450 INJECTION, SOLUTION INTRAVENOUS at 07:15

## 2021-03-19 RX ADMIN — DEXMEDETOMIDINE HYDROCHLORIDE 10 MCG: 100 INJECTION, SOLUTION, CONCENTRATE INTRAVENOUS at 13:15

## 2021-03-19 RX ADMIN — PHENYLEPHRINE HYDROCHLORIDE 160 MCG: 10 INJECTION INTRAVENOUS at 12:40

## 2021-03-19 RX ADMIN — LORAZEPAM 1 MG: 2 INJECTION INTRAMUSCULAR; INTRAVENOUS at 00:12

## 2021-03-19 RX ADMIN — PROPOFOL 100 MG: 10 INJECTION, EMULSION INTRAVENOUS at 11:58

## 2021-03-19 RX ADMIN — MEROPENEM 500 MG: 500 INJECTION, POWDER, FOR SOLUTION INTRAVENOUS at 05:51

## 2021-03-19 RX ADMIN — SODIUM CHLORIDE: 900 INJECTION, SOLUTION INTRAVENOUS at 11:41

## 2021-03-19 RX ADMIN — MEROPENEM 500 MG: 500 INJECTION, POWDER, FOR SOLUTION INTRAVENOUS at 22:11

## 2021-03-19 RX ADMIN — DEXAMETHASONE SODIUM PHOSPHATE 4 MG: 4 INJECTION, SOLUTION INTRAMUSCULAR; INTRAVENOUS at 12:43

## 2021-03-19 RX ADMIN — ONDANSETRON HYDROCHLORIDE 4 MG: 2 INJECTION, SOLUTION INTRAMUSCULAR; INTRAVENOUS at 12:43

## 2021-03-19 RX ADMIN — MEROPENEM 500 MG: 500 INJECTION, POWDER, FOR SOLUTION INTRAVENOUS at 11:00

## 2021-03-19 RX ADMIN — MEROPENEM 500 MG: 500 INJECTION, POWDER, FOR SOLUTION INTRAVENOUS at 17:32

## 2021-03-19 RX ADMIN — DEXMEDETOMIDINE HYDROCHLORIDE 10 MCG: 100 INJECTION, SOLUTION, CONCENTRATE INTRAVENOUS at 13:12

## 2021-03-19 RX ADMIN — LIDOCAINE HYDROCHLORIDE 10 ML: 20 INJECTION, SOLUTION INFILTRATION; PERINEURAL at 12:54

## 2021-03-19 RX ADMIN — ACETAMINOPHEN 650 MG: 650 SOLUTION ORAL at 22:15

## 2021-03-19 RX ADMIN — HYDROMORPHONE HYDROCHLORIDE 0.5 MG: 1 INJECTION, SOLUTION INTRAMUSCULAR; INTRAVENOUS; SUBCUTANEOUS at 13:30

## 2021-03-19 NOTE — CONSULTS
Requesting Provider: Jose Antoino Mccrary MD - Reason for Consultation: \"*urosepsis**\"  Pre-existing [facility] Patient:   No                Patient: Eunice Garcia MRN: [id number]  SSN: [id number]    YOB: 1960  Age: 61 y.o. Sex: male     Location: [facility]/       Code Status: Full Code   PCP: Chapincito Joseph MD  - [phone]   Emergency Contact:  Primary Emergency Contact: Irena Buckner, Home Phone: [phone]   Race/Mandaeism/Language: Ronit Khan / Danielle Garcia / Steve Armenta   Payor: Payor: Andreina Roth / Plan: [employer] [place] / Product Type: Medicare /    Prior Admission Data: 8/26/18 Mauro Rosen   Hospitalized:  Hospital Day: 2 - Admitted 3/18/2021  2:06 PM   POD #      CONSULTANTS  IP CONSULT TO HOSPITALIST   ADMISSION DIAGNOSES    ICD-10-CM ICD-9-CM   1. Sepsis, due to unspecified organism, unspecified whether acute organ dysfunction present ([address]  A41.9 038.9     995.91   2. VAZQUEZ (acute kidney injury) ([address]  N17.9 584.9   3. Urinary tract infection with hematuria, site unspecified  N39.0 599.0    R31.9 599.70   4. Hydroureter  N13.4 593.5   5. Kidney stones  N20.0 592.0         Assessment/Plan:       · Urosepsis - Continue broad spectrum antibiotics. Follow UCx and tailor therapy. BCx NGTD. Fluid resuscitation. · Multiple obstructing right ureteral calculi with mod-severe right hydronephrosis - The patient is in IR for Right percutaneous nephrostomy tube placement. He will then require definitive stone treatment from an antegrade approach once he overcomes his infection. · Left-sided extrarenal pelvis versus hydronephrosis - not significantly changed from CT on 8/22/2018. · VAZQUEZ - Cr. 1.17. Baseline ~0.5. Hopefully will see improvement after placement of R PCN. IVF. Nephrology consult pending. · Hx of bladder carcinoma s/p radical cystoprostatectomy with neobladder - A carrera has been placed. Maintain Carrera at this time.      Will follow     Supervising MD, Dr. Neeta La     CC: Abdominal Pain   HPI: He is a 61 y.o. male. Per HPI, past medical history of cerebral palsy, generalized anxiety disorder, bladder carcinoma s/p radical cystoprostatectomy with neobladder who presents to hospital on 3/18 with acute onset abdominal pain x 1 day. He resides at the [place] home where staff straight cath him 4 times a day. ER workup remarkable for heart rate of 131, /98, leukocytosis of 13, creatinine 1.36, lactic acid 3.4. Chest x-ray showed no acute process. CT abdomen pelvis with contrast showed right-sided hydroureteronephrosis with multiple calculi in the right ureter. Urology was consulted. He is well known to Dr. Neeta La at [facility]. Patient was treated with 1.9 L normal saline bolus, ceftriaxone and meropenem in the ED. Tmax 102, now 100.9. -130s, BP stable. Labs reviewed. WBC 13, Cr 1.17 (baseline ~0.5). Lactic 1.8. UA grossly infected, positive for large leuks, >100 WBCs, 5-10 RBCs, 3+bacteria. UCx pending. BCx NGTD. A CT was performed w/ contrast, images personally viewed. Notable for moderate- severe right hydronephrosis down to multiple calculi present in the distal ureter. Left-sided extrarenal pelvis versus hydronephrosis, not significantly changed from CT on 2018. On rounds, he is in IR for placement of R perc tube. He is currently being treated with meropenem. His sister was updated on plan of care with Dr. Neeta La present. CT A/P W:  IMPRESSION  1. Interval development of right-sided hydroureteronephrosis. The right ureter  contains multiple calculi throughout its course with a calculus at what appears  to be its terminus in a neobladder in the right lower quadrant. Additionally,  there is hyperemia within the walls of the right ureter suggesting  infection/inflammation. 2. No significant change in the appearance of the left-sided extrarenal pelvis  versus hydronephrosis. 3. Incidental findings as above.     Temp (24hrs), Av.5 °F (38.1 °C), Min:98.6 °F (37 °C), Max:102 °F (38.9 °C)    Urinary Status: Retention  Creatinine   Date/Time Value Ref Range Status   03/19/2021 05:40 AM 1.17 0.70 - 1.30 MG/DL Final   03/18/2021 02:38 PM 1.36 (H) 0.70 - 1.30 MG/DL Final   08/26/2018 04:45 AM 0.42 (L) 0.70 - 1.30 MG/DL Final   08/23/2018 05:05 AM 0.68 (L) 0.70 - 1.30 MG/DL Final   08/22/2018 06:20 PM 0.90 0.70 - 1.30 MG/DL Final     Current Antimicrobial Therapy (168h ago, onward)     Ordered     Start Stop    03/18/21 2241  meropenem (MERREM) 500 mg in 0.9% sodium chloride (MBP/ADV) 50 mL MBP  500 mg,   IntraVENous,   EVERY 6 HOURS      03/18/21 2300 03/23/21 2259              Key Anti-Platelet Anticoagulant Meds     The patient is on no antiplatelet meds or anticoagulants.         Diet: No diet orders on file -       Labs     Lab Results   Component Value Date/Time    Lactic acid 1.8 03/18/2021 05:45 PM    Lactic acid 3.4 (HH) 03/18/2021 02:38 PM    Lactic acid 0.6 08/23/2018 05:05 AM    WBC 13.0 (H) 03/18/2021 02:38 PM    HCT 45.0 03/18/2021 02:38 PM    PLATELET 088 (H) 02/82/6905 02:38 PM    Sodium 153 (H) 03/19/2021 05:40 AM    Potassium 3.7 03/19/2021 05:40 AM    Chloride 122 (H) 03/19/2021 05:40 AM    CO2 29 03/19/2021 05:40 AM    BUN 32 (H) 03/19/2021 05:40 AM    Creatinine 1.17 03/19/2021 05:40 AM    Glucose 112 (H) 03/19/2021 05:40 AM    Calcium 8.8 03/19/2021 05:40 AM    Magnesium 1.7 08/23/2013 03:58 AM    INR 1.0 07/14/2012 12:16 PM     UA:   Lab Results   Component Value Date/Time    Color YELLOW/STRAW 03/18/2021 08:00 PM    Appearance TURBID (A) 03/18/2021 08:00 PM    Specific gravity 1.021 03/18/2021 08:00 PM    Specific gravity 1.010 08/22/2018 08:14 PM    pH (UA) 8.5 (H) 03/18/2021 08:00 PM    Protein TRACE (A) 03/18/2021 08:00 PM    Glucose Negative 03/18/2021 08:00 PM    Ketone Negative 03/18/2021 08:00 PM    Bilirubin Negative 03/18/2021 08:00 PM    Urobilinogen 0.2 03/18/2021 08:00 PM    Nitrites Negative 03/18/2021 08:00 PM Leukocyte Esterase LARGE (A) 03/18/2021 08:00 PM    Epithelial cells FEW 03/18/2021 08:00 PM    Bacteria 3+ (A) 03/18/2021 08:00 PM    WBC >100 (H) 03/18/2021 08:00 PM    RBC 5-10 03/18/2021 08:00 PM     Imaging     Results for orders placed during the hospital encounter of 03/18/21   CT ABD PELV W CONT    Narrative EXAM:  CT ABDOMEN PELVIS WITH CONTRAST  INDICATION:  abdominal pain. Additional history:  COMPARISON: CT of the abdomen and pelvis, 8/22/2018. [place] [facility]low TECHNIQUE:   Multislice helical CT was performed from the diaphragm to the symphysis pubis  with oral and intravenous contrast administration. Contiguous 5 mm axial images  were reconstructed and lung and soft tissue windows were generated. Coronal and  sagittal reformations were generated. CT dose reduction was achieved through use of a standardized protocol tailored  for this examination and automatic exposure control for dose modulation. [place]low FINDINGS:  INCIDENTALLY IMAGED CHEST:  Heart/vessels: Calcifications in the coronary arteries. Lungs/Pleura: Within normal limits. .  ABDOMEN:  Liver: Within normal limits. Gallbladder/Biliary: Status post cholecystectomy. Spleen: Within normal limits. Pancreas: Within normal limits. Adrenals: Within normal limits. Kidneys: Right sided hydronephrosis. There are calculi in the right renal  collecting system. Left-sided extrarenal pelvis versus hydronephrosis, not  significant changed  Peritoneum/Mesenteries: Within normal limits. Extraperitoneum: Within normal limits. Gastrointestinal tract: Patulous distal esophagus with a moderate sized, hiatal  hernia. Query ulceration versus diverticulum at the gastroesophageal junction. PEG tube in the stomach. Rectal distention with stool which is overall  diminished relative to comparison, currently measuring approximately 11 x 9.7  cm. Vascular: Within normal limits. [place] PELVIS:  Extraperitoneum: Within normal limits.   Ureters: Right-sided hydroureter with multiple calculi within the right ureter. There is a calculus in what appears to be the distalmost ureter prior to joining  the fluid-filled structure in the right lower quadrant. There is hyperemia  within the right ureter  Bladder: Fluid-filled structure in the right lower quadrant containing a balloon  catheter which traverses the urethra; this contains a few calculi. Reproductive System: Within normal limits. .  MSK:   Right total hip arthroplasty. Degenerative changes in the spine with  dextroscoliosis. .    Impression 1. Interval development of right-sided hydroureteronephrosis. The right ureter  contains multiple calculi throughout its course with a calculus at what appears  to be its terminus in a neobladder in the right lower quadrant. Additionally,  there is hyperemia within the walls of the right ureter suggesting  infection/inflammation. 2. No significant change in the appearance of the left-sided extrarenal pelvis  versus hydronephrosis. 3. Incidental findings as above. US Results (most recent):  Results from [place] encounter on 10/03/13   US ABD COMP    Narrative **Final Report**       ICD Codes / Adm. Diagnosis: 789.00  790.5 / Abdominal pain, unspecified si    Other nonspecific abnormal s  Examination:  US ABDOMEN  - [id number] - Oct  3 2013 12:08PM  Accession No:  [id number]  Reason:  abdominal pain and abnormal liver enzymes      REPORT:  Ultrasonography of the abdomen was performed. The visualized aorta is normal   in caliber. The proximal and mid aorta are obscured by bowel gas. . The   pancreas is obscured by bowel gas. The liver is normal in echotexture. There   is no intrahepatic ductal dilatation or mass. The common bile duct measures   8.8 mm. The gallbladder has been surgically removed. The spleen appears   unremarkable. The right kidney measures 10.9 cm and the left kidney measures   10.2 cm.   The kidneys are normal in echotexture and there is no   hydronephrosis, renal mass or perinephric fluid collection. The inferior   vena cava appears unremarkable. IMPRESSION: Status post cholecystectomy. Otherwise unremarkable. Signing/Reading Doctor: Maryse Cisneros ([id number])    Approved: Maryse Cisneros ([id number])  Oct  3 2013  4:58PM                                   Cultures     All Micro Results     Procedure Component Value Units Date/Time    CULTURE, URINE [[id number]] Collected: 03/18/21 2000    Order Status: Completed Specimen: Cath Urine Updated: 03/19/21 0916    CULTURE, BLOOD, PAIRED [[id number]] Collected: 03/18/21 1438    Order Status: Completed Specimen: Blood Updated: 03/19/21 0556     Special Requests: NO SPECIAL REQUESTS        Culture result: NO GROWTH AFTER 14 HOURS       URINE CULTURE HOLD SAMPLE [[id number]] Collected: 03/18/21 2000    Order Status: Completed Specimen: Urine from Serum Updated: 03/18/21 2019     Urine culture hold       Urine on hold in Microbiology dept for 2 days. If unpreserved urine is submitted, it cannot be used for addtional testing after 24 hours, recollection will be required.                  Past History: (Complete 2+/3 areas)     Allergies   Allergen Reactions    Avelox [Moxifloxacin] Rash    Demerol [Meperidine] Other (comments)     Spastic movements    Amikacin Unknown (comments)    Amoxicillin Rash    Betadine Surgi-Prep Rash     Mother states this does not happen all the time and if betadine is washed off, it's ok    Ciprofloxacin Unknown (comments)    Effexor [Venlafaxine] Unknown (comments)    Fentanyl Unknown (comments)    Keflex [Cephalexin] Rash    Lyrica [Pregabalin] Unknown (comments)    Morphine Unknown (comments)    Neurontin [Gabapentin] Unknown (comments)    Prozac [Fluoxetine] Unknown (comments)    Reglan [Metoclopramide] Unknown (comments)    Serzone [Nefazodone] Unknown (comments)    Sulfa (Sulfonamide Antibiotics) Unknown (comments)    Tetracycline Unknown (comments)    Tricyclamol Chloride Unknown (comments)    Valium [Diazepam] Unknown (comments)     Tolerates Lorazepam      Current Facility-Administered Medications   Medication Dose Route Frequency    dextrose 5 % - 0.45% NaCl infusion  75 mL/hr IntraVENous CONTINUOUS    meropenem (MERREM) 500 mg in 0.9% sodium chloride (MBP/ADV) 50 mL MBP  500 mg IntraVENous Q6H    lidocaine (XYLOCAINE) 20 mg/mL (2 %) injection        iopamidoL (ISOVUE 300) 61 % contrast injection        Prior to Admission medications    Medication Sig Start Date End Date Taking? Authorizing Provider   nortriptyline (PAMELOR) 10 mg/5 mL solution 50 mg by Per G Tube route nightly. Provider, Historical   ascorbic acid, vitamin C, (VITAMIN C) 500 mg tablet 500 mg by Per G Tube route daily. Provider, Historical   cholecalciferol (VITAMIN D3) 1,000 unit tablet 2,000 Units by Per G Tube route daily. Indications: Vitamin D Deficiency    Provider, Historical   acetaminophen (TYLENOL) 160 mg/5 mL liquid 640 mg by Per G Tube route every four (4) hours as needed for Fever or Pain. Provider, Historical   HYDROcodone-acetaminophen (HYCET) 0.5-21.7 mg/mL oral solution 5 mL by Per G Tube route four (4) times daily as needed for Pain. Provider, Historical   LORazepam (ATIVAN) 1 mg tablet 0.75 mg by Per G Tube route every six (6) hours. Provider, Historical   fluticasone (FLONASE) 50 mcg/actuation nasal spray 2 Sprays by Both Nostrils route daily. Provider, Historical   Esomeprazole Magnesium (NEXIUM PACKET) 40 mg by Per G Tube route daily. Indications: gastroesophageal reflux disease    Provider, Historical   rizatriptan (MAXALT) 10 mg tablet 10 mg by SubLINGual route as needed for Migraine.  May repeat dose after 2 hours not to exceed 3 tablets per 24hr    Other, MD Ofelia        PMHx:  has a past medical history of Anxiety, Bladder cancer ([address] Bladder cancer ([facility] [address] Cancer ([facility] [address] CP (cerebral palsy) ([facility] [address] Depression, Hernia, Nausea & vomiting, Other ill-defined conditions(799.89), Psychiatric disorder, and Unspecified constipation (5/13/2013). PSurgHx:  has a past surgical history that includes hx appendectomy; pr remove mesh from abd wall; pr lap procedure, unlisted, bladder; pr lap,cholecystectomy; pr lap,lysis of adhesions; hx cholecystectomy; hx urological; hx hernia repair; and hx orthopaedic (2013). PSocHx:  reports that he has never smoked. He has never used smokeless tobacco. He reports that he does not drink alcohol or use drugs.        Physical Exam: (Comprehesive - 8+ 1995 Systems)     (1) Constitutional:  FIO2:   on SpO2: O2 Sat (%): 97 %  O2 Device: Room air    Patient Vitals for the past 24 hrs:   BP Temp Pulse Resp SpO2 Height Weight   03/19/21 1003 139/86 (!) 100.9 °F (38.3 °C) (!) 120 19 97 %     03/19/21 0730 138/81  (!) 117 15 96 %     03/19/21 0700 (!) 138/93  (!) 118 17 97 %     03/19/21 0630 139/89  (!) 120 23 98 %     03/19/21 0600 130/79  (!) 120 18 97 %     03/19/21 0545 (!) 135/56  (!) 119 17 97 %     03/19/21 0515 (!) 108/90  (!) 120 17 98 %     03/19/21 0445 120/88  (!) 117 18 95 %     03/19/21 0430 131/83 98.6 °F (37 °C) (!) 114 16 97 %     03/19/21 0415 (!) 141/88  (!) 113  97 %     03/19/21 0345 123/84  (!) 117 18 100 %     03/19/21 0315 (!) 136/93  (!) 121 25 99 %     03/19/21 0300 (!) 135/106 (!) 101.4 °F (38.6 °C) (!) 122 19 95 %     03/19/21 0200 (!) 123/98  (!) 131 20 92 %     03/19/21 0124 116/88 (!) 102 °F (38.9 °C) (!) 135 24      03/18/21 2315 (!) 148/94  (!) 138 24 98 %     03/18/21 2300 (!) 148/94 100.3 °F (37.9 °C) (!) 136 23 97 %     03/18/21 2200 (!) 146/70  (!) 133 30 96 %     03/18/21 2115 (!) 148/97  (!) 131 21 96 %     03/18/21 2100 (!) 120/104  (!) 134 30 96 %     03/18/21 2000 (!) 146/96  (!) 127 27 96 %     03/18/21 1900 (!) 134/98  (!) 118 26      03/18/21 1800 138/76  (!) 116 24      03/18/21 1700 (!) 138/93  (!) 112 20 99 %     03/18/21 1645 (!) 133/91  (!) 110 21 99 %     03/18/21 1615 (!) 142/92  (!) 115 19 99 %     03/18/21 1600 (!) 141/106  (!) 120 19 99 %     03/18/21 1545 (!) 135/93  (!) 119 22 99 %     03/18/21 1502     93 %     03/18/21 1500 122/85  (!) 120 23 100 %     03/18/21 1445 (!) 138/97  (!) 124 25 100 %     03/18/21 1425 (!) 132/98 99.5 °F (37.5 °C) (!) 131 26 100 % 5' 7\" (1.702 m) 62.6 kg (138 lb 0.1 oz)       Date 03/18/21 0700 - 03/19/21 0659 03/19/21 0700 - 03/20/21 0659   Shift 3025-5562 7073-5580 24 Hour Total 9678-0360 2781-3968 24 Hour Total   INTAKE   I.V.(mL/kg/hr)  600(0.8) 600(0.4) 500  500     Volume (0.9% sodium chloride infusion)    500  500     Volume (0.9% sodium chloride infusion 500 mL)  500 500        Volume (meropenem (MERREM) 500 mg in 0.9% sodium chloride (MBP/ADV) 50 mL MBP)  100 100      Shift Total(mL/kg)  600(9.6) 600(9.6) 500(8)  500(8)   OUTPUT   Urine(mL/kg/hr)  850(1.1) 850(0.6) 450  450     Urine Output (mL) (Urinary Catheter 03/19/21 [place]; Short)  850 850 450  450   Shift Total(mL/kg)  850(13.6) 850(13.6) 450(7.2)  450(7.2)   NET  -250 -250 50  50   Weight (kg) 62.6 62.6 62.6 62.6 62.6 62.6      (2)      (3)      (4)      (5)      (6)      (7)      (8)      (9)         Signed By: Tani Ramos NP  - March 19, 2021

## 2021-03-19 NOTE — ED NOTES
Pt in room with sister, Annabelle Pandya, at bedside. Updated on the plan for admission and also that pt remains NPO for possible procedure per [facility], per admitting MD. Pt is A&Ox3 difficult to understand his speech but his sister is a great help with assisting. Oral care to be done, mouth appears very dry.

## 2021-03-19 NOTE — ED NOTES
IR is here to take pt to his procedure of the right nephrectomy-cath to place stent to remove kidney stones. Mother will becoming to stay with pt for the rest of the day after the procedure.

## 2021-03-19 NOTE — PROGRESS NOTES
Received from ED via stretcher for Right PCN tube placement. Pt. Informed about procedure. Awake and alert. Able to nod to -questions but difficult for this RN to infer answers. Telephone consent obtained from sister, Aurora Gray- [phone] due to communication difficulty. Temp 100.9 axillary, heart rate in the 116-120's  today.

## 2021-03-19 NOTE — PROGRESS NOTES
[address] [facility] Adult  Hospitalist Group                                                                                          Hospitalist Progress Note  Autumn Mcgarry MD  Answering service: [phone] OR [phone] from in house phone              Progress Note    Patient: Jimy Lopez MRN: [id number]  SSN: [id number]    YOB: 1960  Age: 61 y.o. Sex: male      Admit Date: 3/18/2021    LOS: 1 day     Subjective:     Patient presents with sepsis and urinary tract infection, also with right hydronephrosis, is now s/p decompression with nephrostomy tube placement by IR. Sister present at bedside. Objective:     Vitals:    03/19/21 1350 03/19/21 1405 03/19/21 1415 03/19/21 1430   BP: (!) 156/98 (!) 153/91 136/81 (!) 144/87   Pulse: (!) 105 (!) 105 100 (!) 107   Resp: 14 14 14 16   Temp:       SpO2: 99% 98% 98% 100%   Weight:       Height:            Intake and Output:  Current Shift: 03/19 0701 - 03/19 1900  In: 500 [I.V.:500]  Out: 1500 [Urine:1500]  Last three shifts: 03/17 1901 - 03/19 0700  In: 600 [I.V.:600]  Out: 850 [Urine:850]    Physical Exam:   GENERAL: alert, cooperative  THROAT & NECK: normal and no erythema or exudates noted. LUNG: clear to auscultation bilaterally  HEART: regular rate and rhythm, S1, S2 normal, no murmur, click, rub or gallop  ABDOMEN: soft, non-tender. Bowel sounds normal. No masses,  no organomegaly  EXTREMITIES:  Extremities with some contractures  SKIN: no rash or abnormalities  NEUROLOGIC: AOx3. PSYCHIATRIC: non focal    Lab/Data Review: All lab results for the last 24 hours reviewed.      Recent Results (from the past 24 hour(s))   CBC WITH AUTOMATED DIFF    Collection Time: 03/18/21  2:38 PM   Result Value Ref Range    WBC 13.0 (H) 4.1 - 11.1 K/uL    RBC 4.68 4.10 - 5.70 M/uL    HGB 13.8 12.1 - 17.0 g/dL    HCT 45.0 36.6 - 50.3 %    MCV 96.2 80.0 - 99.0 FL    MCH 29.5 26.0 - 34.0 PG    MCHC 30.7 30.0 - 36.5 g/dL    RDW 13.4 11.5 - 14.5 % PLATELET 752 (H) 323 - 400 K/uL    MPV 10.9 8.9 - 12.9 FL    NRBC 0.0 0  WBC    ABSOLUTE NRBC 0.00 0.00 - 0.01 K/uL    NEUTROPHILS 67 32 - 75 %    LYMPHOCYTES 21 12 - 49 %    MONOCYTES 11 5 - 13 %    EOSINOPHILS 0 0 - 7 %    BASOPHILS 1 0 - 1 %    IMMATURE GRANULOCYTES 0 0.0 - 0.5 %    ABS. NEUTROPHILS 8.7 (H) 1.8 - 8.0 K/UL    ABS. LYMPHOCYTES 2.8 0.8 - 3.5 K/UL    ABS. MONOCYTES 1.4 (H) 0.0 - 1.0 K/UL    ABS. EOSINOPHILS 0.0 0.0 - 0.4 K/UL    ABS. BASOPHILS 0.1 0.0 - 0.1 K/UL    ABS. IMM. GRANS. 0.0 0.00 - 0.04 K/UL    DF AUTOMATED     METABOLIC PANEL, COMPREHENSIVE    Collection Time: 03/18/21  2:38 PM   Result Value Ref Range    Sodium 146 (H) 136 - 145 mmol/L    Potassium 4.3 3.5 - 5.1 mmol/L    Chloride 112 (H) 97 - 108 mmol/L    CO2 30 21 - 32 mmol/L    Anion gap 4 (L) 5 - 15 mmol/L    Glucose 105 (H) 65 - 100 mg/dL    BUN 49 (H) 6 - 20 MG/DL    Creatinine 1.36 (H) 0.70 - 1.30 MG/DL    BUN/Creatinine ratio 36 (H) 12 - 20      GFR est AA >60 >60 ml/min/1.73m2    GFR est non-AA 53 (L) >60 ml/min/1.73m2    Calcium 9.5 8.5 - 10.1 MG/DL    Bilirubin, total 0.5 0.2 - 1.0 MG/DL    ALT (SGPT) 33 12 - 78 U/L    AST (SGOT) 12 (L) 15 - 37 U/L    Alk. phosphatase 85 45 - 117 U/L    Protein, total 8.7 (H) 6.4 - 8.2 g/dL    Albumin 3.1 (L) 3.5 - 5.0 g/dL    Globulin 5.6 (H) 2.0 - 4.0 g/dL    A-G Ratio 0.6 (L) 1.1 - 2.2     SAMPLES BEING HELD    Collection Time: 03/18/21  2:38 PM   Result Value Ref Range    SAMPLES BEING HELD 1red, 1BLU     COMMENT        Add-on orders for these samples will be processed based on acceptable specimen integrity and analyte stability, which may vary by analyte.    CULTURE, BLOOD, PAIRED    Collection Time: 03/18/21  2:38 PM    Specimen: Blood   Result Value Ref Range    Special Requests: NO SPECIAL REQUESTS      Culture result: NO GROWTH AFTER 14 HOURS     LACTIC ACID    Collection Time: 03/18/21  2:38 PM   Result Value Ref Range    Lactic acid 3.4 (HH) 0.4 - 2.0 MMOL/L   LACTIC ACID Collection Time: 03/18/21  5:45 PM   Result Value Ref Range    Lactic acid 1.8 0.4 - 2.0 MMOL/L   URINALYSIS W/MICROSCOPIC    Collection Time: 03/18/21  8:00 PM   Result Value Ref Range    Color YELLOW/STRAW      Appearance TURBID (A) CLEAR      Specific gravity 1.021 1.003 - 1.030      pH (UA) 8.5 (H) 5.0 - 8.0      Protein TRACE (A) NEG mg/dL    Glucose Negative NEG mg/dL    Ketone Negative NEG mg/dL    Bilirubin Negative NEG      Blood MODERATE (A) NEG      Urobilinogen 0.2 0.2 - 1.0 EU/dL    Nitrites Negative NEG      Leukocyte Esterase LARGE (A) NEG      WBC >100 (H) 0 - 4 /hpf    RBC 5-10 0 - 5 /hpf    Epithelial cells FEW FEW /lpf    Bacteria 3+ (A) NEG /hpf    Other: Renal Epithelial cells Present     URINE CULTURE HOLD SAMPLE    Collection Time: 03/18/21  8:00 PM    Specimen: Serum; Urine   Result Value Ref Range    Urine culture hold        Urine on hold in Microbiology dept for 2 days. If unpreserved urine is submitted, it cannot be used for addtional testing after 24 hours, recollection will be required. METABOLIC PANEL, BASIC    Collection Time: 03/19/21  5:40 AM   Result Value Ref Range    Sodium 153 (H) 136 - 145 mmol/L    Potassium 3.7 3.5 - 5.1 mmol/L    Chloride 122 (H) 97 - 108 mmol/L    CO2 29 21 - 32 mmol/L    Anion gap 2 (L) 5 - 15 mmol/L    Glucose 112 (H) 65 - 100 mg/dL    BUN 32 (H) 6 - 20 MG/DL    Creatinine 1.17 0.70 - 1.30 MG/DL    BUN/Creatinine ratio 27 (H) 12 - 20      GFR est AA >60 >60 ml/min/1.73m2    GFR est non-AA >60 >60 ml/min/1.73m2    Calcium 8.8 8.5 - 10.1 MG/DL   SAMPLES BEING HELD    Collection Time: 03/19/21  5:40 AM   Result Value Ref Range    SAMPLES BEING HELD  1 lav     COMMENT        Add-on orders for these samples will be processed based on acceptable specimen integrity and analyte stability, which may vary by analyte.    COVID-19 RAPID TEST    Collection Time: 03/19/21 11:00 AM   Result Value Ref Range    Specimen source Nasopharyngeal      COVID-19 rapid test Not detected NOTD          Imaging:    Ct Abd Pelv W Cont    Result Date: 3/18/2021  EXAM:  CT ABDOMEN PELVIS WITH CONTRAST INDICATION:  abdominal pain. Additional history: COMPARISON: CT of the abdomen and pelvis, 8/22/2018. [place] TECHNIQUE: Multislice helical CT was performed from the diaphragm to the symphysis pubis with oral and intravenous contrast administration. Contiguous 5 mm axial images were reconstructed and lung and soft tissue windows were generated. Coronal and sagittal reformations were generated. CT dose reduction was achieved through use of a standardized protocol tailored for this examination and automatic exposure control for dose modulation. [place] FINDINGS: INCIDENTALLY IMAGED CHEST: Heart/vessels: Calcifications in the coronary arteries. Lungs/Pleura: Within normal limits. . ABDOMEN: Liver: Within normal limits. Gallbladder/Biliary: Status post cholecystectomy. Spleen: Within normal limits. Pancreas: Within normal limits. Adrenals: Within normal limits. Kidneys: Right sided hydronephrosis. There are calculi in the right renal collecting system. Left-sided extrarenal pelvis versus hydronephrosis, not significant changed Peritoneum/Mesenteries: Within normal limits. Extraperitoneum: Within normal limits. Gastrointestinal tract: Patulous distal esophagus with a moderate sized, hiatal hernia. Query ulceration versus diverticulum at the gastroesophageal junction. PEG tube in the stomach. Rectal distention with stool which is overall diminished relative to comparison, currently measuring approximately 11 x 9.7 cm. Vascular: Within normal limits. [place] PELVIS: Extraperitoneum: Within normal limits. Ureters: Right-sided hydroureter with multiple calculi within the right ureter. There is a calculus in what appears to be the distalmost ureter prior to joining the fluid-filled structure in the right lower quadrant.  There is hyperemia within the right ureter Bladder: Fluid-filled structure in the right lower quadrant containing a balloon catheter which traverses the urethra; this contains a few calculi. Reproductive System: Within normal limits. . MSK: Right total hip arthroplasty. Degenerative changes in the spine with dextroscoliosis. .    1. Interval development of right-sided hydroureteronephrosis. The right ureter contains multiple calculi throughout its course with a calculus at what appears to be its terminus in a neobladder in the right lower quadrant. Additionally, there is hyperemia within the walls of the right ureter suggesting infection/inflammation. 2. No significant change in the appearance of the left-sided extrarenal pelvis versus hydronephrosis. 3. Incidental findings as above. Xr Chest Port    Result Date: 3/18/2021  EXAM:  CR chest portable INDICATION: Fever. Abdominal pain. COMPARISON: 8/22/2018. TECHNIQUE: Portable AP semiupright chest view at 1446 hours FINDINGS: The cardiomediastinal contours are stable. The lungs and pleural spaces are clear. There is no pneumothorax. A hiatal hernia is again noted. The bones and upper abdomen are stable. No acute process. Ir Nephrostomy Perc Rt Plc Cath  Si    Result Date: 3/19/2021  PROCEDURE: Right percutaneous nephrostomy catheter placement PRE PROCEDURE DIAGNOSIS: Urinary obstruction POST PROCEDURE DIAGNOSIS: SAME OPERATING PHYSICIAN: David Delgado M.D. ESTIMATED BLOOD LOSS: Minimal SPECIMENS REMOVED: None FLUOROSCOPY DOSE (air kerma): 45.7 mGy COMPLICATIONS: None immediate. Procedure and findings: After detailed discussion of the procedure, risks, benefits, alternatives, and complications with the patient, informed written consent was obtained and placed on patient's medical record. Prophylactic antibiotic of Ancef 2 g was administered prior to the intervention and discontinued prior to the completion of the procedure. The patient was then brought to the angiographic suite and placed prone on the angiographic table. Patient was sedated by anesthesia. The right flank was prepped and draped in standard sterile fashion. Lidocaine 1% was used for local anesthetic. Sonographic images demonstrated mild to moderate hydronephrosis. Under ultrasound guidance, A 20-gauge AccuStick needle was utilized to access a lower pole renal calyx. A microwire was advanced into the right collecting system. The needle was exchanged for a 5 [place] AccuStick sheath. Antegrade nephrostogram was performed. Right antegrade nephrostogram: Mild to moderate hydronephrosis. A 0.035 wire was advanced into the right  collecting system. Over the wire, an 10 [place] 30 cm nephrostomy tube was placed under fluoroscopic guidance. The distal pigtail was formed within the renal pelvis. Position was confirmed with gentle contrast injection. The catheter was sutured to the patient's skin and a dry sterile dressing was applied. Tube was connected to gravity drainage bag. There were no immediate complications and patient tolerated the procedure well. Successful placement of 10 [place] right percutaneous nephrostomy tube.            Assessment and Plan:     Sepsis  -Patient presents with fever, tachycardia and leukocytosis meeting sepsis criteria  -Sepsis due to urinary tract infection with obstructive uropathy  -Follow blood and urine culture  -Continue meropenem    Urinary tract infection  -Antibiotic as above  -Follow blood and urine culture    Right hydroureteronephrosis  -S/p decompression with IR nephrostomy tube placement today  -Several ureteric calculis on CT scan  -Urology evaluation and plan for extraction of stones at a later date when infection is improved    VAZQUEZ  -Likely postobstructive with UTI  -IV fluid hydration and monitor renal function    Hypernatremia  -Worse sodium level  -Change IV fluid to plain D5  -Monitor electrolytes    History of bladder cancer  -S/p radical cystoprostatectomy with ileal conduit placement  -Follow-up as patient    Cerebral palsy  -Supportive care  -Patient with PEG tube feeding  -Nutrition consult for resumption of tube feeding    Discharge disposition: Likely more than 48 hours pending progress.     Signed By: Arely Chapa MD     March 19, 2021

## 2021-03-19 NOTE — H&P
History & Physical    Primary Care Provider: Arabella Chino MD  Source of Information: Patient and chart review    History of Presenting Illness:   Jennifer Perez is a 61 y.o. male with past medical history of cerebral palsy, generalized anxiety disorder, bladder carcinoma s/p radical cystoprostatectomy with ileal conduit who presents to hospital with chief complaint of abdominal pain. Patient currently resides at the [facility] given his CP history. Patient is able to answer questions and provide history via his niece who was at bedside. Patient complained of abdominal pain earlier today that was constant but now improved. He is unable to characterize his pain. Of note, patient has cystoprostatectomy and currently undergoes straight cath every 6 hours at his group home. The patient denies any fever, chills, chest pain, cough, congestion, recent illness, palpitations, or dysuria. On ER presentation, vital signs remarkable for heart rate of 131, /98, respiratory rate 26. Labs remarkable for leukocytosis of 13, creatinine 1.36 with baseline of 0.6, lactic acid 3.4. Chest x-ray showed no acute process. CT abdomen pelvis with contrast showed right-sided hydroureteronephrosis with multiple calculi in the right ureter. Patient was treated with 1.9 L normal saline bolus, ceftriaxone and meropenem. Review of Systems:  A comprehensive review of systems was negative except for that written in the History of Present Illness.      Past Medical History:   Diagnosis Date    Anxiety     Bladder cancer ([address]     [facility] [address]     Cancer ([facility] [address]     BLADDER    CP [facility]cerebral palsy) [facility] [address]     Depression     Hernia     Nausea & vomiting     Other ill-defined conditions(799.89)     Cerebral palsy    Psychiatric disorder     ANXIETY DEPRESSION    Unspecified constipation 5/13/2013      Past Surgical History:   Procedure Laterality Date    HX APPENDECTOMY  HX CHOLECYSTECTOMY      HX HERNIA REPAIR      Inc HR, PEG TUBE 2012    HX ORTHOPAEDIC  2013    Right knee surgery on 8/15 with Dr. Nate Mendez.  HX UROLOGICAL      BLADDER REMOVED, HAS NADYA-BLADDER     OR LAP PROCEDURE, UNLISTED, BLADDER      neobladder    OR LAP,CHOLECYSTECTOMY      OR LAP,LYSIS OF ADHESIONS      OR REMOVE MESH FROM ABD WALL      due to infection     Prior to Admission medications    Medication Sig Start Date End Date Taking? Authorizing Provider   nortriptyline (PAMELOR) 10 mg/5 mL solution 50 mg by Per G Tube route nightly. Provider, Historical   ascorbic acid, vitamin C, (VITAMIN C) 500 mg tablet 500 mg by Per G Tube route daily. Provider, Historical   cholecalciferol (VITAMIN D3) 1,000 unit tablet 2,000 Units by Per G Tube route daily. Indications: Vitamin D Deficiency    Provider, Historical   acetaminophen (TYLENOL) 160 mg/5 mL liquid 640 mg by Per G Tube route every four (4) hours as needed for Fever or Pain. Provider, Historical   HYDROcodone-acetaminophen (HYCET) 0.5-21.7 mg/mL oral solution 5 mL by Per G Tube route four (4) times daily as needed for Pain. Provider, Historical   LORazepam (ATIVAN) 1 mg tablet 0.75 mg by Per G Tube route every six (6) hours. Provider, Historical   fluticasone (FLONASE) 50 mcg/actuation nasal spray 2 Sprays by Both Nostrils route daily. Provider, Historical   Esomeprazole Magnesium (NEXIUM PACKET) 40 mg by Per G Tube route daily. Indications: gastroesophageal reflux disease    Provider, Historical   rizatriptan (MAXALT) 10 mg tablet 10 mg by SubLINGual route as needed for Migraine.  May repeat dose after 2 hours not to exceed 3 tablets per 24hr    Other, MD Ofelia     Allergies   Allergen Reactions    Avelox [Moxifloxacin] Rash    Demerol [Meperidine] Other (comments)     Spastic movements    Amikacin Unknown (comments)    Amoxicillin Rash    Betadine Surgi-Prep Rash     Mother states this does not happen all the time and if betadine is washed off, it's ok    Ciprofloxacin Unknown (comments)    Effexor [Venlafaxine] Unknown (comments)    Fentanyl Unknown (comments)    Keflex [Cephalexin] Rash    Lyrica [Pregabalin] Unknown (comments)    Morphine Unknown (comments)    Neurontin [Gabapentin] Unknown (comments)    Prozac [Fluoxetine] Unknown (comments)    Reglan [Metoclopramide] Unknown (comments)    Serzone [Nefazodone] Unknown (comments)    Sulfa (Sulfonamide Antibiotics) Unknown (comments)    Tetracycline Unknown (comments)    Tricyclamol Chloride Unknown (comments)    Valium [Diazepam] Unknown (comments)     Tolerates Lorazepam      Family History   Problem Relation Age of Onset    Heart Disease Mother         TACHYCARDIA    Anesth Problems Neg Hx         SOCIAL HISTORY:  Patient resides: Group home  Independently    Assisted Living    SNF    With family care       Smoking history:   None  X   Former    Chronic      Alcohol history:   None  X   Social    Chronic      Ambulates: Nonambulatory  Independently    w/cane    w/walker    w/wc    CODE STATUS:  DNR    Full  X   Other      Objective:     Physical Exam:     Visit Vitals  /76   Pulse (!) 116   Temp 99.5 °F (37.5 °C)   Resp 24   Ht 5' 7\" (1.702 m)   Wt 62.6 kg (138 lb 0.1 oz)   SpO2 99%   BMI 21.62 kg/m²      O2 Device: Room air    General:  Alert, cooperative, no distress, appears stated age. Head:  Normocephalic, without obvious abnormality, atraumatic. Eyes:  Conjunctivae/corneas clear. PERRL, EOMs intact. Nose:  dry mucous membranes   Throat:  dry mucous membranes. Neck: Supple, symmetrical, trachea midline. Back:   Symmetric, no curvature. ROM normal. No CVA tenderness. Lungs:   Clear to auscultation bilaterally. Chest wall:  No tenderness or deformity. Heart:   Tachycardic, S1, S2 normal, no murmur, click, rub or gallop. Abdomen:   Soft, non-tender. Bowel sounds normal. No masses,  No organomegaly.   PEG tube noted without drainage or surrounding erythema. Extremities: Extremities normal, atraumatic, no cyanosis or edema. Pulses: 2+ and symmetric all extremities. Skin: Skin color, texture, turgor normal. No rashes or lesions   Neurologic: CNII-XII intact. Data Review:     Recent Days:  Recent Labs     03/18/21  1438   WBC 13.0*   HGB 13.8   HCT 45.0   *     Recent Labs     03/18/21  1438   *   K 4.3   *   CO2 30   *   BUN 49*   CREA 1.36*   CA 9.5   ALB 3.1*   ALT 33     No results for input(s): PH, PCO2, PO2, HCO3, FIO2 in the last 72 hours. 24 Hour Results:  Recent Results (from the past 24 hour(s))   EKG, 12 LEAD, INITIAL    Collection Time: 03/18/21  2:17 PM   Result Value Ref Range    Ventricular Rate 133 BPM    Atrial Rate 133 BPM    P-R Interval 98 ms    QRS Duration 64 ms    Q-T Interval 308 ms    QTC Calculation (Bezet) 458 ms    Calculated P Axis 52 degrees    Calculated R Axis 43 degrees    Calculated T Axis 31 degrees    Diagnosis       Sinus tachycardia with short AR  When compared with ECG of 22-AUG-2013 09:41,  No significant change was found     CBC WITH AUTOMATED DIFF    Collection Time: 03/18/21  2:38 PM   Result Value Ref Range    WBC 13.0 (H) 4.1 - 11.1 K/uL    RBC 4.68 4.10 - 5.70 M/uL    HGB 13.8 12.1 - 17.0 g/dL    HCT 45.0 36.6 - 50.3 %    MCV 96.2 80.0 - 99.0 FL    MCH 29.5 26.0 - 34.0 PG    MCHC 30.7 30.0 - 36.5 g/dL    RDW 13.4 11.5 - 14.5 %    PLATELET 345 (H) 439 - 400 K/uL    MPV 10.9 8.9 - 12.9 FL    NRBC 0.0 0  WBC    ABSOLUTE NRBC 0.00 0.00 - 0.01 K/uL    NEUTROPHILS 67 32 - 75 %    LYMPHOCYTES 21 12 - 49 %    MONOCYTES 11 5 - 13 %    EOSINOPHILS 0 0 - 7 %    BASOPHILS 1 0 - 1 %    IMMATURE GRANULOCYTES 0 0.0 - 0.5 %    ABS. NEUTROPHILS 8.7 (H) 1.8 - 8.0 K/UL    ABS. LYMPHOCYTES 2.8 0.8 - 3.5 K/UL    ABS. MONOCYTES 1.4 (H) 0.0 - 1.0 K/UL    ABS. EOSINOPHILS 0.0 0.0 - 0.4 K/UL    ABS. BASOPHILS 0.1 0.0 - 0.1 K/UL    ABS. IMM.  GRANS. 0.0 0.00 - 0.04 K/UL    DF AUTOMATED METABOLIC PANEL, COMPREHENSIVE    Collection Time: 03/18/21  2:38 PM   Result Value Ref Range    Sodium 146 (H) 136 - 145 mmol/L    Potassium 4.3 3.5 - 5.1 mmol/L    Chloride 112 (H) 97 - 108 mmol/L    CO2 30 21 - 32 mmol/L    Anion gap 4 (L) 5 - 15 mmol/L    Glucose 105 (H) 65 - 100 mg/dL    BUN 49 (H) 6 - 20 MG/DL    Creatinine 1.36 (H) 0.70 - 1.30 MG/DL    BUN/Creatinine ratio 36 (H) 12 - 20      GFR est AA >60 >60 ml/min/1.73m2    GFR est non-AA 53 (L) >60 ml/min/1.73m2    Calcium 9.5 8.5 - 10.1 MG/DL    Bilirubin, total 0.5 0.2 - 1.0 MG/DL    ALT (SGPT) 33 12 - 78 U/L    AST (SGOT) 12 (L) 15 - 37 U/L    Alk. phosphatase 85 45 - 117 U/L    Protein, total 8.7 (H) 6.4 - 8.2 g/dL    Albumin 3.1 (L) 3.5 - 5.0 g/dL    Globulin 5.6 (H) 2.0 - 4.0 g/dL    A-G Ratio 0.6 (L) 1.1 - 2.2     SAMPLES BEING HELD    Collection Time: 03/18/21  2:38 PM   Result Value Ref Range    SAMPLES BEING HELD 1red, 1BLU     COMMENT        Add-on orders for these samples will be processed based on acceptable specimen integrity and analyte stability, which may vary by analyte. LACTIC ACID    Collection Time: 03/18/21  2:38 PM   Result Value Ref Range    Lactic acid 3.4 (HH) 0.4 - 2.0 MMOL/L   LACTIC ACID    Collection Time: 03/18/21  5:45 PM   Result Value Ref Range    Lactic acid 1.8 0.4 - 2.0 MMOL/L   URINE CULTURE HOLD SAMPLE    Collection Time: 03/18/21  8:00 PM    Specimen: Serum; Urine   Result Value Ref Range    Urine culture hold        Urine on hold in Microbiology dept for 2 days. If unpreserved urine is submitted, it cannot be used for addtional testing after 24 hours, recollection will be required. Imaging:     Assessment:     Jerrod Campbell is a 61 y.o. male with past medical history of cerebral palsy, generalized anxiety disorder, bladder carcinoma s/p radical cystoprostatectomy with ileal conduit who is admitted for right-sided hydroureteronephrosis and severe sepsis secondary to acute cystitis. Plan:       Severe sepsis secondary to acute cystitis  -Sepsis bundle activated  -30 cc/kg normal saline volume resuscitation  -Continue with IV meropenem  -Follow-up blood and urine cultures.     Right hydroureteronephrosis  Multiple right renal calculi  Bladder carcinoma s/p radical cystoprostatectomy with neobladder  -Pain control IV morphine  -Patient typically straight cath every 6 hours a group home  -Start daily Flomax  -Stat consult to urology for potential stenting vs basket extraction    Acute renal failure  -Likely secondary to acute cystitis and renal calculi  -Nephrology consulted  -Continue with MIVF  -BMP every 12 hours    Dysphagia 2/2 CP s/p PEG Tube  -Hold PEG tube feedings until urology eval  -Dietitian consult to resume PEG feeds when appropriate  -MIVF      Cerebral palsy / chronic pain  Functional Quadriplegia  -Continue home Hycet and nortriptyline  -Consult left team for frequent repositioning       Hiatal hernia  -Chronic large hiatal hernia as evidenced on CT  -Continue PPI      Generalized anxiety disorder /depressive disorder  -Continue home amitriptyline  -As needed Ativan               FEN/GI -  NS @ 75 ml/hr  Activity - As tolerated  DVT prophylaxis - Heparin  GI prophylaxis -  NI  Disposition - TBD    CODE STATUS:  Full code       Signed By: Bill Brenner MD     March 18, 2021

## 2021-03-19 NOTE — ED NOTES
Spoke with Dr. Irving Burton. Verbal orders received to treat elevated HR and Temp. Updated RN on Vazquez Alonso. RN verbalized understanding. Family updated on plan of care and requesting to speak with Dr. Irving Burton.

## 2021-03-19 NOTE — ANESTHESIA PREPROCEDURE EVALUATION
Relevant Problems   No relevant active problems       Anesthetic History   No history of anesthetic complications  PONV          Review of Systems / Medical History  Patient summary reviewed, nursing notes reviewed and pertinent labs reviewed    Pulmonary  Within defined limits                 Neuro/Psych   Within defined limits      Psychiatric history     Cardiovascular  Within defined limits                Exercise tolerance: <4 METS     GI/Hepatic/Renal  Within defined limits         Hiatal hernia     Endo/Other  Within defined limits      Cancer     Other Findings   Comments: Cp  CP (cerebral palsy) (HCC)  Quadriplegic cerebral palsy (HCC)  Esophageal stricture  UTI (urinary tract infection)  Sepsis                  Physical Exam    Airway  Mallampati: III  TM Distance: 4 - 6 cm  Neck ROM: decreased range of motion   Mouth opening: Normal     Cardiovascular  Regular rate and rhythm,  S1 and S2 normal,  no murmur, click, rub, or gallop             Dental  No notable dental hx       Pulmonary  Breath sounds clear to auscultation               Abdominal  GI exam deferred       Other Findings            Anesthetic Plan    ASA: 3  Anesthesia type: general          Induction: Intravenous  Anesthetic plan and risks discussed with: Patient

## 2021-03-19 NOTE — PROGRESS NOTES
Called and spoke with Nghia Nolan RN regarding mews of 5 and questioned covid testing since he is coming from facility. Also voiced concern about not being able to straight cath him since I was informed it was difficult for them to do down in the ED and questioned if they could get an order for a carrera in the interim. States he called physician to come reevaluate him. 0- Per Cassi Allen RN patient was covid tested at his facility and they had a copy of the results.

## 2021-03-19 NOTE — PROGRESS NOTES
Comprehensive Nutrition Assessment    Type and Reason for Visit:      Nutrition Recommendations/Plan:     Restart enteral feeding once able: Jevity 1.5: 355 ml x 3/day with 480 ml water flush after each feeding. Pt to remain NPO d/t dysphagia. Nutrition Assessment:      61year old admitted for abdominal pain, sepsis, UTI with rt hydronephrosis. Sister bedside. Pt with s/p today: placement of nephrostomy tube. PMH: cerebral palsy, anxiety disorder, bladder cancer s/p radial cystoprostatectomy with ileal conduit placement. Pt resides at [facility] and spoke to nurse via phone. Mr. Ella Longoria usually receives Jevity 1.5 355 ml x 3/day with  480 ml water flush. Also receives water flush of 30 ml before and after each medication. Pt also consumes a small amount of fluids  (signed a wavier as liquids not recommended). Wt overall stable between 131#-136# over the past 18 months. Usual enteral feedings provides:  1065 ml, 1600 calories, 68 g/day and 2249 ml total fluid. Malnutrition Assessment:  Malnutrition Status:  Insufficient data      Estimated Daily Nutrient Needs:  Energy (kcal): 2321-9894 kcal/day ( 25-30 Kcal/kg_; Weight Used for Energy Requirements: Current  Protein (g): 68-74 g/day ( 1.1-1.2 g/kg);  Weight Used for Protein Requirements: Current  Fluid (ml/day): 2842-6955 ml/day; Method Used for Fluid Requirements: 1 ml/kcal      Wounds:    None       Current Nutrition Therapies:  NPO    Anthropometric Measures:  · Height:  5' 7\" (170.2 cm)  · Current Body Wt:  62.6 kg (138 lb 0.1 oz)   · Admission Body Wt:  138 lb 0.1 oz    · Usual Body Wt:  59.4 kg (131 lb)(131-136#)  · Ideal Body Wt:  148 lbs:  93.2 %     Nutrition Diagnosis:   · Inadequate oral intake related to swallowing difficulty as evidenced by nutrition support-enteral nutrition      Nutrition Interventions:   Food and/or Nutrient Delivery: Start tube feeding  Nutrition Education and Counseling: No recommendations at this time  Coordination of Nutrition Care: Continue to monitor while inpatient    Goals:  Provide 90% estimated needs via enteral feedings over the next 3-5 days. Nutrition Monitoring and Evaluation:   Behavioral-Environmental Outcomes: None identified  Food/Nutrient Intake Outcomes: Enteral nutrition intake/tolerance, Vitamin/mineral intake  Physical Signs/Symptoms Outcomes: GI status, Weight    Discharge Planning:     Too soon to determine     Electronically signed by Cynthia Ng RD on 3/19/2021 at 6:32 PM    Contact: Guido

## 2021-03-19 NOTE — H&P
Interventional and Vascular Radiology History and Physical    Patient: Nathaly Mitchell 61 y.o. male       Chief Complaint: Abdominal Pain      History of Present Illness: right urinary obstruction     History:    Past Medical History:   Diagnosis Date    Anxiety     Bladder [facility]     Bladder cancer [facility]     Cancer ([facility]     BLADDER    CP (cerebral palsy) ([facility]     Depression     Hernia     Nausea & vomiting     Other ill-defined conditions(799.89)     Cerebral palsy    Psychiatric disorder     ANXIETY DEPRESSION    Unspecified constipation 5/13/2013     Family History   Problem Relation Age of Onset    Heart Disease Mother         TACHYCARDIA    Anesth Problems Neg Hx      Social History     Socioeconomic History    Marital status: SINGLE     Spouse name: Not on file    Number of children: Not on file    Years of education: Not on file    Highest education level: Not on file   Occupational History    Not on file   Social Needs    Financial resource strain: Not on file    Food insecurity     Worry: Not on file     Inability: Not on file    Transportation needs     Medical: Not on file     Non-medical: Not on file   Tobacco Use    Smoking status: Never Smoker    Smokeless tobacco: Never Used   Substance and Sexual Activity    Alcohol use: No    Drug use: No    Sexual activity: Not on file   Lifestyle    Physical activity     Days per week: Not on file     Minutes per session: Not on file    Stress: Not on file   Relationships    Social connections     Talks on phone: Not on file     Gets together: Not on file     Attends Sabianism service: Not on file     Active member of club or organization: Not on file     Attends meetings of clubs or organizations: Not on file     Relationship status: Not on file    Intimate partner violence     Fear of current or ex partner: Not on file     Emotionally abused: Not on file     Physically abused: Not on file     Forced sexual activity: Not on file   Other Topics Concern    Not on file   Social History Narrative    Not on file       Allergies: Allergies   Allergen Reactions    Avelox [Moxifloxacin] Rash    Demerol [Meperidine] Other (comments)     Spastic movements    Amikacin Unknown (comments)    Amoxicillin Rash    Betadine Surgi-Prep Rash     Mother states this does not happen all the time and if betadine is washed off, it's ok    Ciprofloxacin Unknown (comments)    Effexor [Venlafaxine] Unknown (comments)    Fentanyl Unknown (comments)    Keflex [Cephalexin] Rash    Lyrica [Pregabalin] Unknown (comments)    Morphine Unknown (comments)    Neurontin [Gabapentin] Unknown (comments)    Prozac [Fluoxetine] Unknown (comments)    Reglan [Metoclopramide] Unknown (comments)    Serzone [Nefazodone] Unknown (comments)    Sulfa (Sulfonamide Antibiotics) Unknown (comments)    Tetracycline Unknown (comments)    Tricyclamol Chloride Unknown (comments)    Valium [Diazepam] Unknown (comments)     Tolerates Lorazepam       Current Medications:  Current Facility-Administered Medications   Medication Dose Route Frequency    dextrose 5 % - 0.45% NaCl infusion  75 mL/hr IntraVENous CONTINUOUS    meropenem (MERREM) 500 mg in 0.9% sodium chloride (MBP/ADV) 50 mL MBP  500 mg IntraVENous Q6H     Facility-Administered Medications Ordered in Other Encounters   Medication Dose Route Frequency    0.9% sodium chloride infusion   IntraVENous CONTINUOUS    PHENYLephrine (NEOSYNEPHRINE) in NS syringe   IntraVENous PRN    dexamethasone (DECADRON) 4 mg/mL injection    PRN    ondansetron (ZOFRAN) injection    PRN    propofoL (DIPRIVAN) 10 mg/mL injection   IntraVENous PRN    dexmedeTOMidine (PRECEDEX) 100 mcg/mL iv solution   IntraVENous PRN        Physical Exam:  Blood pressure 139/86, pulse (!) 120, temperature (!) 100.9 °F (38.3 °C), resp. rate 19, height 5' 7\" (1.702 m), weight 62.6 kg (138 lb 0.1 oz), SpO2 97 %.   LUNG: clear to auscultation bilaterally, HEART: regular rate and rhythm, S1, S2 normal, no murmur, click, rub or gallop      Alerts:    Hospital Problems  Date Reviewed: 1/2/2014          Codes Class Noted POA    Severe sepsis ([facility] ICD-10-CM: A41.9, R65.20  ICD-9-CM: 038.9, 995.92  3/18/2021 Unknown        Sepsis ([facility] ICD-10-CM: A41.9  ICD-9-CM: 038.9, 995.91  8/22/2018 Unknown              Laboratory:      Recent Labs     03/19/21  0540 03/18/21  1438   HGB  --  13.8   HCT  --  45.0   WBC  --  13.0*   PLT  --  473*   BUN 32* 49*   CREA 1.17 1.36*   K 3.7 4.3         Plan of Care/Planned Procedure:  Risks, benefits, and alternatives reviewed with patient and he agrees to proceed with the procedure. Conscious sedation will be performed with IV fentanyl and versed.  Plan is for right nephrostomy tube placement       Pierce Garcia MD

## 2021-03-19 NOTE — PROGRESS NOTES
Discussed patient's presentation with on-call interventional radiologist Dr. Cammy Madrid who agreed to place right nephrostomy tube. Upon chart review, patient has an extensive allergy list to include sedate of agents necessary for nephrostomy tube placement. As such, it was deemed prudent to perform procedure in a.m. in presence of full anesthesia staff given patient's allergy profile. I discussed this with patient and family at bedside who are in agreement.

## 2021-03-19 NOTE — ED NOTES
Verbal shift change report given to Kelsie RN (oncoming nurse) by Isis Abdul RN (offgoing nurse). Report included the following information SBAR, ED Summary, MAR and Recent Results.

## 2021-03-19 NOTE — ED NOTES
RN spoke with Dr Contreras regarding pt's MEWS score of 7 with fever that has already been treated. RN received verbal orders for 500mL bolus of NS. Pt to go to telemetry unit

## 2021-03-19 NOTE — ROUTINE PROCESS
TRANSFER - OUT REPORT: 
 
Verbal report given to Raven(name) on Josep Miners  being transferred to [facility] for routine progression of care Report consisted of patients Situation, Background, Assessment and  
Recommendations(SBAR). Information from the following report(s) SBAR, Procedure Summary and MAR was reviewed with the receiving nurse. Lines:  
Peripheral IV 03/18/21 Right;Upper Arm (Active) Site Assessment Clean, dry, & intact 03/18/21 1440 Phlebitis Assessment 0 03/18/21 1440 Infiltration Assessment 0 03/18/21 1440 Dressing Status Clean, dry, & intact 03/18/21 1440 Hub Color/Line Status Pink 03/18/21 1440 Action Taken Blood drawn 03/18/21 1440 Opportunity for questions and clarification was provided. Patient transported with: 
 Registered Nurse, pulse ox, CRNA Katy Raisin

## 2021-03-19 NOTE — PERIOP NOTES
TRANSFER - OUT REPORT:    Verbal report given to Jamia(name) on Mitzy Mejia  being transferred to 352(unit) for routine post - op       Report consisted of patients Situation, Background, Assessment and   Recommendations(SBAR). Time Pre op antibiotic given:na  Anesthesia Stop time: na  Short Present on Transfer to floor:yes  Order for Short on Chart:yes  Discharge Prescriptions with Chart:no    Information from the following report(s) SBAR, Kardex, OR Summary, Procedure Summary, Intake/Output, MAR, Recent Results and Med Rec Status was reviewed with the receiving nurse. Opportunity for questions and clarification was provided. Is the patient on 02? NO       L/Min        Other     Is the patient on a monitor? YES    Is the nurse transporting with the patient? YES    Surgical Waiting Area notified of patient's transfer from PACU? YES      The following personal items collected during your admission accompanied patient upon transfer:   Dental Appliance:    Vision: Visual Aid: None  Hearing Aid:    Jewelry:    Clothing:    Other Valuables:    Valuables sent to safe:      Was told by Atul Briscoe nurse, that ED would deliver clothes. Called ED myself and asked that clothes be delivered to room 352.

## 2021-03-19 NOTE — ROUTINE PROCESS
TRANSFER - OUT REPORT: 
 
Verbal report given to Dee(name) on Fabiana De Anda  being transferred to [facility](unit) for routine progression of care Report consisted of patients Situation, Background, Assessment and  
Recommendations(SBAR). Information from the following report(s) SBAR, ED Summary and MAR was reviewed with the receiving nurse. Lines:  
Peripheral IV 03/18/21 Right;Upper Arm (Active) Site Assessment Clean, dry, & intact 03/18/21 1440 Phlebitis Assessment 0 03/18/21 1440 Infiltration Assessment 0 03/18/21 1440 Dressing Status Clean, dry, & intact 03/18/21 1440 Hub Color/Line Status Pink 03/18/21 1440 Action Taken Blood drawn 03/18/21 1440 Opportunity for questions and clarification was provided. Patient transported with: 
 Monitor

## 2021-03-19 NOTE — ANESTHESIA POSTPROCEDURE EVALUATION
Post-Anesthesia Evaluation and Assessment    Patient: Luis Robertson MRN: [id number]  SSN: [id number]    YOB: 1960  Age: 61 y.o. Sex: male      I have evaluated the patient and they are stable and ready for discharge from the PACU. Cardiovascular Function/Vital Signs  Visit Vitals  BP (!) 156/98   Pulse (!) 105   Temp 37.2 °C (99 °F)   Resp 14   Ht 5' 7\" (1.702 m)   Wt 62.6 kg (138 lb 0.1 oz)   SpO2 99%   BMI 21.62 kg/m²       Patient is status post * No anesthesia type entered * anesthesia for * No procedures listed *. Nausea/Vomiting: None    Postoperative hydration reviewed and adequate. Pain:  Pain Scale 1: Numeric (0 - 10) (03/19/21 1403)  Pain Intensity 1: 0 (03/19/21 1403)   Managed    Neurological Status:   Neuro (WDL): Exceptions to WDL(no change) (03/19/21 1403)  Neuro  Neurologic State: Alert (03/19/21 1403)  Orientation Level: Unable to verbalize (03/19/21 1345)  Cognition: Follows commands (03/19/21 1345)  Speech: Incomprehensible words (03/19/21 1345)   At baseline    Mental Status, Level of Consciousness: Alert and  oriented to person, place, and time    Pulmonary Status:   O2 Device: Room air (03/19/21 1403)   Adequate oxygenation and airway patent    Complications related to anesthesia: None    Post-anesthesia assessment completed. No concerns    Signed By: Leone Boast, MD     March 19, 2021              * No procedures listed *.    general    <BSHSIANPOST>    INITIAL Post-op Vital signs:   Vitals Value Taken Time   /91 03/19/21 1405   Temp 37.2 °C (99 °F) 03/19/21 1345   Pulse 103 03/19/21 1406   Resp 11 03/19/21 1406   SpO2 98 % 03/19/21 1406   Vitals shown include unvalidated device data.

## 2021-03-19 NOTE — PROGRESS NOTES
Pt. Moved prone onto angio table. Anesthesia administering general anesthesia, meds, and vitals. Rapid covid screen negative.

## 2021-03-19 NOTE — PROGRESS NOTES
Taken into angio room and swabbed nasopharyngeal for rapid Covid test as anesthesia to medicate pt. for the procedure. Specimen taken to the lab to be run stat.

## 2021-03-20 ENCOUNTER — APPOINTMENT (OUTPATIENT)
Dept: GENERAL RADIOLOGY | Age: 61
DRG: 871 | End: 2021-03-20
Attending: ANESTHESIOLOGY
Payer: MEDICARE

## 2021-03-20 ENCOUNTER — APPOINTMENT (OUTPATIENT)
Dept: GENERAL RADIOLOGY | Age: 61
DRG: 871 | End: 2021-03-20
Attending: FAMILY MEDICINE
Payer: MEDICARE

## 2021-03-20 PROCEDURE — 74011250636 HC RX REV CODE- 250/636: Performed by: STUDENT IN AN ORGANIZED HEALTH CARE EDUCATION/TRAINING PROGRAM

## 2021-03-20 PROCEDURE — 74011000258 HC RX REV CODE- 258: Performed by: STUDENT IN AN ORGANIZED HEALTH CARE EDUCATION/TRAINING PROGRAM

## 2021-03-20 PROCEDURE — 36556 INSERT NON-TUNNEL CV CATH: CPT

## 2021-03-20 PROCEDURE — C1751 CATH, INF, PER/CENT/MIDLINE: HCPCS

## 2021-03-20 PROCEDURE — 74011000250 HC RX REV CODE- 250: Performed by: STUDENT IN AN ORGANIZED HEALTH CARE EDUCATION/TRAINING PROGRAM

## 2021-03-20 PROCEDURE — 74018 RADEX ABDOMEN 1 VIEW: CPT

## 2021-03-20 PROCEDURE — 71045 X-RAY EXAM CHEST 1 VIEW: CPT

## 2021-03-20 PROCEDURE — 74011250637 HC RX REV CODE- 250/637: Performed by: NURSE PRACTITIONER

## 2021-03-20 PROCEDURE — 74011250636 HC RX REV CODE- 250/636: Performed by: FAMILY MEDICINE

## 2021-03-20 PROCEDURE — 65660000000 HC RM CCU STEPDOWN

## 2021-03-20 PROCEDURE — 76210000016 HC OR PH I REC 1 TO 1.5 HR

## 2021-03-20 RX ORDER — ONDANSETRON 2 MG/ML
4 INJECTION INTRAMUSCULAR; INTRAVENOUS
Status: DISCONTINUED | OUTPATIENT
Start: 2021-03-20 | End: 2021-03-25 | Stop reason: HOSPADM

## 2021-03-20 RX ORDER — DEXTROSE MONOHYDRATE 50 MG/ML
75 INJECTION, SOLUTION INTRAVENOUS CONTINUOUS
Status: DISCONTINUED | OUTPATIENT
Start: 2021-03-20 | End: 2021-03-25 | Stop reason: HOSPADM

## 2021-03-20 RX ADMIN — MEROPENEM 500 MG: 500 INJECTION, POWDER, FOR SOLUTION INTRAVENOUS at 23:59

## 2021-03-20 RX ADMIN — MEROPENEM 500 MG: 500 INJECTION, POWDER, FOR SOLUTION INTRAVENOUS at 17:25

## 2021-03-20 RX ADMIN — DEXTROSE MONOHYDRATE 75 ML/HR: 50 INJECTION, SOLUTION INTRAVENOUS at 15:11

## 2021-03-20 RX ADMIN — ACETAMINOPHEN 650 MG: 650 SOLUTION ORAL at 21:01

## 2021-03-20 RX ADMIN — ACETAMINOPHEN 650 MG: 650 SOLUTION ORAL at 06:50

## 2021-03-20 RX ADMIN — MEROPENEM 500 MG: 500 INJECTION, POWDER, FOR SOLUTION INTRAVENOUS at 12:40

## 2021-03-20 RX ADMIN — DEXTROSE AND SODIUM CHLORIDE 75 ML/HR: 5; 450 INJECTION, SOLUTION INTRAVENOUS at 12:38

## 2021-03-20 NOTE — PROGRESS NOTES
Patient: Juan Miguel Dawkins MRN: [id number]  SSN: [id number]    YOB: 1960  Age: 61 y.o. Sex: male        ADMITTED: 3/18/2021 to Matthew Green MD by Marvin Cobos MD for Severe sepsis ([facility] [A41.9, R65.20]  Sepsis ([facility] [A41.9]  POD# Day of Surgery Procedure(s): INFUSION CATHETER INSERTION    Juan Miguel Dawkins is doing well , remains afebrile but tachycardic . Good UOP from R PCN and carrera catheter. Pt is known to VU followed by Dr. Kenan Hernandez , has a celestine bladder . Ucx + GNR on Meropenem . Pt denies pain     Vitals: Temp (24hrs), Av.5 °F (36.9 °C), Min:98 °F (36.7 °C), Max:99 °F (37.2 °C)    Blood pressure (!) 146/97, pulse (!) 112, temperature 98 °F (36.7 °C), resp. rate 20, height 5' 7\" (1.702 m), weight 62.6 kg (138 lb 0.1 oz), SpO2 96 %. Intake and Output:   1901 -  0700  In: 1100 [I.V.:1100]  Out: 4490 [Urine:4490]  No intake/output data recorded. BANDAR Output lats 24 hrs: No data found. BANDAR Output last 8 hrs: No data found. BM over last 24 hrs: No data found. Exam:    EXAMINATION:    Appearance: , cognitively challenged , contractures of hands    Respiratory Effort:  breathing easily   Abdomen/Flank:  soft non-tender without masses; no CVA tenderness R PCN . Multiple abd old scars .  Peg tube in place   Liver/Spleen:  no organomegaly    Hernia: no ventral hernia   Scrotum: without lesions    Testes: bilaterally non-tender, no masses   Epididymes: Not assessed   Penis: No plaques    Meatus: patent carrera draining    Mood/Affect: Normal                    Labs:  CBC:   Lab Results   Component Value Date/Time    WBC 13.0 (H) 2021 02:38 PM    HCT 45.0 2021 02:38 PM    PLATELET 607 (H)  02:38 PM     BMP:   Lab Results   Component Value Date/Time    Glucose 112 (H) 2021 05:40 AM    Sodium 153 (H) 2021 05:40 AM    Potassium 3.7 2021 05:40 AM    Chloride 122 (H) 2021 05:40 AM    CO2 29 2021 05:40 AM    BUN 32 (H) 2021 05:40 AM    Creatinine 1.17 03/19/2021 05:40 AM    Calcium 8.8 03/19/2021 05:40 AM     Cultures:   Ucx + GNR   BC NGTD     Imaging:     CT:   Results for orders placed during the hospital encounter of 03/18/21   CT ABD PELV W CONT    Narrative EXAM:  CT ABDOMEN PELVIS WITH CONTRAST  INDICATION:  abdominal pain. Additional history:  COMPARISON: CT of the abdomen and pelvis, 8/22/2018. Edgar Lopez TECHNIQUE:   Multislice helical CT was performed from the diaphragm to the symphysis pubis  with oral and intravenous contrast administration. Contiguous 5 mm axial images  were reconstructed and lung and soft tissue windows were generated. Coronal and  sagittal reformations were generated. CT dose reduction was achieved through use of a standardized protocol tailored  for this examination and automatic exposure control for dose modulation. Edgar Lopez FINDINGS:  INCIDENTALLY IMAGED CHEST:  Heart/vessels: Calcifications in the coronary arteries. Lungs/Pleura: Within normal limits. .  ABDOMEN:  Liver: Within normal limits. Gallbladder/Biliary: Status post cholecystectomy. Spleen: Within normal limits. Pancreas: Within normal limits. Adrenals: Within normal limits. Kidneys: Right sided hydronephrosis. There are calculi in the right renal  collecting system. Left-sided extrarenal pelvis versus hydronephrosis, not  significant changed  Peritoneum/Mesenteries: Within normal limits. Extraperitoneum: Within normal limits. Gastrointestinal tract: Patulous distal esophagus with a moderate sized, hiatal  hernia. Query ulceration versus diverticulum at the gastroesophageal junction. PEG tube in the stomach. Rectal distention with stool which is overall  diminished relative to comparison, currently measuring approximately 11 x 9.7  cm. Vascular: Within normal limits. Edgar Jessica PELVIS:  Extraperitoneum: Within normal limits. Ureters: Right-sided hydroureter with multiple calculi within the right ureter.   There is a calculus in what appears to be the distalmost ureter prior to joining  the fluid-filled structure in the right lower quadrant. There is hyperemia  within the right ureter  Bladder: Fluid-filled structure in the right lower quadrant containing a balloon  catheter which traverses the urethra; this contains a few calculi. Reproductive System: Within normal limits. .  MSK:   Right total hip arthroplasty. Degenerative changes in the spine with  dextroscoliosis. .    Impression 1. Interval development of right-sided hydroureteronephrosis. The right ureter  contains multiple calculi throughout its course with a calculus at what appears  to be its terminus in a neobladder in the right lower quadrant. Additionally,  there is hyperemia within the walls of the right ureter suggesting  infection/inflammation. 2. No significant change in the appearance of the left-sided extrarenal pelvis  versus hydronephrosis. 3. Incidental findings as above. Assessment/Plan:     1. Urosepsis- cont with Broad spectrum Abx follow Ucx adjust if indicated   2 . R severe Hydronephrosis - 2/2 multiple ureteral calculi s/p R PCN placed draining well , will need definitive stone management once recovered most likely with Dr. Lowry Child   3. Gomez - Cr 1.36 now improved to 1.17    Signed By: Alyse So NP - March 20, 2021

## 2021-03-20 NOTE — PROGRESS NOTES
Bedside shift change report given to Leslie Nevarez RN (oncoming nurse) by MANI Oleary (offgoing nurse). Report included the following information SBAR, Kardex, Intake/Output, MAR, Accordion, Recent Results and Cardiac Rhythm NSR/ Sinus tachy.

## 2021-03-20 NOTE — PROGRESS NOTES
Physician Progress Note      PATIENT:               Martha Garrison  CSN #:                  [id number]  :                       1960  ADMIT DATE:       3/18/2021 2:06 PM  DISCH DATE:  RESPONDING  PROVIDER #:        Ashia Vanegas MD          QUERY TEXT:    Pt admitted with Sepsis, Acute Cystitis, and Kidney Stones. Documentation in the H&P that pt is straight cathed every 6 hrs. If possible, please document in the progress notes and discharge summary if you are evaluating and / or treating any of the following: The medical record reflects the following:  Risk Factors: pt with ileal conduit, requiring straight cath q 6 hrs  Clinical Indicators: VS on admission 99.5 (102)< 131, 26, 132/98. WBC 13, Lactic Acid 3.4, with 3+ bacteria on UA. Pt diagnosed with Sepsis secondary to Acute Cystitis, and also Kidney stones. There is also documentation that pt undergoes straight caths q 6 hrs. Treatment: Merrem/ Rocephin IV, Urology consult has been made. Nephrostomy tube placement is planned. Thank you, if you have any questions please e-mail me at  [employer]  Options provided:  -- Sepsis related to Straight Caths  -- Sepsis unrelated to Straight caths  -- Sepsis related to both Straight Caths and Kidney Stones  -- Other - I will add my own diagnosis  -- Disagree - Not applicable / Not valid  -- Disagree - Clinically unable to determine / Unknown  -- Refer to Clinical Documentation Reviewer    PROVIDER RESPONSE TEXT:    This patient has sepsis related to both straight caths and kidney stones.     Query created by: Marly Brito on 3/19/2021 7:44 AM      Electronically signed by:  Ashia Vanegas MD 3/20/2021 3:30 AM

## 2021-03-20 NOTE — PROGRESS NOTES
Pt's family member caring for PT at bedside requests that Peg Tube feeds not be started now and to start in am as pt is asleep.  PIV maintenance fluids infusing

## 2021-03-20 NOTE — ACP (ADVANCE CARE PLANNING)
visit for Advance Medical Directive (AMD) consult. Pt was in bed and pt's sister Sneha Ma was present at bedside.  explained paperwork and left with pt and family to look over. Sneha Ma shared that it would be pt's brother and sister that would be his agents.  talked with pt's RN and there was mention that pt's mother is living through has had a stroke and it seems that brother and sister would be decision makers.  Let them know to have [address] [place] [address] contacted if they have any questions or would like to complete AMD.     Chaplain Amber Rodriguez M.Div, [facility]   [phone])

## 2021-03-20 NOTE — PERIOP NOTES
TRANSFER - OUT REPORT:    Verbal report given to Yahaira Julio (name) on Lucio Buckner  being transferred to [facility] (unit) for C-Line Placement      Report consisted of patients Situation, Background, Assessment and   Recommendations(SBAR). Time Pre op antibiotic given:N/A  Anesthesia Stop time: 5308  Short Present on Transfer to floor:Y  Order for Short on Chart:Y  Discharge Prescriptions with Chart:N/A    Information from the following report(s) Procedure Summary was reviewed with the receiving nurse. Opportunity for questions and clarification was provided. Is the patient on 02? YES       L/Min 2       Other N/A    Is the patient on a monitor? YES    Is the nurse transporting with the patient? NO    Surgical Waiting Area notified of patient's transfer from PACU?  NO      The following personal items collected during your admission accompanied patient upon transfer:   Dental Appliance:    Vision: Visual Aid: None  Hearing Aid:    Jewelry:    Clothing:    Other Valuables:    Valuables sent to safe:

## 2021-03-20 NOTE — PROGRESS NOTES
[facility] Adult  Hospitalist Group                                                                                          Hospitalist Progress Note  Shanna Lundberg MD  Answering service: [phone] OR [phone] from in house phone              Progress Note    Patient: Jerrod Campbell MRN: [id number]  SSN: [id number]    YOB: 1960  Age: 61 y.o. Sex: male      Admit Date: 3/18/2021    LOS: 2 days     Subjective:     Patient presents with sepsis and urinary tract infection, also with right hydronephrosis, is now s/p decompression with nephrostomy tube placement by IR. Sister present at bedside. Objective:     Vitals:    03/20/21 1040 03/20/21 1045 03/20/21 1050 03/20/21 1055   BP:  (!) 146/97     Pulse: (!) 115 (!) 114 (!) 118 (!) 112   Resp: 17 16 21 20   Temp:       SpO2: 97% 97% 98% 96%   Weight:       Height:            Intake and Output:  Current Shift: No intake/output data recorded. Last three shifts: 03/18 1901 - 03/20 0700  In: 1100 [I.V.:1100]  Out: 4490 [Urine:4490]    Physical Exam:   GENERAL: alert, cooperative  THROAT & NECK: normal and no erythema or exudates noted. LUNG: clear to auscultation bilaterally  HEART: regular rate and rhythm, S1, S2 normal, no murmur, click, rub or gallop  ABDOMEN: soft, non-tender. Bowel sounds normal. No masses,  no organomegaly  EXTREMITIES:  Extremities with some contractures  SKIN: no rash or abnormalities  NEUROLOGIC: AOx3. PSYCHIATRIC: non focal    Lab/Data Review: All lab results for the last 24 hours reviewed. Recent Results (from the past 24 hour(s))   GLUCOSE, POC    Collection Time: 03/19/21  5:15 PM   Result Value Ref Range    Glucose (POC) 158 (H) 65 - 100 mg/dL    Performed by Ashok Deluca         Imaging:    Xr Chest Port    Result Date: 3/20/2021  EXAM: XR CHEST PORT INDICATION: central line placement COMPARISON: 3/18/2021 FINDINGS: A portable AP radiograph of the chest was obtained at 1045 hours.  The patient is on a cardiac monitor. The right IJ line terminates at the cavoatrial junction. A skin fold overlies the right midlung. There is no pneumothorax. No pneumothorax following central line placement. Assessment and Plan:     Sepsis  -Patient presents with fever, tachycardia and leukocytosis meeting sepsis criteria  -Sepsis due to urinary tract infection with obstructive uropathy  -Follow blood and urine culture  -Continue meropenem    Urinary tract infection  -Antibiotic as above  -Follow blood and urine culture    Right hydroureteronephrosis  -S/p decompression with IR nephrostomy tube placement today  -Several ureteric calculis on CT scan  -Urology evaluation and plan for extraction of stones at a later date when infection is improved    VAZQUEZ  -Likely postobstructive with UTI  -Renal function improving  -Continue IV fluid    Hypernatremia  -Worse sodium level  -Change IV fluid to plain D5  -Monitor electrolytes    History of bladder cancer  -S/p radical cystoprostatectomy with ileal conduit placement  -Follow-up as patient    Cerebral palsy  -Supportive care  -Patient with PEG tube feeding, currently on hold due to abdominal pain  -Check KUB    Discharge disposition: Likely more than 48 hours pending progress.     Signed By: Veto Lewis MD     March 20, 2021

## 2021-03-20 NOTE — PROGRESS NOTES
Doctor Janie Alamo informed that pt was breaking out in light splotchy rash on arms, neck and chest that would fade in minutes and came back several times. The pt said he felt hot. The rash faded and did not come back. Dr. Janie Alamo did not put in new orders. Bedside and Verbal shift change report given to González Schaefer (oncoming nurse) by Selvin Mak RN (offgoing nurse). Report included the following information SBAR, Kardex, ED Summary, Procedure Summary, Intake/Output, MAR, Recent Results and Cardiac Rhythm Sinus Tach.

## 2021-03-20 NOTE — PROGRESS NOTES
Problem: Pressure Injury - Risk of  Goal: *Prevention of pressure injury  Description: Document Rick Scale and appropriate interventions in the flowsheet. Outcome: Progressing Towards Goal  Note: Pressure Injury Interventions:  Sensory Interventions: Assess changes in LOC, Check visual cues for pain, Discuss PT/OT consult with provider, Keep linens dry and wrinkle-free, Maintain/enhance activity level, Minimize linen layers         Activity Interventions: Pressure redistribution bed/mattress(bed type), PT/OT evaluation    Mobility Interventions: Pressure redistribution bed/mattress (bed type), PT/OT evaluation    Nutrition Interventions: Document food/fluid/supplement intake, Discuss nutritional consult with provider    Friction and Shear Interventions: Lift sheet, Lift team/patient mobility team, Minimize layers                Problem: Patient Education: Go to Patient Education Activity  Goal: Patient/Family Education  Outcome: Progressing Towards Goal     Problem: Risk for Spread of Infection  Goal: Prevent transmission of infectious organism to others  Description: Prevent the transmission of infectious organisms to other patients, staff members, and visitors. Outcome: Progressing Towards Goal     Problem: Patient Education:  Go to Education Activity  Goal: Patient/Family Education  Outcome: Progressing Towards Goal     Problem: Falls - Risk of  Goal: *Absence of Falls  Description: Document Gallego Reason Fall Risk and appropriate interventions in the flowsheet.   Outcome: Progressing Towards Goal  Note: Fall Risk Interventions:                 Elimination Interventions: Call light in reach, Patient to call for help with toileting needs, Toileting schedule/hourly rounds              Problem: Patient Education: Go to Patient Education Activity  Goal: Patient/Family Education  Outcome: Progressing Towards Goal     Problem: General Medical Care Plan  Goal: *Optimal pain control at patient's stated goal  Outcome: Progressing Towards Goal  Goal: *Skin integrity maintained  Outcome: Progressing Towards Goal  Goal: *Fluid volume balance  Outcome: Progressing Towards Goal     Problem: Patient Education: Go to Patient Education Activity  Goal: Patient/Family Education  Outcome: Progressing Towards Goal

## 2021-03-20 NOTE — PROGRESS NOTES
Spiritual Care Assessment/Progress Note  [facility], [place]      NAME: Lucio Buckner      MRN: [id number]  AGE: 61 y.o.  SEX: male  Episcopalian Affiliation: No Baptism   Language: English     3/20/2021     Total Time (in minutes): 22     Spiritual Assessment begun in [facility] through conversation with:         [x]Patient        [x] Family    [] Friend(s)              Spiritual beliefs: (Please include comment if needed)     [] Identifies with a ginny tradition:         [] Supported by a ginny community:            [] Claims no spiritual orientation:           [] Seeking spiritual identity:                [] Adheres to an individual form of spirituality:           [x] Not able to assess:                           Identified resources for coping:      [] Prayer                               [] Music                  [] Guided Imagery     [x] Family/friends                 [] Pet visits     [] Devotional reading                         [] Unknown     [] Other:                                               Interventions offered during this visit: (See comments for more details)    Patient Interventions: Advance medical directive consult     Family/Friend(s): Advance medical directive consult, Affirmation of emotions/emotional suffering, Catharsis/review of pertinent events in supportive environment, Coping skills reviewed/reinforced, Normalization of emotional/spiritual concerns     Plan of Care:     [] Support spiritual and/or cultural needs    [x] Support AMD and/or advance care planning process      [] Support grieving process   [] Coordinate Rites and/or Rituals    [] Coordination with community clergy   [] No spiritual needs identified at this time   [] Detailed Plan of Care below (See Comments)  [] Make referral to Music Therapy  [] Make referral to Pet Therapy     [] Make referral to Addiction services  [] Make referral to [facility]  [] Make referral to Spiritual Care Partner  [] No future visits requested        [x] Follow up upon further referrals     Comments:  visit for Advance Medical Directive (AMD) consult. Pt was in bed and pt's sister Author Cornell was present at bedside.  explained paperwork and left with pt and family to look over. Author Cornell shared that it would be pt's brother and sister that would be his agents.  talked with pt's RN and there was mention that pt's mother is living through has had a stroke and it seems that brother and sister would be decision makers.  Let them know to have [phone] [facility] contacted if they have any questions or would like to complete AMD.     DIONICIO Gale.Div, MACE   [phone]

## 2021-03-21 LAB
ANION GAP SERPL CALC-SCNC: 2 MMOL/L (ref 5–15)
BASOPHILS # BLD: 0 K/UL (ref 0–0.1)
BASOPHILS NFR BLD: 0 % (ref 0–1)
BUN SERPL-MCNC: 22 MG/DL (ref 6–20)
BUN/CREAT SERPL: 26 (ref 12–20)
CALCIUM SERPL-MCNC: 9.2 MG/DL (ref 8.5–10.1)
CHLORIDE SERPL-SCNC: 119 MMOL/L (ref 97–108)
CO2 SERPL-SCNC: 29 MMOL/L (ref 21–32)
CREAT SERPL-MCNC: 0.85 MG/DL (ref 0.7–1.3)
DIFFERENTIAL METHOD BLD: NORMAL
EOSINOPHIL # BLD: 0.1 K/UL (ref 0–0.4)
EOSINOPHIL NFR BLD: 1 % (ref 0–7)
ERYTHROCYTE [DISTWIDTH] IN BLOOD BY AUTOMATED COUNT: 13.4 % (ref 11.5–14.5)
GLUCOSE SERPL-MCNC: 122 MG/DL (ref 65–100)
HCT VFR BLD AUTO: 40.9 % (ref 36.6–50.3)
HGB BLD-MCNC: 12.5 G/DL (ref 12.1–17)
IMM GRANULOCYTES # BLD AUTO: 0 K/UL (ref 0–0.04)
IMM GRANULOCYTES NFR BLD AUTO: 0 % (ref 0–0.5)
LYMPHOCYTES # BLD: 1.6 K/UL (ref 0.8–3.5)
LYMPHOCYTES NFR BLD: 18 % (ref 12–49)
MCH RBC QN AUTO: 28.9 PG (ref 26–34)
MCHC RBC AUTO-ENTMCNC: 30.6 G/DL (ref 30–36.5)
MCV RBC AUTO: 94.7 FL (ref 80–99)
MONOCYTES # BLD: 0.6 K/UL (ref 0–1)
MONOCYTES NFR BLD: 6 % (ref 5–13)
NEUTS SEG # BLD: 6.7 K/UL (ref 1.8–8)
NEUTS SEG NFR BLD: 75 % (ref 32–75)
NRBC # BLD: 0 K/UL (ref 0–0.01)
NRBC BLD-RTO: 0 PER 100 WBC
PLATELET # BLD AUTO: 360 K/UL (ref 150–400)
PMV BLD AUTO: 10.8 FL (ref 8.9–12.9)
POTASSIUM SERPL-SCNC: 3.4 MMOL/L (ref 3.5–5.1)
RBC # BLD AUTO: 4.32 M/UL (ref 4.1–5.7)
SODIUM SERPL-SCNC: 150 MMOL/L (ref 136–145)
WBC # BLD AUTO: 9.1 K/UL (ref 4.1–11.1)

## 2021-03-21 PROCEDURE — 65660000000 HC RM CCU STEPDOWN

## 2021-03-21 PROCEDURE — 74011000258 HC RX REV CODE- 258: Performed by: STUDENT IN AN ORGANIZED HEALTH CARE EDUCATION/TRAINING PROGRAM

## 2021-03-21 PROCEDURE — 74011250636 HC RX REV CODE- 250/636: Performed by: FAMILY MEDICINE

## 2021-03-21 PROCEDURE — 85025 COMPLETE CBC W/AUTO DIFF WBC: CPT

## 2021-03-21 PROCEDURE — 74011250637 HC RX REV CODE- 250/637: Performed by: FAMILY MEDICINE

## 2021-03-21 PROCEDURE — 36415 COLL VENOUS BLD VENIPUNCTURE: CPT

## 2021-03-21 PROCEDURE — 74011250636 HC RX REV CODE- 250/636: Performed by: STUDENT IN AN ORGANIZED HEALTH CARE EDUCATION/TRAINING PROGRAM

## 2021-03-21 PROCEDURE — 74011250637 HC RX REV CODE- 250/637: Performed by: NURSE PRACTITIONER

## 2021-03-21 PROCEDURE — 80048 BASIC METABOLIC PNL TOTAL CA: CPT

## 2021-03-21 RX ORDER — DOCUSATE SODIUM 100 MG/1
100 CAPSULE, LIQUID FILLED ORAL 2 TIMES DAILY
Status: DISCONTINUED | OUTPATIENT
Start: 2021-03-21 | End: 2021-03-22

## 2021-03-21 RX ORDER — POLYETHYLENE GLYCOL 3350 17 G/17G
17 POWDER, FOR SOLUTION ORAL DAILY
Status: DISCONTINUED | OUTPATIENT
Start: 2021-03-21 | End: 2021-03-25 | Stop reason: HOSPADM

## 2021-03-21 RX ADMIN — MEROPENEM 500 MG: 500 INJECTION, POWDER, FOR SOLUTION INTRAVENOUS at 11:14

## 2021-03-21 RX ADMIN — DEXTROSE MONOHYDRATE 75 ML/HR: 50 INJECTION, SOLUTION INTRAVENOUS at 18:29

## 2021-03-21 RX ADMIN — DOCUSATE SODIUM 100 MG: 100 CAPSULE, LIQUID FILLED ORAL at 11:16

## 2021-03-21 RX ADMIN — ACETAMINOPHEN 650 MG: 650 SOLUTION ORAL at 23:39

## 2021-03-21 RX ADMIN — DOCUSATE SODIUM 100 MG: 100 CAPSULE, LIQUID FILLED ORAL at 18:29

## 2021-03-21 RX ADMIN — MEROPENEM 500 MG: 500 INJECTION, POWDER, FOR SOLUTION INTRAVENOUS at 06:41

## 2021-03-21 RX ADMIN — MEROPENEM 500 MG: 500 INJECTION, POWDER, FOR SOLUTION INTRAVENOUS at 16:34

## 2021-03-21 RX ADMIN — DEXTROSE MONOHYDRATE 75 ML/HR: 50 INJECTION, SOLUTION INTRAVENOUS at 23:34

## 2021-03-21 RX ADMIN — POLYETHYLENE GLYCOL 3350 17 G: 17 POWDER, FOR SOLUTION ORAL at 11:16

## 2021-03-21 RX ADMIN — DEXTROSE MONOHYDRATE 75 ML/HR: 50 INJECTION, SOLUTION INTRAVENOUS at 06:41

## 2021-03-21 RX ADMIN — MEROPENEM 500 MG: 500 INJECTION, POWDER, FOR SOLUTION INTRAVENOUS at 23:34

## 2021-03-21 NOTE — PROGRESS NOTES
[facility] Adult  Hospitalist Group                                                                                          Hospitalist Progress Note  Zeinab Lane MD  Answering service: [phone] OR [phone] from in house phone              Progress Note    Patient: Elen Renner MRN: [id number]  SSN: [id number]    YOB: 1960  Age: 61 y.o. Sex: male      Admit Date: 3/18/2021    LOS: 3 days     Subjective:     Patient presents with sepsis and urinary tract infection, also with right hydronephrosis, is now s/p decompression with nephrostomy tube placement by IR. Sister present at bedside. Objective:     Vitals:    21 0438 21 0649 21 0812 21 1118   BP: (!) 134/91  (!) 152/95 (!) 144/100   Pulse: 95  92 (!) 101   Resp:    Temp: 97.7 °F (36.5 °C)  98 °F (36.7 °C) 98.1 °F (36.7 °C)   SpO2: 92%  94% 95%   Weight:  55 kg (121 lb 4.1 oz)     Height:            Intake and Output:  Current Shift: No intake/output data recorded. Last three shifts:  1901 -  0700  In: -   Out: 2550 [Urine:2550]    Physical Exam:   GENERAL: alert, cooperative  THROAT & NECK: normal and no erythema or exudates noted. LUNG: clear to auscultation bilaterally  HEART: regular rate and rhythm, S1, S2 normal, no murmur, click, rub or gallop  ABDOMEN: soft, non-tender. Bowel sounds normal. No masses,  no organomegaly  EXTREMITIES:  Extremities with some contractures  SKIN: no rash or abnormalities  NEUROLOGIC: Patient is awake and alert  PSYCHIATRIC: non focal    Lab/Data Review: All lab results for the last 24 hours reviewed.      Recent Results (from the past 24 hour(s))   METABOLIC PANEL, BASIC    Collection Time: 21  4:44 AM   Result Value Ref Range    Sodium 150 (H) 136 - 145 mmol/L    Potassium 3.4 (L) 3.5 - 5.1 mmol/L    Chloride 119 (H) 97 - 108 mmol/L    CO2 29 21 - 32 mmol/L    Anion gap 2 (L) 5 - 15 mmol/L    Glucose 122 (H) 65 - 100 mg/dL    BUN 22 (H) 6 - 20 MG/DL    Creatinine 0.85 0.70 - 1.30 MG/DL    BUN/Creatinine ratio 26 (H) 12 - 20      GFR est AA >60 >60 ml/min/1.73m2    GFR est non-AA >60 >60 ml/min/1.73m2    Calcium 9.2 8.5 - 10.1 MG/DL   CBC WITH AUTOMATED DIFF    Collection Time: 03/21/21  4:44 AM   Result Value Ref Range    WBC 9.1 4.1 - 11.1 K/uL    RBC 4.32 4.10 - 5.70 M/uL    HGB 12.5 12.1 - 17.0 g/dL    HCT 40.9 36.6 - 50.3 %    MCV 94.7 80.0 - 99.0 FL    MCH 28.9 26.0 - 34.0 PG    MCHC 30.6 30.0 - 36.5 g/dL    RDW 13.4 11.5 - 14.5 %    PLATELET 018 620 - 607 K/uL    MPV 10.8 8.9 - 12.9 FL    NRBC 0.0 0  WBC    ABSOLUTE NRBC 0.00 0.00 - 0.01 K/uL    NEUTROPHILS 75 32 - 75 %    LYMPHOCYTES 18 12 - 49 %    MONOCYTES 6 5 - 13 %    EOSINOPHILS 1 0 - 7 %    BASOPHILS 0 0 - 1 %    IMMATURE GRANULOCYTES 0 0.0 - 0.5 %    ABS. NEUTROPHILS 6.7 1.8 - 8.0 K/UL    ABS. LYMPHOCYTES 1.6 0.8 - 3.5 K/UL    ABS. MONOCYTES 0.6 0.0 - 1.0 K/UL    ABS. EOSINOPHILS 0.1 0.0 - 0.4 K/UL    ABS. BASOPHILS 0.0 0.0 - 0.1 K/UL    ABS. IMM. GRANS. 0.0 0.00 - 0.04 K/UL    DF AUTOMATED          Imaging:    Xr Abd (kub)    Result Date: 3/20/2021  EXAM: XR ABD (KUB) INDICATION: Abdominal pain COMPARISON: 5/14/2013. FINDINGS: A supine radiograph of the abdomen shows a large amount of stool in the rectum. There is distention of all large and small bowel loops. A right mid abdominal drain is in place. Constipation versus obstipation.           Assessment and Plan:     Sepsis  -Patient presents with fever, tachycardia and leukocytosis meeting sepsis criteria  -Sepsis due to urinary tract infection with obstructive uropathy  -Overall improving, blood cultures negative  -Continue meropenem    Urinary tract infection  -Wound culture grew Proteus  -Continue current antibiotic and adjust based on sensitivity    Right hydroureteronephrosis  -S/p decompression with IR nephrostomy tube placement today  -Several ureteric calculis on CT scan  -Urology evaluation and plan for extraction of stones at a later date when infection is improved    VAZQUEZ  -Likely postobstructive with UTI  -Renal function improving  -Continue IV fluid    Hypernatremia  -Worse sodium level  -Change IV fluid to plain D5  -Monitor electrolytes    History of bladder cancer  -S/p radical cystoprostatectomy with ileal conduit placement  -Follow-up as patient    Cerebral palsy  -Supportive care  -Patient with PEG tube feeding    Constipation  -Start MiraLAX and Colace  -Likely patient has some abdominal pain from constipation  -May resume tube feeding once constipation improves    Discharge disposition: Likely more than 48 hours pending progress.     Signed By: James Claros MD     March 21, 2021

## 2021-03-21 NOTE — PROGRESS NOTES
Feeling better. Sister here. Had small BM. Afeb with nl WBC and Cr. U Cx Proteus jaret. Plan again discussed. Leave Short and right PCNT.   Dr. Millan Sing to take over tomorrow

## 2021-03-21 NOTE — PROGRESS NOTES
Bedside shift change report given to Erin Buitrago RN (oncoming nurse) by Preeti Mahoney RN (offgoing nurse). Report included the following information SBAR, Kardex, Intake/Output, MAR, Accordion, Recent Results and Cardiac Rhythm NSR/ Sinus tachy.

## 2021-03-22 LAB
ANION GAP SERPL CALC-SCNC: 8 MMOL/L (ref 5–15)
BACTERIA SPEC CULT: ABNORMAL
BASOPHILS # BLD: 0.1 K/UL (ref 0–0.1)
BASOPHILS NFR BLD: 1 % (ref 0–1)
BUN SERPL-MCNC: 17 MG/DL (ref 6–20)
BUN/CREAT SERPL: 18 (ref 12–20)
CALCIUM SERPL-MCNC: 9.1 MG/DL (ref 8.5–10.1)
CC UR VC: ABNORMAL
CHLORIDE SERPL-SCNC: 110 MMOL/L (ref 97–108)
CO2 SERPL-SCNC: 27 MMOL/L (ref 21–32)
CREAT SERPL-MCNC: 0.93 MG/DL (ref 0.7–1.3)
DIFFERENTIAL METHOD BLD: ABNORMAL
EOSINOPHIL # BLD: 0.3 K/UL (ref 0–0.4)
EOSINOPHIL NFR BLD: 3 % (ref 0–7)
ERYTHROCYTE [DISTWIDTH] IN BLOOD BY AUTOMATED COUNT: 13 % (ref 11.5–14.5)
GLUCOSE SERPL-MCNC: 101 MG/DL (ref 65–100)
HCT VFR BLD AUTO: 41.6 % (ref 36.6–50.3)
HGB BLD-MCNC: 12.8 G/DL (ref 12.1–17)
IMM GRANULOCYTES # BLD AUTO: 0 K/UL (ref 0–0.04)
IMM GRANULOCYTES NFR BLD AUTO: 0 % (ref 0–0.5)
LYMPHOCYTES # BLD: 2.4 K/UL (ref 0.8–3.5)
LYMPHOCYTES NFR BLD: 24 % (ref 12–49)
MCH RBC QN AUTO: 28.8 PG (ref 26–34)
MCHC RBC AUTO-ENTMCNC: 30.8 G/DL (ref 30–36.5)
MCV RBC AUTO: 93.7 FL (ref 80–99)
MONOCYTES # BLD: 0.4 K/UL (ref 0–1)
MONOCYTES NFR BLD: 4 % (ref 5–13)
NEUTS SEG # BLD: 6.6 K/UL (ref 1.8–8)
NEUTS SEG NFR BLD: 68 % (ref 32–75)
NRBC # BLD: 0 K/UL (ref 0–0.01)
NRBC BLD-RTO: 0 PER 100 WBC
PLATELET # BLD AUTO: 407 K/UL (ref 150–400)
PMV BLD AUTO: 11.2 FL (ref 8.9–12.9)
POTASSIUM SERPL-SCNC: 3.3 MMOL/L (ref 3.5–5.1)
RBC # BLD AUTO: 4.44 M/UL (ref 4.1–5.7)
SERVICE CMNT-IMP: ABNORMAL
SODIUM SERPL-SCNC: 145 MMOL/L (ref 136–145)
WBC # BLD AUTO: 9.9 K/UL (ref 4.1–11.1)

## 2021-03-22 PROCEDURE — 36415 COLL VENOUS BLD VENIPUNCTURE: CPT

## 2021-03-22 PROCEDURE — 80048 BASIC METABOLIC PNL TOTAL CA: CPT

## 2021-03-22 PROCEDURE — 65660000000 HC RM CCU STEPDOWN

## 2021-03-22 PROCEDURE — 74011250637 HC RX REV CODE- 250/637: Performed by: FAMILY MEDICINE

## 2021-03-22 PROCEDURE — 74011250636 HC RX REV CODE- 250/636: Performed by: STUDENT IN AN ORGANIZED HEALTH CARE EDUCATION/TRAINING PROGRAM

## 2021-03-22 PROCEDURE — 74011000258 HC RX REV CODE- 258: Performed by: STUDENT IN AN ORGANIZED HEALTH CARE EDUCATION/TRAINING PROGRAM

## 2021-03-22 PROCEDURE — 74011250636 HC RX REV CODE- 250/636: Performed by: FAMILY MEDICINE

## 2021-03-22 PROCEDURE — 85025 COMPLETE CBC W/AUTO DIFF WBC: CPT

## 2021-03-22 RX ORDER — DEXTROMETHORPHAN POLISTIREX 30 MG/5 ML
SUSPENSION, EXTENDED RELEASE 12 HR ORAL AS NEEDED
Status: DISCONTINUED | OUTPATIENT
Start: 2021-03-22 | End: 2021-03-25 | Stop reason: HOSPADM

## 2021-03-22 RX ORDER — DOCUSATE SODIUM 50 MG/5ML
100 LIQUID ORAL 2 TIMES DAILY
Status: DISCONTINUED | OUTPATIENT
Start: 2021-03-22 | End: 2021-03-25 | Stop reason: HOSPADM

## 2021-03-22 RX ADMIN — MEROPENEM 500 MG: 500 INJECTION, POWDER, FOR SOLUTION INTRAVENOUS at 10:06

## 2021-03-22 RX ADMIN — MEROPENEM 500 MG: 500 INJECTION, POWDER, FOR SOLUTION INTRAVENOUS at 22:18

## 2021-03-22 RX ADMIN — MEROPENEM 500 MG: 500 INJECTION, POWDER, FOR SOLUTION INTRAVENOUS at 05:42

## 2021-03-22 RX ADMIN — POLYETHYLENE GLYCOL 3350 17 G: 17 POWDER, FOR SOLUTION ORAL at 10:05

## 2021-03-22 RX ADMIN — POTASSIUM BICARBONATE 20 MEQ: 391 TABLET, EFFERVESCENT ORAL at 10:05

## 2021-03-22 RX ADMIN — DEXTROSE MONOHYDRATE 75 ML/HR: 50 INJECTION, SOLUTION INTRAVENOUS at 20:34

## 2021-03-22 RX ADMIN — DOCUSATE SODIUM 100 MG: 50 LIQUID ORAL at 17:11

## 2021-03-22 RX ADMIN — POTASSIUM BICARBONATE 20 MEQ: 391 TABLET, EFFERVESCENT ORAL at 17:11

## 2021-03-22 RX ADMIN — MEROPENEM 500 MG: 500 INJECTION, POWDER, FOR SOLUTION INTRAVENOUS at 17:11

## 2021-03-22 RX ADMIN — MINERAL OIL 133 ML: 100 ENEMA RECTAL at 18:10

## 2021-03-22 RX ADMIN — MINERAL OIL 133 ML: 100 ENEMA RECTAL at 12:18

## 2021-03-22 NOTE — PROGRESS NOTES
Did not administer bolus feed. Pt showed signs of discomfort following administration of flush and tylenol via PEG tube.

## 2021-03-22 NOTE — PROGRESS NOTES
Bedside shift change report given to Mich Gold RN (oncoming nurse) by Gal Hummel RN (offgoing nurse). Report included the following information SBAR, Kardex, Intake/Output, MAR, Accordion, Recent Results and Cardiac Rhythm NSR.

## 2021-03-22 NOTE — PROGRESS NOTES
Problem: Pressure Injury - Risk of  Goal: *Prevention of pressure injury  Description: Document Rick Scale and appropriate interventions in the flowsheet. Outcome: Progressing Towards Goal  Note: Pressure Injury Interventions:  Sensory Interventions: Assess changes in LOC, Check visual cues for pain, Keep linens dry and wrinkle-free, Maintain/enhance activity level, Minimize linen layers    Moisture Interventions: Absorbent underpads, Check for incontinence Q2 hours and as needed, Internal/External urinary devices, Minimize layers    Activity Interventions: Pressure redistribution bed/mattress(bed type)    Mobility Interventions: Pressure redistribution bed/mattress (bed type)    Nutrition Interventions: Document food/fluid/supplement intake    Friction and Shear Interventions: Lift sheet, Lift team/patient mobility team, Minimize layers                Problem: Patient Education: Go to Patient Education Activity  Goal: Patient/Family Education  Outcome: Progressing Towards Goal     Problem: Risk for Spread of Infection  Goal: Prevent transmission of infectious organism to others  Description: Prevent the transmission of infectious organisms to other patients, staff members, and visitors. Outcome: Progressing Towards Goal     Problem: Patient Education:  Go to Education Activity  Goal: Patient/Family Education  Outcome: Progressing Towards Goal     Problem: Falls - Risk of  Goal: *Absence of Falls  Description: Document [place] Jay Fall Risk and appropriate interventions in the flowsheet.   Outcome: Progressing Towards Goal  Note: Fall Risk Interventions:       Mentation Interventions: Door open when patient unattended, Evaluate medications/consider consulting pharmacy, More frequent rounding, Increase mobility, Reorient patient, Room close to nurse's station, Update white board         Elimination Interventions: Patient to call for help with toileting needs              Problem: Patient Education: Go to Patient Education Activity  Goal: Patient/Family Education  Outcome: Progressing Towards Goal     Problem: General Medical Care Plan  Goal: *Vital signs within specified parameters  Outcome: Progressing Towards Goal  Goal: *Labs within defined limits  Outcome: Progressing Towards Goal  Goal: *Absence of infection signs and symptoms  Outcome: Progressing Towards Goal  Goal: *Optimal pain control at patient's stated goal  Outcome: Progressing Towards Goal  Goal: *Skin integrity maintained  Outcome: Progressing Towards Goal  Goal: *Fluid volume balance  Outcome: Progressing Towards Goal     Problem: Patient Education: Go to Patient Education Activity  Goal: Patient/Family Education  Outcome: Progressing Towards Goal

## 2021-03-22 NOTE — PROGRESS NOTES
Transition of Care    Reason for Admission: Abdominal pain                     RUR Score:          8%           Plan for utilizing home health:      NA    PCP: First and Last name:  Doroteo Payan MD     Name of Practice:    Are you a current patient: Yes/No:    Approximate date of last visit:    Can you participate in a virtual visit with your PCP:                     Current Advanced Directive/Advance Care Plan: Full Code      Healthcare Decision Maker:  Sister Fermin Taylor [phone] and brother Don Rivera [phone]  Click here to [phone] including selection of the Healthcare Decision Maker Relationship (ie \"Primary\")                             Transition of Care Plan:       CM contacted patient's sister Gilford Reason. Patient is a long time resident of the [address] [place] [address]. Per Gilford Reason she and her brother are patient's primary medical decision makers now and are in the process of getting POA as patient's mother was his POA but suffered a stroke in Nov and is now incapable. CM followed up with the [address] [place] [address] [phone] and spoke with Dee in nursing who stated that patient can sit in wheelchair but is a total assist. Patient has a peg. Will need BLS at discharge. CM to fax medicals to [phone] at discharge and nurse will need to call report to [phone] and ask for Dee. CM to monitor and assist with transitional care planning needs. Medicare pt has received, reviewed, and signed 1st IM letter informing them of their right to appeal the discharge. Signed copy has been placed on pt bedside chart.       Allyssa Finch MS

## 2021-03-22 NOTE — PROGRESS NOTES
Patient: Jerrod Campbell MRN: [id number]  SSN: [id number]    YOB: 1960  Age: 61 y.o. Sex: male        ADMITTED: 3/18/2021 to Chloe Ramires MD by Shanna Lundberg MD for Severe sepsis ([facility] [address] [A41.9, R65.20]  Sepsis ([facility] [address] [A41.9]  POD# 2 Days Post-Op Procedure(s): INFUSION CATHETER INSERTION    Jerrod Campbell is doing well. Seen and examined in the bed. His sister, [address] [place] [address] Galilea, is not present today. Communication difficult. He remains afebrile, VSS. Good UOP from R PCN and carrera catheter. Labs reviewed, WBC improving, Cr improved to 0.93. Ucx + Proteus UTI, on Meropenem . BCx NGTD. He admits abdominal pain. Large amount of stool noted on KUB. +BM last night. He is being managed with MiraLAX and Colace. Tube feedings held until constipation improved. Vitals: Temp (24hrs), Av.1 °F (36.7 °C), Min:97.9 °F (36.6 °C), Max:98.5 °F (36.9 °C)    Blood pressure 122/78, pulse 80, temperature 97.9 °F (36.6 °C), resp. rate 20, height 5' 7\" (1.702 m), weight 53 kg (116 lb 13.5 oz), SpO2 96 %. Intake and Output:   1901 -  0700  In: 3467.5 [I.V.:3467.5]  Out: 4220 [Urine:3475]  No intake/output data recorded. Labs:  CBC:   Lab Results   Component Value Date/Time    WBC 9.9 2021 12:47 AM    HCT 41.6 2021 12:47 AM    PLATELET 703 (H)  12:47 AM     BMP:   Lab Results   Component Value Date/Time    Glucose 101 (H) 2021 12:47 AM    Sodium 145 2021 12:47 AM    Potassium 3.3 (L) 2021 12:47 AM    Chloride 110 (H) 2021 12:47 AM    CO2 27 2021 12:47 AM    BUN 17 2021 12:47 AM    Creatinine 0.93 2021 12:47 AM    Calcium 9.1 2021 12:47 AM       Assessment/Plan:   · Urosepsis - +Proteus UTI. Continue culture specific antibiotics. BCx NGTD. · Multiple obstructing right ureteral calculi with mod-severe right hydronephrosis -  Maintain R PCNT. Consult placed to IR for antegrade right ureteral stent placement.  Dr. Taylor Valdovinos has updated his sister regarding procedure by phone. I spoke to IR, they have availability to do the case on Wednesday. NPO at midnight on Tuesday. Dr. Yana Reyes then planning for ureteroscopy in ~2 weeks when infection cleared. · VAZQUEZ - Cr. improving. Baseline ~0.5. Monitor labs as you are. · Hx of bladder carcinoma s/p radical cystoprostatectomy with neobladder - Maintain carrera.      Will follow    Supervising MD, Dr. Yana Reyes.      Signed By: Jennifer Parker NP - March 22, 2021

## 2021-03-22 NOTE — PROGRESS NOTES
[facility] Adult  Hospitalist Group                                                                                          Hospitalist Progress Note  Rachel Song MD  Answering service: [phone] OR [phone] from in house phone              Progress Note    Patient: Ban Parnell MRN: [id number]  SSN: [id number]    YOB: 1960  Age: 61 y.o. Sex: male      Admit Date: 3/18/2021    LOS: 4 days     Subjective:     Patient presents with sepsis and urinary tract infection, also with right hydronephrosis, is now s/p decompression with nephrostomy tube placement by IR. Sister present at bedside. Objective:     Vitals:    03/21/21 2057 03/21/21 2339 03/22/21 0445 03/22/21 0907   BP: 114/75 129/86 122/78 126/71   Pulse: 92 95 80 90   Resp: 20 18 20 20   Temp: 98.2 °F (36.8 °C) 98.1 °F (36.7 °C) 97.9 °F (36.6 °C) 97.3 °F (36.3 °C)   SpO2: 93% 94% 96% 94%   Weight:   53 kg (116 lb 13.5 oz)    Height:            Intake and Output:  Current Shift: No intake/output data recorded. Last three shifts: 03/20 1901 - 03/22 0700  In: 3467.5 [I.V.:3467.5]  Out: 2037 [Urine:3475]    Physical Exam:   GENERAL: alert, cooperative  THROAT & NECK: normal and no erythema or exudates noted. LUNG: clear to auscultation bilaterally  HEART: regular rate and rhythm, S1, S2 normal, no murmur, click, rub or gallop  ABDOMEN: soft, non-tender. Bowel sounds normal. No masses,  no organomegaly  EXTREMITIES:  Extremities with some contractures  SKIN: no rash or abnormalities  NEUROLOGIC: Patient is awake and alert  PSYCHIATRIC: non focal    Lab/Data Review: All lab results for the last 24 hours reviewed.      Recent Results (from the past 24 hour(s))   METABOLIC PANEL, BASIC    Collection Time: 03/22/21 12:47 AM   Result Value Ref Range    Sodium 145 136 - 145 mmol/L    Potassium 3.3 (L) 3.5 - 5.1 mmol/L    Chloride 110 (H) 97 - 108 mmol/L    CO2 27 21 - 32 mmol/L    Anion gap 8 5 - 15 mmol/L    Glucose 101 (H) 65 - 100 mg/dL    BUN 17 6 - 20 MG/DL    Creatinine 0.93 0.70 - 1.30 MG/DL    BUN/Creatinine ratio 18 12 - 20      GFR est AA >60 >60 ml/min/1.73m2    GFR est non-AA >60 >60 ml/min/1.73m2    Calcium 9.1 8.5 - 10.1 MG/DL   CBC WITH AUTOMATED DIFF    Collection Time: 03/22/21 12:47 AM   Result Value Ref Range    WBC 9.9 4.1 - 11.1 K/uL    RBC 4.44 4.10 - 5.70 M/uL    HGB 12.8 12.1 - 17.0 g/dL    HCT 41.6 36.6 - 50.3 %    MCV 93.7 80.0 - 99.0 FL    MCH 28.8 26.0 - 34.0 PG    MCHC 30.8 30.0 - 36.5 g/dL    RDW 13.0 11.5 - 14.5 %    PLATELET 130 (H) 033 - 400 K/uL    MPV 11.2 8.9 - 12.9 FL    NRBC 0.0 0  WBC    ABSOLUTE NRBC 0.00 0.00 - 0.01 K/uL    NEUTROPHILS 68 32 - 75 %    LYMPHOCYTES 24 12 - 49 %    MONOCYTES 4 (L) 5 - 13 %    EOSINOPHILS 3 0 - 7 %    BASOPHILS 1 0 - 1 %    IMMATURE GRANULOCYTES 0 0.0 - 0.5 %    ABS. NEUTROPHILS 6.6 1.8 - 8.0 K/UL    ABS. LYMPHOCYTES 2.4 0.8 - 3.5 K/UL    ABS. MONOCYTES 0.4 0.0 - 1.0 K/UL    ABS. EOSINOPHILS 0.3 0.0 - 0.4 K/UL    ABS. BASOPHILS 0.1 0.0 - 0.1 K/UL    ABS. IMM. GRANS. 0.0 0.00 - 0.04 K/UL    DF AUTOMATED          Imaging:    No results found.        Assessment and Plan:     Sepsis  -Patient presents with fever, tachycardia and leukocytosis meeting sepsis criteria  -Sepsis due to urinary tract infection with obstructive uropathy  -Overall improving, blood cultures negative  -Patient with several antibiotics allergy, therefore continue meropenem for now    Urinary tract infection  -Wound culture grew Proteus  -Patient with several antibiotics allergy, therefore continue meropenem for now    Right hydroureteronephrosis  -S/p decompression with IR nephrostomy tube placement today  -Several ureteric calculis on CT scan  -Urology evaluation and plan for extraction of stones at a later date when infection is improved    VAZQUEZ  -Likely postobstructive with UTI  -Renal function improving  -Continue IV fluid    Hypernatremia  -Worse sodium level  -Change IV fluid to plain D5  -Monitor electrolytes    History of bladder cancer  -S/p radical cystoprostatectomy with ileal conduit placement  -Follow-up as patient    Cerebral palsy  -Supportive care  -Patient with PEG tube feeding    Constipation  -Continue MiraLAX and Colace  -Constipation is not completely improved, therefore will start enema  -Hold tube feeding for now until constipation improved    Discharge disposition: Likely more than 48 hours pending progress.     Signed By: Jorge Brady MD     March 22, 2021

## 2021-03-23 LAB
ANION GAP SERPL CALC-SCNC: 5 MMOL/L (ref 5–15)
BACTERIA SPEC CULT: NORMAL
BUN SERPL-MCNC: 17 MG/DL (ref 6–20)
BUN/CREAT SERPL: 23 (ref 12–20)
CALCIUM SERPL-MCNC: 9 MG/DL (ref 8.5–10.1)
CHLORIDE SERPL-SCNC: 111 MMOL/L (ref 97–108)
CO2 SERPL-SCNC: 29 MMOL/L (ref 21–32)
CREAT SERPL-MCNC: 0.74 MG/DL (ref 0.7–1.3)
GLUCOSE SERPL-MCNC: 86 MG/DL (ref 65–100)
POTASSIUM SERPL-SCNC: 3.6 MMOL/L (ref 3.5–5.1)
SERVICE CMNT-IMP: NORMAL
SODIUM SERPL-SCNC: 145 MMOL/L (ref 136–145)

## 2021-03-23 PROCEDURE — 80048 BASIC METABOLIC PNL TOTAL CA: CPT

## 2021-03-23 PROCEDURE — 65660000000 HC RM CCU STEPDOWN

## 2021-03-23 PROCEDURE — 74011250636 HC RX REV CODE- 250/636: Performed by: STUDENT IN AN ORGANIZED HEALTH CARE EDUCATION/TRAINING PROGRAM

## 2021-03-23 PROCEDURE — 36415 COLL VENOUS BLD VENIPUNCTURE: CPT

## 2021-03-23 PROCEDURE — 74011000250 HC RX REV CODE- 250: Performed by: FAMILY MEDICINE

## 2021-03-23 PROCEDURE — 74011250637 HC RX REV CODE- 250/637: Performed by: FAMILY MEDICINE

## 2021-03-23 PROCEDURE — 94760 N-INVAS EAR/PLS OXIMETRY 1: CPT

## 2021-03-23 PROCEDURE — 74011250636 HC RX REV CODE- 250/636: Performed by: FAMILY MEDICINE

## 2021-03-23 PROCEDURE — 74011000258 HC RX REV CODE- 258: Performed by: STUDENT IN AN ORGANIZED HEALTH CARE EDUCATION/TRAINING PROGRAM

## 2021-03-23 RX ORDER — POLYETHYLENE GLYCOL 3350, SODIUM SULFATE, SODIUM CHLORIDE, POTASSIUM CHLORIDE, SODIUM ASCORBATE, AND ASCORBIC ACID 7.5-2.691G
1 KIT ORAL EVERY 12 HOURS
Status: DISPENSED | OUTPATIENT
Start: 2021-03-23 | End: 2021-03-24

## 2021-03-23 RX ADMIN — DOCUSATE SODIUM 100 MG: 50 LIQUID ORAL at 10:51

## 2021-03-23 RX ADMIN — POTASSIUM BICARBONATE 20 MEQ: 391 TABLET, EFFERVESCENT ORAL at 18:17

## 2021-03-23 RX ADMIN — PEG-3350, SODIUM SULFATE, SODIUM CHLORIDE, POTASSIUM CHLORIDE, SODIUM ASCORBATE AND ASCORBIC ACID 1 L: KIT at 23:08

## 2021-03-23 RX ADMIN — MEROPENEM 500 MG: 500 INJECTION, POWDER, FOR SOLUTION INTRAVENOUS at 10:51

## 2021-03-23 RX ADMIN — DEXTROSE MONOHYDRATE 75 ML/HR: 50 INJECTION, SOLUTION INTRAVENOUS at 18:19

## 2021-03-23 RX ADMIN — MEROPENEM 500 MG: 500 INJECTION, POWDER, FOR SOLUTION INTRAVENOUS at 04:41

## 2021-03-23 RX ADMIN — DOCUSATE SODIUM 100 MG: 50 LIQUID ORAL at 18:17

## 2021-03-23 RX ADMIN — MEROPENEM 500 MG: 500 INJECTION, POWDER, FOR SOLUTION INTRAVENOUS at 18:17

## 2021-03-23 RX ADMIN — POTASSIUM BICARBONATE 20 MEQ: 391 TABLET, EFFERVESCENT ORAL at 10:51

## 2021-03-23 RX ADMIN — POLYETHYLENE GLYCOL 3350 17 G: 17 POWDER, FOR SOLUTION ORAL at 10:51

## 2021-03-23 NOTE — PROGRESS NOTES
[facility] Adult  Hospitalist Group                                                                                          Hospitalist Progress Note  Radha Traylor MD  Answering service: [phone] OR [phone] from in house phone              Progress Note    Patient: Josep Miners MRN: [id number]  SSN: [id number]    YOB: 1960  Age: 61 y.o. Sex: male      Admit Date: 3/18/2021    LOS: 5 days     Subjective:     Patient presents with sepsis and urinary tract infection, also with right hydronephrosis, s/p decompression with nephrostomy tube placement by IR. Sister present at bedside. Objective:     Vitals:    03/22/21 2033 03/22/21 2345 03/23/21 0451 03/23/21 0811   BP: 134/83 112/77 116/73 126/83   Pulse: (!) 102 (!) 103 88 84   Resp: 16 16 11 16   Temp: 98.5 °F (36.9 °C) 98.7 °F (37.1 °C) 98.1 °F (36.7 °C) 98.2 °F (36.8 °C)   SpO2: 93% 97% 94% 93%   Weight:       Height:            Intake and Output:  Current Shift: No intake/output data recorded. Last three shifts: 03/21 1901 - 03/23 0700  In: 3467.5 [I.V.:3467.5]  Out: 5831 [Urine:3525]    Physical Exam:   GENERAL: alert, cooperative  THROAT & NECK: normal and no erythema or exudates noted. LUNG: clear to auscultation bilaterally  HEART: regular rate and rhythm, S1, S2 normal, no murmur, click, rub or gallop  ABDOMEN: soft, non-tender. Bowel sounds normal. No masses,  no organomegaly  EXTREMITIES:  Extremities with some contractures  SKIN: no rash or abnormalities  NEUROLOGIC: Patient is awake and alert  PSYCHIATRIC: non focal    Lab/Data Review: All lab results for the last 24 hours reviewed.      Recent Results (from the past 24 hour(s))   METABOLIC PANEL, BASIC    Collection Time: 03/23/21  4:38 AM   Result Value Ref Range    Sodium 145 136 - 145 mmol/L    Potassium 3.6 3.5 - 5.1 mmol/L    Chloride 111 (H) 97 - 108 mmol/L    CO2 29 21 - 32 mmol/L    Anion gap 5 5 - 15 mmol/L    Glucose 86 65 - 100 mg/dL    BUN 17 6 - 20 MG/DL    Creatinine 0.74 0.70 - 1.30 MG/DL    BUN/Creatinine ratio 23 (H) 12 - 20      GFR est AA >60 >60 ml/min/1.73m2    GFR est non-AA >60 >60 ml/min/1.73m2    Calcium 9.0 8.5 - 10.1 MG/DL        Imaging:    No results found. Assessment and Plan:     Sepsis  -Patient presents with fever, tachycardia and leukocytosis meeting sepsis criteria  -Sepsis due to urinary tract infection with obstructive uropathy  -Overall improving, blood cultures negative  -Patient with several antibiotics allergy, therefore continue meropenem for now    Urinary tract infection  -Wound culture grew Proteus  -Patient with several antibiotics allergy, therefore continue meropenem to complete 7 days    Right hydroureteronephrosis  -Several ureteric calculis on CT scan  -S/p decompression with IR nephrostomy tube placement   -Plan for IR ureteral stent placement tomorrow  -Urology planning for stone extraction in about 2 weeks    VAZQUEZ  -Likely postobstructive with UTI  -Renal function improving  -Continue IV fluid    Hypernatremia  -Worse sodium level  -Change IV fluid to plain D5  -Monitor electrolytes    History of bladder cancer  -S/p radical cystoprostatectomy with ileal conduit placement  -Follow-up as patient    Cerebral palsy  -Supportive care  -Patient with PEG tube feeding    Constipation  -Continue MiraLAX and Colace  -Constipation is not completely improved, therefore will start enema  -Nutrition reevaluation for order to resume tube feeding at a lower rate    Discharge disposition: Likely more than 48 hours pending progress.     Signed By: Deyvi Gibbs MD     March 23, 2021

## 2021-03-23 NOTE — PROGRESS NOTES
Comprehensive Nutrition Assessment    Type and Reason for Visit: Initial, Consult    Nutrition Recommendations/Plan:   Suggest to run Golytely at a high rate for full stool evacuation prior to refeeding. Please give via PEG. Once constipate clears begin continuous feedings of Jevity 1.5  @ 45 ml/hr and give 240 ml water every 4 hours. Alter back to usual regime once returned to [facility]. If pt would like to continue with oral fluids, obtain SLP evaluation for safety. Nutrition Assessment:      61year old admitted for abdominal pain, sepsis, UTI with rt hydronephrosis. Sister bedside. Pt with s/p today: placement of nephrostomy tube. PMH: cerebral palsy, anxiety disorder, bladder cancer s/p radial cystoprostatectomy with ileal conduit placement. Pt resides at [facility] and spoke to nurse via phone. Mr. Elli Peacock usually receives Jevity 1.5 355 ml x 3/day with  480 ml water flush. Also receives water flush of 30 ml before and after each medication. Pt also consumes a small amount of fluids  (signed a wavier as liquids not recommended). Wt overall stable between 131# -136# over the past 18 months.      Usual enteral feedings provides:  1065 ml, 1600 calories, 68 g/day and 2249 ml total fluid. 3/23/21: Pt continues with constipation and feeding intolerance. MD order received to evaluate enteral feedings. Pt is receiving Miralax and colace however, could run Golytely at a high rate for full evacuation via PEG. Afterwards, alter feeding to continuous @ 45 ml/hr and give 240 ml water every 4 hours. Alter back to usual regime once returned to [facility]. If pt would like to continue with oral fluids, obtain SLP evaluation for safety. Malnutrition Assessment:  Malnutrition Status:  Insufficient data      Estimated Daily Nutrient Needs:  Energy (kcal): 2206-3666 kcal/day ( 25-30 Kcal/kg_; Weight Used for Energy Requirements: Current  Protein (g): 68-74 g/day ( 1.1-1.2 g/kg);  Weight Used for Protein Requirements: Current  Fluid (ml/day): 0361-6329 ml/day; Method Used for Fluid Requirements: 1 ml/kcal      Nutrition Related Findings:       x2 BM yesterday (one was a \"smear\")  Wounds:    None       Current Nutrition Therapies:  Current Tube Feeding (TF) Orders: NPO    Anthropometric Measures:  · Height:  5' 7\" (170.2 cm)  · Current Body Wt:  62.6 kg (138 lb 0.1 oz)   · Admission Body Wt:  138 lb 0.1 oz    · Usual Body Wt:  59.4 kg (131 lb)(131-136#)     · Ideal Body Wt:  148 lbs:  93.2 %     Nutrition Diagnosis:   · Inadequate oral intake related to swallowing difficulty as evidenced by nutrition support-enteral nutrition      Nutrition Interventions:   Food and/or Nutrient Delivery: Start tube feeding  Nutrition Education and Counseling: No recommendations at this time  Coordination of Nutrition Care: Continue to monitor while inpatient    Goals:  Provide 90% estimated needs via enteral feedings over the next 3-5 days. Nutrition Monitoring and Evaluation:   Behavioral-Environmental Outcomes: None identified  Food/Nutrient Intake Outcomes: Enteral nutrition intake/tolerance, Vitamin/mineral intake  Physical Signs/Symptoms Outcomes: GI status, Weight    Discharge Planning:     Too soon to determine     Electronically signed by Sanam Poole RD on 3/23/2021 at 3:01 PM    Contact: Guido

## 2021-03-23 NOTE — PROGRESS NOTES
Patient: Basilio Mitchell MRN: [id number]  SSN: [id number]    YOB: 1960  Age: 60 y.o.  Sex: male        ADMITTED: 3/18/2021 to Leonard Flores MD by Leonard Flores MD for Severe sepsis (HCC) [A41.9, R65.20]  Sepsis (HCC) [A41.9]  POD# 3 Days Post-Op Procedure(s):  INFUSION CATHETER INSERTION    Basilio Mitchell is doing well. Seen and examined in the bed in no visible distress. Communication difficult. He nods his head \"yes\" when asked if still having abd pain. Last recorded BM yesterday morning. He was given 2 enemas last night without BM. His tube feedings have been held until his bowels become more motile. +IVF infusing.   He remains afebrile, VSS. Good UOP from R PCN and carrera catheter, light yellow UA. Labs reviewed, WBC wnl, Cr improved to 0.74. Ucx + Proteus UTI, on Meropenem . BCx NGTD.      Vitals: Temp (24hrs), Av.2 °F (36.8 °C), Min:97.3 °F (36.3 °C), Max:98.7 °F (37.1 °C)    Blood pressure 116/73, pulse 88, temperature 98.1 °F (36.7 °C), resp. rate 11, height 5' 7\" (1.702 m), weight 53 kg (116 lb 13.5 oz), SpO2 94 %.    Intake and Output:   1901 -  0700  In: 3467.5 [I.V.:3467.5]  Out: 3525 [Urine:3525]  No intake/output data recorded.       Labs:  CBC:   Lab Results   Component Value Date/Time    WBC 9.9 2021 12:47 AM    HCT 41.6 2021 12:47 AM    PLATELET 407 (H) 2021 12:47 AM     BMP:   Lab Results   Component Value Date/Time    Glucose 86 2021 04:38 AM    Sodium 145 2021 04:38 AM    Potassium 3.6 2021 04:38 AM    Chloride 111 (H) 2021 04:38 AM    CO2 29 2021 04:38 AM    BUN 17 2021 04:38 AM    Creatinine 0.74 2021 04:38 AM    Calcium 9.0 2021 04:38 AM       Assessment/Plan:   · Urosepsis - +Proteus UTI. Improving. Continue culture specific antibiotics. BCx NGTD.  · Multiple obstructing right ureteral calculi with mod-severe right hydronephrosis -  Maintain R PCNT.  Scheduled for IR antegrade right  ureteral stent placement tomorrow 3/24. NPO at midnight. Will then arrange for ureteroscopy in ~2 weeks when infection cleared with Dr. Mishel Lowe. · VAZQUEZ - Cr. improving. Closer to baseline ~0.5. Monitor labs as you are. · Hx of bladder carcinoma s/p radical cystoprostatectomy with neobladder - Maintain carrera.       Will follow    Supervising MD, Dr. Melendez.        Signed By: Thuy De Jesus NP - March 23, 2021     I reviewed imaging and spoke with his sister yesterday and again with pt in room today. IR to place antegrade stent tomorrow. May be ready for discharge back to [facility] Thursday if no problems after stent placement. I will schedule retrograde ureteroscopy/laser lithotripsy in a few weeks. Keep nephrostomy in place in case retrograde approach unsuccessful. Can d/c carrera and resume intermittent cath when discharged.

## 2021-03-23 NOTE — PROGRESS NOTES
Patient: Josep Miners MRN: [id number]  SSN: [id number]    YOB: 1960  Age: 61 y.o. Sex: male        ADMITTED: 3/18/2021 to Alma Rosa Rodriguez MD by Radha Traylor MD for Severe sepsis ([facility] [address] [A41.9, R65.20]  Sepsis ([facility] [address] [A41.9]  POD# 3 Days Post-Op Procedure(s): INFUSION CATHETER INSERTION    To IR for antegrade stent placement today. In IR on rounds. He remains afebrile, VSS. Labs reviewed, WBC wnl, Cr improved to 0.66. Ucx + Proteus UTI, on Meropenem .  BCx NGTD.     Constipation remains an issue. He was evaluated by nutrition who recommends Golytely infusion via his PEG for stool evacuation prior to refeeding. Vitals: Temp (24hrs), Av.3 °F (36.8 °C), Min:98.1 °F (36.7 °C), Max:98.7 °F (37.1 °C)    Blood pressure 129/84, pulse 86, temperature 98.1 °F (36.7 °C), resp. rate 14, height 5' 7\" (1.702 m), weight 53 kg (116 lb 13.5 oz), SpO2 94 %. Intake and Output:   190 -  0700  In: 3467.5 [I.V.:3467.5]  Out: 9791 [[facility]]   0701 -  1900  In: 50   Out: -       Labs:  CBC:   Lab Results   Component Value Date/Time    WBC 9.9 2021 12:47 AM    HCT 41.6 2021 12:47 AM    PLATELET 153 (H)  12:47 AM     BMP:   Lab Results   Component Value Date/Time    Glucose 86 2021 04:38 AM    Sodium 145 2021 04:38 AM    Potassium 3.6 2021 04:38 AM    Chloride 111 (H) 2021 04:38 AM    CO2 29 2021 04:38 AM    BUN 17 2021 04:38 AM    Creatinine 0.74 2021 04:38 AM    Calcium 9.0 2021 04:38 AM     Assessment/Plan:     · Urosepsis - +Proteus UTI. Improving. Culture specific antibiotics. BCx NGTD. · Multiple obstructing right ureteral calculi with mod-severe right hydronephrosis -  To IR for antegrade right ureteral stent placement today. They are to maintain the R PCNT. Dr. Keith Watts will schedule retrograde ureteroscopy/laser lithotripsy in a few weeks. Keep nephrostomy in place in case retrograde approach unsuccessful. · VAZQUEZ - Cr. improving. Closer to baseline ~0.5.    · Hx of bladder carcinoma s/p radical cystoprostatectomy with neobladder - Okay to DC carrera before discharge and return to I/O caths 4x daily. Anticipated DC back to [facility] tomorrow if he remains medically stable. Will follow.      Supervising MD, Dr. Jade Melendez  Signed By: Elayne Fothergill, NP - March 23, 2021

## 2021-03-23 NOTE — PROGRESS NOTES
Got report in morning that pt was very gassy and that giving meds in peg tube the gasric contents shot out of the tube. Previous nurse held tube feeding. I let the doctor know and he prescribed a mineral enema and said to hold feeding until pt had bowel movement. Gave 2 enemas. Pt did not have bowel movement. Confirmed with Yonatan Lozano NP to continue to hold feed through night until bowel movement. Pt has no signs of distention, aspiration, or nausea.

## 2021-03-23 NOTE — PROGRESS NOTES
Bedside and Verbal shift change report given to [address] (oncoming nurse) by Mary Ann Young RN (offgoing nurse). Report included the following information SBAR, Kardex, ED Summary, Procedure Summary, Intake/Output, MAR, Recent Results and Cardiac Rhythm Sinus Tach.

## 2021-03-23 NOTE — PROGRESS NOTES
1930: Bedside shift change report given to Yenni Cardona RN (oncoming nurse) by Fabiana Singh RN (offgoing nurse). Report included the following information SBAR, Kardex, Intake/Output, MAR, Recent Results, Med Rec Status and Cardiac Rhythm ST.       0000: Bedside shift change report given to MANI Morales (oncoming nurse) by Yenni Cardona RN (offgoing nurse).  Report included the following information SBAR, Kardex, Intake/Output, MAR, Recent Results, Med Rec Status and Cardiac Rhythm ST.

## 2021-03-24 ENCOUNTER — APPOINTMENT (OUTPATIENT)
Dept: INTERVENTIONAL RADIOLOGY/VASCULAR | Age: 61
DRG: 871 | End: 2021-03-24
Attending: NURSE PRACTITIONER
Payer: MEDICARE

## 2021-03-24 ENCOUNTER — ANESTHESIA (OUTPATIENT)
Dept: INTERVENTIONAL RADIOLOGY/VASCULAR | Age: 61
DRG: 871 | End: 2021-03-24
Payer: MEDICARE

## 2021-03-24 ENCOUNTER — ANESTHESIA EVENT (OUTPATIENT)
Dept: INTERVENTIONAL RADIOLOGY/VASCULAR | Age: 61
DRG: 871 | End: 2021-03-24
Payer: MEDICARE

## 2021-03-24 LAB
ANION GAP SERPL CALC-SCNC: 7 MMOL/L (ref 5–15)
BUN SERPL-MCNC: 16 MG/DL (ref 6–20)
BUN/CREAT SERPL: 24 (ref 12–20)
CALCIUM SERPL-MCNC: 9.1 MG/DL (ref 8.5–10.1)
CHLORIDE SERPL-SCNC: 107 MMOL/L (ref 97–108)
CO2 SERPL-SCNC: 26 MMOL/L (ref 21–32)
CREAT SERPL-MCNC: 0.66 MG/DL (ref 0.7–1.3)
GLUCOSE SERPL-MCNC: 94 MG/DL (ref 65–100)
POTASSIUM SERPL-SCNC: 3.4 MMOL/L (ref 3.5–5.1)
SODIUM SERPL-SCNC: 140 MMOL/L (ref 136–145)

## 2021-03-24 PROCEDURE — 74011000250 HC RX REV CODE- 250: Performed by: STUDENT IN AN ORGANIZED HEALTH CARE EDUCATION/TRAINING PROGRAM

## 2021-03-24 PROCEDURE — 74011000258 HC RX REV CODE- 258: Performed by: STUDENT IN AN ORGANIZED HEALTH CARE EDUCATION/TRAINING PROGRAM

## 2021-03-24 PROCEDURE — C1894 INTRO/SHEATH, NON-LASER: HCPCS

## 2021-03-24 PROCEDURE — BT1D1ZZ FLUOROSCOPY OF RIGHT KIDNEY, URETER AND BLADDER USING LOW OSMOLAR CONTRAST: ICD-10-PCS | Performed by: STUDENT IN AN ORGANIZED HEALTH CARE EDUCATION/TRAINING PROGRAM

## 2021-03-24 PROCEDURE — 77030021533 HC CATH ANGI DX PRFMA MRTM -A

## 2021-03-24 PROCEDURE — 74011250636 HC RX REV CODE- 250/636: Performed by: NURSE ANESTHETIST, CERTIFIED REGISTERED

## 2021-03-24 PROCEDURE — 74011000250 HC RX REV CODE- 250: Performed by: NURSE ANESTHETIST, CERTIFIED REGISTERED

## 2021-03-24 PROCEDURE — 77030026438 HC STYL ET INTUB CARD -A: Performed by: ANESTHESIOLOGY

## 2021-03-24 PROCEDURE — 0T767DZ DILATION OF RIGHT URETER WITH INTRALUMINAL DEVICE, VIA NATURAL OR ARTIFICIAL OPENING: ICD-10-PCS | Performed by: STUDENT IN AN ORGANIZED HEALTH CARE EDUCATION/TRAINING PROGRAM

## 2021-03-24 PROCEDURE — 74011000636 HC RX REV CODE- 636: Performed by: STUDENT IN AN ORGANIZED HEALTH CARE EDUCATION/TRAINING PROGRAM

## 2021-03-24 PROCEDURE — 2709999900 HC NON-CHARGEABLE SUPPLY

## 2021-03-24 PROCEDURE — 77030008684 HC TU ET CUF COVD -B: Performed by: ANESTHESIOLOGY

## 2021-03-24 PROCEDURE — 50693 PLMT URETERAL STENT PRQ: CPT

## 2021-03-24 PROCEDURE — 74011250636 HC RX REV CODE- 250/636: Performed by: STUDENT IN AN ORGANIZED HEALTH CARE EDUCATION/TRAINING PROGRAM

## 2021-03-24 PROCEDURE — 65660000000 HC RM CCU STEPDOWN

## 2021-03-24 PROCEDURE — C1769 GUIDE WIRE: HCPCS

## 2021-03-24 PROCEDURE — C2617 STENT, NON-COR, TEM W/O DEL: HCPCS

## 2021-03-24 PROCEDURE — 77030010548 HC BG WND DRN ARMD -B

## 2021-03-24 PROCEDURE — C1729 CATH, DRAINAGE: HCPCS

## 2021-03-24 PROCEDURE — 51798 US URINE CAPACITY MEASURE: CPT

## 2021-03-24 PROCEDURE — 76210000063 HC OR PH I REC FIRST 0.5 HR

## 2021-03-24 PROCEDURE — 76060000034 HC ANESTHESIA 1.5 TO 2 HR

## 2021-03-24 PROCEDURE — 74011250637 HC RX REV CODE- 250/637: Performed by: FAMILY MEDICINE

## 2021-03-24 PROCEDURE — 80048 BASIC METABOLIC PNL TOTAL CA: CPT

## 2021-03-24 PROCEDURE — 77030008584 HC TOOL GDWRE DEV TERU -A

## 2021-03-24 PROCEDURE — 36415 COLL VENOUS BLD VENIPUNCTURE: CPT

## 2021-03-24 PROCEDURE — 74011250636 HC RX REV CODE- 250/636: Performed by: ANESTHESIOLOGY

## 2021-03-24 RX ORDER — MIDAZOLAM HYDROCHLORIDE 1 MG/ML
0.5 INJECTION, SOLUTION INTRAMUSCULAR; INTRAVENOUS
Status: DISCONTINUED | OUTPATIENT
Start: 2021-03-24 | End: 2021-03-25 | Stop reason: HOSPADM

## 2021-03-24 RX ORDER — SODIUM CHLORIDE 9 MG/ML
25 INJECTION, SOLUTION INTRAVENOUS CONTINUOUS
Status: CANCELLED | OUTPATIENT
Start: 2021-03-24 | End: 2021-03-25

## 2021-03-24 RX ORDER — SODIUM CHLORIDE 9 MG/ML
INJECTION, SOLUTION INTRAVENOUS
Status: DISCONTINUED | OUTPATIENT
Start: 2021-03-24 | End: 2021-03-24 | Stop reason: HOSPADM

## 2021-03-24 RX ORDER — SODIUM CHLORIDE, SODIUM LACTATE, POTASSIUM CHLORIDE, CALCIUM CHLORIDE 600; 310; 30; 20 MG/100ML; MG/100ML; MG/100ML; MG/100ML
125 INJECTION, SOLUTION INTRAVENOUS CONTINUOUS
Status: CANCELLED | OUTPATIENT
Start: 2021-03-24 | End: 2021-03-25

## 2021-03-24 RX ORDER — FENTANYL CITRATE 50 UG/ML
50 INJECTION, SOLUTION INTRAMUSCULAR; INTRAVENOUS AS NEEDED
Status: CANCELLED | OUTPATIENT
Start: 2021-03-24

## 2021-03-24 RX ORDER — ACETAMINOPHEN 325 MG/1
650 TABLET ORAL ONCE
Status: CANCELLED | OUTPATIENT
Start: 2021-03-24 | End: 2021-03-24

## 2021-03-24 RX ORDER — ONDANSETRON 2 MG/ML
INJECTION INTRAMUSCULAR; INTRAVENOUS AS NEEDED
Status: DISCONTINUED | OUTPATIENT
Start: 2021-03-24 | End: 2021-03-24 | Stop reason: HOSPADM

## 2021-03-24 RX ORDER — SODIUM CHLORIDE 0.9 % (FLUSH) 0.9 %
5-40 SYRINGE (ML) INJECTION EVERY 8 HOURS
Status: DISCONTINUED | OUTPATIENT
Start: 2021-03-24 | End: 2021-03-25 | Stop reason: HOSPADM

## 2021-03-24 RX ORDER — DIPHENHYDRAMINE HYDROCHLORIDE 50 MG/ML
12.5 INJECTION, SOLUTION INTRAMUSCULAR; INTRAVENOUS AS NEEDED
Status: ACTIVE | OUTPATIENT
Start: 2021-03-24 | End: 2021-03-24

## 2021-03-24 RX ORDER — SODIUM CHLORIDE 0.9 % (FLUSH) 0.9 %
5-40 SYRINGE (ML) INJECTION AS NEEDED
Status: DISCONTINUED | OUTPATIENT
Start: 2021-03-24 | End: 2021-03-25 | Stop reason: HOSPADM

## 2021-03-24 RX ORDER — SODIUM CHLORIDE 9 MG/ML
25 INJECTION, SOLUTION INTRAVENOUS CONTINUOUS
Status: DISCONTINUED | OUTPATIENT
Start: 2021-03-24 | End: 2021-03-25 | Stop reason: HOSPADM

## 2021-03-24 RX ORDER — MORPHINE SULFATE 2 MG/ML
2 INJECTION, SOLUTION INTRAMUSCULAR; INTRAVENOUS
Status: DISCONTINUED | OUTPATIENT
Start: 2021-03-24 | End: 2021-03-25 | Stop reason: HOSPADM

## 2021-03-24 RX ORDER — MIDAZOLAM HYDROCHLORIDE 1 MG/ML
1 INJECTION, SOLUTION INTRAMUSCULAR; INTRAVENOUS AS NEEDED
Status: CANCELLED | OUTPATIENT
Start: 2021-03-24

## 2021-03-24 RX ORDER — GLYCOPYRROLATE 0.2 MG/ML
INJECTION INTRAMUSCULAR; INTRAVENOUS AS NEEDED
Status: DISCONTINUED | OUTPATIENT
Start: 2021-03-24 | End: 2021-03-24 | Stop reason: HOSPADM

## 2021-03-24 RX ORDER — PHENYLEPHRINE HCL IN 0.9% NACL 0.4MG/10ML
SYRINGE (ML) INTRAVENOUS AS NEEDED
Status: DISCONTINUED | OUTPATIENT
Start: 2021-03-24 | End: 2021-03-24 | Stop reason: HOSPADM

## 2021-03-24 RX ORDER — ROPIVACAINE HYDROCHLORIDE 5 MG/ML
30 INJECTION, SOLUTION EPIDURAL; INFILTRATION; PERINEURAL ONCE
Status: CANCELLED | OUTPATIENT
Start: 2021-03-24 | End: 2021-03-24

## 2021-03-24 RX ORDER — SODIUM CHLORIDE 0.9 % (FLUSH) 0.9 %
5-40 SYRINGE (ML) INJECTION AS NEEDED
Status: CANCELLED | OUTPATIENT
Start: 2021-03-24

## 2021-03-24 RX ORDER — LIDOCAINE HYDROCHLORIDE 10 MG/ML
0.1 INJECTION, SOLUTION EPIDURAL; INFILTRATION; INTRACAUDAL; PERINEURAL AS NEEDED
Status: CANCELLED | OUTPATIENT
Start: 2021-03-24

## 2021-03-24 RX ORDER — SUCCINYLCHOLINE CHLORIDE 20 MG/ML
INJECTION INTRAMUSCULAR; INTRAVENOUS AS NEEDED
Status: DISCONTINUED | OUTPATIENT
Start: 2021-03-24 | End: 2021-03-24 | Stop reason: HOSPADM

## 2021-03-24 RX ORDER — LIDOCAINE HYDROCHLORIDE 20 MG/ML
INJECTION, SOLUTION EPIDURAL; INFILTRATION; INTRACAUDAL; PERINEURAL AS NEEDED
Status: DISCONTINUED | OUTPATIENT
Start: 2021-03-24 | End: 2021-03-24 | Stop reason: HOSPADM

## 2021-03-24 RX ORDER — HYDROMORPHONE HYDROCHLORIDE 1 MG/ML
0.2 INJECTION, SOLUTION INTRAMUSCULAR; INTRAVENOUS; SUBCUTANEOUS
Status: DISCONTINUED | OUTPATIENT
Start: 2021-03-24 | End: 2021-03-25 | Stop reason: HOSPADM

## 2021-03-24 RX ORDER — FENTANYL CITRATE 50 UG/ML
25 INJECTION, SOLUTION INTRAMUSCULAR; INTRAVENOUS
Status: DISCONTINUED | OUTPATIENT
Start: 2021-03-24 | End: 2021-03-25 | Stop reason: HOSPADM

## 2021-03-24 RX ORDER — ONDANSETRON 2 MG/ML
4 INJECTION INTRAMUSCULAR; INTRAVENOUS AS NEEDED
Status: DISCONTINUED | OUTPATIENT
Start: 2021-03-24 | End: 2021-03-25 | Stop reason: HOSPADM

## 2021-03-24 RX ORDER — SODIUM CHLORIDE, SODIUM LACTATE, POTASSIUM CHLORIDE, CALCIUM CHLORIDE 600; 310; 30; 20 MG/100ML; MG/100ML; MG/100ML; MG/100ML
75 INJECTION, SOLUTION INTRAVENOUS CONTINUOUS
Status: DISCONTINUED | OUTPATIENT
Start: 2021-03-24 | End: 2021-03-25 | Stop reason: HOSPADM

## 2021-03-24 RX ORDER — LIDOCAINE HYDROCHLORIDE 20 MG/ML
20 INJECTION, SOLUTION INFILTRATION; PERINEURAL
Status: COMPLETED | OUTPATIENT
Start: 2021-03-24 | End: 2021-03-24

## 2021-03-24 RX ORDER — SODIUM CHLORIDE 0.9 % (FLUSH) 0.9 %
5-40 SYRINGE (ML) INJECTION EVERY 8 HOURS
Status: CANCELLED | OUTPATIENT
Start: 2021-03-24

## 2021-03-24 RX ORDER — PROPOFOL 10 MG/ML
INJECTION, EMULSION INTRAVENOUS AS NEEDED
Status: DISCONTINUED | OUTPATIENT
Start: 2021-03-24 | End: 2021-03-24 | Stop reason: HOSPADM

## 2021-03-24 RX ADMIN — PHENYLEPHRINE HYDROCHLORIDE 40 MCG: 10 INJECTION INTRAVENOUS at 12:49

## 2021-03-24 RX ADMIN — MEROPENEM 500 MG: 500 INJECTION, POWDER, FOR SOLUTION INTRAVENOUS at 11:26

## 2021-03-24 RX ADMIN — SODIUM CHLORIDE: 900 INJECTION, SOLUTION INTRAVENOUS at 12:16

## 2021-03-24 RX ADMIN — LIDOCAINE HYDROCHLORIDE 60 MG: 20 INJECTION, SOLUTION EPIDURAL; INFILTRATION; INTRACAUDAL; PERINEURAL at 12:21

## 2021-03-24 RX ADMIN — SUCCINYLCHOLINE CHLORIDE 140 MG: 20 INJECTION, SOLUTION INTRAMUSCULAR; INTRAVENOUS at 12:21

## 2021-03-24 RX ADMIN — FENTANYL CITRATE 25 MCG: 50 INJECTION INTRAMUSCULAR; INTRAVENOUS at 14:06

## 2021-03-24 RX ADMIN — POTASSIUM BICARBONATE 20 MEQ: 391 TABLET, EFFERVESCENT ORAL at 20:45

## 2021-03-24 RX ADMIN — PROPOFOL 150 MG: 10 INJECTION, EMULSION INTRAVENOUS at 12:21

## 2021-03-24 RX ADMIN — ONDANSETRON HYDROCHLORIDE 4 MG: 2 INJECTION, SOLUTION INTRAMUSCULAR; INTRAVENOUS at 13:24

## 2021-03-24 RX ADMIN — IOPAMIDOL 126 ML: 612 INJECTION, SOLUTION INTRAVENOUS at 13:35

## 2021-03-24 RX ADMIN — GLYCOPYRROLATE 0.2 MG: 0.2 INJECTION, SOLUTION INTRAMUSCULAR; INTRAVENOUS at 12:16

## 2021-03-24 RX ADMIN — LIDOCAINE HYDROCHLORIDE 3 ML: 20 INJECTION, SOLUTION INFILTRATION; PERINEURAL at 13:35

## 2021-03-24 RX ADMIN — DOCUSATE SODIUM 100 MG: 50 LIQUID ORAL at 20:45

## 2021-03-24 RX ADMIN — FENTANYL CITRATE 25 MCG: 50 INJECTION INTRAMUSCULAR; INTRAVENOUS at 14:15

## 2021-03-24 RX ADMIN — PHENYLEPHRINE HYDROCHLORIDE 80 MCG: 10 INJECTION INTRAVENOUS at 12:54

## 2021-03-24 RX ADMIN — Medication 10 ML: at 22:21

## 2021-03-24 NOTE — PROGRESS NOTES
This RN called PACU and spoke to Puneet Kay RN to inform them that procedure was starting. Will notify PACU when procedure is closer to end time.

## 2021-03-24 NOTE — PROGRESS NOTES
[facility]  Hospitalist Group                                                                                          Hospitalist Progress Note  Marci Patel MD  Answering service: [phone] OR [phone] from in house phone              Progress Note    Patient: Jacki Gilmore MRN: [id number]  SSN: [id number]    YOB: 1960  Age: 61 y.o. Sex: male      Admit Date: 3/18/2021    LOS: 6 days     Subjective:     Patient presents with sepsis and urinary tract infection, also with right hydronephrosis, s/p decompression with nephrostomy tube placement by IR 3/19, s/p right ureteral stent placement 3/24. Objective:     Vitals:    03/24/21 0403 03/24/21 0852 03/24/21 0950 03/24/21 1131   BP: 91/75 128/75 122/83 119/83   Pulse: 93 88 96 91   Resp: 16 16 20 19   Temp: 98.7 °F (37.1 °C) 97.2 °F (36.2 °C) 97.6 °F (36.4 °C)    SpO2: 94% 96% 95% 96%   Weight: 59.3 kg (130 lb 11.7 oz)  59.3 kg (130 lb 11.7 oz)    Height:   5' 7\" (1.702 m)         Intake and Output:  Current Shift: 03/24 0701 - 03/24 1900  In: 1158.8 [I.V.:1158.8]  Out: 250 [Urine:250]  Last three shifts: 03/22 1901 - 03/24 0700  In: 2973.8 [I.V.:2873.8]  Out: 1000 [Urine:1000]    Physical Exam:   GENERAL: alert, cooperative  THROAT & NECK: normal and no erythema or exudates noted. LUNG: clear to auscultation bilaterally  HEART: regular rate and rhythm, S1, S2 normal, no murmur, click, rub or gallop  ABDOMEN: soft, non-tender. Bowel sounds normal. No masses,  no organomegaly  EXTREMITIES:  Extremities with some contractures  SKIN: no rash or abnormalities  NEUROLOGIC: Patient is awake and alert  PSYCHIATRIC: non focal    Lab/Data Review: All lab results for the last 24 hours reviewed.      Recent Results (from the past 24 hour(s))   METABOLIC PANEL, BASIC    Collection Time: 03/24/21  3:51 AM   Result Value Ref Range    Sodium 140 136 - 145 mmol/L    Potassium 3.4 (L) 3.5 - 5.1 mmol/L    Chloride 107 97 - 108 mmol/L    CO2 26 21 - 32 mmol/L    Anion gap 7 5 - 15 mmol/L    Glucose 94 65 - 100 mg/dL    BUN 16 6 - 20 MG/DL    Creatinine 0.66 (L) 0.70 - 1.30 MG/DL    BUN/Creatinine ratio 24 (H) 12 - 20      GFR est AA >60 >60 ml/min/1.73m2    GFR est non-AA >60 >60 ml/min/1.73m2    Calcium 9.1 8.5 - 10.1 MG/DL        Imaging:    No results found. Assessment and Plan:     Sepsis  -Patient presents with fever, tachycardia and leukocytosis meeting sepsis criteria  -Sepsis due to urinary tract infection with obstructive uropathy  -Overall improving, blood cultures negative  -Completed 7-day course of meropenem    Urinary tract infection  -Wound culture grew Proteus  -Completed 7-day course of meropenem     Right hydroureteronephrosis  -Several ureteric calculis on CT scan  -S/p decompression with IR nephrostomy tube placement 3/19  -S/p IR ureteral stent placement 3/24  -Urology planning for outpatient follow-up and stone extraction in about 2 weeks    VAZQUEZ  -Likely postobstructive with UTI  -Renal function improving  -Continue IV fluid    Hypernatremia  -Resolved  -DC IV fluid and observe    History of bladder cancer  -S/p radical cystoprostatectomy with ileal conduit placement  -Follow-up as patient    Cerebral palsy  -Supportive care  -Patient with PEG tube feeding    Constipation  -Continue MiraLAX and Colace, also given GoLYTELY but patient did not tolerate  -Continue enema as needed  -Restart PEG tube feeding at a slower rate and increase the rate as tolerated    Discharge disposition: Discharge likely in a.m.     Signed By: Ayala Carrington MD     March 24, 2021

## 2021-03-24 NOTE — PROGRESS NOTES
In chart for IR pre procedural review. One time order placed for Meropenem per IR MD Dr. Ramiro Watts.

## 2021-03-24 NOTE — PROGRESS NOTES
Patient moved into IR suite, intubated by Adrianna Love and Dr. Ida Branch, transferred onto IR procedure table in prone position without incident. Meds and vitals be monitored throughout procedure by Hortensia Davis CRNA.

## 2021-03-24 NOTE — PROGRESS NOTES
Bedside and Verbal shift change report given to [address] [place] [address] Street (oncoming nurse) by Neeta Mabry (offgoing nurse). Report included the following information SBAR, Kardex, ED Summary, Procedure Summary, Intake/Output, MAR, Recent Results and Cardiac Rhythm NSR.

## 2021-03-24 NOTE — PROGRESS NOTES
Urology Update 5:38 PM    Received a call from nursing that the patient's carrera was not draining well. Bladder scan revealed 930 cc. Nursing deflated the balloon and attempted to advance the carrera without improvement in UOP. The carrera was removed and a clot was noted at the tip of the catheter. She then attempted to straight cath and was unable to pass the catheter into the bladder. 2 RNs were then unable to successfully pass a 16F carrera. A new R PCNT was placed by IR today. Per nursing it is patent and draining well with ~300 cc in the drainage bag currently. The urine in the bladder should reflux and drain through the open perc tube. Nursing instructed to repeat the bladder scan and attempt to place a coude catheter into the bladder. Dr. Scotty Becerra and Dr. Stacey Segundo updated and aware. Dr. Scotty Becerra is on-call tonight and will follow.

## 2021-03-24 NOTE — ROUTINE PROCESS
Pt arrives via stretcher to angio department accompanied by transport for antegrade right ureteral stent procedure. All assessments completed and consent was reviewed. Education given was regarding procedure, anethesia sedation, post-procedure care and  management/follow-up. Opportunity for questions was provided and all questions and concerns were addressed.

## 2021-03-24 NOTE — ROUTINE PROCESS
TRANSFER - OUT REPORT: 
 
Verbal report given to Pily SINGLETON(name) on [facility]  being transferred to [facility]) for routine post - op Report consisted of patients Situation, Background, Assessment and  
Recommendations(SBAR). Information from the following report(s) SBAR, Kardex and Procedure Summary was reviewed with the receiving nurse. Lines:  
Cheryl Worthington 03/20/21 Anterior;Right Neck (Active) Central Line Being Utilized Yes 03/23/21 2024 Criteria for Appropriate Use Limited/no vessel suitable for conventional peripheral access 03/23/21 2024 Site Assessment Clean, dry, & intact 03/23/21 2024 Infiltration Assessment 0 03/23/21 2024 Affected Extremity/Extremities Color distal to insertion site pink (or appropriate for race); Pulses palpable;Range of motion performed 03/23/21 2024 Date of Last Dressing Change 03/23/21 03/23/21 2024 Dressing Status Clean, dry, & intact 03/23/21 2024 Dressing Type Disk with Chlorhexadine gluconate (CHG) 03/23/21 2024 Action Taken Open ports on tubing capped 03/23/21 2024 Proximal Hub Color/Line Status White;Capped 03/23/21 2024 Positive Blood Return (Medial Site) Yes 03/23/21 2024 Medial 1 Hub Color/Line Status Gray;Capped 03/23/21 2024 Positive Blood Return (Lateral Site) Yes 03/23/21 2024 Medial 2 Hub Color/Line Status Blue;Capped 03/23/21 2024 Positive Blood Return (Site #3) Yes 03/23/21 2024 Distal Hub Color/Line Status Brown;Capped 03/23/21 2024 Positive Blood Return (Site #4) Yes 03/23/21 2024 Alcohol Cap Used Yes 03/23/21 2024 Peripheral IV 03/18/21 Right;Upper Arm (Active) Site Assessment Clean, dry, & intact 03/23/21 1600 Phlebitis Assessment 0 03/23/21 1600 Infiltration Assessment 0 03/23/21 1600 Dressing Status Clean, dry, & intact 03/23/21 1600 Dressing Type Transparent;Tape 03/23/21 9111 Hub Color/Line Status Pink;Capped 03/23/21 7408 Action Taken Open ports on tubing capped 03/23/21 0137  
Alcohol Cap Used Yes 03/23/21 3609 Opportunity for questions and clarification was provided. Patient transported with: 
 O2 @ 3 liters Registered Nurse Ed Fuentes CRNA

## 2021-03-24 NOTE — PROGRESS NOTES
Transition of Care  1. RUR 9%  2. [facility] pending medical stability  3. DME Peg  4. Transportation  [address] & [facility] for 3/25/21  5.  Follow Up PCP      Allyssa Finch MS

## 2021-03-24 NOTE — H&P
Radiology History and Physical    Patient: Marvin Mitchell 61 y.o. male       Chief Complaint: Abdominal Pain      History of Present Illness: 61year old male with history of bladder cancer, s/p neobladder. Right hydronephrosis, s/p right percutaneous nephrostomy tube. Presents for placement of a JJ ureteral stent, in preparation for retrograde cystoscopy/ureteroscopy.      History:    Past Medical History:   Diagnosis Date    Anxiety     [facility]     [facility]     Cancer ([facility]     BLADDER    CP [facility]cerebral palsy) ([facility]     Depression     Hernia     Nausea & vomiting     Other ill-defined conditions(799.89)     Cerebral palsy    Psychiatric disorder     ANXIETY DEPRESSION    Unspecified constipation 5/13/2013     Family History   Problem Relation Age of Onset    Heart Disease Mother         TACHYCARDIA    Anesth Problems Neg Hx      Social History     Socioeconomic History    Marital status: SINGLE     Spouse name: Not on file    Number of children: Not on file    Years of education: Not on file    Highest education level: Not on file   Occupational History    Not on file   Social Needs    Financial resource strain: Not on file    Food insecurity     Worry: Not on file     Inability: Not on file    Transportation needs     Medical: Not on file     Non-medical: Not on file   Tobacco Use    Smoking status: Never Smoker    Smokeless tobacco: Never Used   Substance and Sexual Activity    Alcohol use: No    Drug use: No    Sexual activity: Not on file   Lifestyle    Physical activity     Days per week: Not on file     Minutes per session: Not on file    Stress: Not on file   Relationships    Social connections     Talks on phone: Not on file     Gets together: Not on file     Attends [employer] service: Not on file     Active member of club or organization: Not on file     Attends meetings of clubs or organizations: Not on file     Relationship status: Not on file  Intimate partner violence     Fear of current or ex partner: Not on file     Emotionally abused: Not on file     Physically abused: Not on file     Forced sexual activity: Not on file   Other Topics Concern    Not on file   Social History Narrative    Not on file       Allergies:    Allergies   Allergen Reactions    Avelox [Moxifloxacin] Rash    Amikacin Unknown (comments)    Amoxicillin Rash    Betadine Surgi-Prep Rash     Mother states this does not happen all the time and if betadine is washed off, it's ok    Ciprofloxacin Unknown (comments)    Effexor [Venlafaxine] Unknown (comments)    Fentanyl Unknown (comments)    Keflex [Cephalexin] Rash    Lyrica [Pregabalin] Unknown (comments)    Morphine Unknown (comments)    Neurontin [Gabapentin] Unknown (comments)    Prozac [Fluoxetine] Unknown (comments)    Reglan [Metoclopramide] Unknown (comments)    Serzone [Nefazodone] Unknown (comments)    Sulfa (Sulfonamide Antibiotics) Unknown (comments)    Tetracycline Unknown (comments)    Tricyclamol Chloride Unknown (comments)    Valium [Diazepam] Unknown (comments)     Tolerates Lorazepam    Demerol [Meperidine] Other (comments)     Spastic movements       Current Medications:  Current Facility-Administered Medications   Medication Dose Route Frequency    lactated Ringers infusion  75 mL/hr IntraVENous CONTINUOUS    0.9% sodium chloride infusion  25 mL/hr IntraVENous CONTINUOUS    sodium chloride (NS) flush 5-40 mL  5-40 mL IntraVENous Q8H    sodium chloride (NS) flush 5-40 mL  5-40 mL IntraVENous PRN    fentaNYL citrate (PF) injection 25 mcg  25 mcg IntraVENous Multiple    morphine injection 2 mg  2 mg IntraVENous Multiple    HYDROmorphone (PF) (DILAUDID) injection 0.2 mg  0.2 mg IntraVENous Multiple    ondansetron (ZOFRAN) injection 4 mg  4 mg IntraVENous PRN    midazolam (VERSED) injection 0.5 mg  0.5 mg IntraVENous Q5MIN PRN    diphenhydrAMINE (BENADRYL) injection 12.5 mg  12.5 mg IntraVENous PRN    peg 3350-Electrolytes-Vit C (MOVIPREP) oral solution 1 L  1 L Per G Tube Q12H    potassium bicarb-citric acid (EFFER-K) tablet 20 mEq  20 mEq Oral BID    mineral oil (FLEET) enema   Rectal PRN    docusate (COLACE) 50 mg/5 mL oral liquid 100 mg  100 mg Per G Tube BID    polyethylene glycol (MIRALAX) packet 17 g  17 g Oral DAILY    ondansetron (ZOFRAN) injection 4 mg  4 mg IntraVENous Q4H PRN    dextrose 5% infusion  75 mL/hr IntraVENous CONTINUOUS    acetaminophen (TYLENOL) solution 650 mg  650 mg Per G Tube Q4H PRN        Physical Exam:  Blood pressure 127/83, pulse (!) 101, temperature 97.7 °F (36.5 °C), resp. rate 22, height 5' 7\" (1.702 m), weight 59.3 kg (130 lb 11.7 oz), SpO2 96 %. GENERAL: nonverbal, but does nod or shake his head in response to questions  LUNG: Nonlabored respiration on room air  HEART: regular rate and rhythm    Alerts:    Hospital Problems  Date Reviewed: 1/2/2014          Codes Class Noted POA    Severe sepsis ([facility] ICD-10-CM: A41.9, R65.20  ICD-9-CM: 038.9, 995.92  3/18/2021 Unknown        Sepsis ([facility] ICD-10-CM: A41.9  ICD-9-CM: 038.9, 995.91  8/22/2018 Unknown              Laboratory:      Recent Labs     03/24/21  0351 03/22/21  0047 03/22/21  0047   HGB  --   --  12.8   HCT  --   --  41.6   WBC  --   --  9.9   PLT  --   --  407*   BUN 16   < > 17   CREA 0.66*   < > 0.93   K 3.4*   < > 3.3*    < > = values in this interval not displayed. Plan of Care/Planned Procedure:  Risks, benefits, and alternatives reviewed with patient's medical power of  and he agrees to proceed with the procedure. Placement of JJ ureteral stent via previous right nephrostomy access; keep nephrostomy tube in place. Deemed appropriate for moderate sedation with versed and fentanyl.     Allene Aschoff, MD  Interventional Radiology  [facility]  3:35 PM, 3/24/2021

## 2021-03-24 NOTE — PROGRESS NOTES
Report received from pt's primary RN. Mega on 3N. Rash noted to pt's anterior chest, primary RN states rash has been intermittent, has not required any treatment, and causative factors are unknown. Will continue to monitor. On arrival pt has indwelling carrera, baclofen pump palpable in R lower abd, quad lumen central line in R IJ, and R nephrostomy tube. VSS, Pt alert, nods no when asked if he is in pain.

## 2021-03-24 NOTE — PROGRESS NOTES
Updated Mega, primary RN on 3N about completion of procedure and pt's transfer to PACU. Updated sister, Lyle Sung, on procedure completion and transfer to PACU.

## 2021-03-24 NOTE — PROGRESS NOTES
Pt incontinent of stool, chloe care provided and placed in new brief. Pt remains resting in bed, will continue to monitor.

## 2021-03-24 NOTE — PROGRESS NOTES
Bedside shift change report given to Teressa Steven RN (oncoming nurse) by MANI Oleary (offgoing nurse). Report included the following information SBAR, Kardex, Intake/Output, MAR, Accordion, Recent Results and Cardiac Rhythm NSR/ Sinus tach.

## 2021-03-24 NOTE — PROCEDURES
Interventional Radiology  Procedure Note        3/24/2021 3:39 PM    Patient: Yenni Mitchell     Informed consent obtained    Diagnosis: Bladder cancer    Procedure(s): Antegrade 8Fr 28cm ureteral stent placed; new 10Fr nephrostomy tube placed    Specimens removed:   Indwelling nephrostomy tube removed    Complications: None    Primary Physician: Pedro Luis Sung MD    Recommendations: N/A    Discharge Disposition: stable; return to floor    Full dictated report to follow    Pedro Luis Sung MD  Interventional Radiology  [address] [facility], [facility].  3:39 PM, 3/24/2021

## 2021-03-25 VITALS
OXYGEN SATURATION: 95 % | BODY MASS INDEX: 19.83 KG/M2 | WEIGHT: 126.32 LBS | SYSTOLIC BLOOD PRESSURE: 122 MMHG | TEMPERATURE: 98.6 F | DIASTOLIC BLOOD PRESSURE: 67 MMHG | HEART RATE: 90 BPM | HEIGHT: 67 IN | RESPIRATION RATE: 20 BRPM

## 2021-03-25 PROBLEM — N13.30 HYDRONEPHROSIS OF RIGHT KIDNEY: Status: ACTIVE | Noted: 2021-03-25

## 2021-03-25 PROBLEM — N20.0 NEPHROLITHIASIS: Status: ACTIVE | Noted: 2021-03-25

## 2021-03-25 PROBLEM — N17.9 ACUTE KIDNEY INJURY (HCC): Status: ACTIVE | Noted: 2021-03-25

## 2021-03-25 LAB
ANION GAP SERPL CALC-SCNC: 8 MMOL/L (ref 5–15)
BUN SERPL-MCNC: 14 MG/DL (ref 6–20)
BUN/CREAT SERPL: 21 (ref 12–20)
CALCIUM SERPL-MCNC: 8.5 MG/DL (ref 8.5–10.1)
CHLORIDE SERPL-SCNC: 106 MMOL/L (ref 97–108)
CO2 SERPL-SCNC: 26 MMOL/L (ref 21–32)
CREAT SERPL-MCNC: 0.66 MG/DL (ref 0.7–1.3)
GLUCOSE SERPL-MCNC: 91 MG/DL (ref 65–100)
POTASSIUM SERPL-SCNC: 3.7 MMOL/L (ref 3.5–5.1)
SODIUM SERPL-SCNC: 140 MMOL/L (ref 136–145)

## 2021-03-25 PROCEDURE — 74011250637 HC RX REV CODE- 250/637: Performed by: FAMILY MEDICINE

## 2021-03-25 PROCEDURE — 36415 COLL VENOUS BLD VENIPUNCTURE: CPT

## 2021-03-25 PROCEDURE — 80048 BASIC METABOLIC PNL TOTAL CA: CPT

## 2021-03-25 RX ORDER — DOCUSATE SODIUM 50 MG/5ML
100 LIQUID ORAL 2 TIMES DAILY
Qty: 600 ML | Refills: 0 | Status: SHIPPED
Start: 2021-03-25 | End: 2021-04-24

## 2021-03-25 RX ORDER — BACITRACIN 500 UNIT/G
PACKET (EA) TOPICAL
Status: DISCONTINUED
Start: 2021-03-25 | End: 2021-03-25 | Stop reason: HOSPADM

## 2021-03-25 RX ADMIN — POLYETHYLENE GLYCOL 3350 17 G: 17 POWDER, FOR SOLUTION ORAL at 09:15

## 2021-03-25 RX ADMIN — POTASSIUM BICARBONATE 20 MEQ: 391 TABLET, EFFERVESCENT ORAL at 09:15

## 2021-03-25 RX ADMIN — DOCUSATE SODIUM 100 MG: 50 LIQUID ORAL at 09:15

## 2021-03-25 NOTE — DISCHARGE SUMMARY
Discharge Summary       PATIENT ID: Lena Barrow  MRN: [id number]   YOB: 1960    DATE OF ADMISSION: 3/18/2021  2:06 PM    DATE OF DISCHARGE: 03/25/2021   PRIMARY CARE PROVIDER: Wally Jeong MD     DISCHARGING PROVIDER: Familia Sharpe MD    To contact this individual call [phone] and ask the  to page. If unavailable ask to be transferred the Adult Hospitalist Department. CONSULTATIONS: IP CONSULT TO HOSPITALIST  IP CONSULT TO INTERVENTIONAL RADIOLOGY    PROCEDURES/SURGERIES: Procedure(s): INFUSION CATHETER INSERTION    ADMITTING DIAGNOSES & HOSPITAL COURSE:   Sepsis 2/2 UTI with obstructing nephrolithiasis   Acute kidney injury  Right hydronephrosis status post nephrostomy tube placement  Hyponatremia  Cerebral palsy  Constipation    Lena Barrow is a 61 y.o. male with past medical history of cerebral palsy, generalized anxiety disorder, bladder carcinoma s/p radical cystoprostatectomy with ileal conduit who presents to hospital with chief complaint of abdominal pain. Patient currently resides at the [facility] given his CP history. Patient is able to answer questions and provide history via his niece who was at bedside. Patient complained of abdominal pain earlier today that was constant but now improved. He is unable to characterize his pain. Of note, patient has cystoprostatectomy and currently undergoes straight cath every 6 hours at his group home. Work-up revealed right hydronephrosis with multiple right renal calculus. UA was positive for suspected UTI he was started on meropenem. Cultures grew out pansensitive Proteus, he remained on meropenem x7-day course. Sepsis resolved. Patient underwent placement of nephrostomy tube 3/19 by interventional radiology. Patient also presented with acute kidney injury with a creatinine of 1.3 from a baseline of 0.4. This improved after nephrostomy placement, and IV fluids.   Urology was consulted, stent was placed on the right on 3/19 by IR. He will return as an outpatient for definitive stone treatment  DISCHARGE DIAGNOSES / PLAN:      Sepsis 2/2 UTI with obstructing nephrolithiasis   R hydronephrosis   H/o bladder cancer s/p radial cystoprostatectomy with ileal conduit  - s/p 7 day course of meropenem. Urine positive for pansensitive Proteus  - s/p R nephrostomy tube 3/19  - s/p R JJ stent by IR 3/24  - Urology following, keep carrera in place. May resume straight cath on Monday 3/29 at [facility]. - F/U for definitive stone treatment on 3/30 at [facility] with Dr. Chace Bartlett       VAZQUEZ - post obstructive   - Improved with IVF   - F/U BMP in 1 week    Hypernatremia - resolved  Cerebral palsy - supportive care, PEG in place   Constipation - bowel regmimen. Enema as tolerated. ADDITIONAL CARE RECOMMENDATIONS:   - Please take all medications as prescribed. Note changes as below. **Add bowel regimen  - Please make sure to follow up with your primary care physician within 1-2 weeks of discharge for hospital follow up. You also need to return 3/30 for definitive stone treatment with Dr. Juan J Vick at [facility].  - Please make sure to continue to monitor for: Worsening abdominal pain, high fevers, difficulty passing urine through the Carrera catheter and return to the Emergency Department with any of these symptoms:   -The Carrera catheter should be maintained until Monday 3/29  - Please get up slowly from a seated or laying position, avoid falls. - Avoid tobacco, alcohol and other illicit drug use.          PENDING TEST RESULTS:   At the time of discharge the following test results are still pending: None    FOLLOW UP APPOINTMENTS:    Follow-up Information     Follow up With Specialties Details Why Contact Info    Barbara Cheung MD [facility] [phone]      Dr. Juan J Vick  On 3/30/2021 At [facility], for definitive stone treatment [facility]             DIET: Resume previous diet  Jevity 1.5  @ 45 ml/hr and give 240 ml water every 4 hours. Alter back to usual regime once returned to [facility]. Give 30 ml water flush before and after medications. ACTIVITY: Activity as tolerated    WOUND CARE: None    EQUIPMENT needed: None      DISCHARGE MEDICATIONS:  Current Discharge Medication List      START taking these medications    Details   docusate (COLACE) 50 mg/5 mL liquid 10 mL by Per G Tube route two (2) times a day for 30 days. Qty: 600 mL, Refills: 0         CONTINUE these medications which have NOT CHANGED    Details   nortriptyline (PAMELOR) 10 mg/5 mL solution 50 mg by Per G Tube route nightly. ascorbic acid, vitamin C, (VITAMIN C) 500 mg tablet 500 mg by Per G Tube route daily. cholecalciferol (VITAMIN D3) 1,000 unit tablet 2,000 Units by Per G Tube route daily. Indications: Vitamin D Deficiency      acetaminophen (TYLENOL) 160 mg/5 mL liquid 640 mg by Per G Tube route every four (4) hours as needed for Fever or Pain. HYDROcodone-acetaminophen (HYCET) 0.5-21.7 mg/mL oral solution 5 mL by Per G Tube route four (4) times daily as needed for Pain. LORazepam (ATIVAN) 1 mg tablet 0.75 mg by Per G Tube route every six (6) hours. fluticasone (FLONASE) 50 mcg/actuation nasal spray 2 Sprays by Both Nostrils route daily. Esomeprazole Magnesium (NEXIUM PACKET) 40 mg by Per G Tube route daily. Indications: gastroesophageal reflux disease      rizatriptan (MAXALT) 10 mg tablet 10 mg by SubLINGual route as needed for Migraine. May repeat dose after 2 hours not to exceed 3 tablets per 24hr               NOTIFY YOUR PHYSICIAN FOR ANY OF THE FOLLOWING:   Fever over 101 degrees for 24 hours. Chest pain, shortness of breath, fever, chills, nausea, vomiting, diarrhea, change in mentation, falling, weakness, bleeding. Severe pain or pain not relieved by medications.   Or, any other signs or symptoms that you may have questions about.    DISPOSITION:   X Home With:   OT  PT  HH  RN       Long term SNF/Inpatient Rehab    Independent/assisted living    Hospice    Other:       PATIENT CONDITION AT DISCHARGE:     Functional status   X Poor     Deconditioned     Independent      Cognition   X  Lucid     Forgetful     Dementia      Catheters/lines (plus indication)   X Short     PICC    X PEG     None      Code status   X  Full code     DNR      PHYSICAL EXAMINATION AT DISCHARGE:  Visit Vitals  /69 (BP 1 Location: Left arm, BP Patient Position: At rest)   Pulse 91   Temp 98 °F (36.7 °C)   Resp 16   Ht 5' 7\" (1.702 m)   Wt 57.3 kg (126 lb 5.2 oz)   SpO2 93%   BMI 19.79 kg/m²     Gen: NAD, awake in bed  HEENT: NC/AT, sclera anicteric, PERRL, EOMI  CV: RRR no m/r/g, normal S1 and S2, no pedal edema   Resp: CTA b/l no increased work of breathing, no wheezing or rhonchi, speaking in full sentences   Abd: NT/ND, normal bowel sounds, no rebound or guarding, PEG in place.    : R nephrostomy tube in place, Short in place  Ext: 2+ pulses, no edema, multiple upper and lower extremity contractures  Skin: No rashes or lesions        CHRONIC MEDICAL DIAGNOSES:  Problem List as of 3/25/2021 Date Reviewed: 1/2/2014          Codes Class Noted - Resolved    Severe sepsis ([facility] ICD-10-CM: A41.9, R65.20  ICD-9-CM: 038.9, 995.92  3/18/2021 - Present        UTI (urinary tract infection) ICD-10-CM: N39.0  ICD-9-CM: 599.0  8/22/2018 - Present        Sepsis ([facility] ICD-10-CM: A41.9  ICD-9-CM: 038.9, 995.91  8/22/2018 - Present        Constipation ICD-10-CM: K59.00  ICD-9-CM: 564.00  8/22/2018 - Present        Fecal impaction ([facility] ICD-10-CM: K56.41  ICD-9-CM: 560.32  8/22/2018 - Present        Hiatal hernia ICD-10-CM: K44.9  ICD-9-CM: 553.3  8/22/2018 - Present        Abdominal pain, LLQ (left lower quadrant) ICD-10-CM: R10.32  ICD-9-CM: 789.04  12/5/2011 - Present        Quadriplegic cerebral palsy ([facility] ICD-10-CM: G80.8  ICD-9-CM: 343.2  7/28/2011 - Present Esophageal stricture ICD-10-CM: K22.2  ICD-9-CM: 530.3  7/28/2011 - Present        Abdominal pain ICD-10-CM: R10.9  ICD-9-CM: 789.00  7/25/2011 - Present        CP (cerebral palsy) (HCC) (Chronic) ICD-10-CM: G80.9  ICD-9-CM: 343.9  7/25/2011 - Present        Abdominal pain, other specified site ICD-10-CM: R10.9  ICD-9-CM: 789.09  6/3/2011 - Present        Abdominal pain ICD-10-CM: R10.9  ICD-9-CM: 789.00  12/9/2010 - Present        RESOLVED: Contracture of joint of multiple sites ICD-10-CM: M24.50  ICD-9-CM: 718.49  1/3/2014 - 1/3/2014        RESOLVED: Sepsis, unspecified ([facility] ICD-10-CM: A41.9  ICD-9-CM: 995.91  8/22/2013 - 8/25/2013        RESOLVED: UTI (lower urinary tract infection) ICD-10-CM: N39.0  ICD-9-CM: 599.0  8/22/2013 - 8/25/2013        RESOLVED: Tachycardia ICD-10-CM: R00.0  ICD-9-CM: 785.0  8/22/2013 - 8/25/2013        RESOLVED: Thrombocytosis ([facility] ICD-10-CM: D47.3  ICD-9-CM: 238.71  8/22/2013 - 8/25/2013        RESOLVED: Unspecified constipation ICD-10-CM: K59.00  ICD-9-CM: 564.00  5/13/2013 - 5/15/2013        RESOLVED: Sepsis, unspecified ([facility] ICD-10-CM: A41.9  ICD-9-CM: 995.91  7/14/2012 - 7/21/2012        RESOLVED: UTI (lower urinary tract infection) ICD-10-CM: N39.0  ICD-9-CM: 599.0  7/14/2012 - 7/21/2012        RESOLVED: Abdominal pain, other specified site ICD-10-CM: R10.9  ICD-9-CM: 789.09  2/14/2012 - 3/3/2012              Greater than 30 minutes were spent with the patient on counseling and coordination of care    Signed:   Claudetta Grange, MD  3/25/2021  12:20 PM

## 2021-03-25 NOTE — PROGRESS NOTES
Transition of Care  1. RUR 9%  2. Disposition The [facility]  3. DME Peg, carrera, nephrostomy  4. Transportation [phone] via ([facility]) phone [phone]  5. Follow Up PCP, Specialists      CM contacted [address] [place] [address] [phone] at The [address] [place] [address] to inform of DC plans today. Amberson requested that CM fax over medicals for review. CM faxed info to [address] [place] [address] at [phone] await confirmation they can accept patient back today. Medicare pt has received, reviewed, and signed 2nd IM letter informing them of their right to appeal the discharge. Signed copy has been placed on pt bedside chart. Allyssa Finch MS    2:55 CM received confirmation that The [address] [place] [address] can accept patient back today. CM has updated [facility] transport from [address] [place] [address] & [facility] to request for 1600. Nurse to call report to [place] [address] at [phone]. CM to monitor. Cara Robertson, MS    3:06 [facility] confirmed transport for 1810. Patient, sister and nurse informed.     Allyssa Finch, MS

## 2021-03-25 NOTE — PROGRESS NOTES
Problem: Pressure Injury - Risk of  Goal: *Prevention of pressure injury  Description: Document Rick Scale and appropriate interventions in the flowsheet.   Outcome: Progressing Towards Goal  Note: Pressure Injury Interventions:  Sensory Interventions: Assess changes in LOC, Check visual cues for pain, Float heels, Keep linens dry and wrinkle-free, Maintain/enhance activity level, Minimize linen layers    Moisture Interventions: Absorbent underpads, Minimize layers    Activity Interventions: Pressure redistribution bed/mattress(bed type), Assess need for specialty bed    Mobility Interventions: Float heels, Pressure redistribution bed/mattress (bed type)    Nutrition Interventions: Document food/fluid/supplement intake    Friction and Shear Interventions: Lift sheet, Lift team/patient mobility team, Minimize layers                Problem: Patient Education: Go to Patient Education Activity  Goal: Patient/Family Education  Outcome: Progressing Towards Goal     Problem: Patient Education: Go to Patient Education Activity  Goal: Patient/Family Education  Outcome: Progressing Towards Goal     Problem: Patient Education: Go to Patient Education Activity  Goal: Patient/Family Education  Outcome: Progressing Towards Goal     Problem: General Medical Care Plan  Goal: *Vital signs within specified parameters  Outcome: Progressing Towards Goal  Goal: *Skin integrity maintained  Outcome: Progressing Towards Goal  Goal: *Fluid volume balance  Outcome: Progressing Towards Goal     Problem: Patient Education: Go to Patient Education Activity  Goal: Patient/Family Education  Outcome: Progressing Towards Goal     Problem: Discharge Planning  Goal: *Discharge to safe environment  Outcome: Progressing Towards Goal

## 2021-03-25 NOTE — DISCHARGE INSTRUCTIONS
Discharge Summary       PATIENT ID: Kael Sow  MRN: [id number]   YOB: 1960    DATE OF ADMISSION: 3/18/2021  2:06 PM    DATE OF DISCHARGE: 03/25/2021   PRIMARY CARE PROVIDER: Ron Rice MD     DISCHARGING PROVIDER: Lili Colin MD    To contact this individual call [phone] and ask the  to page. If unavailable ask to be transferred the Adult Hospitalist Department. CONSULTATIONS: IP CONSULT TO HOSPITALIST  IP CONSULT TO INTERVENTIONAL RADIOLOGY    PROCEDURES/SURGERIES: Procedure(s): INFUSION CATHETER INSERTION    ADMITTING DIAGNOSES & HOSPITAL COURSE:   Sepsis 2/2 UTI with obstructing nephrolithiasis   Acute kidney injury  Right hydronephrosis status post nephrostomy tube placement  Hyponatremia  Cerebral palsy  Constipation    Kael Sow is a 61 y.o. male with past medical history of cerebral palsy, generalized anxiety disorder, bladder carcinoma s/p radical cystoprostatectomy with ileal conduit who presents to hospital with chief complaint of abdominal pain. Patient currently resides at the [facility] given his CP history. Patient is able to answer questions and provide history via his niece who was at bedside. Patient complained of abdominal pain earlier today that was constant but now improved. He is unable to characterize his pain. Of note, patient has cystoprostatectomy and currently undergoes straight cath every 6 hours at his group home. Work-up revealed right hydronephrosis with multiple right renal calculus. UA was positive for suspected UTI he was started on meropenem. Cultures grew out pansensitive Proteus, he remained on meropenem x7-day course. Sepsis resolved. Patient underwent placement of nephrostomy tube 3/19 by interventional radiology. Patient also presented with acute kidney injury with a creatinine of 1.3 from a baseline of 0.4. This improved after nephrostomy placement, and IV fluids.   Urology was consulted, stent was placed on the right on 3/19 by IR. He will return as an outpatient for definitive stone treatment  DISCHARGE DIAGNOSES / PLAN:      Sepsis 2/2 UTI with obstructing nephrolithiasis   R hydronephrosis   H/o bladder cancer s/p radial cystoprostatectomy with ileal conduit  - s/p 7 day course of meropenem. Urine positive for pansensitive Proteus  - s/p R nephrostomy tube 3/19  - s/p R JJ stent by IR 3/24  - Urology following, keep carrera in place. May resume straight cath on Monday 3/29 at [facility]. - F/U for definitive stone treatment on 3/30 at [facility] with Dr. Ernie Guzmán       VAZQUEZ - post obstructive   - Improved with IVF   - F/U BMP in 1 week    Hypernatremia - resolved  Cerebral palsy - supportive care, PEG in place   Constipation - bowel regmimen. Enema as tolerated. ADDITIONAL CARE RECOMMENDATIONS:   - Please take all medications as prescribed. Note changes as below. **Add bowel regimen  - Please make sure to follow up with your primary care physician within 1-2 weeks of discharge for hospital follow up. You also need to return 3/30 for definitive stone treatment with Dr. Cecilio Leiva at [address]  - Please make sure to continue to monitor for: Worsening abdominal pain, high fevers, difficulty passing urine through the Carrera catheter and return to the Emergency Department with any of these symptoms:   -The Carrera catheter should be maintained until Monday 3/29  - Please get up slowly from a seated or laying position, avoid falls. - Avoid tobacco, alcohol and other illicit drug use.          PENDING TEST RESULTS:   At the time of discharge the following test results are still pending: None    FOLLOW UP APPOINTMENTS:    Follow-up Information     Follow up With Specialties Details Why Contact Info    Yanira Gore MD [facility] [phone]      Dr. Cecilio Leiva  On 3/30/2021 At [address], for definitive stone treatment [facility]             DIET: Resume previous diet  Jevity 1.5  @ 45 ml/hr and give 240 ml water every 4 hours. Alter back to usual regime once returned to [facility]. Give 30 ml water flush before and after medications. ACTIVITY: Activity as tolerated    WOUND CARE: None    EQUIPMENT needed: None      DISCHARGE MEDICATIONS:  Current Discharge Medication List      START taking these medications    Details   docusate (COLACE) 50 mg/5 mL liquid 10 mL by Per G Tube route two (2) times a day for 30 days. Qty: 600 mL, Refills: 0         CONTINUE these medications which have NOT CHANGED    Details   nortriptyline (PAMELOR) 10 mg/5 mL solution 50 mg by Per G Tube route nightly. ascorbic acid, vitamin C, (VITAMIN C) 500 mg tablet 500 mg by Per G Tube route daily. cholecalciferol (VITAMIN D3) 1,000 unit tablet 2,000 Units by Per G Tube route daily. Indications: Vitamin D Deficiency      acetaminophen (TYLENOL) 160 mg/5 mL liquid 640 mg by Per G Tube route every four (4) hours as needed for Fever or Pain. HYDROcodone-acetaminophen (HYCET) 0.5-21.7 mg/mL oral solution 5 mL by Per G Tube route four (4) times daily as needed for Pain. LORazepam (ATIVAN) 1 mg tablet 0.75 mg by Per G Tube route every six (6) hours. fluticasone (FLONASE) 50 mcg/actuation nasal spray 2 Sprays by Both Nostrils route daily. Esomeprazole Magnesium (NEXIUM PACKET) 40 mg by Per G Tube route daily. Indications: gastroesophageal reflux disease      rizatriptan (MAXALT) 10 mg tablet 10 mg by SubLINGual route as needed for Migraine. May repeat dose after 2 hours not to exceed 3 tablets per 24hr               NOTIFY YOUR PHYSICIAN FOR ANY OF THE FOLLOWING:   Fever over 101 degrees for 24 hours. Chest pain, shortness of breath, fever, chills, nausea, vomiting, diarrhea, change in mentation, falling, weakness, bleeding. Severe pain or pain not relieved by medications.   Or, any other signs or symptoms that you may have questions about.    DISPOSITION:   X Home With:   OT  PT  HH  RN       Long term SNF/Inpatient Rehab    Independent/assisted living    Hospice    Other:       PATIENT CONDITION AT DISCHARGE:     Functional status   X Poor     Deconditioned     Independent      Cognition   X  Lucid     Forgetful     Dementia      Catheters/lines (plus indication)   X Short     PICC    X PEG     None      Code status   X  Full code     DNR      PHYSICAL EXAMINATION AT DISCHARGE:  Visit Vitals  /69 (BP 1 Location: Left arm, BP Patient Position: At rest)   Pulse 91   Temp 98 °F (36.7 °C)   Resp 16   Ht 5' 7\" (1.702 m)   Wt 57.3 kg (126 lb 5.2 oz)   SpO2 93%   BMI 19.79 kg/m²     Gen: NAD, awake in bed  HEENT: NC/AT, sclera anicteric, PERRL, EOMI  CV: RRR no m/r/g, normal S1 and S2, no pedal edema   Resp: CTA b/l no increased work of breathing, no wheezing or rhonchi, speaking in full sentences   Abd: NT/ND, normal bowel sounds, no rebound or guarding, PEG in place.    : R nephrostomy tube in place, Short in place  Ext: 2+ pulses, no edema, multiple upper and lower extremity contractures  Skin: No rashes or lesions        CHRONIC MEDICAL DIAGNOSES:  Problem List as of 3/25/2021 Date Reviewed: 1/2/2014          Codes Class Noted - Resolved    Severe sepsis ([facility] ICD-10-CM: A41.9, R65.20  ICD-9-CM: 038.9, 995.92  3/18/2021 - Present        UTI (urinary tract infection) ICD-10-CM: N39.0  ICD-9-CM: 599.0  8/22/2018 - Present        Sepsis ([facility] ICD-10-CM: A41.9  ICD-9-CM: 038.9, 995.91  8/22/2018 - Present        Constipation ICD-10-CM: K59.00  ICD-9-CM: 564.00  8/22/2018 - Present        Fecal impaction ([facility] ICD-10-CM: K56.41  ICD-9-CM: 560.32  8/22/2018 - Present        Hiatal hernia ICD-10-CM: K44.9  ICD-9-CM: 553.3  8/22/2018 - Present        Abdominal pain, LLQ (left lower quadrant) ICD-10-CM: R10.32  ICD-9-CM: 789.04  12/5/2011 - Present        Quadriplegic cerebral palsy ([facility] ICD-10-CM: G80.8  ICD-9-CM: 343.2  7/28/2011 - Present Esophageal stricture ICD-10-CM: K22.2  ICD-9-CM: 530.3  7/28/2011 - Present        Abdominal pain ICD-10-CM: R10.9  ICD-9-CM: 789.00  7/25/2011 - Present        CP (cerebral palsy) (HCC) (Chronic) ICD-10-CM: G80.9  ICD-9-CM: 343.9  7/25/2011 - Present        Abdominal pain, other specified site ICD-10-CM: R10.9  ICD-9-CM: 789.09  6/3/2011 - Present        Abdominal pain ICD-10-CM: R10.9  ICD-9-CM: 789.00  12/9/2010 - Present        RESOLVED: Contracture of joint of multiple sites ICD-10-CM: M24.50  ICD-9-CM: 718.49  1/3/2014 - 1/3/2014        RESOLVED: Sepsis, unspecified ([facility] ICD-10-CM: A41.9  ICD-9-CM: 995.91  8/22/2013 - 8/25/2013        RESOLVED: UTI (lower urinary tract infection) ICD-10-CM: N39.0  ICD-9-CM: 599.0  8/22/2013 - 8/25/2013        RESOLVED: Tachycardia ICD-10-CM: R00.0  ICD-9-CM: 785.0  8/22/2013 - 8/25/2013        RESOLVED: Thrombocytosis ([facility] ICD-10-CM: D47.3  ICD-9-CM: 238.71  8/22/2013 - 8/25/2013        RESOLVED: Unspecified constipation ICD-10-CM: K59.00  ICD-9-CM: 564.00  5/13/2013 - 5/15/2013        RESOLVED: Sepsis, unspecified ([facility] ICD-10-CM: A41.9  ICD-9-CM: 995.91  7/14/2012 - 7/21/2012        RESOLVED: UTI (lower urinary tract infection) ICD-10-CM: N39.0  ICD-9-CM: 599.0  7/14/2012 - 7/21/2012        RESOLVED: Abdominal pain, other specified site ICD-10-CM: R10.9  ICD-9-CM: 789.09  2/14/2012 - 3/3/2012              Signed:   Reva Tamayo MD  3/25/2021  12:20 PM

## 2021-03-25 NOTE — PROGRESS NOTES
1530: Noticed that carrera was not draining. Was notified by RN in OR that bladder seemed very full but there was no attempt to bladder scan or flush carrera. Bladder scanned carrera and there was volume of 930mL. Flushed carrera with no success in draining. Called Quinton Brenner NP and she said to deflate carrera balloon, attempt to advance carrera, and if it drained to reinflate the balloon, if not to take it out. Advancement did not work, carrera removed. Per NP's request, attempted straight cath. Was not able to advance the catheter into the bladder. Mendel Kayser RN attempted to place a 16fr coude carrera and was also not able to advance. Sanjuana SINGLETON used 12fr coude carrera and was able to enter the bladder. Dr. Flash Gordon informed. Report passed on to [facility] who will bladder scan to check for residual volume and was given Dr. Monik Pozo contact information for updates. Bedside and Verbal shift change report given to [facility] (oncoming nurse) by Mary Ann Young RN (offgoing nurse). Report included the following information SBAR, Kardex, ED Summary, Procedure Summary, Intake/Output, MAR, Recent Results and Cardiac Rhythm NSR.

## 2021-03-25 NOTE — PROGRESS NOTES
Patient: Fabiana De Anda MRN: [id number]  SSN: [id number]    YOB: 1960  Age: 61 y.o. Sex: male        ADMITTED: 3/18/2021 to [facility] by Divine Vargas MD for Severe sepsis ([facility] [A41.9, R65.20]  Sepsis ([facility] [A41.9]  POD# 3 Days Post-Op Procedure(s): INFUSION CATHETER INSERTION    Hx of bladder carcinoma s/p radical cystoprostatectomy with neobladder    Multiple obstructing right ureteral calculi with mod-severe right hydronephrosis    S/p antegrade stent placement on 21   R PCN placed 21  URE scheduled 21 at 07:30 with Dr. Stacey Segundo at [address] [place] [address]. Patient and sister aware    Last evening NP received a call that patient's carrera was not draining well. Bladder scan revealed 930 cc. Nursing deflated the balloon and attempted to advance the carrera without improvement in UOP. The carrera was removed and a clot was noted at the tip of the catheter. After multiple attempts, a 12fr coude carrera was placed. Carrera in place draining clear yellow UA. Irrigated carrera with no resistance. R PCN in place draining clear phil UA    afvss  Cr: 0.66  Labs from 21 below:  WBC: 9.9  Hgb: 12.8  UA: >100 WBCs, 5-10 RBCs, 3+ bacteria  Ucx: PROTEUS MIRABILIS     21  CT AP w:  1. Interval development of right-sided hydroureteronephrosis. The right ureter  contains multiple calculi throughout its course with a calculus at what appears  to be its terminus in a neobladder in the right lower quadrant. Additionally,  there is hyperemia within the walls of the right ureter suggesting  infection/inflammation. 2. No significant change in the appearance of the left-sided extrarenal pelvis  versus hydronephrosis. 3. Incidental findings as above. Vitals: Temp (24hrs), Av.9 °F (36.6 °C), Min:97.2 °F (36.2 °C), Max:98.9 °F (37.2 °C)    Blood pressure 106/69, pulse 91, temperature 98 °F (36.7 °C), resp.  rate 16, height 5' 7\" (1.702 m), weight 57.3 kg (126 lb 5.2 oz), SpO2 93 %.    Intake and Output:  03/23 1901 - 03/25 0700  In: 4282.5 [I.V.:4032.5]  Out: 1437 [Urine:1350]  No intake/output data recorded. Labs:  CBC:   Lab Results   Component Value Date/Time    WBC 9.9 03/22/2021 12:47 AM    HCT 41.6 03/22/2021 12:47 AM    PLATELET 732 (H) 26/93/6490 12:47 AM     BMP:   Lab Results   Component Value Date/Time    Glucose 91 03/25/2021 04:27 AM    Sodium 140 03/25/2021 04:27 AM    Potassium 3.7 03/25/2021 04:27 AM    Chloride 106 03/25/2021 04:27 AM    CO2 26 03/25/2021 04:27 AM    BUN 14 03/25/2021 04:27 AM    Creatinine 0.66 (L) 03/25/2021 04:27 AM    Calcium 8.5 03/25/2021 04:27 AM     Assessment/Plan:     Hx of bladder carcinoma s/p radical cystoprostatectomy with neobladder  - MAINTAIN CARRERA. [facility]. Will ensure patency of carrera prior to discharge    Multiple obstructing right ureteral calculi with mod-severe right hydronephrosis:  -  s/p antegrade right ureteral stent placement. Maintain R PCNT at discharge. Dr. Torsten James will schedule retrograde ureteroscopy/laser lithotripsy in a few weeks. Keep nephrostomy in place in case retrograde approach unsuccessful.   ----URE scheduled 03/30/21 at 07:30 with Dr. Torsten James at [address]. Patient and sister aware    Urosepsis : +Proteus UTI. Improving. Culture specific antibiotics. BCx NGTD. VAZQUEZ: improving     Anticipated DC back to [facility] today, 03/25/21 if he remains medically stable.     Supervising MD, Dr. Torsten James    Signed By: Melissa Pinon NP - March 25, 2021

## 2021-03-25 NOTE — PROGRESS NOTES
[facility],HR and BP up- from baseline-MD aware -brother spoke to pt -pt calm down -stated he dont want to leave

## 2021-03-25 NOTE — ANESTHESIA POSTPROCEDURE EVALUATION
Post-Anesthesia Evaluation and Assessment    Patient: Josep Rothman MRN: [id number]  SSN: [id number]    YOB: 1960  Age: 61 y.o. Sex: male      I have evaluated the patient and they are stable and ready for discharge from the PACU. Cardiovascular Function/Vital Signs  Visit Vitals  /69 (BP 1 Location: Left arm, BP Patient Position: At rest)   Pulse 91   Temp 36.7 °C (98 °F)   Resp 16   Ht 5' 7\" (1.702 m)   Wt 57.3 kg (126 lb 5.2 oz)   SpO2 93%   BMI 19.79 kg/m²       Patient is status post * No anesthesia type entered * anesthesia for * No procedures listed *. Nausea/Vomiting: None    Postoperative hydration reviewed and adequate. Pain:  Pain Scale 1: Numeric (0 - 10) (03/25/21 0440)  Pain Intensity 1: 0 (03/25/21 0440)   Managed    Neurological Status:   Neuro (WDL): Exceptions to WDL (03/24/21 1358)  Neuro  Neurologic State: Alert;Eyes open spontaneously (03/24/21 2030)  Orientation Level: Unable to verbalize (03/24/21 2030)  Cognition: Follows commands (03/24/21 2030)  Speech: Garbled (03/24/21 2030)  Assessment L Pupil: Brisk;Round (03/24/21 1108)  Size L Pupil (mm): 3 (03/24/21 1108)  Assessment R Pupil: Brisk;Round (03/24/21 1108)  Size R Pupil (mm): 3 (03/24/21 1108)  LUE Motor Response: Spastic (03/24/21 2030)  LLE Motor Response: Nonpurposeful (03/24/21 2030)  RUE Motor Response: Spastic (03/24/21 2030)  RLE Motor Response: Nonpurposeful (03/24/21 2030)   At baseline    Mental Status, Level of Consciousness: Alert and  oriented to person, place, and time    Pulmonary Status:   O2 Device: Room air (03/25/21 0440)   Adequate oxygenation and airway patent    Complications related to anesthesia: None    Post-anesthesia assessment completed.  No concerns    Signed By: Cait Rebollar MD     March 25, 2021              * No procedures listed *.    general    <BSHSIANPOST>    INITIAL Post-op Vital signs:   Vitals Value Taken Time   /83 03/24/21 1430   Temp 36.5 °C (97.7 °F) 03/24/21 1358   Pulse 92 03/24/21 1431   Resp 14 03/24/21 1431   SpO2 94 % 03/24/21 1431   Vitals shown include unvalidated device data.

## 2021-03-25 NOTE — PROGRESS NOTES
Bedside shift change report given to MANI Ashley (oncoming nurse) by Skip Cardona RN (offgoing nurse). Report included the following information SBAR, Kardex, Intake/Output, MAR, Accordion, Recent Results and Cardiac Rhythm NSR/ Sinus tachy.

## 2021-03-26 ENCOUNTER — PATIENT OUTREACH (OUTPATIENT)
Dept: CASE MANAGEMENT | Age: 61
End: 2021-03-26

## 2021-03-26 ENCOUNTER — TRANSCRIBE ORDER (OUTPATIENT)
Dept: REGISTRATION | Age: 61
End: 2021-03-26

## 2021-03-26 ENCOUNTER — HOSPITAL ENCOUNTER (OUTPATIENT)
Dept: PREADMISSION TESTING | Age: 61
Discharge: HOME | End: 2021-03-26
Payer: MEDICARE

## 2021-03-26 DIAGNOSIS — Z01.812 PRE-PROCEDURE LAB EXAM: Primary | ICD-10-CM

## 2021-03-26 DIAGNOSIS — Z01.812 PRE-PROCEDURE LAB EXAM: ICD-10-CM

## 2021-03-26 PROCEDURE — U0003 INFECTIOUS AGENT DETECTION BY NUCLEIC ACID (DNA OR RNA); SEVERE ACUTE RESPIRATORY SYNDROME CORONAVIRUS 2 (SARS-COV-2) (CORONAVIRUS DISEASE [COVID-19]), AMPLIFIED PROBE TECHNIQUE, MAKING USE OF HIGH THROUGHPUT TECHNOLOGIES AS DESCRIBED BY CMS-2020-01-R: HCPCS

## 2021-03-26 RX ORDER — MIRTAZAPINE 15 MG/1
15 TABLET, FILM COATED ORAL DAILY
COMMUNITY

## 2021-03-26 NOTE — PERIOP NOTES
[facility] CALLED WHERE PATIENT RESIDES AND MADE AWARE OF COVID-19 TESTING NEEDED TO BE DONE WITHIN 96 HOURS OF SURGERY. COVID-19 TESTING APPOINTMENT MADE FOR PATIENT. [facility] INSTRUCTED THAT PATIENT NEEDS TO BE QUARANTINED BETWEEN TESTING AND ARRIVAL TIME DAY OF SURGERY.

## 2021-03-26 NOTE — PERIOP NOTES
PATIENT WAS JUST RELEASED FROM HOSPITAL. PREM AT DR. ZIEGLER'S OFFICE IS CHECKING TO SEE IF HE CAN BE COVID TESTED WHERE HE RESIDES. WILL LET US KNOW.

## 2021-03-26 NOTE — PERIOP NOTES
CALLED FOR PAT PHONE INTERVIEW AND SPOKE WITH VAZQUEZ, NURSING SUPERVISOR AT [address] [place] [address] PATIENT RESIDES. VAZQUEZ STATED I WAS THE FOURTH OR FIFTH PERSON THAT HAS CALLED REGARDING PATIENT INFORMATION FOR UPCOMING SURGERY ON 3/30/21. INFORMED ME THAT PATIENT WAS RELEASED FROM [facility] TODAY AND THAT ALL INFORMATION SHOULD BE IN THE CHART, ACCURATE AND UP TO DATE. SHE DID NOT FEEL THE NEED TO DISCUSS AND REVIEW MEDICAL BACKGROUND. ALL MEDICATIONS WERE REVIEWED WITH HER AND INSTRUCTIONS ON WHEN TO HOLD OR TAKE PRIOR TO SURGERY GIVEN. PATIENT'S POA IS HIS BROTHER, PRINCESS PETER; HE WILL BE WITH PATIENT DAY OF SURGERY. PREOP INSTRUCTIONS REVIEWED AND DISCUSSED WITH VAZQUEZ.

## 2021-03-26 NOTE — PROGRESS NOTES
Care Transitions Initial Follow Up Call    Call within 2 business days of discharge: Yes     Patient: Lashonda Rankin Patient : 1960 MRN: [id number]    Last Discharge [address]       Complaint Diagnosis Description Type Department Provider    3/18/21 Abdominal Pain Sepsis, due to unspecified organism, unspecified whether acute organ dysfunction present ([facility] . .. ED to Hosp-Admission (Discharged) (ADMIT) Therese Garcia MD; Chata Patino... Was this an external facility discharge? No     Challenges to be reviewed by the provider   Additional needs identified to be addressed with provider yes  Sepsis 2/2 Cystitis- Blood cultures negative. Completed 7 day IV Meropenem. Right hydroureteromephrosis- s/p nephrostomy tube and ureteral stent placement. Planned stone removal at [facility] on 3/30 with Dr. Natalia Martins. Discharged with carrera in place until 3/29/21 per urology. (PTA straight cathed Q6h)     Constipation-Obstipation- new orders for colace 50mg/ml- 10ml BID per PEG x 30 days- further length of therapy to be determined by PCP. Has PEG in place. Cont Jevity 1.5  45 ml/hr- H20 bolus 240 ml Q4H, 30 ml before and after meds given. Labs:    3/18/21            3/21/21         3/25/21  CR+        1.36                 0.85                 0.66  NA            146                   150                 140           Method of communication with provider : phone- [address] states they received all notes and labs done at facility. His MD has reviewed. Discussed COVID-19 related testing which was not done at this time. Test results were not done. Patient informed of results, if available? NA     Advance Care Planning:   Does patient have an Advance Directive: DDNR on file dated 2008, [place] for mother, Génesis Pisano dated 1982. Inpatient Readmission Risk score: Unplanned Readmit Risk Score: 9    Was this a readmission?  no   Patient stated reason for the admission: NA    Patients top risk factors for readmission: functional physical ability and medical condition sepsis  Interventions to address risk factors: Education of patient/family/caregiver/guardian to support self-management-nephrostomy tube and constipation and Assessment and support for treatment adherence and medication management-constipation and PEG tube feedings. Care Transition Nurse (CTN) contacted the caregiver by telephone to perform post hospital discharge assessment. Verified name and  with caregiver as identifiers. Provided introduction to self, and explanation of the CTN role. CTN reviewed discharge instructions, medical action plan and red flags with caregiver who verbalized understanding. Were discharge instructions available to patient? yes. Reviewed appropriate site of care based on symptoms and resources available to patient including: Specialist and [employer] for after hours. Caregiver given an opportunity to ask questions and does not have any further questions or concerns at this time. The caregiver agrees to contact the PCP office for questions related to their healthcare. Medication reconciliation was performed with caregiver, who verbalizes understanding of administration of home medications. Advised obtaining a 90-day supply of all daily and as-needed medications. Referral to Pharm D needed: no     Home Health/Outpatient orders at discharge: none  Lives at [address] [place] [address]. Durable Medical Equipment ordered at discharge: None  Short left in place per order until 3/29/21. Has procedure on 3/30/21 at [facility] with Dr. Livier Snider. Covid Risk Education    Patient has following risk factors of: sepsis. Education provided regarding infection prevention, and signs and symptoms of COVID-19 and when to seek medical attention with caregiver who verbalized understanding.  Discussed exposure protocols and quarantine From CDC: Are you at higher risk for severe illness?  and given an opportunity for questions and concerns. The caregiver agrees to contact the COVID-19 hotline [phone] or PCP office for questions related to COVID-19. For more information on steps you can take to protect yourself, see CDC's How to Protect Yourself     Was patient discharged with a pulse oximeter? NA    Discussed follow-up appointments. If no appointment was previously scheduled, appointment scheduling offered: NA Is follow up appointment scheduled within 7 days of discharge? yes   [address] follow up appointment(s):   Future Appointments   Date Time Provider Evelyn Perdomo   3/26/2021 12:20 PM [id number] PAT ONE [facility] . [facility]      [facility] follow up appointment(s):   PCP- Dr. Georgia Negrete- retired; Dr. Siva Engel or Dr. Carmen Cortez saw today. Urology- Dr. Julian Luther- procedure at [facility] on 3/30/21    Plan for follow up call after procedure done 3/30/21-and when he retrns home to their facility  based on severity of symptoms and risk factors. Plan for next call: symptom management-constipation and post procedure and follow up appointment-urology and PCP  CTN provided contact information for future needs. Goals Addressed                 This Visit's Progress       General     Reduce Risk of Hospitalization        3/26/21- spoke with [address] [place] [address], [facility]. They have two physicians who see the residents at the facility- one of them is there daily. He was seen by MD this morning. Dr. Viky Craig retired. Now Dr. Narcisa Ibanez and Dr. Carmen Cortez are the two MD.   She received discharge paperwork- states she did not receive information about care for nephrostomy tube- has drainage bag in place- she will empty and keep clean and dry. She understands carrera to stay in place until 3/29/21. They can remove. Has spoken with [facility] and set up pre-procedure COVID testing, arrival time on 3/30 is 6 am.   Planned for now as out patient.     She has new colace med in place- states that he does not receive hydrocodone PRN med regularly- she will monitor bowels.       LL[employer]

## 2021-03-27 LAB — SARS-COV-2, COV2NT: NOT DETECTED

## 2021-03-29 ENCOUNTER — ANESTHESIA EVENT (OUTPATIENT)
Dept: SURGERY | Age: 61
End: 2021-03-29
Payer: MEDICARE

## 2021-03-30 ENCOUNTER — ANESTHESIA (OUTPATIENT)
Dept: SURGERY | Age: 61
End: 2021-03-30
Payer: MEDICARE

## 2021-03-30 ENCOUNTER — HOSPITAL ENCOUNTER (OUTPATIENT)
Age: 61
Setting detail: OUTPATIENT SURGERY
Discharge: NURSING, REHAB OR LONG-TERM CARE | End: 2021-03-30
Attending: UROLOGY | Admitting: UROLOGY
Payer: MEDICARE

## 2021-03-30 ENCOUNTER — APPOINTMENT (OUTPATIENT)
Dept: GENERAL RADIOLOGY | Age: 61
End: 2021-03-30
Attending: UROLOGY
Payer: MEDICARE

## 2021-03-30 VITALS
BODY MASS INDEX: 16.64 KG/M2 | OXYGEN SATURATION: 96 % | SYSTOLIC BLOOD PRESSURE: 114 MMHG | WEIGHT: 109.79 LBS | HEIGHT: 68 IN | TEMPERATURE: 98 F | RESPIRATION RATE: 11 BRPM | DIASTOLIC BLOOD PRESSURE: 83 MMHG | HEART RATE: 97 BPM

## 2021-03-30 PROCEDURE — 74011250636 HC RX REV CODE- 250/636: Performed by: NURSE ANESTHETIST, CERTIFIED REGISTERED

## 2021-03-30 PROCEDURE — 74011250636 HC RX REV CODE- 250/636

## 2021-03-30 PROCEDURE — 74011250636 HC RX REV CODE- 250/636: Performed by: ANESTHESIOLOGY

## 2021-03-30 PROCEDURE — 77030026438 HC STYL ET INTUB CARD -A: Performed by: NURSE ANESTHETIST, CERTIFIED REGISTERED

## 2021-03-30 PROCEDURE — C1758 CATHETER, URETERAL: HCPCS | Performed by: UROLOGY

## 2021-03-30 PROCEDURE — 76010000174 HC OR TIME 3.5 TO 4 HR INTENSV-TIER 1: Performed by: UROLOGY

## 2021-03-30 PROCEDURE — 77030019927 HC TBNG IRR CYSTO BAXT -A: Performed by: UROLOGY

## 2021-03-30 PROCEDURE — 74011250636 HC RX REV CODE- 250/636: Performed by: UROLOGY

## 2021-03-30 PROCEDURE — 77030040361 HC SLV COMPR DVT MDII -B: Performed by: UROLOGY

## 2021-03-30 PROCEDURE — C2617 STENT, NON-COR, TEM W/O DEL: HCPCS | Performed by: UROLOGY

## 2021-03-30 PROCEDURE — 74011000258 HC RX REV CODE- 258: Performed by: UROLOGY

## 2021-03-30 PROCEDURE — 77030040922 HC BLNKT HYPOTHRM STRY -A

## 2021-03-30 PROCEDURE — C1769 GUIDE WIRE: HCPCS

## 2021-03-30 PROCEDURE — 77030008684 HC TU ET CUF COVD -B: Performed by: NURSE ANESTHETIST, CERTIFIED REGISTERED

## 2021-03-30 PROCEDURE — C1769 GUIDE WIRE: HCPCS | Performed by: UROLOGY

## 2021-03-30 PROCEDURE — 74011000636 HC RX REV CODE- 636: Performed by: UROLOGY

## 2021-03-30 PROCEDURE — 77030034696 HC CATH URETH FOL 2W BARD -A: Performed by: UROLOGY

## 2021-03-30 PROCEDURE — 77030040831 HC BAG URINE DRNG MDII -A: Performed by: UROLOGY

## 2021-03-30 PROCEDURE — 74011000250 HC RX REV CODE- 250: Performed by: NURSE ANESTHETIST, CERTIFIED REGISTERED

## 2021-03-30 PROCEDURE — 77030012893

## 2021-03-30 PROCEDURE — 76210000020 HC REC RM PH II FIRST 0.5 HR: Performed by: UROLOGY

## 2021-03-30 PROCEDURE — 76210000017 HC OR PH I REC 1.5 TO 2 HR: Performed by: UROLOGY

## 2021-03-30 PROCEDURE — C1894 INTRO/SHEATH, NON-LASER: HCPCS | Performed by: UROLOGY

## 2021-03-30 PROCEDURE — 2709999900 HC NON-CHARGEABLE SUPPLY: Performed by: UROLOGY

## 2021-03-30 PROCEDURE — 76060000038 HC ANESTHESIA 3.5 TO 4 HR: Performed by: UROLOGY

## 2021-03-30 RX ORDER — ONDANSETRON 2 MG/ML
INJECTION INTRAMUSCULAR; INTRAVENOUS AS NEEDED
Status: DISCONTINUED | OUTPATIENT
Start: 2021-03-30 | End: 2021-03-30 | Stop reason: HOSPADM

## 2021-03-30 RX ORDER — SODIUM CHLORIDE, SODIUM LACTATE, POTASSIUM CHLORIDE, CALCIUM CHLORIDE 600; 310; 30; 20 MG/100ML; MG/100ML; MG/100ML; MG/100ML
100 INJECTION, SOLUTION INTRAVENOUS CONTINUOUS
Status: DISCONTINUED | OUTPATIENT
Start: 2021-03-30 | End: 2021-03-30 | Stop reason: HOSPADM

## 2021-03-30 RX ORDER — ROCURONIUM BROMIDE 10 MG/ML
INJECTION, SOLUTION INTRAVENOUS AS NEEDED
Status: DISCONTINUED | OUTPATIENT
Start: 2021-03-30 | End: 2021-03-30 | Stop reason: HOSPADM

## 2021-03-30 RX ORDER — SUCCINYLCHOLINE CHLORIDE 20 MG/ML
INJECTION INTRAMUSCULAR; INTRAVENOUS AS NEEDED
Status: DISCONTINUED | OUTPATIENT
Start: 2021-03-30 | End: 2021-03-30 | Stop reason: HOSPADM

## 2021-03-30 RX ORDER — SODIUM CHLORIDE, SODIUM LACTATE, POTASSIUM CHLORIDE, CALCIUM CHLORIDE 600; 310; 30; 20 MG/100ML; MG/100ML; MG/100ML; MG/100ML
1000 INJECTION, SOLUTION INTRAVENOUS CONTINUOUS
Status: DISCONTINUED | OUTPATIENT
Start: 2021-03-30 | End: 2021-03-30 | Stop reason: HOSPADM

## 2021-03-30 RX ORDER — ONDANSETRON 2 MG/ML
4 INJECTION INTRAMUSCULAR; INTRAVENOUS AS NEEDED
Status: DISCONTINUED | OUTPATIENT
Start: 2021-03-30 | End: 2021-03-30 | Stop reason: HOSPADM

## 2021-03-30 RX ORDER — ROPIVACAINE HYDROCHLORIDE 5 MG/ML
150 INJECTION, SOLUTION EPIDURAL; INFILTRATION; PERINEURAL AS NEEDED
Status: DISCONTINUED | OUTPATIENT
Start: 2021-03-30 | End: 2021-03-30 | Stop reason: HOSPADM

## 2021-03-30 RX ORDER — OXYCODONE HYDROCHLORIDE 5 MG/1
5 TABLET ORAL AS NEEDED
Status: DISCONTINUED | OUTPATIENT
Start: 2021-03-30 | End: 2021-03-30 | Stop reason: HOSPADM

## 2021-03-30 RX ORDER — ACETAMINOPHEN 325 MG/1
650 TABLET ORAL ONCE
Status: DISCONTINUED | OUTPATIENT
Start: 2021-03-30 | End: 2021-03-30 | Stop reason: HOSPADM

## 2021-03-30 RX ORDER — FENTANYL CITRATE 50 UG/ML
INJECTION, SOLUTION INTRAMUSCULAR; INTRAVENOUS AS NEEDED
Status: DISCONTINUED | OUTPATIENT
Start: 2021-03-30 | End: 2021-03-30 | Stop reason: HOSPADM

## 2021-03-30 RX ORDER — SODIUM CHLORIDE 9 MG/ML
25 INJECTION, SOLUTION INTRAVENOUS CONTINUOUS
Status: DISCONTINUED | OUTPATIENT
Start: 2021-03-30 | End: 2021-03-30 | Stop reason: HOSPADM

## 2021-03-30 RX ORDER — LIDOCAINE HYDROCHLORIDE 20 MG/ML
INJECTION, SOLUTION EPIDURAL; INFILTRATION; INTRACAUDAL; PERINEURAL AS NEEDED
Status: DISCONTINUED | OUTPATIENT
Start: 2021-03-30 | End: 2021-03-30 | Stop reason: HOSPADM

## 2021-03-30 RX ORDER — MIDAZOLAM HYDROCHLORIDE 1 MG/ML
0.5 INJECTION, SOLUTION INTRAMUSCULAR; INTRAVENOUS
Status: DISCONTINUED | OUTPATIENT
Start: 2021-03-30 | End: 2021-03-30 | Stop reason: HOSPADM

## 2021-03-30 RX ORDER — MIDAZOLAM HYDROCHLORIDE 1 MG/ML
1 INJECTION, SOLUTION INTRAMUSCULAR; INTRAVENOUS AS NEEDED
Status: DISCONTINUED | OUTPATIENT
Start: 2021-03-30 | End: 2021-03-30 | Stop reason: HOSPADM

## 2021-03-30 RX ORDER — SODIUM CHLORIDE, SODIUM LACTATE, POTASSIUM CHLORIDE, CALCIUM CHLORIDE 600; 310; 30; 20 MG/100ML; MG/100ML; MG/100ML; MG/100ML
INJECTION, SOLUTION INTRAVENOUS
Status: DISCONTINUED | OUTPATIENT
Start: 2021-03-30 | End: 2021-03-30 | Stop reason: HOSPADM

## 2021-03-30 RX ORDER — HYDROMORPHONE HYDROCHLORIDE 1 MG/ML
0.2 INJECTION, SOLUTION INTRAMUSCULAR; INTRAVENOUS; SUBCUTANEOUS
Status: ACTIVE | OUTPATIENT
Start: 2021-03-30 | End: 2021-03-30

## 2021-03-30 RX ORDER — PHENYLEPHRINE HCL IN 0.9% NACL 0.4MG/10ML
SYRINGE (ML) INTRAVENOUS AS NEEDED
Status: DISCONTINUED | OUTPATIENT
Start: 2021-03-30 | End: 2021-03-30 | Stop reason: HOSPADM

## 2021-03-30 RX ORDER — DIPHENHYDRAMINE HYDROCHLORIDE 50 MG/ML
12.5 INJECTION, SOLUTION INTRAMUSCULAR; INTRAVENOUS AS NEEDED
Status: DISCONTINUED | OUTPATIENT
Start: 2021-03-30 | End: 2021-03-30 | Stop reason: HOSPADM

## 2021-03-30 RX ORDER — PROPOFOL 10 MG/ML
INJECTION, EMULSION INTRAVENOUS AS NEEDED
Status: DISCONTINUED | OUTPATIENT
Start: 2021-03-30 | End: 2021-03-30 | Stop reason: HOSPADM

## 2021-03-30 RX ORDER — LIDOCAINE HYDROCHLORIDE 10 MG/ML
0.1 INJECTION, SOLUTION EPIDURAL; INFILTRATION; INTRACAUDAL; PERINEURAL AS NEEDED
Status: DISCONTINUED | OUTPATIENT
Start: 2021-03-30 | End: 2021-03-30 | Stop reason: HOSPADM

## 2021-03-30 RX ORDER — DEXAMETHASONE SODIUM PHOSPHATE 4 MG/ML
INJECTION, SOLUTION INTRA-ARTICULAR; INTRALESIONAL; INTRAMUSCULAR; INTRAVENOUS; SOFT TISSUE AS NEEDED
Status: DISCONTINUED | OUTPATIENT
Start: 2021-03-30 | End: 2021-03-30 | Stop reason: HOSPADM

## 2021-03-30 RX ORDER — FENTANYL CITRATE 50 UG/ML
25 INJECTION, SOLUTION INTRAMUSCULAR; INTRAVENOUS
Status: DISCONTINUED | OUTPATIENT
Start: 2021-03-30 | End: 2021-03-30 | Stop reason: HOSPADM

## 2021-03-30 RX ORDER — EPHEDRINE SULFATE/0.9% NACL/PF 50 MG/5 ML
5 SYRINGE (ML) INTRAVENOUS AS NEEDED
Status: DISCONTINUED | OUTPATIENT
Start: 2021-03-30 | End: 2021-03-30 | Stop reason: HOSPADM

## 2021-03-30 RX ADMIN — Medication 200 MCG: at 09:40

## 2021-03-30 RX ADMIN — SODIUM CHLORIDE, POTASSIUM CHLORIDE, SODIUM LACTATE AND CALCIUM CHLORIDE: 600; 310; 30; 20 INJECTION, SOLUTION INTRAVENOUS at 08:00

## 2021-03-30 RX ADMIN — FENTANYL CITRATE 25 MCG: 50 INJECTION, SOLUTION INTRAMUSCULAR; INTRAVENOUS at 10:37

## 2021-03-30 RX ADMIN — FENTANYL CITRATE 25 MCG: 50 INJECTION, SOLUTION INTRAMUSCULAR; INTRAVENOUS at 11:34

## 2021-03-30 RX ADMIN — DEXAMETHASONE SODIUM PHOSPHATE 4 MG: 4 INJECTION, SOLUTION INTRAMUSCULAR; INTRAVENOUS at 08:10

## 2021-03-30 RX ADMIN — Medication 80 MCG: at 09:13

## 2021-03-30 RX ADMIN — PROPOFOL 100 MG: 10 INJECTION, EMULSION INTRAVENOUS at 08:00

## 2021-03-30 RX ADMIN — Medication 160 MCG: at 08:07

## 2021-03-30 RX ADMIN — Medication 80 MCG: at 09:52

## 2021-03-30 RX ADMIN — FENTANYL CITRATE 25 MCG: 50 INJECTION, SOLUTION INTRAMUSCULAR; INTRAVENOUS at 12:10

## 2021-03-30 RX ADMIN — Medication 200 MCG: at 10:28

## 2021-03-30 RX ADMIN — Medication 120 MCG: at 09:54

## 2021-03-30 RX ADMIN — Medication 120 MCG: at 08:10

## 2021-03-30 RX ADMIN — SUCCINYLCHOLINE CHLORIDE 100 MG: 20 INJECTION, SOLUTION INTRAMUSCULAR; INTRAVENOUS at 08:01

## 2021-03-30 RX ADMIN — ONDANSETRON HYDROCHLORIDE 4 MG: 2 INJECTION, SOLUTION INTRAMUSCULAR; INTRAVENOUS at 08:10

## 2021-03-30 RX ADMIN — FENTANYL CITRATE 25 MCG: 50 INJECTION, SOLUTION INTRAMUSCULAR; INTRAVENOUS at 12:30

## 2021-03-30 RX ADMIN — Medication 200 MCG: at 10:09

## 2021-03-30 RX ADMIN — ROCURONIUM BROMIDE 5 MG: 10 SOLUTION INTRAVENOUS at 08:00

## 2021-03-30 RX ADMIN — Medication 80 MCG: at 09:17

## 2021-03-30 RX ADMIN — Medication 80 MCG: at 08:33

## 2021-03-30 RX ADMIN — Medication 120 MCG: at 09:26

## 2021-03-30 RX ADMIN — FENTANYL CITRATE 50 MCG: 50 INJECTION, SOLUTION INTRAMUSCULAR; INTRAVENOUS at 08:00

## 2021-03-30 RX ADMIN — LIDOCAINE HYDROCHLORIDE 50 MG: 20 INJECTION, SOLUTION EPIDURAL; INFILTRATION; INTRACAUDAL; PERINEURAL at 08:00

## 2021-03-30 RX ADMIN — GENTAMICIN SULFATE 250 MG: 40 INJECTION, SOLUTION INTRAMUSCULAR; INTRAVENOUS at 08:15

## 2021-03-30 NOTE — ANESTHESIA POSTPROCEDURE EVALUATION
Post-Anesthesia Evaluation and Assessment    Patient: Eladio Nuñez MRN: [id number]  SSN: [id number]    YOB: 1960  Age: 61 y.o. Sex: male      I have evaluated the patient and they are stable and ready for discharge from the PACU. Cardiovascular Function/Vital Signs  Visit Vitals  /70   Pulse 95   Temp 36.7 °C (98 °F)   Resp 14   Ht 5' 8\" (1.727 m)   Wt 49.8 kg (109 lb 12.6 oz)   SpO2 99%   BMI 16.69 kg/m²       Patient is status post General anesthesia for Procedure(s):  CYSTOSCOPY, RIGHT RETROGRADE PYELOGRAM, RIGHT URETEROSCOPY WITH HOLMIUM/LITHO, RIGHT URETERAL STENT CHANGE. Nausea/Vomiting: None    Postoperative hydration reviewed and adequate. Pain:  Pain Scale 1: FLACC (03/30/21 1210)  Pain Intensity 1: 0 (03/30/21 0657)   Managed    Neurological Status:   Neuro (WDL): Exceptions to WDL(Cerebral Palsy, contractures) (03/30/21 1210)  Neuro  Neurologic State: Alert (03/30/21 1210)  Orientation Level: Oriented to person;Oriented to place (03/30/21 1210)  Cognition: Follows commands;Decreased attention/concentration (03/30/21 1210)  Speech: Other (comment)(none verbal moans, brother at bedside helping interpret) (03/30/21 1210)  LUE Motor Response: Spontaneous  (03/30/21 1132)  LLE Motor Response: Spontaneous  (03/30/21 1132)  RUE Motor Response: Spontaneous  (03/30/21 1132)  RLE Motor Response: Spontaneous  (03/30/21 1132)   At baseline    Mental Status, Level of Consciousness: Alert and  oriented to person, place, and time    Pulmonary Status:   O2 Device: CO2 nasal cannula(Simultaneous filing. User may not have seen previous data.) (03/30/21 1132)   Adequate oxygenation and airway patent    Complications related to anesthesia: None    Post-anesthesia assessment completed.  No concerns    Signed By: Yisel Burt MD     March 30, 2021              Procedure(s):  CYSTOSCOPY, RIGHT RETROGRADE PYELOGRAM, RIGHT URETEROSCOPY WITH HOLMIUM/LITHO, RIGHT URETERAL STENT CHANGE. general    <BSHSIANPOST>    INITIAL Post-op Vital signs:   Vitals Value Taken Time   /109 03/30/21 1215   Temp 36.7 °C (98 °F) 03/30/21 1132   Pulse 97 03/30/21 1217   Resp 12 03/30/21 1217   SpO2 99 % 03/30/21 1217   Vitals shown include unvalidated device data.

## 2021-03-30 NOTE — ROUTINE PROCESS
Patient: Mellissa Amin MRN: [id number]  SSN: [id number]   YOB: 1960  Age: 61 y.o. Sex: male     Patient is status post Procedure(s):  CYSTOSCOPY, RIGHT RETROGRADE PYELOGRAM, RIGHT URETEROSCOPY WITH HOLMIUM/LITHO, RIGHT URETERAL STENT CHANGE. Surgeon(s) and Role:     * Shayla Roman MD - Primary    Local/Dose/Irrigation:  0.9% NORMAL SALINE AND STERILE WATER USED FOR PROCEDURE. Peripheral IV 03/30/21 Left Hand (Active)          Nephrostomy Tube Flank (Active)                     Dressing/Packing:       Splint/Cast:  ]    Other:  SCDS, ELANA HOSE, 18FR LATEX COUDE MARADIAGA, NEPHROSTOMY TUBE RIGHT FLANK, PEG TUBE.

## 2021-03-30 NOTE — DISCHARGE INSTRUCTIONS
Patient Education        Ureteral Stent Placement: What to Expect at Home  Your Recovery     A ureteral (say \"you-REE-ter-ul\") stent is a thin, hollow tube that is placed in the ureter to help urine pass from the kidney into the bladder. Ureters are the tubes that connect the kidneys to the bladder. You may have a small amount of blood in your urine for 1 to 3 days after the procedure. While the stent is in place, you may have to urinate more often, feel a sudden need to urinate, or feel like you can't completely empty your bladder. You may feel some pain when you urinate or do strenuous activity. You also may notice a small amount of blood in your urine after strenuous activities. These side effects usually don't prevent people from doing their normal daily activities. You may have a thin string coming out of your urethra. Your urethra is the tube that carries urine from your bladder to outside your body. This string is attached to the stent. Try not to pull on the string. The doctor will use the string to pull out the stent when you no longer need it. After the procedure, urine may flow better from your kidneys to your bladder. A ureteral stent may be left in place for several days or for as long as several months. Your doctor will take it out when you no longer need it. This care sheet gives you a general idea about how long it will take for you to recover. But each person recovers at a different pace. Follow the steps below to get better as quickly as possible. How can you care for yourself at home? Activity    Rest when you feel tired. Getting enough sleep will help you recover.     Avoid strenuous activities, such as bicycle riding, jogging, weight lifting, or aerobic exercise, until your doctor says it is okay.     Ask your doctor when you can drive again.     Most people are able to return to work the day after the procedure.  If your work requires intense activity, you may feel pain in your kidney area or get tired easily. If this happens, you may need to do less strenuous activities while the stent is in.     Ask your doctor when it is okay for you to have sex. Diet    You can eat your normal diet. If your stomach is upset, try bland, low-fat foods like plain rice, broiled chicken, toast, and yogurt.     Drink plenty of fluids (unless your doctor tells you not to). Medicines    Your doctor will tell you if and when you can restart your medicines. You will also get instructions about taking any new medicines.     If you take aspirin or some other blood thinner, ask your doctor if and when to start taking it again. Make sure that you understand exactly what your doctor wants you to do.     Be safe with medicines. Take pain medicines exactly as directed. If the doctor gave you a prescription medicine for pain, take it as prescribed. If you are not taking a prescription pain medicine, ask your doctor if you can take an over-the-counter medicine.     If you think your pain medicine is making you sick to your stomach: Take your medicine after meals (unless your doctor has told you not to). Ask your doctor for a different pain medicine.     If your doctor prescribed antibiotics, take them as directed. Do not stop taking them just because you feel better. You need to take the full course of antibiotics. Follow-up care is a key part of your treatment and safety. Be sure to make and go to all appointments, and call your doctor if you are having problems. It's also a good idea to know your test results and keep a list of the medicines you take. When should you call for help? Call 911 anytime you think you may need emergency care. For example, call if:    You passed out (lost consciousness).     You have severe trouble breathing.     You have sudden chest pain and shortness of breath, or you cough up blood.     You have severe belly pain.    Call your doctor now or seek immediate medical care if:    Part or all of the stent comes out of your urethra.     You have pain that does not get better after you take pain medicine.     You have symptoms of a urinary infection. For example: You have blood or pus in your urine. You have pain in your back just below your rib cage. This is called flank pain. You have a fever, chills, or body aches. It hurts to urinate. You have groin or belly pain.     You cannot control when you urinate, or you leak urine. Watch closely for changes in your health, and be sure to contact your doctor if you have any problems. Where can you learn more? Go to http://www.gray.com/  Enter B869 in the search box to learn more about \"Ureteral Stent Placement: What to Expect at Home. \"  Current as of: June 29, 2020               Content Version: 12.6  © 6949-8008 [employer]. Care instructions adapted under license by [employer] (which disclaims liability or warranty for this information). If you have questions about a medical condition or this instruction, always ask your healthcare professional. [address] any warranty or liability for your use of this information. Call [phone] to schedule visit for Thursday to remove nephrostomy tube. The ureteral stent is temporary and will need to be removed. There is a string coming out of the urethra attached to the stent. Call for questions or problems. ______________________________________________________________________    Anesthesia Discharge Instructions    After general anesthesia or intervenous sedation, for 24 hours or while taking prescription Narcotics:  · Limit your activities  · Do not drive or operate hazardous machinery  · If you have not urinated within 8 hours after discharge, please contact your surgeon on call.   · Do not make important personal or business decisions  · Do not drink alcoholic beverages    Report the following to your surgeon:  · Excessive pain, swelling, redness or odor of or around the surgical area  · Temperature over 100.5 degrees  · Nausea and vomiting lasting longer than 4 hours or if unable to take medication  · Any signs of decreased circulation or nerve impairment to extremity:  Change in color, persistent numbness, tingling, coldness or increased pain.   · Any questions

## 2021-03-30 NOTE — PERIOP NOTES
TRANSFER - OUT REPORT:    Verbal report given to MANI Peck(name) on Jose Mcmullen  being transferred to   [facility](unit) for routine post - op       Report consisted of patients Situation, Background, Assessment and   Recommendations(SBAR). Information from the following report(s) SBAR, Kardex, OR Summary, Procedure Summary, Intake/Output, MAR, Recent Results, Med Rec Status and Cardiac Rhythm SR was reviewed with the receiving nurse. Lines:   Peripheral IV 03/30/21 Left Hand (Active)   Site Assessment Clean, dry, & intact 03/30/21 1215   Phlebitis Assessment 0 03/30/21 1215   Infiltration Assessment 0 03/30/21 1215   Dressing Status Clean, dry, & intact 03/30/21 1215   Dressing Type Transparent 03/30/21 1215   Hub Color/Line Status Infusing 03/30/21 1215        Opportunity for questions and clarification was provided.       Patient transported with:no   Patient's medications from home  With brother in own wheelchair being picked up by [facility] transportation  R/A

## 2021-03-30 NOTE — OP NOTES
[address]   OPERATIVE REPORT    Name:  Lawanda Ospina  MR#:  [id number]  :  1960  ACCOUNT #:  [de-identified]  DATE OF SERVICE:  2021      PREOPERATIVE DIAGNOSES:  Multiple right ureteral stones, previous cystectomy and orthotopic neobladder. POSTOPERATIVE DIAGNOSIS:  Multiple right ureteral stones, previous cystectomy and orthotopic neobladder. PROCEDURE PERFORMED:  Cystoscopy, right ureteroscopy with holmium laser fragmentation of multiple right ureteral stones, right retrograde pyelogram, right ureteral stent change, fluoroscopy used during procedure. Procedure was unusually lengthy and extremely difficult due to multiple anatomic challenges as described below in my operative report. Operative time was close to 3 hours. SURGEON:  Sahra Andres MD    ASSISTANT:  None. ANESTHESIA:  General.    COMPLICATIONS:  None. SPECIMENS REMOVED:  None. ESTIMATED BLOOD LOSS:  Minimal.    DRAINS:  7-[place] citizen 28 cm double-J stent on the right with string exiting the meatus, 18-[place] citizen coude catheter placed in the orthotopic neobladder, previously indwelling nephrostomy tube was left in place. INDICATIONS FOR PROCEDURE:  The patient is a 17-year-old gentleman with history of cerebral palsy. He is a resident at the [facility]. He has had prior history of cystectomy and orthotopic neobladder creation many years ago. He presented recently with stones in the right ureter and what appeared to be the obstructing stone at the distal most ureter. He underwent a temporizing nephrostomy tube. I had discussed options with the patient and his sister and decided on attempt of retrograde approach to the stones understanding if this was unsuccessful we may require antegrade approach. He therefore underwent antegrade stent placement at his previous hospitalization. He was discharged back to [facility] and then comes in today for ureteroscopy.   I met his brother in the holding area. We confirmed the procedure. I reviewed his imaging again and he agreed to proceed. PROCEDURE:  The patient was identified and escorted into the operating room. General anesthesia was induced by the operative team.  He had significant contractures of his lower extremities which made positioning a challenge and we had limited access to the perineum. He was carefully positioned using the candy-cane leg holders and then prepped and draped in standard fashion. A time-out was performed and the procedure was initiated. I first performed cystoscopy using a 21-Bermudian cystoscope. Again, there was limited access to the perineum, but I was able to visualize the cystoscope all the way into the neobladder. Immediately upon entering the neobladder, there was significant amount of mucus at the floor of the neobladder. I irrigated this out until there was only a small amount of mucus adherent to the indwelling stent. The distal coil of the stent was visualized; however, due to the angle of the anastomosis from the ureter to the neobladder, I could not visualize the entry into the bladder. I was fearful of trying to exchange the stent over a wire that I could lose access. I felt it would be safer if I could pass a wire alongside the stent. I passed the flexible cystoscope. Although I was able to see the anastomosis, it was at a very acute angle and I could not get a wire to go alongside the stent and up the ureter. After trying this for a little while, it appeared as though this would be unsuccessful. I therefore went back to the cystoscope and grasped the very distal portion of the ureteral stent. I slowly removed this maintaining intermittent visualization of the proximal portion which uncoiled and came part way down the right ureter, but I was able to maintain access. Once the tip of the stent was at the meatus, I passed a Sensor wire.   This passed up through the stent but coiled in the region of the proximal ureter with appearance suggesting a tortuous ureter or perhaps a stone in this location. I coiled some extra wire in the ureter and then removed the stent the remainder of the way without any difficulty. I then attempted to see if I could get the wire all the way up to the kidney. This proved to be difficult as there was a very capacious neobladder and any attempts to pass anything over the wire resulted in the wire wanting to back down the ureter. Eventually, I was able to pass the 5-Nigerian open-ended catheter up over the wire and maintaining access to the ureter. I then performed a retrograde pyelogram which showed very tortuous proximal ureter. I could not easily negotiate the wire beyond this and therefore left the wire at the coil. I attempted next to pass the flexible ureteroscope. Although I could get this into the neobladder, I did not have a good angle to get it up the right ureter. I therefore switched and used a 10-Nigerian dual-lumen catheter. I carefully passed this up the right ureter and then passed a Super Stiff wire such that I now had two wires in the ureter, but still not quite at the kidney level. I attempted to pass the 11/13 36 cm sheath. However, this would not go beyond the distal most ureter where I suspected the stone was impacted. I switched to a 9-Nigerian sheath and the flexible digital ureteroscope. I was able to engage the distal most ureter with the access sheath and then passed the flexible ureteroscope. We were just in the distal ureter and I could visualize a somewhat impacted stone. I could advance the ureteroscope beyond the distal ureteral stone and into the more dilated ureter, but I had very limited working room to the stone itself. I initially passed the ureteroscope up towards the kidney. I was able to uncoil the proximal ureter and advance the wire up to the kidney.   I encountered two additional stones in the ureter, one was in the mid ureter and another stone was in the more proximal ureter. These were very soft and broke up easily with a 200 micron laser. The more distal stone proved to be very challenging as it was just at the  anastomosis and I had very limited working room. I needed to remove the sheath. I re-passed the ureteroscope over a wire such that I was just in the ureteral orifice and now had better visualization of the stone and used the 200 micron laser to begin to fragment the stone. This was very slow and tedious as our angles were very difficult and on several occasions, the ureteroscope dropped back into the neobladder and I needed to repeat the above sequence to reestablish access, always maintaining a safety wire. Once I had the stone fragmented enough, I was able to gently advance it a little more proximally up the ureter where I had better access to it and I completed laser fragmentation. I then passed the ureteroscope all the way up to the kidney. There were free-floating stone particles and stone debris, but no sizable stones were seen. Given the difficulties we had with access, I did not attempt basket extraction of any of the stone fragments and everything visualized looked to be small enough to pass. I removed the ureteroscope. In doing so, the wire dropped back down below the coil in the proximal ureter and I again had to repeat the above sequence and use the ureteroscope to renegotiate the wire back up to the kidney. I passed the 5-Amharic open-ended catheter and performed a retrograde pyelogram again confirming good position in the collecting system. I then prepared a 7-Amharic 24 cm double-J stent which was passed over the wire and up to the kidney with a nice coil visualized at the kidney and the bladder. I left the string intact exiting the meatus. I removed the safety wire. I then passed cystoscope back into the neobladder and confirmed good position of the stent.   I removed the cystoscope and passed an 18-German coude catheter. Given all the above and his history of infection, I elected to keep the nephrostomy tube indwelling at least for a few days to make sure there are no issues with infection and maintain maximal drainage. My thought is to then remove the nephrostomy tube and then later the ureteral stent. He seemed to tolerate the procedure well. He was awakened and transported to Recovery in good condition.       MD KAMERON [id number]  D:  03/30/2021 11:28  T:  03/30/2021 19:17  JOB #:  [id number]

## 2021-03-30 NOTE — ANESTHESIA PREPROCEDURE EVALUATION
Relevant Problems   GASTROINTESTINAL   (+) Hiatal hernia      RENAL FAILURE   (+) Acute kidney injury ([address]   (+) Hydronephrosis of right kidney   (+) Nephrolithiasis       Anesthetic History   No history of anesthetic complications  PONV          Review of Systems / Medical History  Patient summary reviewed, nursing notes reviewed and pertinent labs reviewed    Pulmonary  Within defined limits                 Neuro/Psych   Within defined limits      Psychiatric history     Cardiovascular  Within defined limits                Exercise tolerance: <4 METS     GI/Hepatic/Renal  Within defined limits         Hiatal hernia     Endo/Other  Within defined limits      Cancer     Other Findings   Comments: Cp  CP (cerebral palsy) (HCC)  Quadriplegic cerebral palsy (HCC)  Esophageal stricture  UTI (urinary tract infection)  Sepsis                  Physical Exam    Airway  Mallampati: III  TM Distance: 4 - 6 cm  Neck ROM: decreased range of motion   Mouth opening: Normal     Cardiovascular  Regular rate and rhythm,  S1 and S2 normal,  no murmur, click, rub, or gallop             Dental  No notable dental hx       Pulmonary  Breath sounds clear to auscultation               Abdominal  GI exam deferred       Other Findings            Anesthetic Plan    ASA: 3  Anesthesia type: general          Induction: Intravenous  Anesthetic plan and risks discussed with: Patient and Legal guardian

## 2021-03-30 NOTE — PERIOP NOTES
7F x 28cm ureteral stent placed into right ureter by surgeon.   Ref [id number]  Use by 02-  Lot [id number]

## 2021-04-01 ENCOUNTER — HOSPITAL ENCOUNTER (OUTPATIENT)
Dept: INTERVENTIONAL RADIOLOGY/VASCULAR | Age: 61
Discharge: HOME | End: 2021-04-01
Attending: UROLOGY | Admitting: RADIOLOGY
Payer: MEDICARE

## 2021-04-01 VITALS
HEART RATE: 98 BPM | BODY MASS INDEX: 17.2 KG/M2 | HEIGHT: 67 IN | TEMPERATURE: 99.2 F | RESPIRATION RATE: 17 BRPM | OXYGEN SATURATION: 99 % | SYSTOLIC BLOOD PRESSURE: 119 MMHG | DIASTOLIC BLOOD PRESSURE: 81 MMHG

## 2021-04-01 PROCEDURE — 50431 NJX PX NFROSGRM &/URTRGRM: CPT

## 2021-04-01 RX ORDER — FERROUS SULFATE 220 (44)/5
220 SOLUTION, ORAL ORAL DAILY
COMMUNITY

## 2021-04-01 NOTE — PROGRESS NOTES
Pt arrives with Wilman, caretaker , to angio dept, via electric wheelchair, auto driven by pt. Lift team utilized to move pt to stretcher. Pt tolerated move well.

## 2021-04-01 NOTE — PROGRESS NOTES
1422-Pt moved to angio table by angio staff without incident. Pt placed on monitors, timeout performed, tube removed and pt was moved back to stretcher without incident. Pt tolerated procedure well, VSS.     1429- Pt taken to recovery area, lift team placed pt back into wheelchair. Pt discharge instructions were given to pt and pt caretaker , Ani Akers, by  RN. Opportunities for questions and concerns were provided. Pt  Advocate verbalized understanding of follow-up. IV dc'd, pt wheeled  off unit in no signs of distress.

## 2021-04-01 NOTE — DISCHARGE INSTRUCTIONS
Please advise on abnormal lab results.    Routed to PCP for review and recommendations.     [address] of Interventional Radiology  [phone]  DRAIN/PORT/CATHETER REMOVAL  DISCHARGE INSTRUCTIONS    General Information:     Your doctor has ordered for us to remove your drain, port, or catheter. This could be that you do not need it anymore, it is not doing its job, your physician has decided on another plan for your treatment and/or it may need replacing. Home Care Instructions: You can resume your regular diet and medication regimen. Do not drink alcohol, drive, or make any important legal decisions in the next 24 hours. Do not lift anything heavier than a gallon of milk, or do anything strenuous for the next 24 hours. You will notice a dressing over the site of the removal. This dressing should stay in place until the site is healed. The dressing should be changed at least every 48 hours. You should change the dressing sooner if it becomes soiled or wet. The nurse who discharges you to home should review with you any wound care instructions. Resume your normal level of activity slowly. You may shower after 24 hours, but do not take a bath, swim or immerse yourself in water until the site has healed, and keep the dressing dry with plastic wrap while showering. The site may ooze for a couple of days. This drainage should lessen with each passing day. Call If:     You should call your Physician and/or the Radiology Nurse if you have any bleeding other than a small spot on your bandage. Call if you have any signs of infection, fever, or increased pain at the site of the tube. Call if the oozing increases, if it changes color, or begins to have an odor. Follow-Up Instructions: Please see your ordering doctor as he/she has requested.      To Reach Us:        Patient Signature:  Date: 4/1/2021  Discharging Nurse: Veronica Domingo RN

## 2021-04-07 ENCOUNTER — PATIENT OUTREACH (OUTPATIENT)
Dept: CASE MANAGEMENT | Age: 61
End: 2021-04-07

## 2021-04-07 NOTE — PROGRESS NOTES
Care Transitions Nurse Note:    Advance Care Planning note:  CTN spoke with Dee, supervisor at [facility]. She states that new CHARLENE is now his brother, Cari Cohen left  on [facility] for return call to obtain paperwork to be scanned into EMR. Relayed that current paperwork on file lists mother, Silvia West as primary medical decision maker. Goals        General     Reduce Risk of Hospitalization      4/8/21 spoke with Dee- nursing at [facility]. Mr. Molina Irizarry is doing well- he completed abx- had his procedure done with urology on 3/30- she confirmed that nephrostomy tube has been removed. She is planning on reaching out to urology- Dr. Lindsey Sherwood to determine follow up needed. [facility]     4/7/21- spoke with supervisor Yonathan, he said he is doing well. Marco Knox his nurse is with another patient- asked that CTN call back later. [facility]     3/26/21- spoke with Dee, nursing at [facility]. They have two physicians who see the residents at the facility- one of them is there daily. He was seen by MD this morning. Dr. Poornima Mendiola retired. Now Dr. Jennifer Hernández and Dr. Fide Cortez are the two MD.   She received discharge paperwork- states she did not receive information about care for nephrostomy tube- has drainage bag in place- she will empty and keep clean and dry. She understands carrera to stay in place until 3/29/21. They can remove. Has spoken with [facility] and set up pre-procedure COVID testing, arrival time on 3/30 is 6 am.   Planned for now as out patient. She has new colace med in place- states that he does not receive hydrocodone PRN med regularly- she will monitor bowels.       [employer]

## 2021-04-08 NOTE — ACP (ADVANCE CARE PLANNING)
ADDY spoke with Dee, supervisor at [facility]. She states that new CHARLENE is now his brother, Eliana Pandya left  on [facility] for return call to obtain paperwork to be scanned into EMR. Relayed that current paperwork on file lists mother, Lizzy Carmona as primary medical decision maker.

## 2022-02-28 ENCOUNTER — TRANSCRIBE ORDER (OUTPATIENT)
Dept: SCHEDULING | Age: 62
End: 2022-02-28

## 2022-02-28 DIAGNOSIS — R10.31 ABDOMINAL PAIN, RIGHT LOWER QUADRANT: Primary | ICD-10-CM

## 2022-03-02 ENCOUNTER — HOSPITAL ENCOUNTER (OUTPATIENT)
Dept: ULTRASOUND IMAGING | Age: 62
Discharge: HOME | End: 2022-03-02
Attending: INTERNAL MEDICINE
Payer: MEDICARE

## 2022-03-02 DIAGNOSIS — R10.31 ABDOMINAL PAIN, RIGHT LOWER QUADRANT: ICD-10-CM

## 2022-03-02 PROCEDURE — 76770 US EXAM ABDO BACK WALL COMP: CPT

## 2022-03-19 PROBLEM — R65.20 SEVERE SEPSIS (HCC): Status: ACTIVE | Noted: 2021-03-18

## 2022-03-19 PROBLEM — A41.9 SEPSIS (HCC): Status: ACTIVE | Noted: 2018-08-22

## 2022-03-19 PROBLEM — N17.9 ACUTE KIDNEY INJURY (HCC): Status: ACTIVE | Noted: 2021-03-25

## 2022-03-19 PROBLEM — N39.0 UTI (URINARY TRACT INFECTION): Status: ACTIVE | Noted: 2018-08-22

## 2022-03-19 PROBLEM — K44.9 HIATAL HERNIA: Status: ACTIVE | Noted: 2018-08-22

## 2022-03-19 PROBLEM — A41.9 SEVERE SEPSIS (HCC): Status: ACTIVE | Noted: 2021-03-18

## 2022-03-19 PROBLEM — K59.00 CONSTIPATION: Status: ACTIVE | Noted: 2018-08-22

## 2022-03-20 PROBLEM — N13.30 HYDRONEPHROSIS OF RIGHT KIDNEY: Status: ACTIVE | Noted: 2021-03-25

## 2022-03-20 PROBLEM — K56.41 FECAL IMPACTION (HCC): Status: ACTIVE | Noted: 2018-08-22

## 2022-03-20 PROBLEM — N20.0 NEPHROLITHIASIS: Status: ACTIVE | Noted: 2021-03-25

## 2022-08-08 ENCOUNTER — HOSPITAL ENCOUNTER (INPATIENT)
Age: 62
LOS: 22 days | Discharge: NURSING, REHAB OR LONG-TERM CARE | DRG: 698 | End: 2022-08-30
Attending: EMERGENCY MEDICINE | Admitting: INTERNAL MEDICINE
Payer: MEDICARE

## 2022-08-08 ENCOUNTER — APPOINTMENT (OUTPATIENT)
Dept: CT IMAGING | Age: 62
DRG: 698 | End: 2022-08-08
Attending: EMERGENCY MEDICINE
Payer: MEDICARE

## 2022-08-08 DIAGNOSIS — N30.01 ACUTE CYSTITIS WITH HEMATURIA: ICD-10-CM

## 2022-08-08 DIAGNOSIS — N17.9 ACUTE KIDNEY INJURY (HCC): ICD-10-CM

## 2022-08-08 DIAGNOSIS — B96.89 URINARY TRACT INFECTION DUE TO ESBL KLEBSIELLA: ICD-10-CM

## 2022-08-08 DIAGNOSIS — G80.8 QUADRIPLEGIC CEREBRAL PALSY (HCC): ICD-10-CM

## 2022-08-08 DIAGNOSIS — R10.9 ACUTE ABDOMINAL PAIN: Primary | ICD-10-CM

## 2022-08-08 DIAGNOSIS — R00.0 SINUS TACHYCARDIA: ICD-10-CM

## 2022-08-08 DIAGNOSIS — G80.9 CEREBRAL PALSY, UNSPECIFIED TYPE (HCC): ICD-10-CM

## 2022-08-08 DIAGNOSIS — C67.9 MALIGNANT NEOPLASM OF URINARY BLADDER, UNSPECIFIED SITE (HCC): ICD-10-CM

## 2022-08-08 DIAGNOSIS — K56.41 FECAL IMPACTION (HCC): ICD-10-CM

## 2022-08-08 DIAGNOSIS — N39.0 URINARY TRACT INFECTION DUE TO ESBL KLEBSIELLA: ICD-10-CM

## 2022-08-08 DIAGNOSIS — N39.0 RECURRENT UTI: ICD-10-CM

## 2022-08-08 DIAGNOSIS — N39.0 COMPLICATED UTI (URINARY TRACT INFECTION): ICD-10-CM

## 2022-08-08 DIAGNOSIS — A49.8 INFECTION DUE TO STENOTROPHOMONAS MALTOPHILIA: ICD-10-CM

## 2022-08-08 LAB
ALBUMIN SERPL-MCNC: 3.7 G/DL (ref 3.5–5)
ALBUMIN/GLOB SERPL: 0.9 {RATIO} (ref 1.1–2.2)
ALP SERPL-CCNC: 86 U/L (ref 45–117)
ALT SERPL-CCNC: 30 U/L (ref 12–78)
ANION GAP SERPL CALC-SCNC: 11 MMOL/L (ref 5–15)
APPEARANCE UR: ABNORMAL
AST SERPL-CCNC: 18 U/L (ref 15–37)
BACTERIA URNS QL MICRO: ABNORMAL /HPF
BILIRUB SERPL-MCNC: 0.8 MG/DL (ref 0.2–1)
BILIRUB UR QL: NEGATIVE
BUN SERPL-MCNC: 26 MG/DL (ref 6–20)
BUN/CREAT SERPL: 27 (ref 12–20)
CALCIUM SERPL-MCNC: 9.6 MG/DL (ref 8.5–10.1)
CHLORIDE SERPL-SCNC: 103 MMOL/L (ref 97–108)
CO2 SERPL-SCNC: 25 MMOL/L (ref 21–32)
COLOR UR: ABNORMAL
COMMENT, HOLDF: NORMAL
CREAT SERPL-MCNC: 0.97 MG/DL (ref 0.7–1.3)
EPITH CASTS URNS QL MICRO: ABNORMAL /LPF
ERYTHROCYTE [DISTWIDTH] IN BLOOD BY AUTOMATED COUNT: 13.5 % (ref 11.5–14.5)
GLOBULIN SER CALC-MCNC: 3.9 G/DL (ref 2–4)
GLUCOSE BLD STRIP.AUTO-MCNC: 80 MG/DL (ref 65–117)
GLUCOSE SERPL-MCNC: 80 MG/DL (ref 65–100)
GLUCOSE UR STRIP.AUTO-MCNC: NEGATIVE MG/DL
HCT VFR BLD AUTO: 48.9 % (ref 36.6–50.3)
HGB BLD-MCNC: 15.2 G/DL (ref 12.1–17)
HGB UR QL STRIP: ABNORMAL
KETONES UR QL STRIP.AUTO: NEGATIVE MG/DL
LEUKOCYTE ESTERASE UR QL STRIP.AUTO: ABNORMAL
LIPASE SERPL-CCNC: 536 U/L (ref 73–393)
MCH RBC QN AUTO: 29 PG (ref 26–34)
MCHC RBC AUTO-ENTMCNC: 31.1 G/DL (ref 30–36.5)
MCV RBC AUTO: 93.1 FL (ref 80–99)
MUCOUS THREADS URNS QL MICRO: ABNORMAL /LPF
NITRITE UR QL STRIP.AUTO: POSITIVE
NRBC # BLD: 0 K/UL (ref 0–0.01)
NRBC BLD-RTO: 0 PER 100 WBC
PH UR STRIP: >8.5 [PH] (ref 5–8)
PLATELET # BLD AUTO: 398 K/UL (ref 150–400)
PMV BLD AUTO: 9.6 FL (ref 8.9–12.9)
POTASSIUM SERPL-SCNC: 3.9 MMOL/L (ref 3.5–5.1)
PROT SERPL-MCNC: 7.6 G/DL (ref 6.4–8.2)
PROT UR STRIP-MCNC: 100 MG/DL
RBC # BLD AUTO: 5.25 M/UL (ref 4.1–5.7)
RBC #/AREA URNS HPF: ABNORMAL /HPF (ref 0–5)
SAMPLES BEING HELD,HOLD: NORMAL
SERVICE CMNT-IMP: NORMAL
SODIUM SERPL-SCNC: 139 MMOL/L (ref 136–145)
SP GR UR REFRACTOMETRY: >1.03 (ref 1–1.03)
TRI-PHOS CRY URNS QL MICRO: ABNORMAL
UROBILINOGEN UR QL STRIP.AUTO: 1 EU/DL (ref 0.2–1)
WBC # BLD AUTO: 11.5 K/UL (ref 4.1–11.1)
WBC URNS QL MICRO: >100 /HPF (ref 0–4)

## 2022-08-08 PROCEDURE — 74011000258 HC RX REV CODE- 258: Performed by: EMERGENCY MEDICINE

## 2022-08-08 PROCEDURE — 74011250636 HC RX REV CODE- 250/636: Performed by: EMERGENCY MEDICINE

## 2022-08-08 PROCEDURE — 74011250637 HC RX REV CODE- 250/637: Performed by: EMERGENCY MEDICINE

## 2022-08-08 PROCEDURE — 81001 URINALYSIS AUTO W/SCOPE: CPT

## 2022-08-08 PROCEDURE — 74011250636 HC RX REV CODE- 250/636: Performed by: INTERNAL MEDICINE

## 2022-08-08 PROCEDURE — 87186 SC STD MICRODIL/AGAR DIL: CPT

## 2022-08-08 PROCEDURE — 74177 CT ABD & PELVIS W/CONTRAST: CPT

## 2022-08-08 PROCEDURE — 96374 THER/PROPH/DIAG INJ IV PUSH: CPT

## 2022-08-08 PROCEDURE — 36415 COLL VENOUS BLD VENIPUNCTURE: CPT

## 2022-08-08 PROCEDURE — 74011000250 HC RX REV CODE- 250: Performed by: INTERNAL MEDICINE

## 2022-08-08 PROCEDURE — 87077 CULTURE AEROBIC IDENTIFY: CPT

## 2022-08-08 PROCEDURE — 65270000029 HC RM PRIVATE

## 2022-08-08 PROCEDURE — 99285 EMERGENCY DEPT VISIT HI MDM: CPT

## 2022-08-08 PROCEDURE — 0T9B70Z DRAINAGE OF BLADDER WITH DRAINAGE DEVICE, VIA NATURAL OR ARTIFICIAL OPENING: ICD-10-PCS | Performed by: ADVANCED PRACTICE MIDWIFE

## 2022-08-08 PROCEDURE — 74011000636 HC RX REV CODE- 636: Performed by: EMERGENCY MEDICINE

## 2022-08-08 PROCEDURE — 74011250637 HC RX REV CODE- 250/637: Performed by: INTERNAL MEDICINE

## 2022-08-08 PROCEDURE — 87040 BLOOD CULTURE FOR BACTERIA: CPT

## 2022-08-08 PROCEDURE — 93005 ELECTROCARDIOGRAM TRACING: CPT

## 2022-08-08 PROCEDURE — 85027 COMPLETE CBC AUTOMATED: CPT

## 2022-08-08 PROCEDURE — 74011000258 HC RX REV CODE- 258: Performed by: INTERNAL MEDICINE

## 2022-08-08 PROCEDURE — 87086 URINE CULTURE/COLONY COUNT: CPT

## 2022-08-08 PROCEDURE — 83690 ASSAY OF LIPASE: CPT

## 2022-08-08 PROCEDURE — 82962 GLUCOSE BLOOD TEST: CPT

## 2022-08-08 PROCEDURE — 80053 COMPREHEN METABOLIC PANEL: CPT

## 2022-08-08 PROCEDURE — 51702 INSERT TEMP BLADDER CATH: CPT

## 2022-08-08 RX ORDER — ASCORBIC ACID 500 MG
500 TABLET ORAL DAILY
Status: DISCONTINUED | OUTPATIENT
Start: 2022-08-09 | End: 2022-08-31 | Stop reason: HOSPADM

## 2022-08-08 RX ORDER — POLYETHYLENE GLYCOL 3350 17 G/17G
17 POWDER, FOR SOLUTION ORAL DAILY PRN
Status: DISCONTINUED | OUTPATIENT
Start: 2022-08-08 | End: 2022-08-10

## 2022-08-08 RX ORDER — PANTOPRAZOLE SODIUM 40 MG/1
40 TABLET, DELAYED RELEASE ORAL
Status: DISCONTINUED | OUTPATIENT
Start: 2022-08-09 | End: 2022-08-12

## 2022-08-08 RX ORDER — ONDANSETRON 2 MG/ML
4 INJECTION INTRAMUSCULAR; INTRAVENOUS
Status: DISCONTINUED | OUTPATIENT
Start: 2022-08-08 | End: 2022-08-31 | Stop reason: HOSPADM

## 2022-08-08 RX ORDER — ONDANSETRON 4 MG/1
4 TABLET, ORALLY DISINTEGRATING ORAL
Status: DISCONTINUED | OUTPATIENT
Start: 2022-08-08 | End: 2022-08-31 | Stop reason: HOSPADM

## 2022-08-08 RX ORDER — FACIAL-BODY WIPES
10 EACH TOPICAL
Status: COMPLETED | OUTPATIENT
Start: 2022-08-08 | End: 2022-08-08

## 2022-08-08 RX ORDER — LORAZEPAM 1 MG/1
1 TABLET ORAL
Status: COMPLETED | OUTPATIENT
Start: 2022-08-08 | End: 2022-08-08

## 2022-08-08 RX ORDER — ACETAMINOPHEN 650 MG/1
650 SUPPOSITORY RECTAL
Status: DISCONTINUED | OUTPATIENT
Start: 2022-08-08 | End: 2022-08-31 | Stop reason: HOSPADM

## 2022-08-08 RX ORDER — MIRTAZAPINE 15 MG/1
15 TABLET, FILM COATED ORAL DAILY
Status: DISCONTINUED | OUTPATIENT
Start: 2022-08-09 | End: 2022-08-31 | Stop reason: HOSPADM

## 2022-08-08 RX ORDER — ENOXAPARIN SODIUM 100 MG/ML
40 INJECTION SUBCUTANEOUS DAILY
Status: DISCONTINUED | OUTPATIENT
Start: 2022-08-09 | End: 2022-08-24

## 2022-08-08 RX ORDER — NORTRIPTYLINE HYDROCHLORIDE 25 MG/1
50 CAPSULE ORAL
Status: DISCONTINUED | OUTPATIENT
Start: 2022-08-08 | End: 2022-08-31 | Stop reason: HOSPADM

## 2022-08-08 RX ORDER — FLUTICASONE PROPIONATE 50 MCG
2 SPRAY, SUSPENSION (ML) NASAL DAILY
Status: DISCONTINUED | OUTPATIENT
Start: 2022-08-09 | End: 2022-08-31 | Stop reason: HOSPADM

## 2022-08-08 RX ORDER — SODIUM CHLORIDE 0.9 % (FLUSH) 0.9 %
5-40 SYRINGE (ML) INJECTION AS NEEDED
Status: DISCONTINUED | OUTPATIENT
Start: 2022-08-08 | End: 2022-08-31 | Stop reason: HOSPADM

## 2022-08-08 RX ORDER — SODIUM CHLORIDE 0.9 % (FLUSH) 0.9 %
5-40 SYRINGE (ML) INJECTION EVERY 8 HOURS
Status: DISCONTINUED | OUTPATIENT
Start: 2022-08-08 | End: 2022-08-31 | Stop reason: HOSPADM

## 2022-08-08 RX ORDER — ACETAMINOPHEN 325 MG/1
650 TABLET ORAL
Status: DISCONTINUED | OUTPATIENT
Start: 2022-08-08 | End: 2022-08-31 | Stop reason: HOSPADM

## 2022-08-08 RX ORDER — ONDANSETRON 2 MG/ML
4 INJECTION INTRAMUSCULAR; INTRAVENOUS
Status: COMPLETED | OUTPATIENT
Start: 2022-08-08 | End: 2022-08-08

## 2022-08-08 RX ADMIN — NORTRIPTYLINE HYDROCHLORIDE 50 MG: 25 CAPSULE ORAL at 21:50

## 2022-08-08 RX ADMIN — SODIUM CHLORIDE, PRESERVATIVE FREE 10 ML: 5 INJECTION INTRAVENOUS at 22:38

## 2022-08-08 RX ADMIN — MEROPENEM 1 G: 1 INJECTION, POWDER, FOR SOLUTION INTRAVENOUS at 19:14

## 2022-08-08 RX ADMIN — BISACODYL 10 MG: 10 SUPPOSITORY RECTAL at 19:15

## 2022-08-08 RX ADMIN — LORAZEPAM 1 MG: 1 TABLET ORAL at 15:03

## 2022-08-08 RX ADMIN — SODIUM CHLORIDE 500 ML: 9 INJECTION, SOLUTION INTRAVENOUS at 15:04

## 2022-08-08 RX ADMIN — ONDANSETRON 4 MG: 2 INJECTION INTRAMUSCULAR; INTRAVENOUS at 15:03

## 2022-08-08 RX ADMIN — SODIUM CHLORIDE 500 ML: 9 INJECTION, SOLUTION INTRAVENOUS at 19:15

## 2022-08-08 RX ADMIN — IOPAMIDOL 100 ML: 755 INJECTION, SOLUTION INTRAVENOUS at 14:27

## 2022-08-08 RX ADMIN — MEROPENEM 1 G: 1 INJECTION, POWDER, FOR SOLUTION INTRAVENOUS at 20:59

## 2022-08-08 NOTE — H&P
Hospitalist Admission Note    NAME: Edward Hatch   :  1960   MRN:  [id number]     Date/Time:  2022 6:27 PM    Patient PCP: Carlos Vieira MD  ______________________________________________________________________  Given the patient's current clinical presentation, I have a high level of concern for decompensation if discharged from the emergency department. Complex decision making was performed, which includes reviewing the patient's available past medical records, laboratory results, and x-ray films. My assessment of this patient's clinical condition and my plan of care is as follows. Assessment / Plan:    58-year-old male with past medical history of bladder status post neobladder , cerebral palsy a group home resident presented to the emergency department with nausea vomiting and abdominal pain. #Sepsis due to urinary tract infection. POA. status post neobladder. #Cerebral palsy. #Anxiety depression. #History of bladder cancer. #Constipation. Admit to inpatient. Continue with IV fluid hydration. History of ESBL E. coli. Follow-up on urine analysis/urine culture. Started empirically on meropenem. CT scan with IV contrast shows moderate stool in the colon.    -Elevated lipase on the lab. Abdomen with diffuse tenderness. CT scan with no acute pancreatitis. Repeat labs. Serial abdominal examination.    -Cerebral palsy. Continue with supportive care. -Constipation. Suppository as needed. Code Status: DO NOT RESUSCITATE. Surrogate Decision Maker:    DVT Prophylaxis: Lovenox. GI Prophylaxis: not indicated    Baseline: Dependent for activity of daily living. Subjective:   CHIEF COMPLAINT: Nausea vomiting and abdominal pain. HISTORY OF PRESENT ILLNESS:     Edward Hatch is a 64 y.o.    male who presents with  past medical history of cerebral palsy, history of bladder cancer with neobladder, depression who presented to the emergency department with worsening abdominal pain and nausea. Patient is a resident of [facility]. He was brought in because it was noticed this morning he was having abdominal pain 8 out of 10 associated with diffuse in nature with one episode of vomiting. Patient had his last PEG tube feeding around 7:30 AM.  He is not sure about the fever. Patient denied of any chest pain or shortness of breath. Patient takes Ativan last dose was in the morning. He is not sure about his bowel movement. Review of the system is not obtainable due to patient condition. We were asked to admit for work up and evaluation of the above problems. Past Medical History:   Diagnosis Date    Anxiety     Bladder cancer ([address]     Bladder cancer ([address]     Cancer ([address]     BLADDER    CP (cerebral palsy) (HCC)     Depression     Hernia     Nausea & vomiting     Other ill-defined conditions(799.89)     Cerebral palsy    Psychiatric disorder     ANXIETY DEPRESSION    Unspecified constipation 5/13/2013        Past Surgical History:   Procedure Laterality Date    HX APPENDECTOMY      HX CHOLECYSTECTOMY      HX HERNIA REPAIR      Inc HR, PEG TUBE 2012    HX ORTHOPAEDIC  2013    Right knee surgery on 8/15 with Dr. Arslan Blake. HX UROLOGICAL      BLADDER REMOVED, HAS NADYA-BLADDER     IR INJ NEPHRO/URETEROGRAM LT EXISTING ACCESS SI  4/1/2021    IR NEPHROSTOMY PERC RT PLC CATH  SI  3/19/2021    IR URETERAL STENT RT PERC PLC EXISTING SI  3/24/2021    GA LAP PROCEDURE, UNLISTED, BLADDER      neobladder    GA LAP,CHOLECYSTECTOMY      GA LAP,LYSIS OF ADHESIONS      GA REMOVE MESH FROM ABD WALL      due to infection       Social History     Tobacco Use    Smoking status: Never    Smokeless tobacco: Never   Substance Use Topics    Alcohol use: No        Family History   Problem Relation Age of Onset    Heart Disease Mother         TACHYCARDIA    Anesth Problems Neg Hx    Group home resident.   Allergies   Allergen Reactions    Avelox [Moxifloxacin] Rash    Amikacin Unknown (comments)    Amoxicillin Rash    Betadine Surgi-Prep Rash     Mother states this does not happen all the time and if betadine is washed off, it's ok    Ciprofloxacin Unknown (comments)    Dilaudid [Hydromorphone] Unable to Obtain    Effexor [Venlafaxine] Unknown (comments)    Fentanyl Unknown (comments)    Keflex [Cephalexin] Rash    Lyrica [Pregabalin] Unknown (comments)    Morphine Unknown (comments)    Neurontin [Gabapentin] Unknown (comments)    Prozac [Fluoxetine] Unknown (comments)    Reglan [Metoclopramide] Unknown (comments)    Serzone [Nefazodone] Unknown (comments)    Sulfa (Sulfonamide Antibiotics) Unknown (comments)    Tetracycline Unknown (comments)    Tricyclamol Chloride Unknown (comments)    Valium [Diazepam] Unknown (comments)     Tolerates Lorazepam    Demerol [Meperidine] Other (comments)     Spastic movements        Prior to Admission medications    Medication Sig Start Date End Date Taking? Authorizing Provider   ferrous sulfate 220 mg (44 mg iron)/5 mL solution Take 220 mg by mouth daily. Provider, Historical   mirtazapine (REMERON) 15 mg tablet 15 mg by Per G Tube route daily. Provider, Historical   nortriptyline (PAMELOR) 10 mg/5 mL solution 50 mg by Per G Tube route nightly. Provider, Historical   ascorbic acid, vitamin C, (VITAMIN C) 500 mg tablet 500 mg by Per G Tube route daily. Provider, Historical   cholecalciferol (VITAMIN D3) 1,000 unit tablet 2,000 Units by Per G Tube route daily. Indications: Vitamin D Deficiency    Provider, Historical   acetaminophen (TYLENOL) 160 mg/5 mL liquid 640 mg by Per G Tube route every four (4) hours as needed for Fever or Pain. Provider, Historical   HYDROcodone-acetaminophen (HYCET) 0.5-21.7 mg/mL oral solution 5 mL by Per G Tube route four (4) times daily as needed for Pain. Provider, Historical   LORazepam (ATIVAN) 1 mg tablet 0.75 mg by Per G Tube route every six (6) hours.     Provider, Historical   fluticasone (FLONASE) 50 mcg/actuation nasal spray 2 Sprays by Both Nostrils route daily. Provider, Historical   Esomeprazole Magnesium (NEXIUM PACKET) 40 mg by Per G Tube route daily. Indications: gastroesophageal reflux disease    Provider, Historical   rizatriptan (MAXALT) 10 mg tablet 10 mg by SubLINGual route as needed for Migraine. May repeat dose after 2 hours not to exceed 3 tablets per 24hr    Other, MD Ofelia       REVIEW OF SYSTEMS:     I am not able to complete the review of systems because:    The patient is intubated and sedated   x The patient has altered mental status due to his acute medical problems    The patient has baseline aphasia from prior stroke(s)    The patient has baseline dementia and is not reliable historian    The patient is in acute medical distress and unable to provide information           Total of 12 systems reviewed as follows:       POSITIVE= underlined text  Negative = text not underlined  General:  fever, chills, sweats, generalized weakness, weight loss/gain,      loss of appetite   Eyes:    blurred vision, eye pain, loss of vision, double vision  ENT:    rhinorrhea, pharyngitis   Respiratory:   cough, sputum production, SOB, FANG, wheezing, pleuritic pain   Cardiology:   chest pain, palpitations, orthopnea, PND, edema, syncope   Gastrointestinal:  abdominal pain , N/V, diarrhea, dysphagia, constipation, bleeding   Genitourinary:  frequency, urgency, dysuria, hematuria, incontinence   Muskuloskeletal :  arthralgia, myalgia, back pain  Hematology:  easy bruising, nose or gum bleeding, lymphadenopathy   Dermatological: rash, ulceration, pruritis, color change / jaundice  Endocrine:   hot flashes or polydipsia   Neurological:  headache, dizziness, confusion, focal weakness, paresthesia,     Speech difficulties, memory loss, gait difficulty  Psychological: Feelings of anxiety, depression, agitation    Objective:   VITALS:    Visit Vitals  BP (!) 115/97   Pulse (!) 141   Temp 99.6 °F (37.6 °C)   Resp (!) 32   Ht 5' 7\" (1.702 m)   SpO2 96%   BMI 17.20 kg/m²       PHYSICAL EXAM:    General:    Alert, cooperative, no distress, appears stated age. HEENT: Atraumatic, anicteric sclerae, pink conjunctivae     No oral ulcers, mucosa moist, throat clear, dentition fair  Neck:  Supple, symmetrical,  thyroid: non tender  Lungs:   Clear to auscultation bilaterally. No Wheezing or Rhonchi. No rales. Chest wall:  No tenderness  No Accessory muscle use. Heart:   Regular  rhythm,  No  murmur   No edema  Abdomen:   Soft, mildly distended, neobladder in place. Bowel sounds normal  Extremities: No cyanosis. No clubbing,      Skin turgor normal, Capillary refill normal, Radial dial pulse 2+  Skin:     Not pale. Not Jaundiced  No rashes   Psych:  Good insight. Not depressed. Not anxious or agitated. Neurologic: Cerebral palsy. No focal deficit.     _______________________________________________________________________  Care Plan discussed with:    Comments   Patient x    Family  x    RN x    Care Manager                    Consultant:      _______________________________________________________________________  Expected  Disposition:   Home with Family    HH/PT/OT/RN    SNF/LTC x   [facility]    ________________________________________________________________________  TOTAL TIME:  28 Minutes    Critical Care Provided     Minutes non procedure based      Comments     Reviewed previous records   >50% of visit spent in counseling and coordination of care  Discussion with patient and/or family and questions answered       ________________________________________________________________________  Signed: Megan Rouse MD    Procedures: see electronic medical records for all procedures/Xrays and details which were not copied into this note but were reviewed prior to creation of Plan.     LAB DATA REVIEWED:    Recent Results (from the past 24 hour(s))   GLUCOSE, POC    Collection Time: 08/08/22 12:52 PM   Result Value Ref Range    Glucose (POC) 80 65 - 117 mg/dL    Performed by Magdalena Mariano    CBC W/O DIFF    Collection Time: 08/08/22  1:10 PM   Result Value Ref Range    WBC 11.5 (H) 4.1 - 11.1 K/uL    RBC 5.25 4. 10 - 5.70 M/uL    HGB 15.2 12.1 - 17.0 g/dL    HCT 48.9 36.6 - 50.3 %    MCV 93.1 80.0 - 99.0 FL    MCH 29.0 26.0 - 34.0 PG    MCHC 31.1 30.0 - 36.5 g/dL    RDW 13.5 11.5 - 14.5 %    PLATELET 212 792 - 545 K/uL    MPV 9.6 8.9 - 12.9 FL    NRBC 0.0 0  WBC    ABSOLUTE NRBC 0.00 0.00 - 1.55 K/uL   METABOLIC PANEL, COMPREHENSIVE    Collection Time: 08/08/22  1:10 PM   Result Value Ref Range    Sodium 139 136 - 145 mmol/L    Potassium 3.9 3.5 - 5.1 mmol/L    Chloride 103 97 - 108 mmol/L    CO2 25 21 - 32 mmol/L    Anion gap 11 5 - 15 mmol/L    Glucose 80 65 - 100 mg/dL    BUN 26 (H) 6 - 20 MG/DL    Creatinine 0.97 0.70 - 1.30 MG/DL    BUN/Creatinine ratio 27 (H) 12 - 20      GFR est AA >60 >60 ml/min/1.73m2    GFR est non-AA >60 >60 ml/min/1.73m2    Calcium 9.6 8.5 - 10.1 MG/DL    Bilirubin, total 0.8 0.2 - 1.0 MG/DL    ALT (SGPT) 30 12 - 78 U/L    AST (SGOT) 18 15 - 37 U/L    Alk. phosphatase 86 45 - 117 U/L    Protein, total 7.6 6.4 - 8.2 g/dL    Albumin 3.7 3.5 - 5.0 g/dL    Globulin 3.9 2.0 - 4.0 g/dL    A-G Ratio 0.9 (L) 1.1 - 2.2     LIPASE    Collection Time: 08/08/22  1:10 PM   Result Value Ref Range    Lipase 536 (H) 73 - 393 U/L   SAMPLES BEING HELD    Collection Time: 08/08/22  1:10 PM   Result Value Ref Range    SAMPLES BEING HELD 1 PST, 1 RED, 1 LAV     COMMENT        Add-on orders for these samples will be processed based on acceptable specimen integrity and analyte stability, which may vary by analyte.    EKG, 12 LEAD, INITIAL    Collection Time: 08/08/22  1:29 PM   Result Value Ref Range    Ventricular Rate 131 BPM    Atrial Rate 131 BPM    P-R Interval 94 ms    QRS Duration 76 ms    Q-T Interval 304 ms    QTC Calculation (Bezet) 448 ms    Calculated P Axis 63 degrees    Calculated R Axis 67 degrees    Calculated T Axis 67 degrees    Diagnosis       Sinus tachycardia with short MI  Septal infarct , age undetermined  When compared with ECG of 18-MAR-2021 14:17,  Septal infarct is now present     URINALYSIS W/ RFLX MICROSCOPIC    Collection Time: 08/08/22  4:50 PM   Result Value Ref Range    Color DARK YELLOW      Appearance CLOUDY (A) CLEAR      Specific gravity >1.030 (H) 1.003 - 1.030    pH (UA) >8.5 (H) 5.0 - 8.0    Protein 100 (A) NEG mg/dL    Glucose Negative NEG mg/dL    Ketone Negative NEG mg/dL    Bilirubin Negative NEG      Blood LARGE (A) NEG      Urobilinogen 1.0 0.2 - 1.0 EU/dL    Nitrites Positive (A) NEG      Leukocyte Esterase LARGE (A) NEG      WBC >100 (H) 0 - 4 /hpf    RBC  0 - 5 /hpf    Epithelial cells FEW FEW /lpf    Bacteria 3+ (A) NEG /hpf    Mucus 3+ (A) NEG /lpf    Triple Phosphate crystals 2+ (A) NEG     CT Results  (Last 48 hours)                 08/08/22 1429  CT ABD PELV W CONT Final result    Impression:  1. Rectal impaction. 2. Calculi in the neobladder. 3. No acute finding. Narrative:  INDICATION: pain        EXAM: CT Abdomen and Pelvis is performed with 100 mL Isovue 370 contrast IV   without oral contrast. CT dose reduction was achieved through use of a   standardized protocol tailored for this examination and automatic exposure   control for dose modulation. FINDINGS:   The rectum is prominently distended with stool and there is moderate stool   throughout the colon. Small bowel is unremarkable. PEG tube is in the stomach. There is a hiatal hernia. Kidneys show no acute finding. There is no obstructive hydronephrosis. There is   stable left renal pelviectasis. Status post cystectomy with nondistended neobladder which contains several   stones. No apparent urinary tract inflammation. There is no inflammation, ascites, pneumoperitoneum or significant adenopathy. Liver shows no significant finding.  Gallbladder is absent. Bile ducts are not   enlarged. Pancreas shows no mass or inflammation. Spleen is unremarkable. Adrenal glands are normal in size. Aorta is without aneurysm.

## 2022-08-08 NOTE — ED PROVIDER NOTES
EMERGENCY DEPARTMENT HISTORY AND PHYSICAL EXAM      Date: 8/8/2022  Patient Name: Mellissa Amin    History of Presenting Illness     Chief Complaint   Patient presents with    Abdominal Pain     Arrives with EMS from facility for c/o abdominal pain, N/V x 1 (facility states emesis was dark, coffee ground). Last PEG feeding 0730; last had changed PEG on Thursday; patient is at baseline per facility. History Provided By: Patient, Caregiver, and facility notes    HPI: Mellissa Amin, 64 y.o. male presents to the ED with cc of abdominal pain and vomiting. Patient has a history of bladder cancer, celestine bladder, and cerebral palsy. He was brought in from his facility for abdominal pain which started this morning. Pain is 8 out of 10 in severity, it is diffuse. She had 1 episode of vomiting. His last PEG tube feeding was at 7:30 AM.  Is not sure whether he has had a fever. He denies chest pain or shortness of breath. He takes Ativan every 6 hours, and his last dose was at 6 AM.  He is not sure whether his bowels are moving normally. There are no other complaints, changes, or physical findings at this time. PCP: Susy Jain MD    No current facility-administered medications on file prior to encounter. Current Outpatient Medications on File Prior to Encounter   Medication Sig Dispense Refill    ferrous sulfate 220 mg (44 mg iron)/5 mL solution Take 220 mg by mouth daily. mirtazapine (REMERON) 15 mg tablet 15 mg by Per G Tube route daily. nortriptyline (PAMELOR) 10 mg/5 mL solution 50 mg by Per G Tube route nightly. ascorbic acid, vitamin C, (VITAMIN C) 500 mg tablet 500 mg by Per G Tube route daily. cholecalciferol (VITAMIN D3) 1,000 unit tablet 2,000 Units by Per G Tube route daily. Indications: Vitamin D Deficiency      acetaminophen (TYLENOL) 160 mg/5 mL liquid 640 mg by Per G Tube route every four (4) hours as needed for Fever or Pain. HYDROcodone-acetaminophen (HYCET) 0.5-21.7 mg/mL oral solution 5 mL by Per G Tube route four (4) times daily as needed for Pain. LORazepam (ATIVAN) 1 mg tablet 0.75 mg by Per G Tube route every six (6) hours. fluticasone (FLONASE) 50 mcg/actuation nasal spray 2 Sprays by Both Nostrils route daily. Esomeprazole Magnesium (NEXIUM PACKET) 40 mg by Per G Tube route daily. Indications: gastroesophageal reflux disease      rizatriptan (MAXALT) 10 mg tablet 10 mg by SubLINGual route as needed for Migraine. May repeat dose after 2 hours not to exceed 3 tablets per 24hr         Past History     Past Medical History:  Past Medical History:   Diagnosis Date    Anxiety     Bladder cancer ([facility] [address]     Bladder cancer ([facility] [address]     Cancer ([facility] [place] [address]     BLADDER    CP (cerebral palsy) ([facility] [place] [address]     Depression     Hernia     Nausea & vomiting     Other ill-defined conditions(799.89)     Cerebral palsy    Psychiatric disorder     ANXIETY DEPRESSION    Unspecified constipation 5/13/2013       Past Surgical History:  Past Surgical History:   Procedure Laterality Date    HX APPENDECTOMY      HX CHOLECYSTECTOMY      HX HERNIA REPAIR      Inc HR, PEG TUBE 2012    HX ORTHOPAEDIC  2013    Right knee surgery on 8/15 with Dr. Cait Magallon. HX UROLOGICAL      BLADDER REMOVED, HAS NADYA-BLADDER     IR INJ NEPHRO/URETEROGRAM LT EXISTING ACCESS SI  4/1/2021    IR NEPHROSTOMY PERC RT PLC CATH  SI  3/19/2021    IR URETERAL STENT RT PERC PLC EXISTING SI  3/24/2021    VT LAP PROCEDURE, UNLISTED, BLADDER      neobladder    VT LAP,CHOLECYSTECTOMY      VT LAP,LYSIS OF ADHESIONS      VT REMOVE MESH FROM ABD WALL      due to infection       Family History:  Family History   Problem Relation Age of Onset    Heart Disease Mother         TACHYCARDIA    Anesth Problems Neg Hx        Social History:  Social History     Tobacco Use    Smoking status: Never    Smokeless tobacco: Never   Substance Use Topics    Alcohol use: No    Drug use:  No Allergies: Allergies   Allergen Reactions    Avelox [Moxifloxacin] Rash    Amikacin Unknown (comments)    Amoxicillin Rash    Betadine Surgi-Prep Rash     Mother states this does not happen all the time and if betadine is washed off, it's ok    Ciprofloxacin Unknown (comments)    Dilaudid [Hydromorphone] Unable to Obtain    Effexor [Venlafaxine] Unknown (comments)    Fentanyl Unknown (comments)    Keflex [Cephalexin] Rash    Lyrica [Pregabalin] Unknown (comments)    Morphine Unknown (comments)    Neurontin [Gabapentin] Unknown (comments)    Prozac [Fluoxetine] Unknown (comments)    Reglan [Metoclopramide] Unknown (comments)    Serzone [Nefazodone] Unknown (comments)    Sulfa (Sulfonamide Antibiotics) Unknown (comments)    Tetracycline Unknown (comments)    Tricyclamol Chloride Unknown (comments)    Valium [Diazepam] Unknown (comments)     Tolerates Lorazepam    Demerol [Meperidine] Other (comments)     Spastic movements         Review of Systems   Review of Systems   Constitutional:  Negative for fatigue. HENT:  Negative for congestion. Eyes: Negative. Respiratory:  Negative for shortness of breath. Cardiovascular:  Negative for chest pain. Gastrointestinal:  Positive for abdominal pain, nausea and vomiting. Endocrine: Negative for heat intolerance. Genitourinary: Negative. Musculoskeletal:  Negative for back pain. Skin:  Negative for rash. Allergic/Immunologic: Negative for immunocompromised state. Neurological: Negative. Hematological:  Does not bruise/bleed easily. Psychiatric/Behavioral: Negative. All other systems reviewed and are negative. Physical Exam   Physical Exam  Vitals and nursing note reviewed. Constitutional:       General: He is not in acute distress. Appearance: He is well-developed. HENT:      Head: Normocephalic and atraumatic. Cardiovascular:      Rate and Rhythm: Regular rhythm. Tachycardia present. Heart sounds: Normal heart sounds. Pulmonary:      Effort: Pulmonary effort is normal.      Breath sounds: Normal breath sounds. Abdominal:      General: Bowel sounds are normal.      Palpations: Abdomen is soft. Tenderness: There is generalized abdominal tenderness. Musculoskeletal:      Cervical back: Normal range of motion. Skin:     General: Skin is warm and dry. Neurological:      Mental Status: He is alert and oriented to person, place, and time. Coordination: Coordination normal.      Comments: Baseline mental status   Psychiatric:         Behavior: Behavior normal.       Diagnostic Study Results     Labs -     Recent Results (from the past 12 hour(s))   GLUCOSE, POC    Collection Time: 08/08/22 12:52 PM   Result Value Ref Range    Glucose (POC) 80 65 - 117 mg/dL    Performed by Jessica Espinoza    CBC W/O DIFF    Collection Time: 08/08/22  1:10 PM   Result Value Ref Range    WBC 11.5 (H) 4.1 - 11.1 K/uL    RBC 5.25 4. 10 - 5.70 M/uL    HGB 15.2 12.1 - 17.0 g/dL    HCT 48.9 36.6 - 50.3 %    MCV 93.1 80.0 - 99.0 FL    MCH 29.0 26.0 - 34.0 PG    MCHC 31.1 30.0 - 36.5 g/dL    RDW 13.5 11.5 - 14.5 %    PLATELET 297 496 - 848 K/uL    MPV 9.6 8.9 - 12.9 FL    NRBC 0.0 0  WBC    ABSOLUTE NRBC 0.00 0.00 - 3.39 K/uL   METABOLIC PANEL, COMPREHENSIVE    Collection Time: 08/08/22  1:10 PM   Result Value Ref Range    Sodium 139 136 - 145 mmol/L    Potassium 3.9 3.5 - 5.1 mmol/L    Chloride 103 97 - 108 mmol/L    CO2 25 21 - 32 mmol/L    Anion gap 11 5 - 15 mmol/L    Glucose 80 65 - 100 mg/dL    BUN 26 (H) 6 - 20 MG/DL    Creatinine 0.97 0.70 - 1.30 MG/DL    BUN/Creatinine ratio 27 (H) 12 - 20      GFR est AA >60 >60 ml/min/1.73m2    GFR est non-AA >60 >60 ml/min/1.73m2    Calcium 9.6 8.5 - 10.1 MG/DL    Bilirubin, total 0.8 0.2 - 1.0 MG/DL    ALT (SGPT) 30 12 - 78 U/L    AST (SGOT) 18 15 - 37 U/L    Alk.  phosphatase 86 45 - 117 U/L    Protein, total 7.6 6.4 - 8.2 g/dL    Albumin 3.7 3.5 - 5.0 g/dL    Globulin 3.9 2.0 - 4.0 g/dL    A-G Ratio 0.9 (L) 1.1 - 2.2     LIPASE    Collection Time: 08/08/22  1:10 PM   Result Value Ref Range    Lipase 536 (H) 73 - 393 U/L   SAMPLES BEING HELD    Collection Time: 08/08/22  1:10 PM   Result Value Ref Range    SAMPLES BEING HELD 1 PST, 1 RED, 1 LAV     COMMENT        Add-on orders for these samples will be processed based on acceptable specimen integrity and analyte stability, which may vary by analyte. Radiologic Studies -   CT ABD PELV W CONT   Final Result   1. Rectal impaction. 2. Calculi in the neobladder. 3. No acute finding. CT Results  (Last 48 hours)                 08/08/22 1429  CT ABD PELV W CONT Final result    Impression:  1. Rectal impaction. 2. Calculi in the neobladder. 3. No acute finding. Narrative:  INDICATION: pain        EXAM: CT Abdomen and Pelvis is performed with 100 mL Isovue 370 contrast IV   without oral contrast. CT dose reduction was achieved through use of a   standardized protocol tailored for this examination and automatic exposure   control for dose modulation. FINDINGS:   The rectum is prominently distended with stool and there is moderate stool   throughout the colon. Small bowel is unremarkable. PEG tube is in the stomach. There is a hiatal hernia. Kidneys show no acute finding. There is no obstructive hydronephrosis. There is   stable left renal pelviectasis. Status post cystectomy with nondistended neobladder which contains several   stones. No apparent urinary tract inflammation. There is no inflammation, ascites, pneumoperitoneum or significant adenopathy. Liver shows no significant finding. Gallbladder is absent. Bile ducts are not   enlarged. Pancreas shows no mass or inflammation. Spleen is unremarkable. Adrenal glands are normal in size. Aorta is without aneurysm. CXR Results  (Last 48 hours)      None            Medical Decision Making   I am the first provider for this patient.     I reviewed the vital signs, available nursing notes, past medical history, past surgical history, family history and social history. Vital Signs-Reviewed the patient's vital signs. Patient Vitals for the past 12 hrs:   Temp Pulse Resp BP SpO2   08/08/22 1312 -- (!) 141 (!) 32 (!) 115/97 96 %   08/08/22 1257 99.6 °F (37.6 °C) (!) 138 20 115/80 95 %       EKG interpretation: (Preliminary)  Rhythm: sinus tachycardia; and regular . Rate (approx.): 131; Axis: normal; RI interval: normal; QRS interval: normal ; ST/T wave: non-specific changes; Other findings: .    Records Reviewed: Nursing Notes, Old Medical Records, Previous electrocardiograms, Previous Radiology Studies, and Previous Laboratory Studies    Provider Notes (Medical Decision Making):   Obstruction, UTI, diverticulitis, constipation, impaction, dehydration, anxiety    ED Course:   Initial assessment performed. The patients presenting problems have been discussed, and they are in agreement with the care plan formulated and outlined with them. I have encouraged them to ask questions as they arise throughout their visit. Rectal exam:    Assisted by patient's nurse. There was soft stool, so cannot really disimpact him.   I will give him a suppository      Consult note:    Patient is being admitted by the hospitalist        Critical Care Time:   CRITICAL CARE NOTE :    3:23 PM    IMPENDING DETERIORATION -Cardiovascular, Metabolic, and Renal  ASSOCIATED RISK FACTORS - Dysrhythmia, Metabolic changes, and Dehydration  MANAGEMENT- Bedside Assessment and Supervision of Care  INTERPRETATION -  CT Scan, ECG, and Blood Pressure  INTERVENTIONS - hemodynamic mngmt and Metobolic interventions  CASE REVIEW - Hospitalist/Intensivist, Nursing, and Family  TREATMENT RESPONSE -Improved  PERFORMED BY - Self    NOTES   :  I have spent 30 minutes of critical care time involved in lab review, consultations with specialist, family decision- making, bedside attention and documentation. This time excludes time spent in any separate billed procedures. During this entire length of time I was immediately available to the patient . Simeon Calabrese MD      Disposition:    DISCHARGE PLAN:  1. Current Discharge Medication List        2. Follow-up Information    None       3. Return to ED if worse     Diagnosis     Clinical Impression:   1. Acute abdominal pain    2. Acute cystitis with hematuria    3. Fecal impaction ([address]    4. Sinus tachycardia        Attestations:    Simeon Calabrese MD        Please note that this dictation was completed with [employer], the computer voice recognition software. Quite often unanticipated grammatical, syntax, homophones, and other interpretive errors are inadvertently transcribed by the computer software. Please disregard these errors. Please excuse any errors that have escaped final proofreading. Thank you.

## 2022-08-08 NOTE — PROGRESS NOTES
Transition of Care Plan  RUR: n/a  DISPOSITION: The disposition plan is return to the [facility]   F/ with PCP/Specialist    Transport: [facility]    Reason for Admission:  UTI                     RUR Score:    n/a                 Plan for utilizing home health:      not recommended     PCP: First and Last name:  You Lomax MD     Name of Practice:    Are you a current patient: Yes/No:    Approximate date of last visit:    Can you participate in a virtual visit with your PCP:                     Current Advanced Directive/Advance Care Plan: DNR      Healthcare Decision Maker:   Click here to complete [address] including selection of the Healthcare Decision Maker Relationship (ie \"Primary\")                             Transition of Care Plan:                      CRM spoke with patient's sister, introduced self, explained role, verified demographics, and offered assistance. The patient lives at the [facility]. Patient has a wheelchair. Care Management Interventions  PCP Verified by CM:  Yes  Palliative Care Criteria Met (RRAT>21 & CHF Dx)?: No  Mode of Transport at Discharge: BLS  Transition of Care Consult (CM Consult): Discharge Planning  MyChart Signup: No  Discharge Durable Medical Equipment: No  Health Maintenance Reviewed: Yes  Physical Therapy Consult: No  Occupational Therapy Consult: No  Speech Therapy Consult: No  Support Systems: Other Family Member(s)  Confirm Follow Up Transport: [address] Provided?: No  Discharge Location  Patient Expects to be Discharged to[de-identified] Group home    6:34 PM  SERGIO Landis

## 2022-08-09 ENCOUNTER — APPOINTMENT (OUTPATIENT)
Dept: INTERVENTIONAL RADIOLOGY/VASCULAR | Age: 62
DRG: 698 | End: 2022-08-09
Attending: STUDENT IN AN ORGANIZED HEALTH CARE EDUCATION/TRAINING PROGRAM
Payer: MEDICARE

## 2022-08-09 ENCOUNTER — APPOINTMENT (OUTPATIENT)
Dept: CT IMAGING | Age: 62
DRG: 698 | End: 2022-08-09
Attending: STUDENT IN AN ORGANIZED HEALTH CARE EDUCATION/TRAINING PROGRAM
Payer: MEDICARE

## 2022-08-09 LAB
ANION GAP SERPL CALC-SCNC: 6 MMOL/L (ref 5–15)
ATRIAL RATE: 131 BPM
BUN SERPL-MCNC: 22 MG/DL (ref 6–20)
BUN/CREAT SERPL: 32 (ref 12–20)
CALCIUM SERPL-MCNC: 9 MG/DL (ref 8.5–10.1)
CALCULATED P AXIS, ECG09: 63 DEGREES
CALCULATED R AXIS, ECG10: 67 DEGREES
CALCULATED T AXIS, ECG11: 67 DEGREES
CHLORIDE SERPL-SCNC: 112 MMOL/L (ref 97–108)
CO2 SERPL-SCNC: 24 MMOL/L (ref 21–32)
CREAT SERPL-MCNC: 0.69 MG/DL (ref 0.7–1.3)
DIAGNOSIS, 93000: NORMAL
ERYTHROCYTE [DISTWIDTH] IN BLOOD BY AUTOMATED COUNT: 13.7 % (ref 11.5–14.5)
GLUCOSE SERPL-MCNC: 85 MG/DL (ref 65–100)
HCT VFR BLD AUTO: 43.5 % (ref 36.6–50.3)
HGB BLD-MCNC: 14.1 G/DL (ref 12.1–17)
LIPASE SERPL-CCNC: 106 U/L (ref 73–393)
MCH RBC QN AUTO: 29.5 PG (ref 26–34)
MCHC RBC AUTO-ENTMCNC: 32.4 G/DL (ref 30–36.5)
MCV RBC AUTO: 91 FL (ref 80–99)
NRBC # BLD: 0 K/UL (ref 0–0.01)
NRBC BLD-RTO: 0 PER 100 WBC
P-R INTERVAL, ECG05: 94 MS
PLATELET # BLD AUTO: 333 K/UL (ref 150–400)
PMV BLD AUTO: 9.9 FL (ref 8.9–12.9)
POTASSIUM SERPL-SCNC: 4.4 MMOL/L (ref 3.5–5.1)
Q-T INTERVAL, ECG07: 304 MS
QRS DURATION, ECG06: 76 MS
QTC CALCULATION (BEZET), ECG08: 448 MS
RBC # BLD AUTO: 4.78 M/UL (ref 4.1–5.7)
SODIUM SERPL-SCNC: 142 MMOL/L (ref 136–145)
VENTRICULAR RATE, ECG03: 131 BPM
WBC # BLD AUTO: 8.2 K/UL (ref 4.1–11.1)

## 2022-08-09 PROCEDURE — 77030018617 HC TU FEED GASTMY1 COOK -B

## 2022-08-09 PROCEDURE — 74011000636 HC RX REV CODE- 636: Performed by: RADIOLOGY

## 2022-08-09 PROCEDURE — 74011000250 HC RX REV CODE- 250: Performed by: INTERNAL MEDICINE

## 2022-08-09 PROCEDURE — 36415 COLL VENOUS BLD VENIPUNCTURE: CPT

## 2022-08-09 PROCEDURE — 0D20XUZ CHANGE FEEDING DEVICE IN UPPER INTESTINAL TRACT, EXTERNAL APPROACH: ICD-10-PCS | Performed by: RADIOLOGY

## 2022-08-09 PROCEDURE — 83690 ASSAY OF LIPASE: CPT

## 2022-08-09 PROCEDURE — 74011250636 HC RX REV CODE- 250/636: Performed by: RADIOLOGY

## 2022-08-09 PROCEDURE — 80048 BASIC METABOLIC PNL TOTAL CA: CPT

## 2022-08-09 PROCEDURE — 77030011229 HC DIL VESL COON COOK -A

## 2022-08-09 PROCEDURE — 74011250636 HC RX REV CODE- 250/636: Performed by: INTERNAL MEDICINE

## 2022-08-09 PROCEDURE — 85027 COMPLETE CBC AUTOMATED: CPT

## 2022-08-09 PROCEDURE — 65270000029 HC RM PRIVATE

## 2022-08-09 PROCEDURE — 74177 CT ABD & PELVIS W/CONTRAST: CPT

## 2022-08-09 PROCEDURE — C1769 GUIDE WIRE: HCPCS

## 2022-08-09 PROCEDURE — 77030009378 HC DEV TORQ OLCT F/GWIRE MANIP COOK -A

## 2022-08-09 PROCEDURE — 74011000636 HC RX REV CODE- 636: Performed by: STUDENT IN AN ORGANIZED HEALTH CARE EDUCATION/TRAINING PROGRAM

## 2022-08-09 PROCEDURE — 74011250637 HC RX REV CODE- 250/637: Performed by: STUDENT IN AN ORGANIZED HEALTH CARE EDUCATION/TRAINING PROGRAM

## 2022-08-09 PROCEDURE — 74011000250 HC RX REV CODE- 250: Performed by: STUDENT IN AN ORGANIZED HEALTH CARE EDUCATION/TRAINING PROGRAM

## 2022-08-09 PROCEDURE — 49440 PLACE GASTROSTOMY TUBE PERC: CPT

## 2022-08-09 PROCEDURE — 2709999900 HC NON-CHARGEABLE SUPPLY

## 2022-08-09 PROCEDURE — 74011250637 HC RX REV CODE- 250/637: Performed by: INTERNAL MEDICINE

## 2022-08-09 PROCEDURE — 74011000258 HC RX REV CODE- 258: Performed by: INTERNAL MEDICINE

## 2022-08-09 RX ORDER — LIDOCAINE HYDROCHLORIDE 20 MG/ML
2 JELLY TOPICAL ONCE
Status: COMPLETED | OUTPATIENT
Start: 2022-08-09 | End: 2022-08-09

## 2022-08-09 RX ORDER — FACIAL-BODY WIPES
10 EACH TOPICAL DAILY
Status: DISCONTINUED | OUTPATIENT
Start: 2022-08-09 | End: 2022-08-31 | Stop reason: HOSPADM

## 2022-08-09 RX ORDER — LIDOCAINE HYDROCHLORIDE 20 MG/ML
10 INJECTION, SOLUTION INFILTRATION; PERINEURAL ONCE
Status: DISPENSED | OUTPATIENT
Start: 2022-08-09 | End: 2022-08-10

## 2022-08-09 RX ORDER — HEPARIN SODIUM 200 [USP'U]/100ML
400 INJECTION, SOLUTION INTRAVENOUS ONCE
Status: COMPLETED | OUTPATIENT
Start: 2022-08-09 | End: 2022-08-09

## 2022-08-09 RX ORDER — LIDOCAINE HYDROCHLORIDE 20 MG/ML
5 JELLY TOPICAL AS NEEDED
Status: DISCONTINUED | OUTPATIENT
Start: 2022-08-09 | End: 2022-08-09

## 2022-08-09 RX ADMIN — LIDOCAINE HYDROCHLORIDE 2 ML: 20 JELLY TOPICAL at 17:39

## 2022-08-09 RX ADMIN — SODIUM CHLORIDE, PRESERVATIVE FREE 10 ML: 5 INJECTION INTRAVENOUS at 05:00

## 2022-08-09 RX ADMIN — ENOXAPARIN SODIUM 40 MG: 100 INJECTION SUBCUTANEOUS at 08:02

## 2022-08-09 RX ADMIN — ACETAMINOPHEN 650 MG: 325 TABLET ORAL at 08:02

## 2022-08-09 RX ADMIN — OXYCODONE HYDROCHLORIDE AND ACETAMINOPHEN 500 MG: 500 TABLET ORAL at 08:02

## 2022-08-09 RX ADMIN — SODIUM CHLORIDE, PRESERVATIVE FREE 10 ML: 5 INJECTION INTRAVENOUS at 22:12

## 2022-08-09 RX ADMIN — MEROPENEM 1 G: 1 INJECTION, POWDER, FOR SOLUTION INTRAVENOUS at 22:12

## 2022-08-09 RX ADMIN — PANTOPRAZOLE SODIUM 40 MG: 40 TABLET, DELAYED RELEASE ORAL at 08:02

## 2022-08-09 RX ADMIN — MEROPENEM 1 G: 1 INJECTION, POWDER, FOR SOLUTION INTRAVENOUS at 02:55

## 2022-08-09 RX ADMIN — MEROPENEM 1 G: 1 INJECTION, POWDER, FOR SOLUTION INTRAVENOUS at 11:25

## 2022-08-09 RX ADMIN — IOPAMIDOL 100 ML: 755 INJECTION, SOLUTION INTRAVENOUS at 12:57

## 2022-08-09 RX ADMIN — HEPARIN SODIUM IN SODIUM CHLORIDE 800 UNITS: 200 INJECTION INTRAVENOUS at 17:39

## 2022-08-09 RX ADMIN — BISACODYL 10 MG: 10 SUPPOSITORY RECTAL at 18:07

## 2022-08-09 RX ADMIN — ONDANSETRON 4 MG: 2 INJECTION INTRAMUSCULAR; INTRAVENOUS at 04:57

## 2022-08-09 RX ADMIN — ACETAMINOPHEN 650 MG: 325 TABLET ORAL at 00:36

## 2022-08-09 RX ADMIN — FLUTICASONE PROPIONATE 2 SPRAY: 50 SPRAY, METERED NASAL at 11:15

## 2022-08-09 RX ADMIN — IOPAMIDOL 15 ML: 755 INJECTION, SOLUTION INTRAVENOUS at 17:39

## 2022-08-09 NOTE — CONSULTS
GI CONSULTATION NOTE  Clari Torres, JOLENE  [phone] NP in-hospital cell phone M-F until 4:30  After 5pm or on weekends, please call  for physician on call    NAME: Mellissa Amin   :  1960   MRN:  [id number]   Attending: Dr. Dave Crawford  Primary GI: Dr. Bernardino Heredia  Date/Time:  2022 3:28 PM  Assessment:   PEG Tube Malfunction; Displacement  - Patient with significant PMH including cerebral palsy, bladder cancer, celestine bladder; initially came in to ED for abdominal pain 8/10 and diffuse; had even had episode of vomiting  - PEG tube was dislodged/slid out unsure how this happened; sister who is with him states it happens often and nurses at long term care facility where he lives replace it  - PEG was reportedly dislodged around noon 22 but may have been longer since dislodgement  - Further inspection of PEG shows balloon to have possibly burst or ripped  - PEG insertion site is clean dry intact w/o evidence of infection or bleeding  - Labs negative for anemia  - CT ABD PEL W CONT 22 Obstipation, Air-filled stomach, Incidental Moderate hiatal hernia, emphysema. Plan:   - Would not recommend replacement of PEG at bedside given the time that has lapsed since it was removed/dislodged  - Recommend PEG be replaced with Fluoro by IR  - Enema ordered for rectal impaction noted on CT 22  - Symptomatic care per primary team    Plan discussed with Dr. Bernardino Heredia. Thank you for this consultation! Subjective:     HISTORY OF PRESENT ILLNESS:     Mellissa Amin is an 64 y.o.  male who we are asked to see for complaint of  PEG tube malfunction/dislodgement. Patient with significant PMH including cerebral palsy, bladder cancer, celestine bladder; initially came in to ED for abdominal pain 8/10 and diffuse. Had even had episode of vomiting.  PEG tube was dislodged/slid out unsure howthis happened; sister who is with him states it happens often and nurses at long term care facility where he lives replace it. PEG was reportedly dislodged around noon 8/9/22 but may have been longer since dislodgement. Further inspection of PEG shows balloon to have possibly burst or ripped. PEG insertion site is clean dry intact w/o evidence of infection or bleeding. Labs negative for anemia. CT ABD PEL W CONT 8/9/22 Obstipation, Air-filled stomach, Incidental Moderate hiatal hernia, emphysema. Past Medical History:   Diagnosis Date    Anxiety     Bladder cancer ([address]     Bladder cancer ([address]     Cancer ([address]     BLADDER    CP (cerebral palsy) (HCC)     Depression     Hernia     Nausea & vomiting     Other ill-defined conditions(799.89)     Cerebral palsy    Psychiatric disorder     ANXIETY DEPRESSION    Unspecified constipation 5/13/2013      Past Surgical History:   Procedure Laterality Date    HX APPENDECTOMY      HX CHOLECYSTECTOMY      HX HERNIA REPAIR      Inc HR, PEG TUBE 2012    HX ORTHOPAEDIC  2013    Right knee surgery on 8/15 with Dr. Ad Syed.     HX UROLOGICAL      BLADDER REMOVED, HAS NADYA-BLADDER     IR INJ NEPHRO/URETEROGRAM LT EXISTING ACCESS SI  4/1/2021    IR NEPHROSTOMY PERC RT PLC CATH  SI  3/19/2021    IR URETERAL STENT RT PERC PLC EXISTING SI  3/24/2021    IL LAP PROCEDURE, UNLISTED, BLADDER      neobladder    IL LAP,CHOLECYSTECTOMY      IL LAP,LYSIS OF ADHESIONS      IL REMOVE MESH FROM ABD WALL      due to infection     Social History     Tobacco Use    Smoking status: Never    Smokeless tobacco: Never   Substance Use Topics    Alcohol use: No      Family History   Problem Relation Age of Onset    Heart Disease Mother         TACHYCARDIA    Anesth Problems Neg Hx       Allergies   Allergen Reactions    Avelox [Moxifloxacin] Rash    Amikacin Unknown (comments)    Amoxicillin Rash    Betadine Surgi-Prep Rash     Mother states this does not happen all the time and if betadine is washed off, it's ok    Ciprofloxacin Unknown (comments)    Dilaudid [Hydromorphone] Unable to Obtain    Effexor [Venlafaxine] Unknown (comments)    Fentanyl Unknown (comments)    Keflex [Cephalexin] Rash    Lyrica [Pregabalin] Unknown (comments)    Morphine Unknown (comments)    Neurontin [Gabapentin] Unknown (comments)    Prozac [Fluoxetine] Unknown (comments)    Reglan [Metoclopramide] Unknown (comments)    Serzone [Nefazodone] Unknown (comments)    Sulfa (Sulfonamide Antibiotics) Unknown (comments)    Tetracycline Unknown (comments)    Tricyclamol Chloride Unknown (comments)    Valium [Diazepam] Unknown (comments)     Tolerates Lorazepam    Demerol [Meperidine] Other (comments)     Spastic movements      Prior to Admission medications    Medication Sig Start Date End Date Taking? Authorizing Provider   ferrous sulfate 220 mg (44 mg iron)/5 mL solution Take 220 mg by mouth daily. Provider, Historical   mirtazapine (REMERON) 15 mg tablet 15 mg by Per G Tube route daily. Provider, Historical   nortriptyline (PAMELOR) 10 mg/5 mL solution 50 mg by Per G Tube route nightly. Provider, Historical   ascorbic acid, vitamin C, (VITAMIN C) 500 mg tablet 500 mg by Per G Tube route daily. Provider, Historical   cholecalciferol (VITAMIN D3) 1,000 unit tablet 2,000 Units by Per G Tube route daily. Indications: Vitamin D Deficiency    Provider, Historical   acetaminophen (TYLENOL) 160 mg/5 mL liquid 640 mg by Per G Tube route every four (4) hours as needed for Fever or Pain. Provider, Historical   HYDROcodone-acetaminophen (HYCET) 0.5-21.7 mg/mL oral solution 5 mL by Per G Tube route four (4) times daily as needed for Pain. Provider, Historical   LORazepam (ATIVAN) 1 mg tablet 0.75 mg by Per G Tube route every six (6) hours. Provider, Historical   fluticasone (FLONASE) 50 mcg/actuation nasal spray 2 Sprays by Both Nostrils route daily. Provider, Historical   Esomeprazole Magnesium (NEXIUM PACKET) 40 mg by Per G Tube route daily.  Indications: gastroesophageal reflux disease    Provider, Historical   rizatriptan (MAXALT) 10 mg tablet 10 mg by SubLINGual route as needed for Migraine. May repeat dose after 2 hours not to exceed 3 tablets per 24hr    Kobi, MD Ofelia       Patient Active Problem List   Diagnosis Code    Abdominal pain R10.9    Abdominal pain, other specified site R10.9    Abdominal pain R10.9    CP (cerebral palsy) ([address] G80.9    Quadriplegic cerebral palsy ([address] G80.8    Esophageal stricture K22.2    Abdominal pain, LLQ (left lower quadrant) R10.32    UTI (urinary tract infection) N39.0    Sepsis ([address] A41.9    Constipation K59.00    Fecal impaction (HCC) K56.41    Hiatal hernia K44.9    Severe sepsis (HCC) A41.9, R65.20    Acute kidney injury ([address] N17.9    Nephrolithiasis N20.0    Hydronephrosis of right kidney N13.30       REVIEW OF SYSTEMS:    Constitutional: negative fever, negative chills  Eyes:   negative visual changes  ENT:   negative sore throat, tongue or lip swelling   Respiratory:  negative cough, negative dyspnea  Cards:  negative for chest pain, palpitations, lower extremity edema  GI:   See HPI  :  negative for frequency  Integument:  negative for rash and pruritus  Heme:  negative for easy bruising and gum/nose bleeding  Musculoskel: Hx CP  Neuro: negative for headaches, dizziness, vertigo  Psych: negative for feelings of anxiety, depression     Objective:   VITALS:    Visit Vitals  BP (!) 151/82   Pulse (!) 118   Temp 100.1 °F (37.8 °C)   Resp 20   Ht 5' 7\" (1.702 m)   Wt 50.8 kg (112 lb)   SpO2 96%   BMI 17.54 kg/m²       PHYSICAL EXAM:   General:          Alert, WD, WN, cooperative, no distress, appears stated age. Head:               Normocephalic, without obvious abnormality, atraumatic. Eyes:               Conjunctivae clear and pale, anicteric sclerae. Pupils are equal  Throat:             Lips, mucosa, and tongue normal.  No Thrush  Lungs:             CTA bilaterally. No wheezing/rhonchi/rales. Chest wall:      No tenderness or deformity. No Accessory muscle use.   Heart: Regular rate and rhythm,  no murmur, rub or gallop. Abdomen:        Soft, non-tender. Not distended. Bowel sounds normal. No masses  Skin:                Texture, turgor normal. No rashes/lesions/jaundice  Psych:             Good insight. Not depressed. Not anxious or agitated. Neurologic:      EOMs intact. No facial asymmetry.     LAB DATA REVIEWED:    Recent Results (from the past 24 hour(s))   URINALYSIS W/ RFLX MICROSCOPIC    Collection Time: 08/08/22  4:50 PM   Result Value Ref Range    Color DARK YELLOW      Appearance CLOUDY (A) CLEAR      Specific gravity >1.030 (H) 1.003 - 1.030    pH (UA) >8.5 (H) 5.0 - 8.0    Protein 100 (A) NEG mg/dL    Glucose Negative NEG mg/dL    Ketone Negative NEG mg/dL    Bilirubin Negative NEG      Blood LARGE (A) NEG      Urobilinogen 1.0 0.2 - 1.0 EU/dL    Nitrites Positive (A) NEG      Leukocyte Esterase LARGE (A) NEG      WBC >100 (H) 0 - 4 /hpf    RBC  0 - 5 /hpf    Epithelial cells FEW FEW /lpf    Bacteria 3+ (A) NEG /hpf    Mucus 3+ (A) NEG /lpf    Triple Phosphate crystals 2+ (A) NEG   CULTURE, URINE    Collection Time: 08/08/22  4:50 PM    Specimen: Clean catch; Urine   Result Value Ref Range    Special Requests: NO SPECIAL REQUESTS      Anza Count >100,000  COLONIES/mL        Culture result: GRAM NEGATIVE RODS (A)     CULTURE, BLOOD, PAIRED    Collection Time: 08/08/22  5:45 PM    Specimen: Blood   Result Value Ref Range    Special Requests: NO SPECIAL REQUESTS      Culture result: NO GROWTH AFTER 13 HOURS     METABOLIC PANEL, BASIC    Collection Time: 08/09/22  3:34 AM   Result Value Ref Range    Sodium 142 136 - 145 mmol/L    Potassium 4.4 3.5 - 5.1 mmol/L    Chloride 112 (H) 97 - 108 mmol/L    CO2 24 21 - 32 mmol/L    Anion gap 6 5 - 15 mmol/L    Glucose 85 65 - 100 mg/dL    BUN 22 (H) 6 - 20 MG/DL    Creatinine 0.69 (L) 0.70 - 1.30 MG/DL    BUN/Creatinine ratio 32 (H) 12 - 20      GFR est AA >60 >60 ml/min/1.73m2    GFR est non-AA >60 >60 ml/min/1.73m2    Calcium 9.0 8.5 - 10.1 MG/DL   CBC W/O DIFF    Collection Time: 08/09/22  3:34 AM   Result Value Ref Range    WBC 8.2 4.1 - 11.1 K/uL    RBC 4.78 4.10 - 5.70 M/uL    HGB 14.1 12.1 - 17.0 g/dL    HCT 43.5 36.6 - 50.3 %    MCV 91.0 80.0 - 99.0 FL    MCH 29.5 26.0 - 34.0 PG    MCHC 32.4 30.0 - 36.5 g/dL    RDW 13.7 11.5 - 14.5 %    PLATELET 792 856 - 433 K/uL    MPV 9.9 8.9 - 12.9 FL    NRBC 0.0 0  WBC    ABSOLUTE NRBC 0.00 0.00 - 0.01 K/uL   LIPASE    Collection Time: 08/09/22  3:34 AM   Result Value Ref Range    Lipase 106 73 - 393 U/L     IMAGING RESULTS:  I have personally reviewed the imaging reports    Total time spent with patient: 25 minutes ________________________________________________________________________  Care Plan discussed with:  Patient y   Family  Y -sister   RN Y - Thor Richie              Consultant:  Dorie LOPEZ  8/9/2022:  ________________________________________________________________________    ___________________________________________________  Consulting Provider: Royce Albert NP      8/9/2022  3:28 PM

## 2022-08-09 NOTE — ED NOTES
Report received from Yajaira Brady. Reviewed reason for patient arrival, vitals, labs, medications, orders, procedures, results, pending orders/results and current plan for disposition. Questions were asked and answered prior to departure.

## 2022-08-09 NOTE — PROGRESS NOTES
Physician Progress Note      PATIENT:               Janna Lara  [facility] #:                  [id number]  :                       1960  ADMIT DATE:       2022 12:41 PM  [address] [place] DATE:  RESPONDING  PROVIDER #:        Ronda Li MD          QUERY TEXT:    Patient admitted with uti. Noted documentation of sepsis in H&P. In order to support the diagnosis of sepsis, please include additional clinical indicators in your documentation. Or please document if the diagnosis of sepsis has been ruled out after further study. The medical record reflects the following:  Risk Factors: CP, neobladder, uti  Clinical Indicators: wbc 11-8, T 99, hr 108-138, H&P-Sepsis due to urinary tract infection  Treatment: ivfs, cx, meropenem  Thanks,  Bindu Varela Rn/CDI   Options provided:  -- Sepsis present as evidenced by, Please document evidence.   -- Sepsis was ruled out after study  -- Other - I will add my own diagnosis  -- Disagree - Not applicable / Not valid  -- Disagree - Clinically unable to determine / Unknown  -- Refer to Clinical Documentation Reviewer    PROVIDER RESPONSE TEXT:    Sepsis is present as evidenced by Leukocytosis, tachycardia and UTI    Query created by: Kvng Tejeda on 2022 11:09 AM      Electronically signed by:  Ronda Li MD 2022 11:12 AM

## 2022-08-09 NOTE — PROGRESS NOTES
Pt's PEG tube came out (all the way). Dr. Kirsten Asencio aware, CT ABD and GI consult ordered. Spoke with GI office, Dr. Matthew Rosado is on call. GI office is paging Dr. Matthew Rosado to make him aware.

## 2022-08-09 NOTE — CONSULTS
Urology Consult    Patient: Lashonda Rankin MRN: [id number]  SSN: [id number]    YOB: 1960  Age: 64 y.o. Sex: male          Date of Consultation:  August 9, 2022  Requesting Physician: Sola Gallegos MD  Reason for Consultation: UTI with neobladder           Assessment/Plan:  UTI pending cultures   Hx of bladder carcinoma s/p radical cystoprostatectomy with neobladder     -medical management of UTI, follow urine cultures, target therapy once able  -stones in neobladder non obstructing, neobladder non distended, no acute  intervention   -Okay to DC carrera before discharge and once clinically improved with return to I/O caths 4x daily  -treat constipation   -will arrange follow up with primary urologist dr. Maribell Merrill , otherwise will sign off, please call if needed    Discussed with supervising MD, Dr. Betzy Colon     History of Present Illness:  Patient is a 64 y.o. male admitted 8/8/2022 to the hospital for UTI (urinary tract infection) [N39.0]. He presents with  past medical history of cerebral palsy, history of bladder cancer with neobladder, depression who presented to the emergency department with worsening abdominal pain and nausea. Patient is a resident of [facility]. He was brought in because it was noticed this morning he was having abdominal pain 8 out of 10 associated with diffuse in nature with one episode of vomiting. Patient had his last PEG tube feeding around 7:30 AM.  He is not sure about the fever. Patient denied of any chest pain or shortness of breath. Patient takes Ativan last dose was in the morning. He is not sure about his bowel movement. Review of the system is not obtainable due to patient condition. He was admitted for fecal impaction and acute cystitis workup     Urology consulted. ROS unreliable due to poor historian. Remote patient of Dr. Maribell Merrill. Has not followed him in office since 2006.  Difficult assessment, contracted, nonverbal, family at bedside. Pt nods yes and no, states yes to having right flank pain. C/o constipation as well. Peg tube dislodged this morning. He had urosepsis admission at [facility] in march 2021 with proteus UTI and mult obstructing right ureteral stones causing hydronephrosis at the time and was difficult stent and ureteroscopy treatment after infection was treated. Hx of x4 daily intermittent caths of neobladder. Chart reviewed:  Af, vss. Tachycardic on admission resolved  UOP 10L yesterday? Today UOP 350ml  HD stable. Blood cx pending. Treated for UTI with meropenem  UA bacterial with >100 wbc, with + nitrites and leuks. Pending urine cx  Creat remains NL  CT abd with contrast reviewed 8/8/22:  Kidneys show no acute finding. There is no obstructive hydronephrosis. There is  stable left renal pelviectasis. Status post cystectomy with nondistended neobladder which contains several  stones. No apparent urinary tract inflammation. Past Medical History:   Allergies   Allergen Reactions    Avelox [Moxifloxacin] Rash    Amikacin Unknown (comments)    Amoxicillin Rash    Betadine Surgi-Prep Rash     Mother states this does not happen all the time and if betadine is washed off, it's ok    Ciprofloxacin Unknown (comments)    Dilaudid [Hydromorphone] Unable to Obtain    Effexor [Venlafaxine] Unknown (comments)    Fentanyl Unknown (comments)    Keflex [Cephalexin] Rash    Lyrica [Pregabalin] Unknown (comments)    Morphine Unknown (comments)    Neurontin [Gabapentin] Unknown (comments)    Prozac [Fluoxetine] Unknown (comments)    Reglan [Metoclopramide] Unknown (comments)    Serzone [Nefazodone] Unknown (comments)    Sulfa (Sulfonamide Antibiotics) Unknown (comments)    Tetracycline Unknown (comments)    Tricyclamol Chloride Unknown (comments)    Valium [Diazepam] Unknown (comments)     Tolerates Lorazepam    Demerol [Meperidine] Other (comments)     Spastic movements      Prior to Admission medications    Medication Sig Start Date End Date Taking? Authorizing Provider   ferrous sulfate 220 mg (44 mg iron)/5 mL solution Take 220 mg by mouth daily. Provider, Historical   mirtazapine (REMERON) 15 mg tablet 15 mg by Per G Tube route daily. Provider, Historical   nortriptyline (PAMELOR) 10 mg/5 mL solution 50 mg by Per G Tube route nightly. Provider, Historical   ascorbic acid, vitamin C, (VITAMIN C) 500 mg tablet 500 mg by Per G Tube route daily. Provider, Historical   cholecalciferol (VITAMIN D3) 1,000 unit tablet 2,000 Units by Per G Tube route daily. Indications: Vitamin D Deficiency    Provider, Historical   acetaminophen (TYLENOL) 160 mg/5 mL liquid 640 mg by Per G Tube route every four (4) hours as needed for Fever or Pain. Provider, Historical   HYDROcodone-acetaminophen (HYCET) 0.5-21.7 mg/mL oral solution 5 mL by Per G Tube route four (4) times daily as needed for Pain. Provider, Historical   LORazepam (ATIVAN) 1 mg tablet 0.75 mg by Per G Tube route every six (6) hours. Provider, Historical   fluticasone (FLONASE) 50 mcg/actuation nasal spray 2 Sprays by Both Nostrils route daily. Provider, Historical   Esomeprazole Magnesium (NEXIUM PACKET) 40 mg by Per G Tube route daily. Indications: gastroesophageal reflux disease    Provider, Historical   rizatriptan (MAXALT) 10 mg tablet 10 mg by SubLINGual route as needed for Migraine. May repeat dose after 2 hours not to exceed 3 tablets per 24hr    Other, MD Ofelia      PMHx:  has a past medical history of Anxiety, Bladder cancer ([facility] Bladder cancer ([facility] Cancer ([facility] CP (cerebral palsy) ([facility] Depression, Hernia, Nausea & vomiting, Other ill-defined conditions(799.89), Psychiatric disorder, and Unspecified constipation (5/13/2013).    PSurgHx:  has a past surgical history that includes hx appendectomy; pr remove mesh from abd wall; pr lap procedure, unlisted, bladder; pr lap,cholecystectomy; pr lap,lysis of adhesions; hx cholecystectomy; hx urological; hx hernia repair; ir nephrostomy perc rt plc cath  si (3/19/2021); ir ureteral stent rt perc plc existing si (3/24/2021); hx orthopaedic (2013); and ir inj nephro/ureterogram lt existing access si (4/1/2021). PSocHx:  reports that he has never smoked. He has never used smokeless tobacco. He reports that he does not drink alcohol and does not use drugs. ROS:  Admission ROS by Esvin Kapoor MD from 8/8/2022 were reviewed with the patient and changes (other than per HPI) include: none.     Physical Exam:  General Appearance: NAD, awake  HENT: atraumatic, normal ears  Cardiovascular: not tachycardic, no LE edema  Respiratory: no distress, room air  Abdomen: soft, some suprapubic fullness or tenderness  : no CVA tenderness, carrera clear yellow   Extremities: moves all, contracted  Musculoskeletal: normal alignment of neck and head  Neuro: Appropriate, no focal neurological deficits  Mood/Affect: appropriate, A&O x 1      Lab Results   Component Value Date/Time    WBC 8.2 08/09/2022 03:34 AM    HCT 43.5 08/09/2022 03:34 AM    PLATELET 890 31/55/2586 03:34 AM    Sodium 142 08/09/2022 03:34 AM    Potassium 4.4 08/09/2022 03:34 AM    Chloride 112 (H) 08/09/2022 03:34 AM    CO2 24 08/09/2022 03:34 AM    BUN 22 (H) 08/09/2022 03:34 AM    Creatinine 0.69 (L) 08/09/2022 03:34 AM    Glucose 85 08/09/2022 03:34 AM    Calcium 9.0 08/09/2022 03:34 AM    Magnesium 1.7 08/23/2013 03:58 AM    INR 1.0 07/14/2012 12:16 PM       UA:   Lab Results   Component Value Date/Time    Color DARK YELLOW 08/08/2022 04:50 PM    Appearance CLOUDY (A) 08/08/2022 04:50 PM    Specific gravity >1.030 (H) 08/08/2022 04:50 PM    Specific gravity 1.021 03/18/2021 08:00 PM    pH (UA) >8.5 (H) 08/08/2022 04:50 PM    Protein 100 (A) 08/08/2022 04:50 PM    Glucose Negative 08/08/2022 04:50 PM    Ketone Negative 08/08/2022 04:50 PM    Bilirubin Negative 08/08/2022 04:50 PM    Urobilinogen 1.0 08/08/2022 04:50 PM    Nitrites Positive (A) 08/08/2022 04:50 PM Leukocyte Esterase LARGE (A) 08/08/2022 04:50 PM    Epithelial cells FEW 08/08/2022 04:50 PM    Bacteria 3+ (A) 08/08/2022 04:50 PM    WBC >100 (H) 08/08/2022 04:50 PM    RBC  08/08/2022 04:50 PM         Signed By: Margaret March, JOLENE  - August 9, 2022

## 2022-08-09 NOTE — PROGRESS NOTES
Hospitalist Progress Note    NAME: Mitzy Mejia   :  1960   MRN:  [id number]       Assessment / Plan:  70-year-old male with past medical history of bladder status post neobladder , cerebral palsy a group home resident presented to the emergency department with nausea vomiting and abdominal pain. Sepsis due to urinary tract infection. POA. status post neobladder. Cerebral palsy. Anxiety/depression. History of bladder cancer. Constipation. Continue with IV fluid hydration. History of ESBL E. coli. Follow-up on urine analysis/urine culture. Cw  meropenem. CT scan with IV contrast shows moderate stool in the colon, enema + suppository ordered. Lipase elevated to 500 on admission, improved to 106, CT didn't show pancreatitis. Dislodged PEG tube:  PEG tube dislodged today  Stat Ct ordered  GI consulted, recommended IR guided placement, Consulted     Cerebral palsy. Continue with supportive care. Code Status: DO NOT RESUSCITATE. Surrogate Decision Maker: His neice     DVT Prophylaxis: Lovenox. GI Prophylaxis: not indicated     Baseline: Dependent for activity of daily living. JESSE:   Barriers: Urine cultures/ Constipation     Subjective:     Chief Complaint / Reason for Physician Visit  Seen and evaluated in ED  Discussed with Niece at the bedside  He didn't have a bm yet    Review of Systems:  Symptom Y/N Comments  Symptom Y/N Comments   Fever/Chills    Chest Pain     Poor Appetite    Edema     Cough    Abdominal Pain     Sputum    Joint Pain     SOB/FANG    Pruritis/Rash     Nausea/vomit    Tolerating PT/OT     Diarrhea    Tolerating Diet     Constipation    Other       Could NOT obtain due to: Non verbal     Objective:     VITALS:   Last 24hrs VS reviewed since prior progress note.  Most recent are:  Patient Vitals for the past 24 hrs:   Temp Pulse Resp BP SpO2   22 1517 100.1 °F (37.8 °C) (!) 118 20 (!) 151/82 96 %   22 0627 -- 94 12 106/70 94 % 08/09/22 0612 -- 98 13 116/71 95 %   08/09/22 0542 -- 98 15 113/73 91 %   08/09/22 0516 -- (!) 107 19 99/78 90 %   08/09/22 0501 -- (!) 113 18 133/77 93 %   08/09/22 0326 98.3 °F (36.8 °C) 94 16 99/61 93 %   08/09/22 0311 -- 94 21 97/63 92 %   08/09/22 0254 -- 98 14 105/62 92 %   08/09/22 0239 -- (!) 101 14 100/73 91 %   08/09/22 0224 -- (!) 108 14 104/78 92 %   08/09/22 0209 -- (!) 115 16 106/71 94 %   08/09/22 0158 -- (!) 115 15 108/77 91 %   08/09/22 0143 -- (!) 105 15 110/76 93 %   08/09/22 0128 98.4 °F (36.9 °C) (!) 106 14 110/66 91 %   08/09/22 0113 -- (!) 109 12 125/76 90 %   08/09/22 0058 -- (!) 111 15 115/77 91 %   08/09/22 0023 -- (!) 119 17 -- 91 %   08/08/22 2323 -- (!) 115 30 -- 92 %   08/08/22 2253 -- (!) 108 19 (!) 138/92 92 %   08/08/22 2239 98.1 °F (36.7 °C) (!) 109 20 (!) 149/98 94 %   08/08/22 2046 98.5 °F (36.9 °C) (!) 106 20 (!) 136/97 95 %   08/08/22 1853 -- (!) 120 -- (!) 135/90 99 %   08/08/22 1757 -- (!) 108 16 -- --       Intake/Output Summary (Last 24 hours) at 8/9/2022 1702  Last data filed at 8/9/2022 1416  Gross per 24 hour   Intake 750 ml   Output 79173 ml   Net -04974 ml        I had a face to face encounter and independently examined this patient on 8/9/2022, as outlined below:  PHYSICAL EXAM:  General: Awake, No acute distress  EENT:  EOMI. Anicteric sclerae. MMM  Resp:  CTA bilaterally, no wheezing or rales. No accessory muscle use  CV:  Regular  rhythm,  No edema  GI:  Soft, Non distended, Non tender. +Bowel sounds  Neurologic:  Awake, non verbal at baseline, contracted extremity  Psych:   Not anxious nor agitated  Skin:  No rashes.   No jaundice    Reviewed most current lab test results and cultures  YES  Reviewed most current radiology test results   YES  Review and summation of old records today    NO  Reviewed patient's current orders and MAR    YES  PMH/SH reviewed - no change compared to H&P  ________________________________________________________________________  Care Plan discussed with:    Comments   Patient y    Family      RN y    Care Manager     Consultant                        Multidiciplinary team rounds were held today with , nursing, pharmacist and clinical coordinator. Patient's plan of care was discussed; medications were reviewed and discharge planning was addressed. ________________________________________________________________________  Total NON critical care TIME:  36   Minutes    Total CRITICAL CARE TIME Spent:   Minutes non procedure based      Comments   >50% of visit spent in counseling and coordination of care     ________________________________________________________________________  Ky Horta MD     Procedures: see electronic medical records for all procedures/Xrays and details which were not copied into this note but were reviewed prior to creation of Plan. LABS:  I reviewed today's most current labs and imaging studies.   Pertinent labs include:  Recent Labs     08/09/22  0334 08/08/22  1310   WBC 8.2 11.5*   HGB 14.1 15.2   HCT 43.5 48.9    398     Recent Labs     08/09/22  0334 08/08/22  1310    139   K 4.4 3.9   * 103   CO2 24 25   GLU 85 80   BUN 22* 26*   CREA 0.69* 0.97   CA 9.0 9.6   ALB  --  3.7   TBILI  --  0.8   ALT  --  30       Signed: Ky Horta MD

## 2022-08-09 NOTE — ED NOTES
Report given to Ric Colin RN. Nurse was informed of reason for arrival, vitals, labs, medications, orders, procedures, results, anything left pending and current plan of action. Questions were asked and received prior to departure from the patient.

## 2022-08-09 NOTE — ED NOTES
Patient cleaned and repositioned after a soft, green bowel movement. IV was infiltrated so it was removed and replaced. Short inserted into neobladder without difficulty. Niece still at the bedside.

## 2022-08-10 PROCEDURE — 74011250636 HC RX REV CODE- 250/636: Performed by: INTERNAL MEDICINE

## 2022-08-10 PROCEDURE — 65270000029 HC RM PRIVATE

## 2022-08-10 PROCEDURE — 74011000250 HC RX REV CODE- 250: Performed by: INTERNAL MEDICINE

## 2022-08-10 PROCEDURE — 74011000258 HC RX REV CODE- 258: Performed by: INTERNAL MEDICINE

## 2022-08-10 PROCEDURE — 74011250636 HC RX REV CODE- 250/636: Performed by: HOSPITALIST

## 2022-08-10 PROCEDURE — 3E0G76Z INTRODUCTION OF NUTRITIONAL SUBSTANCE INTO UPPER GI, VIA NATURAL OR ARTIFICIAL OPENING: ICD-10-PCS | Performed by: STUDENT IN AN ORGANIZED HEALTH CARE EDUCATION/TRAINING PROGRAM

## 2022-08-10 PROCEDURE — 74011250636 HC RX REV CODE- 250/636: Performed by: STUDENT IN AN ORGANIZED HEALTH CARE EDUCATION/TRAINING PROGRAM

## 2022-08-10 PROCEDURE — 74011250637 HC RX REV CODE- 250/637: Performed by: INTERNAL MEDICINE

## 2022-08-10 PROCEDURE — 74011250637 HC RX REV CODE- 250/637: Performed by: STUDENT IN AN ORGANIZED HEALTH CARE EDUCATION/TRAINING PROGRAM

## 2022-08-10 RX ORDER — POLYETHYLENE GLYCOL 3350 17 G/17G
17 POWDER, FOR SOLUTION ORAL DAILY
Status: DISCONTINUED | OUTPATIENT
Start: 2022-08-11 | End: 2022-08-11

## 2022-08-10 RX ADMIN — SODIUM CHLORIDE, PRESERVATIVE FREE 10 ML: 5 INJECTION INTRAVENOUS at 21:00

## 2022-08-10 RX ADMIN — MIRTAZAPINE 15 MG: 15 TABLET, FILM COATED ORAL at 09:49

## 2022-08-10 RX ADMIN — MEROPENEM 1 G: 1 INJECTION, POWDER, FOR SOLUTION INTRAVENOUS at 04:43

## 2022-08-10 RX ADMIN — ENOXAPARIN SODIUM 40 MG: 100 INJECTION SUBCUTANEOUS at 09:49

## 2022-08-10 RX ADMIN — OXYCODONE HYDROCHLORIDE AND ACETAMINOPHEN 500 MG: 500 TABLET ORAL at 09:49

## 2022-08-10 RX ADMIN — MEROPENEM 1 G: 1 INJECTION, POWDER, FOR SOLUTION INTRAVENOUS at 11:09

## 2022-08-10 RX ADMIN — SODIUM CHLORIDE 500 ML: 900 INJECTION, SOLUTION INTRAVENOUS at 16:00

## 2022-08-10 RX ADMIN — NORTRIPTYLINE HYDROCHLORIDE 50 MG: 25 CAPSULE ORAL at 21:00

## 2022-08-10 RX ADMIN — MEROPENEM 1 G: 1 INJECTION, POWDER, FOR SOLUTION INTRAVENOUS at 20:51

## 2022-08-10 RX ADMIN — FLUTICASONE PROPIONATE 2 SPRAY: 50 SPRAY, METERED NASAL at 09:50

## 2022-08-10 RX ADMIN — BISACODYL 10 MG: 10 SUPPOSITORY RECTAL at 09:49

## 2022-08-10 RX ADMIN — SODIUM CHLORIDE 500 ML: 900 INJECTION, SOLUTION INTRAVENOUS at 17:54

## 2022-08-10 RX ADMIN — SODIUM CHLORIDE, POTASSIUM CHLORIDE, SODIUM LACTATE AND CALCIUM CHLORIDE 500 ML: 600; 310; 30; 20 INJECTION, SOLUTION INTRAVENOUS at 20:50

## 2022-08-10 RX ADMIN — PANTOPRAZOLE SODIUM 40 MG: 40 TABLET, DELAYED RELEASE ORAL at 09:49

## 2022-08-10 RX ADMIN — SODIUM CHLORIDE, PRESERVATIVE FREE 10 ML: 5 INJECTION INTRAVENOUS at 05:07

## 2022-08-10 RX ADMIN — ACETAMINOPHEN 650 MG: 650 SUPPOSITORY RECTAL at 15:42

## 2022-08-10 NOTE — PROGRESS NOTES
08/10/22 1500   Vital Signs   Temp 100.2 °F (37.9 °C)   Temp Source Axillary   Pulse (Heart Rate) (!) 141  (MD made aware, RR nurse made aware)   Heart Rate Source Monitor   Resp Rate 22   O2 Sat (%) 92 %   Level of Consciousness Alert (0)   BP (!) 144/98   MAP (Calculated) 113   MEWS Score 5     MEWS score elevated due to above V/S. MD perfect served and message was read. RR nurse made aware. Pt is not on tele at this time, water bolus given to pt in PEG tube. Bolus order and tele order placed. Pt now receiving 500ml bolus, monitor shows ST. Will continue to monitor patient.

## 2022-08-10 NOTE — PROGRESS NOTES
GI PROGRESS NOTE  Areli Charles, NP  [phone] NP in-hospital cell phone M-F until 4:30  After 5pm or on weekends, please call  for physician on call    NAME:Basilio Mitchell :1960 XFX:[id number]   ATTG: Dr. Alice Haywood  PCP: Tracie Cadet MD  Date/Time:  8/10/2022 12:42 PM     Primary GI: Dr. Valerio Mena    Reason for following: peg malfunction    Assessment:     PEG Tube Malfunction; replaced by IR  - Patient with significant PMH including cerebral palsy, bladder cancer, celestine bladder; initially came in to ED for abdominal pain 8/10 and diffuse; had even had episode of vomiting  - PEG tube was dislodged/slid out unsure how this happened; sister who is with him states it happens often and nurses at long term care facility where he lives replace it  - PEG was reportedly dislodged around noon 22 but may have been longer since dislodgement  - Replaced by IR 2022 with 18Fr  - CT ABD PEL W CONT 22 Obstipation, Air-filled stomach, Incidental Moderate hiatal hernia, emphysema.  -Constipation, s/p enema with bowel movement   -chronic back pain, s/p stimulator, located in RLQ  Plan:     -Needs dietician/nutritionist referral for recs regarding tube feed  -Needs aggressive bowel management to prevent constipation/obstipation. Would consider miralax daily instead of PRN. -Will sign off as GI has nothing further to add to plan. Please call GI with any further questions or concerns. Thank you! *Plan discussed with Dr. Valerio Mena  Subjective:   Discussed with RN events overnight.     Complaint Y/N Description   Abdominal Pain N    Hematemesis     Hematochezia     Melena     Constipation     Diarrhea     Dyspepsia     Dysphagia     Jaundiced     Nausea/vomiting N      Review of Systems:  Symptom Y/N Comments  Symptom Y/N Comments   Fever/Chills    Chest Pain     Cough    Headaches     Sputum    Joint Pain     SOB/FANG    Pruritis/Rash     Tolerating Diet    Other       Could NOT obtain due to:      Objective:   VITALS:   Last 24hrs VS reviewed since prior progress note. Most recent are:  Visit Vitals  BP (!) 141/81   Pulse (!) 120   Temp 99.2 °F (37.3 °C)   Resp 20   Ht 5' 7\" (1.702 m)   Wt 50.8 kg (112 lb)   SpO2 94%   BMI 17.54 kg/m²       Intake/Output Summary (Last 24 hours) at 8/10/2022 1242  Last data filed at 8/10/2022 3486  Gross per 24 hour   Intake 120 ml   Output 1775 ml   Net -1655 ml     PHYSICAL EXAM:  General: WD, WN. Alert, cooperative, no acute distress    HEENT: NC, Atraumatic. Anicteric sclerae. Lungs:  CTA Bilaterally. No Wheezing/Rhonchi/Rales. Heart:  Regular  rhythm,  No Rubs, No Gallops  Abdomen: Soft, Non distended, Non tender. +Bowel sounds, no HSM. PEG in lower quadrant, site c/d/I. Stimulator in RLQ  Extremities: No c/c/e  Neurologic:  Alert and oriented X 2  No acute neurological distress   Psych:   Not anxious nor agitated. Lab and Radiology Data Reviewed: (see below)    Medications Reviewed: (see below)  PMH/SH reviewed - no change compared to H&P  ________________________________________________________________________  Total time spent with patient: 20minutes ________________________________________________________________________  Care Plan discussed with:  Patient Y   Family     RN Y              Consultant:       Natanael Goldberg NP     Procedures: see electronic medical records for all procedures/Xrays and details which were not copied into this note but were reviewed prior to creation of Plan.       LABS:  Recent Labs     08/09/22  0334 08/08/22  1310   WBC 8.2 11.5*   HGB 14.1 15.2   HCT 43.5 48.9    398     Recent Labs     08/09/22  0334 08/08/22  1310    139   K 4.4 3.9   * 103   CO2 24 25   BUN 22* 26*   CREA 0.69* 0.97   GLU 85 80   CA 9.0 9.6     Recent Labs     08/09/22  0334 08/08/22  1310   AP  --  86   TP  --  7.6   ALB  --  3.7   GLOB  --  3.9   LPSE 106 536*     No results for input(s): INR, PTP, APTT, INREXT in the last 72 hours. No results for input(s): FE, TIBC, PSAT, FERR in the last 72 hours. No results found for: FOL, RBCF  No results for input(s): PH, PCO2, PO2 in the last 72 hours. No results for input(s): CPK, CKMB in the last 72 hours.     No lab exists for component: TROPONINI  Lab Results   Component Value Date/Time    Color DARK YELLOW 08/08/2022 04:50 PM    Appearance CLOUDY (A) 08/08/2022 04:50 PM    Specific gravity >1.030 (H) 08/08/2022 04:50 PM    Specific gravity 1.021 03/18/2021 08:00 PM    pH (UA) >8.5 (H) 08/08/2022 04:50 PM    Protein 100 (A) 08/08/2022 04:50 PM    Glucose Negative 08/08/2022 04:50 PM    Ketone Negative 08/08/2022 04:50 PM    Bilirubin Negative 08/08/2022 04:50 PM    Urobilinogen 1.0 08/08/2022 04:50 PM    Nitrites Positive (A) 08/08/2022 04:50 PM    Leukocyte Esterase LARGE (A) 08/08/2022 04:50 PM    Epithelial cells FEW 08/08/2022 04:50 PM    Bacteria 3+ (A) 08/08/2022 04:50 PM    WBC >100 (H) 08/08/2022 04:50 PM    RBC  08/08/2022 04:50 PM       MEDICATIONS:  Current Facility-Administered Medications   Medication Dose Route Frequency    bisacodyL (DULCOLAX) suppository 10 mg  10 mg Rectal DAILY    sodium chloride (NS) flush 5-40 mL  5-40 mL IntraVENous Q8H    sodium chloride (NS) flush 5-40 mL  5-40 mL IntraVENous PRN    acetaminophen (TYLENOL) tablet 650 mg  650 mg Oral Q6H PRN    Or    acetaminophen (TYLENOL) suppository 650 mg  650 mg Rectal Q6H PRN    polyethylene glycol (MIRALAX) packet 17 g  17 g Oral DAILY PRN    ondansetron (ZOFRAN ODT) tablet 4 mg  4 mg Oral Q8H PRN    Or    ondansetron (ZOFRAN) injection 4 mg  4 mg IntraVENous Q6H PRN    enoxaparin (LOVENOX) injection 40 mg  40 mg SubCUTAneous DAILY    ascorbic acid (vitamin C) (VITAMIN C) tablet 500 mg  500 mg Per G Tube DAILY    pantoprazole (PROTONIX) tablet 40 mg  40 mg Oral ACB    fluticasone propionate (FLONASE) 50 mcg/actuation nasal spray 2 Spray  2 Spray Both Nostrils DAILY    mirtazapine (REMERON) tablet 15 mg  15 mg Per G Tube DAILY    nortriptyline (PAMELOR) capsule 50 mg  50 mg Per G Tube QHS    meropenem (MERREM) 1 g in 0.9% sodium chloride (MBP/ADV) 50 mL MBP  1 g IntraVENous Q8H

## 2022-08-10 NOTE — PROGRESS NOTES
Hospitalist Progress Note    NAME: Luis Robertson   :  1960   MRN:  [id number]       Assessment / Plan:  69-year-old male with past medical history of bladder status post neobladder , cerebral palsy a group home resident presented to the emergency department with nausea vomiting and abdominal pain. Sepsis due to urinary tract infection. POA. status post neobladder. Cerebral palsy. Anxiety/depression. History of bladder cancer. Constipation. Continue with IV fluid hydration. History of ESBL E. coli. Follow-up on urine analysis/urine culture, growing GNR  Cw  meropenem. CT scan with IV contrast shows moderate stool in the colon, having BM after enema  Lipase elevated to 500 on admission, improved to 106, CT didn't show pancreatitis. Sinus tachy with fever today, given fluids bolus     Dislodged PEG tube:  PEG replaced by IR yesterday, resume feeding     Cerebral palsy. Continue with supportive care. Code Status: DO NOT RESUSCITATE. Surrogate Decision Maker: His neice     DVT Prophylaxis: Lovenox. GI Prophylaxis: not indicated     Baseline: Dependent for activity of daily living. JESSE:   Barriers: Urine cultures/ Constipation     Subjective:     Chief Complaint / Reason for Physician Visit  Seen and evaluated in ED  Had a bm, tachy during the afternoon    Review of Systems:  Symptom Y/N Comments  Symptom Y/N Comments   Fever/Chills    Chest Pain     Poor Appetite    Edema     Cough    Abdominal Pain     Sputum    Joint Pain     SOB/FANG    Pruritis/Rash     Nausea/vomit    Tolerating PT/OT     Diarrhea    Tolerating Diet     Constipation    Other       Could NOT obtain due to: Non verbal     Objective:     VITALS:   Last 24hrs VS reviewed since prior progress note.  Most recent are:  Patient Vitals for the past 24 hrs:   Temp Pulse Resp BP SpO2   08/10/22 1500 100.2 °F (37.9 °C) (!) 141 22 (!) 144/98 92 %   08/10/22 0924 99.2 °F (37.3 °C) (!) 120 20 (!) 141/81 94 % 08/09/22 2241 98.3 °F (36.8 °C) (!) 115 20 137/84 97 %   08/09/22 1725 -- (!) 125 24 (!) 154/92 96 %       Intake/Output Summary (Last 24 hours) at 8/10/2022 1714  Last data filed at 8/10/2022 3102  Gross per 24 hour   Intake 120 ml   Output 1500 ml   Net -1380 ml        I had a face to face encounter and independently examined this patient on 8/10/2022, as outlined below:  PHYSICAL EXAM:  General: Awake, No acute distress  EENT:  EOMI. Anicteric sclerae. MMM  Resp:  CTA bilaterally, no wheezing or rales. No accessory muscle use  CV:  Regular  rhythm,  No edema  GI:  Soft, Non distended, Non tender. +Bowel sounds  Neurologic:  Awake, non verbal at baseline, contracted extremity  Psych:   Not anxious nor agitated  Skin:  No rashes. No jaundice    Reviewed most current lab test results and cultures  YES  Reviewed most current radiology test results   YES  Review and summation of old records today    NO  Reviewed patient's current orders and MAR    YES  PMH/SH reviewed - no change compared to H&P  ________________________________________________________________________  Care Plan discussed with:    Comments   Patient y    Family      RN y    Care Manager     Consultant                        Multidiciplinary team rounds were held today with , nursing, pharmacist and clinical coordinator. Patient's plan of care was discussed; medications were reviewed and discharge planning was addressed.      ________________________________________________________________________  Total NON critical care TIME:  36   Minutes    Total CRITICAL CARE TIME Spent:   Minutes non procedure based      Comments   >50% of visit spent in counseling and coordination of care     ________________________________________________________________________  Harry Reynoso MD     Procedures: see electronic medical records for all procedures/Xrays and details which were not copied into this note but were reviewed prior to creation of Plan.      LABS:  I reviewed today's most current labs and imaging studies.   Pertinent labs include:  Recent Labs     08/09/22  0334 08/08/22  1310   WBC 8.2 11.5*   HGB 14.1 15.2   HCT 43.5 48.9    398     Recent Labs     08/09/22  0334 08/08/22  1310    139   K 4.4 3.9   * 103   CO2 24 25   GLU 85 80   BUN 22* 26*   CREA 0.69* 0.97   CA 9.0 9.6   ALB  --  3.7   TBILI  --  0.8   ALT  --  30       Signed: Tl Kowalski MD

## 2022-08-10 NOTE — PROGRESS NOTES
Transition of Care Plan:    RUR: 11% (low)  Disposition: Home with HH and IV abx.?  Follow up appointments: PCP/specialists. DME needed: TBD. Transportation at Discharge: [facility] vs. Family. [place] or means to access home:   Facility has keys. IM Medicare Letter: 1st IMM given, 2nd IMM needed prior to discharge. Is patient a BCPI-A Bundle:   N/A        If yes, was Bundle Letter given?:  N/A  Is patient a  and connected with the [facility] [address]? N/A              If yes, was [employer] transfer form completed and [facility] notified? N/A  Caregiver Contact: Kayden Wylie [facility] - [phone]. Discharge Caregiver contacted prior to discharge? To be contacted prior to discharge. Care Conference needed?:  No.                 From [place] [address] [address], may need HH and IV abx. on discharge.        Farzana Gibson, ALMAN, RN    Care Management  [phone]

## 2022-08-10 NOTE — PROGRESS NOTES
Communication more difficult without his mother present. All reviewed. U cx prelim:  GNR. Improved on Merrem. Few small stones in neobladder don't need removal unless recurrent UTI's with his high anesthetic risk. Small BM yesterday insufficient in light of stool burden on CT. Short can be removed and IC resumed at [facility].. We will arrange followup.

## 2022-08-10 NOTE — PROGRESS NOTES
Comprehensive Nutrition Assessment    Type and Reason for Visit: Initial, Consult    Nutrition Recommendations/Plan:   Resume home TF regimen - Jevity 1.5 355 mL bolus 3x/day + 480 mL water flushes after each bolus (provides ~1600 kcals, 68g protein, 2249 mL water)     Nutrition Assessment:    Patient medically noted for UTI, abdominal pain, constipation, and dislodged PEG tube. PMH CP and bladder cancer. Feeding tube replaced in IR. Consult received today for TF recommendations/management. Reviewed MAR from group home and discussed TF regimen with patient/family at bedside. Orders placed per patient's home regimen. Patient reportedly tolerating this regimen well at home with a stable weight. Also consumes tea and jello at times per family. Nutrition Related Findings:    Labs reviewed  BM 8/9  Vitamin C, Dulcolax, Remeron, Protonix    Current Nutrition Intake & Therapies:        Current Tube Feeding (TF) Orders:   Feeding Route: Gastrostomy  Formula: Standard with fiber  Schedule: Bolus    Regimen: 355 mL 3x/day  Additives/Modulars: None  Water Flushes: 480 mL after each bolus  Current TF & Flush Orders Provides: 1600 kcals, 68g protein, 2249 mL water    Anthropometric Measures:  Height: 5' 7\" (170.2 cm)  Ideal Body Weight (IBW): 148 lbs (67 kg)     Current Body Wt:  50.8 kg (111 lb 15.9 oz), 75.7 % IBW. Current BMI (kg/m2): 17.5                          BMI Category: Underweight (BMI less than 18.5)    Estimated Daily Nutrient Needs:  Energy Requirements Based On: Formula  Weight Used for Energy Requirements: Current  Energy (kcal/day): 1656 kcals (BMR 1274 x 1. 3AF)  Weight Used for Protein Requirements: Current  Protein (g/day): 66-77g (1.3-1.5 g/kg bw)  Method Used for Fluid Requirements: 1 ml/kcal  Fluid (ml/day): 1650 mL    Nutrition Diagnosis:   Inadequate protein-energy intake related to  (dislodged PEG tube) as evidenced by NPO or clear liquid status due to medical condition    Nutrition Interventions: Food and/or Nutrient Delivery: Start tube feeding  Nutrition Education/Counseling: No recommendations at this time  Coordination of Nutrition Care: Continue to monitor while inpatient       Goals:     Goals:  Tolerate nutrition support at goal rate, by next RD assessment       Nutrition Monitoring and Evaluation:   Behavioral-Environmental Outcomes: None identified  Food/Nutrient Intake Outcomes: Enteral nutrition intake/tolerance  Physical Signs/Symptoms Outcomes: Biochemical data, Weight, Fluid status or edema, Hemodynamic status, GI status    Discharge Planning:    Enteral nutrition    Jet Amaya RD  Contact: ext [phone]

## 2022-08-11 ENCOUNTER — APPOINTMENT (OUTPATIENT)
Dept: CT IMAGING | Age: 62
DRG: 698 | End: 2022-08-11
Attending: STUDENT IN AN ORGANIZED HEALTH CARE EDUCATION/TRAINING PROGRAM
Payer: MEDICARE

## 2022-08-11 LAB
ANION GAP SERPL CALC-SCNC: 12 MMOL/L (ref 5–15)
BACTERIA SPEC CULT: ABNORMAL
BUN SERPL-MCNC: 31 MG/DL (ref 6–20)
BUN/CREAT SERPL: 25 (ref 12–20)
CALCIUM SERPL-MCNC: 9.2 MG/DL (ref 8.5–10.1)
CC UR VC: ABNORMAL
CHLORIDE SERPL-SCNC: 120 MMOL/L (ref 97–108)
CO2 SERPL-SCNC: 18 MMOL/L (ref 21–32)
CREAT SERPL-MCNC: 1.22 MG/DL (ref 0.7–1.3)
ERYTHROCYTE [DISTWIDTH] IN BLOOD BY AUTOMATED COUNT: 14.5 % (ref 11.5–14.5)
GLUCOSE SERPL-MCNC: 263 MG/DL (ref 65–100)
HCT VFR BLD AUTO: 47.4 % (ref 36.6–50.3)
HGB BLD-MCNC: 14.9 G/DL (ref 12.1–17)
MCH RBC QN AUTO: 29.5 PG (ref 26–34)
MCHC RBC AUTO-ENTMCNC: 31.4 G/DL (ref 30–36.5)
MCV RBC AUTO: 93.9 FL (ref 80–99)
NRBC # BLD: 0 K/UL (ref 0–0.01)
NRBC BLD-RTO: 0 PER 100 WBC
PLATELET # BLD AUTO: 406 K/UL (ref 150–400)
PMV BLD AUTO: 10.1 FL (ref 8.9–12.9)
POTASSIUM SERPL-SCNC: 4 MMOL/L (ref 3.5–5.1)
RBC # BLD AUTO: 5.05 M/UL (ref 4.1–5.7)
SERVICE CMNT-IMP: ABNORMAL
SODIUM SERPL-SCNC: 150 MMOL/L (ref 136–145)
WBC # BLD AUTO: 9.8 K/UL (ref 4.1–11.1)

## 2022-08-11 PROCEDURE — 74011000258 HC RX REV CODE- 258: Performed by: INTERNAL MEDICINE

## 2022-08-11 PROCEDURE — 36415 COLL VENOUS BLD VENIPUNCTURE: CPT

## 2022-08-11 PROCEDURE — 74011250636 HC RX REV CODE- 250/636: Performed by: INTERNAL MEDICINE

## 2022-08-11 PROCEDURE — 74011250637 HC RX REV CODE- 250/637: Performed by: INTERNAL MEDICINE

## 2022-08-11 PROCEDURE — 74011000636 HC RX REV CODE- 636: Performed by: STUDENT IN AN ORGANIZED HEALTH CARE EDUCATION/TRAINING PROGRAM

## 2022-08-11 PROCEDURE — 74011000250 HC RX REV CODE- 250: Performed by: STUDENT IN AN ORGANIZED HEALTH CARE EDUCATION/TRAINING PROGRAM

## 2022-08-11 PROCEDURE — 74011000250 HC RX REV CODE- 250: Performed by: INTERNAL MEDICINE

## 2022-08-11 PROCEDURE — 51798 US URINE CAPACITY MEASURE: CPT

## 2022-08-11 PROCEDURE — 74011250637 HC RX REV CODE- 250/637: Performed by: STUDENT IN AN ORGANIZED HEALTH CARE EDUCATION/TRAINING PROGRAM

## 2022-08-11 PROCEDURE — 80048 BASIC METABOLIC PNL TOTAL CA: CPT

## 2022-08-11 PROCEDURE — 65270000029 HC RM PRIVATE

## 2022-08-11 PROCEDURE — BT40ZZZ ULTRASONOGRAPHY OF BLADDER: ICD-10-PCS | Performed by: STUDENT IN AN ORGANIZED HEALTH CARE EDUCATION/TRAINING PROGRAM

## 2022-08-11 PROCEDURE — 85027 COMPLETE CBC AUTOMATED: CPT

## 2022-08-11 PROCEDURE — 74177 CT ABD & PELVIS W/CONTRAST: CPT

## 2022-08-11 PROCEDURE — 0T2BX0Z CHANGE DRAINAGE DEVICE IN BLADDER, EXTERNAL APPROACH: ICD-10-PCS | Performed by: STUDENT IN AN ORGANIZED HEALTH CARE EDUCATION/TRAINING PROGRAM

## 2022-08-11 RX ORDER — DEXTROSE MONOHYDRATE 50 MG/ML
75 INJECTION, SOLUTION INTRAVENOUS CONTINUOUS
Status: DISCONTINUED | OUTPATIENT
Start: 2022-08-11 | End: 2022-08-11

## 2022-08-11 RX ORDER — METOPROLOL TARTRATE 25 MG/1
12.5 TABLET, FILM COATED ORAL EVERY 12 HOURS
Status: DISCONTINUED | OUTPATIENT
Start: 2022-08-11 | End: 2022-08-13

## 2022-08-11 RX ORDER — DEXTROSE MONOHYDRATE AND SODIUM CHLORIDE 5; .45 G/100ML; G/100ML
50 INJECTION, SOLUTION INTRAVENOUS CONTINUOUS
Status: DISCONTINUED | OUTPATIENT
Start: 2022-08-11 | End: 2022-08-12

## 2022-08-11 RX ORDER — AMOXICILLIN 250 MG
1 CAPSULE ORAL
Status: DISCONTINUED | OUTPATIENT
Start: 2022-08-11 | End: 2022-08-31 | Stop reason: HOSPADM

## 2022-08-11 RX ADMIN — NORTRIPTYLINE HYDROCHLORIDE 50 MG: 25 CAPSULE ORAL at 21:08

## 2022-08-11 RX ADMIN — IOPAMIDOL 100 ML: 755 INJECTION, SOLUTION INTRAVENOUS at 11:04

## 2022-08-11 RX ADMIN — MEROPENEM 1 G: 1 INJECTION, POWDER, FOR SOLUTION INTRAVENOUS at 12:47

## 2022-08-11 RX ADMIN — SODIUM CHLORIDE, PRESERVATIVE FREE 10 ML: 5 INJECTION INTRAVENOUS at 21:32

## 2022-08-11 RX ADMIN — LACTULOSE 30 ML: 20 SOLUTION ORAL at 17:47

## 2022-08-11 RX ADMIN — ACETAMINOPHEN 650 MG: 325 TABLET ORAL at 09:34

## 2022-08-11 RX ADMIN — MEROPENEM 1 G: 1 INJECTION, POWDER, FOR SOLUTION INTRAVENOUS at 21:30

## 2022-08-11 RX ADMIN — FLUTICASONE PROPIONATE 2 SPRAY: 50 SPRAY, METERED NASAL at 09:00

## 2022-08-11 RX ADMIN — DEXTROSE AND SODIUM CHLORIDE 50 ML/HR: 5; 450 INJECTION, SOLUTION INTRAVENOUS at 13:38

## 2022-08-11 RX ADMIN — ONDANSETRON 4 MG: 2 INJECTION INTRAMUSCULAR; INTRAVENOUS at 18:36

## 2022-08-11 RX ADMIN — SENNOSIDES AND DOCUSATE SODIUM 1 TABLET: 50; 8.6 TABLET ORAL at 21:08

## 2022-08-11 RX ADMIN — MIRTAZAPINE 15 MG: 15 TABLET, FILM COATED ORAL at 09:34

## 2022-08-11 RX ADMIN — SODIUM CHLORIDE, PRESERVATIVE FREE 10 ML: 5 INJECTION INTRAVENOUS at 05:32

## 2022-08-11 RX ADMIN — ACETAMINOPHEN 650 MG: 325 TABLET ORAL at 18:37

## 2022-08-11 RX ADMIN — SODIUM CHLORIDE, PRESERVATIVE FREE 10 ML: 5 INJECTION INTRAVENOUS at 14:00

## 2022-08-11 RX ADMIN — METOPROLOL TARTRATE 12.5 MG: 25 TABLET, FILM COATED ORAL at 09:34

## 2022-08-11 RX ADMIN — POLYETHYLENE GLYCOL 3350 17 G: 17 POWDER, FOR SOLUTION ORAL at 09:35

## 2022-08-11 RX ADMIN — MEROPENEM 1 G: 1 INJECTION, POWDER, FOR SOLUTION INTRAVENOUS at 05:00

## 2022-08-11 RX ADMIN — OXYCODONE HYDROCHLORIDE AND ACETAMINOPHEN 500 MG: 500 TABLET ORAL at 09:34

## 2022-08-11 RX ADMIN — METOPROLOL TARTRATE 12.5 MG: 25 TABLET, FILM COATED ORAL at 21:30

## 2022-08-11 RX ADMIN — BISACODYL 10 MG: 10 SUPPOSITORY RECTAL at 09:34

## 2022-08-11 RX ADMIN — ENOXAPARIN SODIUM 40 MG: 100 INJECTION SUBCUTANEOUS at 09:33

## 2022-08-11 RX ADMIN — PANTOPRAZOLE SODIUM 40 MG: 40 TABLET, DELAYED RELEASE ORAL at 09:34

## 2022-08-11 NOTE — PROGRESS NOTES
Spiritual Care Assessment/Progress Note  [facility]      NAME: Mitzy Mejia      MRN: [id number]  AGE: 64 y.o. SEX: male  Bahai Affiliation: No Sikh   Language: English     8/11/2022     Total Time (in minutes): 15     Spiritual Assessment begun in [facility] 2 MED TELE through conversation with:         [x]Patient        [x] Family    [] Friend(s)        Reason for Consult: Initial/Spiritual assessment, patient floor     Spiritual beliefs: (Please include comment if needed)     [] Identifies with a ginny tradition:         [] Supported by a ginny community:            [] Claims no spiritual orientation:           [] Seeking spiritual identity:                [x] Adheres to an individual form of spirituality:           [] Not able to assess:                           Identified resources for coping:      [x] Prayer                               [] Music                  [] Guided Imagery     [x] Family/friends                 [] Pet visits     [] Devotional reading                         [] Unknown     [] Other:                                                Interventions offered during this visit: (See comments for more details)    Patient Interventions: Affirmation of emotions/emotional suffering, Affirmation of ginny, Iconic (affirming the presence of God/Higher Power), Initial/Spiritual assessment, patient floor, Prayer (assurance of)     Family/Friend(s):  Affirmation of ginny, Catharsis/review of pertinent events in supportive environment, Iconic (affirming the presence of God/Higher Power), Initial Assessment, Prayer (assurance of)     Plan of Care:     [x] Support spiritual and/or cultural needs    [] Support AMD and/or advance care planning process      [] Support grieving process   [] Coordinate Rites and/or Rituals    [] Coordination with community clergy   [] No spiritual needs identified at this time   [] Detailed Plan of Care below (See Comments)  [] Make referral to Music Therapy  [] Make referral to Pet Therapy     [] Make referral to Addiction services  [] Make referral to [facility]  [] Make referral to Spiritual Care Partner  [] No future visits requested        [x] Contact Spiritual Care for further referrals     Comments:   Initial visit on [facility] for spiritual assessment. Patient's sister was present. Patient was awake and smiling. His sister shared his verbal communication is quite limited, but he manages to communicate with his eyebrows, head gestures and smiles. Family knows him as \"Ajith\". He frowned slightly when asked how he's feeling. His sister spoke about some of his medical history and current concerns. Prayer seems to be a means of coping for his sister. Provided spiritual presence, supportive listening, words of encouragement and assurance of prayer. Advised of ongoing availability of pastoral support.       EULALIA Payton, [facility], Staff [address]    [address] Paging Service  [phone]

## 2022-08-11 NOTE — PROGRESS NOTES
Text Paged Ignacia SMITH :    Sinus Tachy lingering between 120-140 all afternoon, he is currently at 139, /84 mmhg, they gave 1L bolus and he is still on Meropenem, can he get something to control the rate? . He does not have any other symptoms at this point. Response:Ordered a bolus 500 mls, LR8/11/22 12:09 AM Paged MD again : carrera was removed, is it possible to place an order for straight cath please?  He does self cath at home, also just a note, his heart rate remain 130-140s    Check the bladder scan and if he retaining then you can do straight cath

## 2022-08-11 NOTE — PROGRESS NOTES
CM contacted [facility] (group home) located at [address], [address], phone number [phone]. Director said to fax clinicals to them at [phone] to review and confirm that they can reaccept patient. Patient will not need a COVID test to return and [facility] usually uses [facility]. Director said [facility] can manage PICC and IV abx. infusions if patient is requiring it. Information faxed over to [facility] and referral sent to [facility] through [employer] to provide care to patient at [facility], address and number provided.       ALMA HirschN, RN    Care Management  [phone]

## 2022-08-11 NOTE — PROGRESS NOTES
Hospitalist Progress Note    NAME: Lashonda Rankin   :  1960   MRN:  [id number]       Assessment / Plan:  66-year-old male with past medical history of bladder status post neobladder , cerebral palsy a group home resident presented to the emergency department with nausea vomiting and abdominal pain. Sepsis due to urinary tract infection. POA. status post neobladder. Cerebral palsy. Anxiety/depression. History of bladder cancer. History of ESBL E. coli. Follow-up on urine analysis/urine culture, growing ESBL  Cw  meropenem, will need 7 day course   Does straight cath in group home. Short removed here yesterday, however retaining now, inserted by urology today. Will keep on discharge  Spiking fever so repeat CT done, no abscess but has left hydronephrosis. Sinus tachycardia  Likely dehydration/ urinary retention  Metoprolol low dose started today    Constipation  Fecal impaction:  Had a small bm with laxatives today  Repeat CT showing still has stool impaction with colonic distension  Asked Gi for follow up  C/w aggressive bowel regimen, enema x 1    Dislodged PEG tube:  PEG replaced by IR  Now feeding resumed    Hypernatremia:  Likely d/t fluids bolus ys  C/w D5 half NS today     Cerebral palsy. Continue with supportive care. Code Status: DO NOT RESUSCITATE. Surrogate Decision Maker: His neice     DVT Prophylaxis: Lovenox. GI Prophylaxis: not indicated     Baseline: Dependent for activity of daily living.     JESSE:   Barriers: Urine cultures/ Constipation     Subjective:     Chief Complaint / Reason for Physician Visit  Seen and evaluated in ED  Had a small bm this morning  Feels better    Review of Systems:  Symptom Y/N Comments  Symptom Y/N Comments   Fever/Chills    Chest Pain     Poor Appetite    Edema     Cough    Abdominal Pain     Sputum    Joint Pain     SOB/FANG    Pruritis/Rash     Nausea/vomit    Tolerating PT/OT     Diarrhea    Tolerating Diet Constipation    Other       Could NOT obtain due to: Non verbal     Objective:     VITALS:   Last 24hrs VS reviewed since prior progress note. Most recent are:  Patient Vitals for the past 24 hrs:   Temp Pulse Resp BP SpO2   08/11/22 1610 99 °F (37.2 °C) (!) 112 20 123/74 96 %   08/11/22 1050 -- (!) 101 20 137/70 93 %   08/11/22 0932 (!) 100.9 °F (38.3 °C) (!) 119 20 132/76 93 %   08/11/22 0300 98.4 °F (36.9 °C) (!) 131 20 139/88 96 %   08/10/22 2008 99 °F (37.2 °C) (!) 140 20 -- --   08/10/22 1946 99.7 °F (37.6 °C) (!) 137 20 (!) 141/84 94 %       Intake/Output Summary (Last 24 hours) at 8/11/2022 1645  Last data filed at 8/11/2022 0316  Gross per 24 hour   Intake --   Output 1700 ml   Net -1700 ml        I had a face to face encounter and independently examined this patient on 8/11/2022, as outlined below:  PHYSICAL EXAM:  General: Awake, No acute distress  EENT:  EOMI. Anicteric sclerae. MMM  Resp:  CTA bilaterally, no wheezing or rales. No accessory muscle use  CV:  Regular  rhythm,  No edema  GI:  Soft, Non distended, Non tender. +Bowel sounds  Neurologic:  Awake, non verbal at baseline, contracted extremity  Psych:   Not anxious nor agitated  Skin:  No rashes. No jaundice    Reviewed most current lab test results and cultures  YES  Reviewed most current radiology test results   YES  Review and summation of old records today    NO  Reviewed patient's current orders and MAR    YES  PMH/SH reviewed - no change compared to H&P  ________________________________________________________________________  Care Plan discussed with:    Comments   Patient y    Family      RN y    Care Manager     Consultant                        Multidiciplinary team rounds were held today with , nursing, pharmacist and clinical coordinator. Patient's plan of care was discussed; medications were reviewed and discharge planning was addressed. ________________________________________________________________________  Total NON critical care TIME:  36   Minutes    Total CRITICAL CARE TIME Spent:   Minutes non procedure based      Comments   >50% of visit spent in counseling and coordination of care     ________________________________________________________________________  Harry Reynoso MD     Procedures: see electronic medical records for all procedures/Xrays and details which were not copied into this note but were reviewed prior to creation of Plan. LABS:  I reviewed today's most current labs and imaging studies.   Pertinent labs include:  Recent Labs     08/11/22 0136 08/09/22  0334   WBC 9.8 8.2   HGB 14.9 14.1   HCT 47.4 43.5   * 333     Recent Labs     08/11/22 0136 08/09/22  0334   * 142   K 4.0 4.4   * 112*   CO2 18* 24   * 85   BUN 31* 22*   CREA 1.22 0.69*   CA 9.2 9.0       Signed: Harry Reynoso MD

## 2022-08-12 ENCOUNTER — APPOINTMENT (OUTPATIENT)
Dept: ULTRASOUND IMAGING | Age: 62
DRG: 698 | End: 2022-08-12
Attending: INTERNAL MEDICINE
Payer: MEDICARE

## 2022-08-12 ENCOUNTER — APPOINTMENT (OUTPATIENT)
Dept: GENERAL RADIOLOGY | Age: 62
DRG: 698 | End: 2022-08-12
Attending: STUDENT IN AN ORGANIZED HEALTH CARE EDUCATION/TRAINING PROGRAM
Payer: MEDICARE

## 2022-08-12 LAB
ANION GAP SERPL CALC-SCNC: 7 MMOL/L (ref 5–15)
BUN SERPL-MCNC: 39 MG/DL (ref 6–20)
BUN/CREAT SERPL: 34 (ref 12–20)
CALCIUM SERPL-MCNC: 9.7 MG/DL (ref 8.5–10.1)
CHLORIDE SERPL-SCNC: 120 MMOL/L (ref 97–108)
CO2 SERPL-SCNC: 27 MMOL/L (ref 21–32)
COVID-19 RAPID TEST, COVR: NOT DETECTED
CREAT SERPL-MCNC: 1.14 MG/DL (ref 0.7–1.3)
GLUCOSE SERPL-MCNC: 136 MG/DL (ref 65–100)
LACTATE SERPL-SCNC: 2.7 MMOL/L (ref 0.4–2)
POTASSIUM SERPL-SCNC: 4.5 MMOL/L (ref 3.5–5.1)
SODIUM SERPL-SCNC: 154 MMOL/L (ref 136–145)
SOURCE, COVRS: NORMAL

## 2022-08-12 PROCEDURE — 74011000258 HC RX REV CODE- 258: Performed by: INTERNAL MEDICINE

## 2022-08-12 PROCEDURE — 80048 BASIC METABOLIC PNL TOTAL CA: CPT

## 2022-08-12 PROCEDURE — 87040 BLOOD CULTURE FOR BACTERIA: CPT

## 2022-08-12 PROCEDURE — 74011250637 HC RX REV CODE- 250/637: Performed by: INTERNAL MEDICINE

## 2022-08-12 PROCEDURE — 74011000250 HC RX REV CODE- 250: Performed by: INTERNAL MEDICINE

## 2022-08-12 PROCEDURE — 83605 ASSAY OF LACTIC ACID: CPT

## 2022-08-12 PROCEDURE — 71045 X-RAY EXAM CHEST 1 VIEW: CPT

## 2022-08-12 PROCEDURE — 87635 SARS-COV-2 COVID-19 AMP PRB: CPT

## 2022-08-12 PROCEDURE — 74011250636 HC RX REV CODE- 250/636: Performed by: INTERNAL MEDICINE

## 2022-08-12 PROCEDURE — 36415 COLL VENOUS BLD VENIPUNCTURE: CPT

## 2022-08-12 PROCEDURE — 74011000250 HC RX REV CODE- 250: Performed by: STUDENT IN AN ORGANIZED HEALTH CARE EDUCATION/TRAINING PROGRAM

## 2022-08-12 PROCEDURE — 65270000029 HC RM PRIVATE

## 2022-08-12 PROCEDURE — 74011250637 HC RX REV CODE- 250/637: Performed by: STUDENT IN AN ORGANIZED HEALTH CARE EDUCATION/TRAINING PROGRAM

## 2022-08-12 PROCEDURE — 74011250636 HC RX REV CODE- 250/636: Performed by: HOSPITALIST

## 2022-08-12 PROCEDURE — 76770 US EXAM ABDO BACK WALL COMP: CPT

## 2022-08-12 PROCEDURE — 74011000250 HC RX REV CODE- 250: Performed by: HOSPITALIST

## 2022-08-12 RX ORDER — METOPROLOL TARTRATE 5 MG/5ML
2.5 INJECTION INTRAVENOUS ONCE
Status: COMPLETED | OUTPATIENT
Start: 2022-08-12 | End: 2022-08-12

## 2022-08-12 RX ORDER — DEXTROSE MONOHYDRATE 50 MG/ML
100 INJECTION, SOLUTION INTRAVENOUS CONTINUOUS
Status: DISCONTINUED | OUTPATIENT
Start: 2022-08-12 | End: 2022-08-13

## 2022-08-12 RX ADMIN — MEROPENEM 1 G: 1 INJECTION, POWDER, FOR SOLUTION INTRAVENOUS at 04:30

## 2022-08-12 RX ADMIN — NORTRIPTYLINE HYDROCHLORIDE 50 MG: 25 CAPSULE ORAL at 22:15

## 2022-08-12 RX ADMIN — FLUTICASONE PROPIONATE 2 SPRAY: 50 SPRAY, METERED NASAL at 09:00

## 2022-08-12 RX ADMIN — MEROPENEM 1 G: 1 INJECTION, POWDER, FOR SOLUTION INTRAVENOUS at 11:47

## 2022-08-12 RX ADMIN — OXYCODONE HYDROCHLORIDE AND ACETAMINOPHEN 500 MG: 500 TABLET ORAL at 09:19

## 2022-08-12 RX ADMIN — SODIUM CHLORIDE, PRESERVATIVE FREE 10 ML: 5 INJECTION INTRAVENOUS at 22:24

## 2022-08-12 RX ADMIN — MEROPENEM 1 G: 1 INJECTION, POWDER, FOR SOLUTION INTRAVENOUS at 22:25

## 2022-08-12 RX ADMIN — SODIUM CHLORIDE, PRESERVATIVE FREE 10 ML: 5 INJECTION INTRAVENOUS at 14:00

## 2022-08-12 RX ADMIN — ENOXAPARIN SODIUM 40 MG: 100 INJECTION SUBCUTANEOUS at 09:19

## 2022-08-12 RX ADMIN — METOPROLOL TARTRATE 2.5 MG: 5 INJECTION INTRAVENOUS at 22:14

## 2022-08-12 RX ADMIN — SENNOSIDES AND DOCUSATE SODIUM 1 TABLET: 50; 8.6 TABLET ORAL at 22:15

## 2022-08-12 RX ADMIN — SODIUM CHLORIDE, POTASSIUM CHLORIDE, SODIUM LACTATE AND CALCIUM CHLORIDE 1000 ML: 600; 310; 30; 20 INJECTION, SOLUTION INTRAVENOUS at 05:29

## 2022-08-12 RX ADMIN — ACETAMINOPHEN 650 MG: 325 TABLET ORAL at 09:51

## 2022-08-12 RX ADMIN — SODIUM CHLORIDE, POTASSIUM CHLORIDE, SODIUM LACTATE AND CALCIUM CHLORIDE 1000 ML: 600; 310; 30; 20 INJECTION, SOLUTION INTRAVENOUS at 22:02

## 2022-08-12 RX ADMIN — MIRTAZAPINE 15 MG: 15 TABLET, FILM COATED ORAL at 09:19

## 2022-08-12 RX ADMIN — BISACODYL 10 MG: 10 SUPPOSITORY RECTAL at 09:19

## 2022-08-12 RX ADMIN — DEXTROSE MONOHYDRATE 100 ML/HR: 50 INJECTION, SOLUTION INTRAVENOUS at 11:47

## 2022-08-12 RX ADMIN — LACTULOSE 30 ML: 20 SOLUTION ORAL at 17:45

## 2022-08-12 RX ADMIN — DEXTROSE AND SODIUM CHLORIDE 50 ML/HR: 5; 450 INJECTION, SOLUTION INTRAVENOUS at 09:52

## 2022-08-12 RX ADMIN — LACTULOSE 30 ML: 20 SOLUTION ORAL at 09:19

## 2022-08-12 RX ADMIN — ACETAMINOPHEN 650 MG: 325 TABLET ORAL at 18:13

## 2022-08-12 RX ADMIN — METOPROLOL TARTRATE 12.5 MG: 25 TABLET, FILM COATED ORAL at 09:20

## 2022-08-12 RX ADMIN — ACETAMINOPHEN 650 MG: 325 TABLET ORAL at 05:03

## 2022-08-12 RX ADMIN — METOPROLOL TARTRATE 12.5 MG: 25 TABLET, FILM COATED ORAL at 22:15

## 2022-08-12 NOTE — PROGRESS NOTES
Attempt 22g RUE, + blood return noted, unable to advance iv catheter, iv removed and primary RN aware.

## 2022-08-12 NOTE — PROGRESS NOTES
Comprehensive Nutrition Assessment    Type and Reason for Visit: Reassess    Nutrition Recommendations/Plan:   Continue current TF regimen     Nutrition Assessment:    Chart reviewed; patient started on his home bolus TF regimen. Tolerating well. Started on D5 for hypernatremia. Additional water flushes via g-tube per nephrology. Nutrition Related Findings:    Na 154, -977-39-80  BM 8/12  Vitamin C, Dulcolax, Lactulose, Lopressor, Remeron, protonix, Pericolace, D5% @ 100    Current Nutrition Intake & Therapies:        Current Tube Feeding (TF) Orders:   Feeding Route: Gastrostomy  Formula: Standard with fiber  Schedule: Bolus    Regimen: 355 mL 3x/day  Additives/Modulars: None  Water Flushes: 480 mL after each bolus  Current TF & Flush Orders Provides: 1600 kcals, 68g protein, 2249 mL water    Anthropometric Measures:  Height: 5' 7\" (170.2 cm)  Ideal Body Weight (IBW): 148 lbs (67 kg)     Current Body Wt:  50.8 kg (111 lb 15.9 oz), 75.7 % IBW. Current BMI (kg/m2): 17.5                          BMI Category: Underweight (BMI less than 18.5)    Estimated Daily Nutrient Needs:  Energy Requirements Based On: Formula  Weight Used for Energy Requirements: Current  Energy (kcal/day): 1656 kcals (BMR 1274 x 1. 3AF)  Weight Used for Protein Requirements: Current  Protein (g/day): 66-77g (1.3-1.5 g/kg bw)  Method Used for Fluid Requirements: 1 ml/kcal  Fluid (ml/day): 1650 mL    Nutrition Diagnosis:   Inadequate protein-energy intake related to  (dislodged PEG tube) as evidenced by NPO or clear liquid status due to medical condition    Nutrition Interventions:   Food and/or Nutrient Delivery: Continue tube feeding  Nutrition Education/Counseling: No recommendations at this time  Coordination of Nutrition Care: Continue to monitor while inpatient       Goals:  Previous Goal Met: Goal(s) achieved  Goals:  (tolerate bolus feedings with no s/sx of intolerance next 2-4 days)       Nutrition Monitoring and Evaluation: Behavioral-Environmental Outcomes: None identified  Food/Nutrient Intake Outcomes: Enteral nutrition intake/tolerance  Physical Signs/Symptoms Outcomes: Biochemical data, Weight, Fluid status or edema    Discharge Planning:    Enteral nutrition    Cuco Gomez RD  Contact: ext [phone]

## 2022-08-12 NOTE — PROGRESS NOTES
Bedside shift report given to oncoming nurse Aly Champagne RN by off going nurse, patient alert and responsive no distress noted during shift change. 1900  Bedside shift report given to oncbrigida nurse, urology called for patient low output in carrera, actions for nurse is to irrigate carrera, then follow up with hospitalist,.  Patient stable, bladder scan completed with a scan of 125ml prior to irrigation of carrera, 250ml output from carrera no distress noted

## 2022-08-12 NOTE — CONSULTS
Nephrology Consult Note     Mauro Cox                Phone - [phone]   Patient: Mita Madera   YOB: 1960    Date- 8/12/2022  MRN: [id number]             REASON FOR CONSULTATION: Hyponatremia  CONSULTING PHYSICIAN:   ADMIT Melissa Barajas MD     IMPRESSION & PLAN:   Hypernatremia(secondary to free water deficit of 3.1 L)  Malfunction of PEG tube (replacement by IR 8/9/2022)  Nausea vomiting  History of cerebral palsy  History of bladder cancer status post radical cystoprostatectomy with neobladder  UTI    PLAN-  Will start on D5W at 100 MLS per hour. Patient gets  PEG feed diet in boluses. He is currently getting 480 cc of water 3 times a day. Will need to increase it to at least 4 times a day if possible. Check labs daily. Thank you for the consult  Spoke to the niece at bedside     Active Problems:    UTI (urinary tract infection) (8/22/2018)        [x] High complexity decision making was performed  [x] Patient is at high-risk of decompensation with multiple organ involvement    Subjective:   HPI: Mita Madera is a 64 y.o.  male with past medical history significant for cerebral palsy, bladder cancer status post cystoprostatectomy with neobladder, depression who lives in a group home presented to the ED on 8/8 for abdominal pain nausea and vomiting. His PEG tube  was found to be dislodged. It has been replaced by IR. Feeding has been resumed. He was noticed to be hypernatremic so renal consult was requested for further evaluation. Patient cerebral palsy is not a good historian. Most of the history significant from medical records and patient's niece at bedside.       Review of Systems:   Unable to obtain     Past Medical History:   Diagnosis Date    Anxiety     Bladder cancer ([address]     Bladder cancer ([address]     Cancer ([address]     BLADDER    CP (cerebral palsy) ([address]     Depression Hernia     Nausea & vomiting     Other ill-defined conditions(799.89)     Cerebral palsy    Psychiatric disorder     ANXIETY DEPRESSION    Unspecified constipation 5/13/2013      Past Surgical History:   Procedure Laterality Date    HX APPENDECTOMY      HX CHOLECYSTECTOMY      HX [address] HR, PEG TUBE 2012    HX ORTHOPAEDIC  2013    Right knee surgery on 8/15 with Dr. Tess Saenz. HX UROLOGICAL      BLADDER REMOVED, HAS NADYA-BLADDER     IR INJ NEPHRO/URETEROGRAM LT EXISTING ACCESS SI  4/1/2021    IR INSERT GASTROSTOMY TUBE PERC  8/9/2022    IR NEPHROSTOMY PERC RT PLC CATH  SI  3/19/2021    IR URETERAL STENT RT PERC PLC EXISTING SI  3/24/2021    RI LAP PROCEDURE, UNLISTED, BLADDER      neobladder    RI LAP,CHOLECYSTECTOMY      RI LAP,LYSIS OF ADHESIONS      RI REMOVE MESH FROM ABD WALL      due to infection      Prior to Admission medications    Medication Sig Start Date End Date Taking? Authorizing Provider   ferrous sulfate 220 mg (44 mg iron)/5 mL solution Take 220 mg by mouth daily. Provider, Historical   mirtazapine (REMERON) 15 mg tablet 15 mg by Per G Tube route daily. Provider, Historical   nortriptyline (PAMELOR) 10 mg/5 mL solution 50 mg by Per G Tube route nightly. Provider, Historical   ascorbic acid, vitamin C, (VITAMIN C) 500 mg tablet 500 mg by Per G Tube route daily. Provider, Historical   cholecalciferol (VITAMIN D3) 1,000 unit tablet 2,000 Units by Per G Tube route daily. Indications: Vitamin D Deficiency    Provider, Historical   acetaminophen (TYLENOL) 160 mg/5 mL liquid 640 mg by Per G Tube route every four (4) hours as needed for Fever or Pain. Provider, Historical   HYDROcodone-acetaminophen (HYCET) 0.5-21.7 mg/mL oral solution 5 mL by Per G Tube route four (4) times daily as needed for Pain. Provider, Historical   LORazepam (ATIVAN) 1 mg tablet 0.75 mg by Per G Tube route every six (6) hours.     Provider, Historical   fluticasone (FLONASE) 50 mcg/actuation nasal spray 2 Sprays by Both Nostrils route daily. Provider, Historical   Esomeprazole Magnesium (NEXIUM PACKET) 40 mg by Per G Tube route daily. Indications: gastroesophageal reflux disease    Provider, Historical   rizatriptan (MAXALT) 10 mg tablet 10 mg by SubLINGual route as needed for Migraine.  May repeat dose after 2 hours not to exceed 3 tablets per 24hr    Other, MD Ofelia     Allergies   Allergen Reactions    Avelox [Moxifloxacin] Rash    Amikacin Unknown (comments)    Amoxicillin Rash    Betadine Surgi-Prep Rash     Mother states this does not happen all the time and if betadine is washed off, it's ok    Ciprofloxacin Unknown (comments)    Dilaudid [Hydromorphone] Unable to Obtain    Effexor [Venlafaxine] Unknown (comments)    Fentanyl Unknown (comments)    Keflex [Cephalexin] Rash    Lyrica [Pregabalin] Unknown (comments)    Morphine Unknown (comments)    Neurontin [Gabapentin] Unknown (comments)    Prozac [Fluoxetine] Unknown (comments)    Reglan [Metoclopramide] Unknown (comments)    Serzone [Nefazodone] Unknown (comments)    Sulfa (Sulfonamide Antibiotics) Unknown (comments)    Tetracycline Unknown (comments)    Tricyclamol Chloride Unknown (comments)    Valium [Diazepam] Unknown (comments)     Tolerates Lorazepam    Demerol [Meperidine] Other (comments)     Spastic movements      Social History     Tobacco Use    Smoking status: Never    Smokeless tobacco: Never   Substance Use Topics    Alcohol use: No      Family History   Problem Relation Age of Onset    Heart Disease Mother         TACHYCARDIA    Anesth Problems Neg Hx         Objective:    Patient Vitals for the past 24 hrs:   Temp Pulse Resp BP SpO2   08/12/22 0917 98.9 °F (37.2 °C) (!) 117 18 (!) 145/99 94 %   08/12/22 0555 99.5 °F (37.5 °C) (!) 122 16 -- 94 %   08/12/22 0434 (!) 101.9 °F (38.8 °C) (!) 146 24 (!) 134/90 92 %   08/11/22 2322 97.7 °F (36.5 °C) (!) 106 20 (!) 149/93 94 %   08/11/22 2121 98 °F (36.7 °C) (!) 117 18 (!) 154/96 95 % 08/11/22 1610 99 °F (37.2 °C) (!) 112 20 123/74 96 %     No intake/output data recorded. Last 3 Recorded Weights in this Encounter    08/08/22 1845   Weight: 50.8 kg (112 lb)      Physical Exam:  General:Alert, No distress,   Eyes:No scleral icterus, No conjunctival pallor  Neck:Supple,no mass palpable,no thyromegaly  Lungs:Clears to auscultation Bilaterally, normal respiratory effort  CVS:RRR, S1 S2 normal,  No rub,  Abdomen:Soft, Non tender, No hepatosplenomegaly, PEG tube  Extremities: Chronic limb contractures  : Short catheter  Musculoskeletal : Chronic limb contractures      CODE STATUS: DNR  Care Plan discussed with: Niece     Chart reviewed.     Yes Reviewed previous records   Yes Discussion with patient and/or family and questions answered         Xray/CT/US/MRI REV:yes  Lab Data Personally Reviewed: (see below)  Recent Labs     08/12/22  0431 08/11/22  0136   WBC  --  9.8   HGB  --  14.9   PLT  --  406*   * 150*   K 4.5 4.0   * 263*   BUN 39* 31*   CREA 1.14 1.22   CA 9.7 9.2     Lab Results   Component Value Date/Time    Color DARK YELLOW 08/08/2022 04:50 PM    Appearance CLOUDY (A) 08/08/2022 04:50 PM    Specific gravity >1.030 (H) 08/08/2022 04:50 PM    Specific gravity 1.021 03/18/2021 08:00 PM    pH (UA) >8.5 (H) 08/08/2022 04:50 PM    Protein 100 (A) 08/08/2022 04:50 PM    Glucose Negative 08/08/2022 04:50 PM    Ketone Negative 08/08/2022 04:50 PM    Bilirubin Negative 08/08/2022 04:50 PM    Urobilinogen 1.0 08/08/2022 04:50 PM    Nitrites Positive (A) 08/08/2022 04:50 PM    Leukocyte Esterase LARGE (A) 08/08/2022 04:50 PM    Epithelial cells FEW 08/08/2022 04:50 PM    Bacteria 3+ (A) 08/08/2022 04:50 PM    WBC >100 (H) 08/08/2022 04:50 PM    RBC  08/08/2022 04:50 PM       No results found for: IRON, FE, TIBC, IBCT, PSAT, FERR  Lab Results   Component Value Date/Time    Culture result: NO GROWTH 4 DAYS 08/08/2022 05:45 PM    Culture result: (A) 08/08/2022 04:50 PM KLEBSIELLA PNEUMONIAE ** (EXTENDED SPECTRUM BETA LACTAMASE ) **    Culture result: PROTEUS MIRABILIS (A) 2021 08:00 PM     Prior to Admission Medications   Prescriptions Last Dose Informant Patient Reported? Taking? Esomeprazole Magnesium (NEXIUM PACKET)   Yes No   Si mg by Per G Tube route daily. Indications: gastroesophageal reflux disease   HYDROcodone-acetaminophen (HYCET) 0.5-21.7 mg/mL oral solution   Yes No   Si mL by Per G Tube route four (4) times daily as needed for Pain. LORazepam (ATIVAN) 1 mg tablet   Yes No   Si.75 mg by Per G Tube route every six (6) hours. acetaminophen (TYLENOL) 160 mg/5 mL liquid   Yes No   Si mg by Per G Tube route every four (4) hours as needed for Fever or Pain. ascorbic acid, vitamin C, (VITAMIN C) 500 mg tablet   Yes No   Si mg by Per G Tube route daily. cholecalciferol (VITAMIN D3) 1,000 unit tablet   Yes No   Si,000 Units by Per G Tube route daily. Indications: Vitamin D Deficiency   ferrous sulfate 220 mg (44 mg iron)/5 mL solution   Yes No   Sig: Take 220 mg by mouth daily. fluticasone (FLONASE) 50 mcg/actuation nasal spray   Yes No   Si Sprays by Both Nostrils route daily. mirtazapine (REMERON) 15 mg tablet   Yes No   Sig: 15 mg by Per G Tube route daily. nortriptyline (PAMELOR) 10 mg/5 mL solution   Yes No   Si mg by Per G Tube route nightly. rizatriptan (MAXALT) 10 mg tablet   Yes No   Sig: 10 mg by SubLINGual route as needed for Migraine. May repeat dose after 2 hours not to exceed 3 tablets per 24hr      Facility-Administered Medications: None     Imaging:    Medications list Personally Reviewed   [x]      Yes     []               No    Thank you for allowing us to participate in the care this patient. We will follow patient with you.   Signed By: Arias Stewart [facility]  [facility] [address], [address] [facility], [address]  Phone - [phone]         Fax - [phone] Office  [address] [facility] [address]  Phone - [phone]        Fax - [phone]

## 2022-08-12 NOTE — PROGRESS NOTES
Short replaced 8/11 by Melyssa Wade NP.  DO NOT REMOVE. 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 He can resume IC at the Critical [facility] hospital[facility]. as previously stated. No plans to remove the small neobladder stones that are non obstructing unless they become much more symptomatic. He has a high anesthetic risk due to his airway. Please call as needed.

## 2022-08-12 NOTE — PROGRESS NOTES
This RN inquired if pt could be changed from PO Protonix to IV Protonix of Liquid Lansoprazole since PO Protonix cannot be crushed and pt takes his medication via PEG. Pt's order changed from PO Protonix to IV Protonix.

## 2022-08-12 NOTE — PROGRESS NOTES
This RN gave report to [facility] (oncoming RN). All questions answered. Around 0400, pt had HR>140s. At the time, pt also was febrile and tachypneic. MD notified. Pt provided Tylenol via PEG. Pt received 1000cc LR bolus. Pt received his scheduled Meropenem. Pt endorsed extreme discomfort in his bladder. This RN bladder scanned pt's bladder which showed minimal urine (<100-150) and typically pointed to the Short balloon. This RN obtained order to irrigate pt's Short from [address] [facility] [address], [place]. After 100cc of sterile water, pt voided 1200cc back out with mucous. This RN inquired if pt needed PRN irrigation order. After pt voided, pt looked relief and pt's HR had trended back down to 120s. This AM, pt is seen awake in bed. Of note, this RN attempted to obtain blood from pt; however, blood hemolyzed. Jennifer aware.

## 2022-08-12 NOTE — PROGRESS NOTES
Hospitalist Progress Note    NAME: Jerrod Campbell   :  1960   MRN:  [id number]       Assessment / Plan:  Sepsis due to urinary tract infection. POA. status post neobladder. Cerebral palsy. Anxiety/depression. History of bladder cancer. Urine culture grew ESBL E coli  Cw  meropenem, will do 10 day course, given still febrile  Does straight cath in group home. Short removed here , however retaining, reinserted yesterday, not having much output?, urology f/u  Spiking fever so repeat CT done, no abscess but has left hydronephrosis. Given fever spikes ,check CXR, covid is negative    Sinus tachycardia  Likely dehydration/ urinary retention  C/w low dose  Would avoid giving fluids bolus for tachycardia, likely contributing to hypernatremia    Constipation  Fecal impaction:  Had 2 bm yesterday and 1 today as per nursing. Repeat CT showing still has stool impaction with colonic distension  GI follow up  C/w aggressive bowel regimen, colace/ lactulose/ dulcolax daily, enema as needed    Dislodged PEG tube:  PEG replaced by IR  C/w feeding    Hypernatremia:  Likely d/t fluids bolus given for tachycardia  Getting worse today  D5w started by nephrology, check Na daily  Cw free water bolus with feeding     Cerebral palsy. Continue with supportive care. Code Status: DO NOT RESUSCITATE. Surrogate Decision Maker: His neice     DVT Prophylaxis: Lovenox. GI Prophylaxis: not indicated     Baseline: Dependent for activity of daily living. Non verbal, but can communicated with nodding/ gestures, better when family at bedside    JESSE: 8/15  Barriers: Urine cultures/ Constipation     Subjective:     Chief Complaint / Reason for Physician Visit  Seen and evaluated in ED  Had a bm today per nursing.   Discussed with niece at bedside    Review of Systems:  Symptom Y/N Comments  Symptom Y/N Comments   Fever/Chills    Chest Pain     Poor Appetite    Edema     Cough    Abdominal Pain     Sputum    Joint Pain SOB/FANG    Pruritis/Rash     Nausea/vomit    Tolerating PT/OT     Diarrhea    Tolerating Diet     Constipation    Other       Could NOT obtain due to: Non verbal     Objective:     VITALS:   Last 24hrs VS reviewed since prior progress note. Most recent are:  Patient Vitals for the past 24 hrs:   Temp Pulse Resp BP SpO2   08/12/22 0917 98.9 °F (37.2 °C) (!) 117 18 (!) 145/99 94 %   08/12/22 0555 99.5 °F (37.5 °C) (!) 122 16 -- 94 %   08/12/22 0434 (!) 101.9 °F (38.8 °C) (!) 146 24 (!) 134/90 92 %   08/11/22 2322 97.7 °F (36.5 °C) (!) 106 20 (!) 149/93 94 %   08/11/22 2121 98 °F (36.7 °C) (!) 117 18 (!) 154/96 95 %   08/11/22 1610 99 °F (37.2 °C) (!) 112 20 123/74 96 %       Intake/Output Summary (Last 24 hours) at 8/12/2022 1401  Last data filed at 8/12/2022 7059  Gross per 24 hour   Intake 2200 ml   Output 1200 ml   Net 1000 ml        I had a face to face encounter and independently examined this patient on 8/12/2022, as outlined below:  PHYSICAL EXAM:  General: Awake, No acute distress  EENT:  EOMI. Anicteric sclerae. MMM  Resp:  CTA bilaterally, no wheezing or rales. No accessory muscle use  CV:  Regular  rhythm,  No edema  GI:  Soft, Non distended, Non tender. +Bowel sounds  Neurologic:  Awake, non verbal at baseline, contracted extremity  Psych:   Not anxious nor agitated  Skin:  No rashes. No jaundice    Reviewed most current lab test results and cultures  YES  Reviewed most current radiology test results   YES  Review and summation of old records today    NO  Reviewed patient's current orders and MAR    YES  PMH/SH reviewed - no change compared to H&P  ________________________________________________________________________  Care Plan discussed with:    Comments   Patient y    Family  y Niece at bedside   RN y    Care Manager     Consultant                        Multidiciplinary team rounds were held today with , nursing, pharmacist and clinical coordinator.   Patient's plan of care was discussed; medications were reviewed and discharge planning was addressed. ________________________________________________________________________  Total NON critical care TIME:  36   Minutes    Total CRITICAL CARE TIME Spent:   Minutes non procedure based      Comments   >50% of visit spent in counseling and coordination of care     ________________________________________________________________________  Alayna Magdaleno MD     Procedures: see electronic medical records for all procedures/Xrays and details which were not copied into this note but were reviewed prior to creation of Plan. LABS:  I reviewed today's most current labs and imaging studies.   Pertinent labs include:  Recent Labs     08/11/22  0136   WBC 9.8   HGB 14.9   HCT 47.4   *     Recent Labs     08/12/22  0431 08/11/22  0136   * 150*   K 4.5 4.0   * 120*   CO2 27 18*   * 263*   BUN 39* 31*   CREA 1.14 1.22   CA 9.7 9.2       Signed: lAayna Magdaleno MD

## 2022-08-13 LAB
ALBUMIN SERPL-MCNC: 3.5 G/DL (ref 3.5–5)
ANION GAP SERPL CALC-SCNC: 7 MMOL/L (ref 5–15)
BACTERIA SPEC CULT: NORMAL
BUN SERPL-MCNC: 42 MG/DL (ref 6–20)
BUN/CREAT SERPL: 31 (ref 12–20)
CALCIUM SERPL-MCNC: 10.4 MG/DL (ref 8.5–10.1)
CHLORIDE SERPL-SCNC: 115 MMOL/L (ref 97–108)
CO2 SERPL-SCNC: 28 MMOL/L (ref 21–32)
CREAT SERPL-MCNC: 1.37 MG/DL (ref 0.7–1.3)
GLUCOSE SERPL-MCNC: 140 MG/DL (ref 65–100)
PHOSPHATE SERPL-MCNC: 3.3 MG/DL (ref 2.6–4.7)
POTASSIUM SERPL-SCNC: 4.2 MMOL/L (ref 3.5–5.1)
SERVICE CMNT-IMP: NORMAL
SODIUM SERPL-SCNC: 150 MMOL/L (ref 136–145)

## 2022-08-13 PROCEDURE — 65270000029 HC RM PRIVATE

## 2022-08-13 PROCEDURE — 74011250637 HC RX REV CODE- 250/637: Performed by: STUDENT IN AN ORGANIZED HEALTH CARE EDUCATION/TRAINING PROGRAM

## 2022-08-13 PROCEDURE — 74011000250 HC RX REV CODE- 250: Performed by: HOSPITALIST

## 2022-08-13 PROCEDURE — 74011250636 HC RX REV CODE- 250/636: Performed by: INTERNAL MEDICINE

## 2022-08-13 PROCEDURE — 74011250636 HC RX REV CODE- 250/636: Performed by: HOSPITALIST

## 2022-08-13 PROCEDURE — 74011000258 HC RX REV CODE- 258: Performed by: INTERNAL MEDICINE

## 2022-08-13 PROCEDURE — 74011250637 HC RX REV CODE- 250/637: Performed by: INTERNAL MEDICINE

## 2022-08-13 PROCEDURE — 36415 COLL VENOUS BLD VENIPUNCTURE: CPT

## 2022-08-13 PROCEDURE — C9113 INJ PANTOPRAZOLE SODIUM, VIA: HCPCS | Performed by: HOSPITALIST

## 2022-08-13 PROCEDURE — 74011000250 HC RX REV CODE- 250: Performed by: INTERNAL MEDICINE

## 2022-08-13 PROCEDURE — 80069 RENAL FUNCTION PANEL: CPT

## 2022-08-13 RX ORDER — SODIUM CHLORIDE, SODIUM LACTATE, POTASSIUM CHLORIDE, CALCIUM CHLORIDE 600; 310; 30; 20 MG/100ML; MG/100ML; MG/100ML; MG/100ML
75 INJECTION, SOLUTION INTRAVENOUS CONTINUOUS
Status: DISCONTINUED | OUTPATIENT
Start: 2022-08-13 | End: 2022-08-14

## 2022-08-13 RX ORDER — METOPROLOL TARTRATE 25 MG/1
25 TABLET, FILM COATED ORAL EVERY 12 HOURS
Status: DISCONTINUED | OUTPATIENT
Start: 2022-08-13 | End: 2022-08-29 | Stop reason: DRUGHIGH

## 2022-08-13 RX ADMIN — SODIUM CHLORIDE, PRESERVATIVE FREE 10 ML: 5 INJECTION INTRAVENOUS at 21:29

## 2022-08-13 RX ADMIN — SODIUM CHLORIDE, PRESERVATIVE FREE 10 ML: 5 INJECTION INTRAVENOUS at 14:00

## 2022-08-13 RX ADMIN — ACETAMINOPHEN 650 MG: 325 TABLET ORAL at 12:10

## 2022-08-13 RX ADMIN — MEROPENEM 1 G: 1 INJECTION, POWDER, FOR SOLUTION INTRAVENOUS at 21:29

## 2022-08-13 RX ADMIN — DEXTROSE MONOHYDRATE 100 ML/HR: 50 INJECTION, SOLUTION INTRAVENOUS at 06:50

## 2022-08-13 RX ADMIN — ONDANSETRON 4 MG: 2 INJECTION INTRAMUSCULAR; INTRAVENOUS at 18:16

## 2022-08-13 RX ADMIN — DEXTROSE MONOHYDRATE 100 ML/HR: 50 INJECTION, SOLUTION INTRAVENOUS at 00:20

## 2022-08-13 RX ADMIN — OXYCODONE HYDROCHLORIDE AND ACETAMINOPHEN 500 MG: 500 TABLET ORAL at 09:48

## 2022-08-13 RX ADMIN — SENNOSIDES AND DOCUSATE SODIUM 1 TABLET: 50; 8.6 TABLET ORAL at 21:29

## 2022-08-13 RX ADMIN — SODIUM CHLORIDE, PRESERVATIVE FREE 10 ML: 5 INJECTION INTRAVENOUS at 06:49

## 2022-08-13 RX ADMIN — METOPROLOL TARTRATE 25 MG: 25 TABLET ORAL at 21:28

## 2022-08-13 RX ADMIN — METOPROLOL TARTRATE 25 MG: 25 TABLET ORAL at 09:48

## 2022-08-13 RX ADMIN — MEROPENEM 1 G: 1 INJECTION, POWDER, FOR SOLUTION INTRAVENOUS at 06:49

## 2022-08-13 RX ADMIN — FLUTICASONE PROPIONATE 2 SPRAY: 50 SPRAY, METERED NASAL at 09:00

## 2022-08-13 RX ADMIN — SODIUM CHLORIDE, POTASSIUM CHLORIDE, SODIUM LACTATE AND CALCIUM CHLORIDE 75 ML/HR: 600; 310; 30; 20 INJECTION, SOLUTION INTRAVENOUS at 10:47

## 2022-08-13 RX ADMIN — SODIUM CHLORIDE 40 MG: 9 INJECTION, SOLUTION INTRAMUSCULAR; INTRAVENOUS; SUBCUTANEOUS at 09:49

## 2022-08-13 RX ADMIN — LACTULOSE 30 ML: 20 SOLUTION ORAL at 18:16

## 2022-08-13 RX ADMIN — MIRTAZAPINE 15 MG: 15 TABLET, FILM COATED ORAL at 09:48

## 2022-08-13 RX ADMIN — NORTRIPTYLINE HYDROCHLORIDE 50 MG: 25 CAPSULE ORAL at 21:28

## 2022-08-13 RX ADMIN — MEROPENEM 1 G: 1 INJECTION, POWDER, FOR SOLUTION INTRAVENOUS at 12:10

## 2022-08-13 RX ADMIN — BISACODYL 10 MG: 10 SUPPOSITORY RECTAL at 09:47

## 2022-08-13 RX ADMIN — ENOXAPARIN SODIUM 40 MG: 100 INJECTION SUBCUTANEOUS at 09:48

## 2022-08-13 RX ADMIN — LACTULOSE 30 ML: 20 SOLUTION ORAL at 09:47

## 2022-08-13 NOTE — PROGRESS NOTES
End of Shift Note    Bedside shift change report given to [address] [place] [address] (oncoming nurse) by Ada Garner RN (offgoing nurse). Report included the following information SBAR, Kardex, Intake/Output, MAR, Accordion, Recent Results, and Med Rec Status    Shift worked:  7PM-7AM     Shift summary and any significant changes:     Notified by tele staff  of pt  sustaining in 150-160's. MD notified and rapid response nurse and orders received for lactate bolus and  lactic  acid blood  draw. Short irrigated  X1  with sterile  water 60 mls  resulting to  1800  urine output.      Concerns for physician to address:  As above     Zone phone for oncoming shift:          Ada Garner, RN

## 2022-08-13 NOTE — PROGRESS NOTES
Hospitalist Progress Note    NAME: Adonis Shahid   :  1960   MRN:  [id number]       Assessment / Plan:  Sepsis due to urinary tract infection. POA. status post neobladder. Cerebral palsy. Anxiety/depression. History of bladder cancer. Urine culture grew ESBL E coli  Cw  meropenem, will do 10 day course  Does straight cath in group home. C/w carrera catheter here, will discharge with this  Spiking fever so repeat CT done, no abscess but has left hydronephrosis. CXR negative, Rapid covid negative    Sinus tachycardia  Likely dehydration/ urinary retention  Metoprolol dose increased    Constipation  Fecal impaction:  Had 2 bm yesterday  Repeat CT showing still has stool impaction with colonic distension  GI follow up  C/w aggressive bowel regimen, colace/ lactulose/ dulcolax daily, enema as needed    Dislodged PEG tube:  PEG replaced by IR  C/w feeding    Hypernatremia:  VAZQUEZ    Na improved today, creatinine getting worse  Fluids swithced to LR  Cw free water bolus with feeding     Cerebral palsy. Continue with supportive care. Code Status: DO NOT RESUSCITATE. Surrogate Decision Maker: His rufina     DVT Prophylaxis: Lovenox. GI Prophylaxis: not indicated     Baseline: Dependent for activity of daily living. Non verbal, but can communicated with nodding/ gestures, better when family at bedside    JESSE: 8/15  Barriers: Urine cultures/ Constipation     Subjective:     Chief Complaint / Reason for Physician Visit  Seen and evaluated in ED  Discussed with brother at the bedside today    Review of Systems:  Symptom Y/N Comments  Symptom Y/N Comments   Fever/Chills    Chest Pain     Poor Appetite    Edema     Cough    Abdominal Pain     Sputum    Joint Pain     SOB/FANG    Pruritis/Rash     Nausea/vomit    Tolerating PT/OT     Diarrhea    Tolerating Diet     Constipation    Other       Could NOT obtain due to:  Non verbal     Objective:     VITALS:   Last 24hrs VS reviewed since prior progress note. Most recent are:  Patient Vitals for the past 24 hrs:   Temp Pulse Resp BP SpO2   08/13/22 0952 99 °F (37.2 °C) (!) 120 20 (!) 140/92 94 %   08/13/22 0246 98.6 °F (37 °C) (!) 101 18 138/77 94 %   08/12/22 2230 98.4 °F (36.9 °C) 100 18 (!) 140/78 92 %   08/12/22 2119 (!) 96.2 °F (35.7 °C) (!) 140 18 135/72 92 %   08/12/22 1648 99.3 °F (37.4 °C) (!) 116 18 (!) 158/92 92 %       Intake/Output Summary (Last 24 hours) at 8/13/2022 1150  Last data filed at 8/13/2022 0256  Gross per 24 hour   Intake 1410 ml   Output 2151 ml   Net -741 ml        I had a face to face encounter and independently examined this patient on 8/13/2022, as outlined below:  PHYSICAL EXAM:  General: Awake, No acute distress  EENT:  EOMI. Anicteric sclerae. MMM  Resp:  CTA bilaterally, no wheezing or rales. No accessory muscle use  CV:  Regular  rhythm,  No edema  GI:  Soft, Non distended, Non tender. +Bowel sounds  Neurologic:  Awake, non verbal at baseline, contracted extremity  Psych:   Not anxious nor agitated  Skin:  No rashes. No jaundice    Reviewed most current lab test results and cultures  YES  Reviewed most current radiology test results   YES  Review and summation of old records today    NO  Reviewed patient's current orders and MAR    YES  PMH/ reviewed - no change compared to H&P  ________________________________________________________________________  Care Plan discussed with:    Comments   Patient y    Family  y Brother at the bedside   RN y    Care Manager     Consultant                        Multidiciplinary team rounds were held today with , nursing, pharmacist and clinical coordinator. Patient's plan of care was discussed; medications were reviewed and discharge planning was addressed.      ________________________________________________________________________  Total NON critical care TIME:  36   Minutes    Total CRITICAL CARE TIME Spent:   Minutes non procedure based      Comments   >50% of visit spent in counseling and coordination of care     ________________________________________________________________________  Solomon Dodd MD     Procedures: see electronic medical records for all procedures/Xrays and details which were not copied into this note but were reviewed prior to creation of Plan. LABS:  I reviewed today's most current labs and imaging studies.   Pertinent labs include:  Recent Labs     08/11/22  0136   WBC 9.8   HGB 14.9   HCT 47.4   *     Recent Labs     08/13/22  0703 08/12/22  0431 08/11/22  0136   * 154* 150*   K 4.2 4.5 4.0   * 120* 120*   CO2 28 27 18*   * 136* 263*   BUN 42* 39* 31*   CREA 1.37* 1.14 1.22   CA 10.4* 9.7 9.2   PHOS 3.3  --   --    ALB 3.5  --   --        Signed: Solomon Dodd MD

## 2022-08-13 NOTE — PROGRESS NOTES
Bedside report given to oncoming nurse Morales Aviles RN by off going nurse patient stable no distress noted during shift change    1900  Bedside shift report give to oncoming nurse by off going nurse Morales Aviles RN patient stable no distress noted, patient tolerates turns and reposition q2 hr, patient tolerates bed bath no distress noted

## 2022-08-13 NOTE — PROGRESS NOTES
[address] [place]  YOB: 1960          Assessment & Plan:     VAZQUEZ  -CR 1.1 TO 1.4 mg today  - he has carrera  - he is on IVF  - ? Hydro:urology following, had CT with contrast   - no documented hypotension, not on nephrotoxic drugs  Hypernatremia(secondary to free water deficit of 3.1 L)  - better with D5W, as Cr and Ca worse, needs fluids with more physilogically balanced properties, if Na worse, needs change in IVF  Malfunction of PEG tube (replacement by IR 8/9/2022)  Hypercalcemia:? Immobility, if worse needs eval  Nausea vomiting  History of cerebral palsy  History of bladder cancer status post radical cystoprostatectomy with neobladder  UTI  PLAN  Follow Na,Ca  Change IVF to LR  Labs in am  Dw family,DR. Castillo       Subjective:   CC:arf  HPI: Patient seen   Na better  Cr worse  Ca worse  US: hydro, DR. ROSALIE Link 9 seeing pt.   Family at bedside  ROS:  Current Facility-Administered Medications   Medication Dose Route Frequency    metoprolol tartrate (LOPRESSOR) tablet 25 mg  25 mg Per G Tube Q12H    pantoprazole (PROTONIX) 40 mg in 0.9% sodium chloride 10 mL injection  40 mg IntraVENous DAILY    dextrose 5% infusion  100 mL/hr IntraVENous CONTINUOUS    senna-docusate (PERICOLACE) 8.6-50 mg per tablet 1 Tablet  1 Tablet Per G Tube QHS    lactulose (CHRONULAC) 10 gram/15 mL solution 30 mL  20 g Per G Tube BID    bisacodyL (DULCOLAX) suppository 10 mg  10 mg Rectal DAILY    sodium chloride (NS) flush 5-40 mL  5-40 mL IntraVENous Q8H    sodium chloride (NS) flush 5-40 mL  5-40 mL IntraVENous PRN    acetaminophen (TYLENOL) tablet 650 mg  650 mg Oral Q6H PRN    Or    acetaminophen (TYLENOL) suppository 650 mg  650 mg Rectal Q6H PRN    ondansetron (ZOFRAN ODT) tablet 4 mg  4 mg Oral Q8H PRN    Or    ondansetron (ZOFRAN) injection 4 mg  4 mg IntraVENous Q6H PRN    enoxaparin (LOVENOX) injection 40 mg  40 mg SubCUTAneous DAILY    ascorbic acid (vitamin C) (VITAMIN C) tablet 500 mg  500 mg Per G Tube DAILY    fluticasone propionate (FLONASE) 50 mcg/actuation nasal spray 2 Spray  2 Spray Both Nostrils DAILY    mirtazapine (REMERON) tablet 15 mg  15 mg Per G Tube DAILY    nortriptyline (PAMELOR) capsule 50 mg  50 mg Per G Tube QHS    meropenem (MERREM) 1 g in 0.9% sodium chloride (MBP/ADV) 50 mL MBP  1 g IntraVENous Q8H          Objective:     Vitals:  Blood pressure 138/77, pulse (!) 101, temperature 98.6 °F (37 °C), resp. rate 18, height 5' 7\" (1.702 m), weight 50.8 kg (112 lb), SpO2 94 %. Temp (24hrs), Av.1 °F (36.7 °C), Min:96.2 °F (35.7 °C), Max:99.3 °F (37.4 °C)      Intake and Output:  No intake/output data recorded.  1901 -  0700  In: 1610   Out: 3351 [Urine:3350]    Physical Exam:                Patient is intubated:  no    Physical Examination:   GENERAL ASSESSMENT: NAD  HEENT:Nontraumatic   CHEST: CTA  HEART: S1S2  ABDOMEN: Soft,NT,  :Short: y  EXTREMITY: EDEMA n  NEURO:CP          ECG/rhythm:    Data Review      No results for input(s): TNIPOC in the last 72 hours. No lab exists for component: ITNL   No results for input(s): CPK, CKMB, TROIQ in the last 72 hours. Recent Labs     22  0703 22  0431 22  0136   * 154* 150*   K 4.2 4.5 4.0   * 120* 120*   CO2 28 27 18*   BUN 42* 39* 31*   CREA 1.37* 1.14 1.22   * 136* 263*   PHOS 3.3  --   --    CA 10.4* 9.7 9.2   ALB 3.5  --   --    WBC  --   --  9.8   HGB  --   --  14.9   HCT  --   --  47.4   PLT  --   --  406*      No results for input(s): INR, PTP, APTT, INREXT in the last 72 hours. Needs: urine analysis, urine sodium, protein and creatinine  No results found for: WILTON CENTENO         : Angelica Smith MD  2022        [facility]:  [email or web address]. [email or web address]  Estermj Akbar office:  [address] [place] [address],[address] [place] [address], [address] [place] [address]  Phone: [phone]  Fax : [phone]    [place] office:  [address] [facility]  [place], [address]  Phone - [phone]  Fax - [phone]

## 2022-08-14 LAB
ALBUMIN SERPL-MCNC: 2.7 G/DL (ref 3.5–5)
ANION GAP SERPL CALC-SCNC: 5 MMOL/L (ref 5–15)
BUN SERPL-MCNC: 33 MG/DL (ref 6–20)
BUN/CREAT SERPL: 34 (ref 12–20)
CALCIUM SERPL-MCNC: 9.6 MG/DL (ref 8.5–10.1)
CHLORIDE SERPL-SCNC: 118 MMOL/L (ref 97–108)
CO2 SERPL-SCNC: 29 MMOL/L (ref 21–32)
CREAT SERPL-MCNC: 0.98 MG/DL (ref 0.7–1.3)
GLUCOSE SERPL-MCNC: 102 MG/DL (ref 65–100)
PHOSPHATE SERPL-MCNC: 3.2 MG/DL (ref 2.6–4.7)
POTASSIUM SERPL-SCNC: 4.8 MMOL/L (ref 3.5–5.1)
SODIUM SERPL-SCNC: 152 MMOL/L (ref 136–145)

## 2022-08-14 PROCEDURE — 36415 COLL VENOUS BLD VENIPUNCTURE: CPT

## 2022-08-14 PROCEDURE — 80069 RENAL FUNCTION PANEL: CPT

## 2022-08-14 PROCEDURE — 74011250636 HC RX REV CODE- 250/636: Performed by: INTERNAL MEDICINE

## 2022-08-14 PROCEDURE — 74011000250 HC RX REV CODE- 250: Performed by: INTERNAL MEDICINE

## 2022-08-14 PROCEDURE — 74011250637 HC RX REV CODE- 250/637: Performed by: STUDENT IN AN ORGANIZED HEALTH CARE EDUCATION/TRAINING PROGRAM

## 2022-08-14 PROCEDURE — 74011000258 HC RX REV CODE- 258: Performed by: INTERNAL MEDICINE

## 2022-08-14 PROCEDURE — 74011250636 HC RX REV CODE- 250/636: Performed by: STUDENT IN AN ORGANIZED HEALTH CARE EDUCATION/TRAINING PROGRAM

## 2022-08-14 PROCEDURE — C9113 INJ PANTOPRAZOLE SODIUM, VIA: HCPCS | Performed by: HOSPITALIST

## 2022-08-14 PROCEDURE — 74011250637 HC RX REV CODE- 250/637: Performed by: INTERNAL MEDICINE

## 2022-08-14 PROCEDURE — 74011000250 HC RX REV CODE- 250: Performed by: HOSPITALIST

## 2022-08-14 PROCEDURE — 65270000029 HC RM PRIVATE

## 2022-08-14 PROCEDURE — 74011250636 HC RX REV CODE- 250/636: Performed by: HOSPITALIST

## 2022-08-14 RX ORDER — DEXTROSE MONOHYDRATE 50 MG/ML
75 INJECTION, SOLUTION INTRAVENOUS CONTINUOUS
Status: DISCONTINUED | OUTPATIENT
Start: 2022-08-14 | End: 2022-08-16

## 2022-08-14 RX ADMIN — DEXTROSE MONOHYDRATE 100 ML/HR: 50 INJECTION, SOLUTION INTRAVENOUS at 18:30

## 2022-08-14 RX ADMIN — SODIUM CHLORIDE, PRESERVATIVE FREE 10 ML: 5 INJECTION INTRAVENOUS at 22:20

## 2022-08-14 RX ADMIN — SENNOSIDES AND DOCUSATE SODIUM 1 TABLET: 50; 8.6 TABLET ORAL at 22:16

## 2022-08-14 RX ADMIN — MIRTAZAPINE 15 MG: 15 TABLET, FILM COATED ORAL at 09:56

## 2022-08-14 RX ADMIN — MEROPENEM 1 G: 1 INJECTION, POWDER, FOR SOLUTION INTRAVENOUS at 12:34

## 2022-08-14 RX ADMIN — MEROPENEM 1 G: 1 INJECTION, POWDER, FOR SOLUTION INTRAVENOUS at 05:02

## 2022-08-14 RX ADMIN — LACTULOSE 30 ML: 20 SOLUTION ORAL at 09:56

## 2022-08-14 RX ADMIN — SODIUM CHLORIDE, PRESERVATIVE FREE 10 ML: 5 INJECTION INTRAVENOUS at 14:00

## 2022-08-14 RX ADMIN — SODIUM CHLORIDE 40 MG: 9 INJECTION, SOLUTION INTRAMUSCULAR; INTRAVENOUS; SUBCUTANEOUS at 09:56

## 2022-08-14 RX ADMIN — SODIUM CHLORIDE, PRESERVATIVE FREE 10 ML: 5 INJECTION INTRAVENOUS at 05:02

## 2022-08-14 RX ADMIN — BISACODYL 10 MG: 10 SUPPOSITORY RECTAL at 09:56

## 2022-08-14 RX ADMIN — SODIUM CHLORIDE, POTASSIUM CHLORIDE, SODIUM LACTATE AND CALCIUM CHLORIDE 75 ML/HR: 600; 310; 30; 20 INJECTION, SOLUTION INTRAVENOUS at 01:17

## 2022-08-14 RX ADMIN — METOPROLOL TARTRATE 25 MG: 25 TABLET ORAL at 22:16

## 2022-08-14 RX ADMIN — ENOXAPARIN SODIUM 40 MG: 100 INJECTION SUBCUTANEOUS at 09:55

## 2022-08-14 RX ADMIN — METOPROLOL TARTRATE 25 MG: 25 TABLET ORAL at 09:56

## 2022-08-14 RX ADMIN — OXYCODONE HYDROCHLORIDE AND ACETAMINOPHEN 500 MG: 500 TABLET ORAL at 09:56

## 2022-08-14 RX ADMIN — NORTRIPTYLINE HYDROCHLORIDE 50 MG: 25 CAPSULE ORAL at 22:16

## 2022-08-14 RX ADMIN — MEROPENEM 1 G: 1 INJECTION, POWDER, FOR SOLUTION INTRAVENOUS at 22:16

## 2022-08-14 RX ADMIN — DEXTROSE MONOHYDRATE 75 ML/HR: 50 INJECTION, SOLUTION INTRAVENOUS at 12:34

## 2022-08-14 NOTE — PROGRESS NOTES
[address] [place]  YOB: 1960          Assessment & Plan:     VAZQUEZ  -CR 1.1 TO 1.4   - he has carrera  - he is on IVF  - ?  Hydro:urology following, had CT with contrast   - no documented hypotension, not on nephrotoxic drugs  hypernatremia  Malfunction of PEG tube (replacement by IR 8/9/2022)  Hypercalcemia:? Immobility, if worse needs eval  Nausea vomiting  History of cerebral palsy  History of bladder cancer status post radical cystoprostatectomy with neobladder  UTI  PLAN  Stop LR  Start d5w 100 ml/hr  Labs in am         Subjective:   CC:arf  HPI: Patient seen   Na high  Cr improving  Urine out put 1700 ml  ROS:  Current Facility-Administered Medications   Medication Dose Route Frequency    dextrose 5% infusion  100 mL/hr IntraVENous CONTINUOUS    metoprolol tartrate (LOPRESSOR) tablet 25 mg  25 mg Per G Tube Q12H    pantoprazole (PROTONIX) 40 mg in 0.9% sodium chloride 10 mL injection  40 mg IntraVENous DAILY    senna-docusate (PERICOLACE) 8.6-50 mg per tablet 1 Tablet  1 Tablet Per G Tube QHS    bisacodyL (DULCOLAX) suppository 10 mg  10 mg Rectal DAILY    sodium chloride (NS) flush 5-40 mL  5-40 mL IntraVENous Q8H    sodium chloride (NS) flush 5-40 mL  5-40 mL IntraVENous PRN    acetaminophen (TYLENOL) tablet 650 mg  650 mg Oral Q6H PRN    Or    acetaminophen (TYLENOL) suppository 650 mg  650 mg Rectal Q6H PRN    ondansetron (ZOFRAN ODT) tablet 4 mg  4 mg Oral Q8H PRN    Or    ondansetron (ZOFRAN) injection 4 mg  4 mg IntraVENous Q6H PRN    enoxaparin (LOVENOX) injection 40 mg  40 mg SubCUTAneous DAILY    ascorbic acid (vitamin C) (VITAMIN C) tablet 500 mg  500 mg Per G Tube DAILY    fluticasone propionate (FLONASE) 50 mcg/actuation nasal spray 2 Spray  2 Spray Both Nostrils DAILY    mirtazapine (REMERON) tablet 15 mg  15 mg Per G Tube DAILY    nortriptyline (PAMELOR) capsule 50 mg  50 mg Per G Tube QHS    meropenem (MERREM) 1 g in 0.9% sodium chloride (MBP/ADV) 50 mL MBP  1 g IntraVENous Q8H          Objective:     Vitals:  Blood pressure 118/70, pulse 85, temperature 98.4 °F (36.9 °C), resp. rate (!) 47, height 5' 7\" (1.702 m), weight 50.8 kg (112 lb), SpO2 95 %. Temp (24hrs), Av.4 °F (36.9 °C), Min:97.8 °F (36.6 °C), Max:98.8 °F (37.1 °C)      Intake and Output:  No intake/output data recorded.  1901 -  0700  In: 2345 [I.V.:625]  Out: 3600 [Urine:3600]    Physical Exam:                GEN:  NAD  NECK:  Supple, no thyromegaly  RESP: Clear  b/l, no  wheezing,   CVS: RRR,S1,S2   Short +          ECG/rhythm:    Data Review      No results for input(s): TNIPOC in the last 72 hours. No lab exists for component: ITNL   No results for input(s): CPK, CKMB, TROIQ in the last 72 hours. Recent Labs     22  0315 22  0703 22  0431   * 150* 154*   K 4.8 4.2 4.5   * 115* 120*   CO2 29 28 27   BUN 33* 42* 39*   CREA 0.98 1.37* 1.14   * 140* 136*   PHOS 3.2 3.3  --    CA 9.6 10.4* 9.7   ALB 2.7* 3.5  --         No results for input(s): INR, PTP, APTT, INREXT, INREXT in the last 72 hours. Needs: urine analysis, urine sodium, protein and creatinine  No results found for: Lisa Yañezs         : Ivette Suggs MD  2022        [facility]:  [email or web address]. [employer]  Angeli Rico office:  [address] [place] [address],[address]  [place], [address] [place] [address]  Phone: [phone]  Fax :     [phone]    [place] office:  [address] [place] [address]  Darcy Louise  Phone - [phone]  Fax - [phone]

## 2022-08-14 NOTE — PROGRESS NOTES
End of Shift Note    Bedside shift change report given to [address] (oncoming nurse) by Mata Nash RN (offgoing nurse). Report included the following information SBAR, Kardex, Intake/Output, MAR, Accordion, Recent Results, and Med Rec Status    Shift worked:  7pm-7am     Shift summary and any significant changes:    2129: Short irrgated  with  60  ccsterile water and   drained 700 mls of yellow  cloudy  urine  0600: Short  irrigated  with 60 cc of sterile water and  drained 800 cc of yellow cloudy urine. Sodium level of 152, NP on call informed with no new orders.      Concerns for physician to address:  As above     Zone phone for oncoming shift:            Mata Nash RN

## 2022-08-14 NOTE — PROGRESS NOTES
Hospitalist Progress Note    NAME: Mitzy Mejia   :  1960   MRN:  [id number]       Assessment / Plan:  Sepsis due to urinary tract infection. POA. status post neobladder. Cerebral palsy. Anxiety/depression. History of bladder cancer. Urine culture grew ESBL E coli  Cw  meropenem, will do 10 day course, last day  (doing 10 day course instead of 7 days as he was remained febrile for 3-4 days despite being on meropenem)  Does straight cath in group home. C/w carrera catheter here, will discharge with this  Having intermittent retention with carrera, nursing doing irrigation PRN  Urology following  Spiking fever so repeat CT done, no abscess but has left hydronephrosis. No fever for last 2 days  CXR negative, Rapid covid negative    Sinus tachycardia  Likely dehydration/ urinary retention  Resolved  C/w metoprolol    Constipation  Fecal impaction:  Now having BM  GI evaluated and signed off  C/w aggressive bowel regimen, colace/ lactulose/ dulcolax daily, enema as needed    Dislodged PEG tube:  PEG replaced by IR  C/w feeding    Hypernatremia:  VAZQUEZ    Creatinine better, Na slightly worse, changed LR to D5w  Cw free water bolus with feeding     Cerebral palsy. Continue with supportive care. Code Status: DO NOT RESUSCITATE. Surrogate Decision Maker: His neice     DVT Prophylaxis: Lovenox. GI Prophylaxis: not indicated     Baseline: Dependent for activity of daily living.   Non verbal, but can communicated with nodding/ gestures, better when family at bedside    JESSE: 8/15  Barriers: Creatinine/ Sodium/ Needs ertapenem on discharge     Subjective:     Chief Complaint / Reason for Physician Visit  Seen and evaluated in ED  Discussed with brother at the bedside today    Review of Systems:  Symptom Y/N Comments  Symptom Y/N Comments   Fever/Chills    Chest Pain     Poor Appetite    Edema     Cough    Abdominal Pain     Sputum    Joint Pain     SOB/FANG    Pruritis/Rash     Nausea/vomit Tolerating PT/OT     Diarrhea    Tolerating Diet     Constipation    Other       Could NOT obtain due to: Non verbal     Objective:     VITALS:   Last 24hrs VS reviewed since prior progress note. Most recent are:  Patient Vitals for the past 24 hrs:   Temp Pulse Resp BP SpO2   08/14/22 1058 98.4 °F (36.9 °C) 85 (!) 47 118/70 95 %   08/14/22 0841 98.8 °F (37.1 °C) 98 18 128/74 93 %   08/14/22 0400 98.4 °F (36.9 °C) 94 18 109/62 94 %   08/13/22 2354 98 °F (36.7 °C) 88 16 126/66 92 %   08/13/22 1930 97.8 °F (36.6 °C) 90 18 108/69 90 %   08/13/22 1832 98.6 °F (37 °C) 100 20 138/77 92 %   08/13/22 1520 98.5 °F (36.9 °C) 100 20 119/74 91 %       Intake/Output Summary (Last 24 hours) at 8/14/2022 1145  Last data filed at 8/14/2022 0600  Gross per 24 hour   Intake 2185 ml   Output 1700 ml   Net 485 ml        I had a face to face encounter and independently examined this patient on 8/14/2022, as outlined below:  PHYSICAL EXAM:  General: Awake, No acute distress  EENT:  EOMI. Anicteric sclerae. MMM  Resp:  CTA bilaterally, no wheezing or rales. No accessory muscle use  CV:  Regular  rhythm,  No edema  GI:  Soft, Non distended, Non tender. +Bowel sounds  Neurologic:  Awake, non verbal at baseline, contracted extremity  Psych:   Not anxious nor agitated  Skin:  No rashes. No jaundice    Reviewed most current lab test results and cultures  YES  Reviewed most current radiology test results   YES  Review and summation of old records today    NO  Reviewed patient's current orders and MAR    YES  PMH/SH reviewed - no change compared to H&P  ________________________________________________________________________  Care Plan discussed with:    Comments   Patient y    Family  y Brother at the bedside   RN y    Care Manager     Consultant                        Multidiciplinary team rounds were held today with , nursing, pharmacist and clinical coordinator.   Patient's plan of care was discussed; medications were reviewed and discharge planning was addressed. ________________________________________________________________________  Total NON critical care TIME:  36   Minutes    Total CRITICAL CARE TIME Spent:   Minutes non procedure based      Comments   >50% of visit spent in counseling and coordination of care     ________________________________________________________________________  Betty Crump MD     Procedures: see electronic medical records for all procedures/Xrays and details which were not copied into this note but were reviewed prior to creation of Plan. LABS:  I reviewed today's most current labs and imaging studies. Pertinent labs include:  No results for input(s): WBC, HGB, HCT, PLT, HGBEXT, HCTEXT, PLTEXT, HGBEXT, HCTEXT, PLTEXT in the last 72 hours.     Recent Labs     08/14/22  0315 08/13/22  0703 08/12/22  0431   * 150* 154*   K 4.8 4.2 4.5   * 115* 120*   CO2 29 28 27   * 140* 136*   BUN 33* 42* 39*   CREA 0.98 1.37* 1.14   CA 9.6 10.4* 9.7   PHOS 3.2 3.3  --    ALB 2.7* 3.5  --        Signed: Betty Crump MD

## 2022-08-15 ENCOUNTER — APPOINTMENT (OUTPATIENT)
Dept: CT IMAGING | Age: 62
DRG: 698 | End: 2022-08-15
Attending: NURSE PRACTITIONER
Payer: MEDICARE

## 2022-08-15 LAB
ALBUMIN SERPL-MCNC: 2.7 G/DL (ref 3.5–5)
ANION GAP SERPL CALC-SCNC: 3 MMOL/L (ref 5–15)
BUN SERPL-MCNC: 29 MG/DL (ref 6–20)
BUN/CREAT SERPL: 37 (ref 12–20)
CALCIUM SERPL-MCNC: 9.5 MG/DL (ref 8.5–10.1)
CHLORIDE SERPL-SCNC: 109 MMOL/L (ref 97–108)
CO2 SERPL-SCNC: 34 MMOL/L (ref 21–32)
CREAT SERPL-MCNC: 0.79 MG/DL (ref 0.7–1.3)
GLUCOSE SERPL-MCNC: 99 MG/DL (ref 65–100)
PHOSPHATE SERPL-MCNC: 3.2 MG/DL (ref 2.6–4.7)
POTASSIUM SERPL-SCNC: 4.4 MMOL/L (ref 3.5–5.1)
SODIUM SERPL-SCNC: 146 MMOL/L (ref 136–145)

## 2022-08-15 PROCEDURE — 65270000029 HC RM PRIVATE

## 2022-08-15 PROCEDURE — C1751 CATH, INF, PER/CENT/MIDLINE: HCPCS

## 2022-08-15 PROCEDURE — 74011250637 HC RX REV CODE- 250/637: Performed by: NURSE PRACTITIONER

## 2022-08-15 PROCEDURE — 77030018786 HC NDL GD F/USND BARD -B

## 2022-08-15 PROCEDURE — 74011250636 HC RX REV CODE- 250/636: Performed by: INTERNAL MEDICINE

## 2022-08-15 PROCEDURE — 74176 CT ABD & PELVIS W/O CONTRAST: CPT

## 2022-08-15 PROCEDURE — 74011250636 HC RX REV CODE- 250/636: Performed by: HOSPITALIST

## 2022-08-15 PROCEDURE — 74011000250 HC RX REV CODE- 250: Performed by: HOSPITALIST

## 2022-08-15 PROCEDURE — 74011250637 HC RX REV CODE- 250/637: Performed by: STUDENT IN AN ORGANIZED HEALTH CARE EDUCATION/TRAINING PROGRAM

## 2022-08-15 PROCEDURE — 74011000258 HC RX REV CODE- 258: Performed by: STUDENT IN AN ORGANIZED HEALTH CARE EDUCATION/TRAINING PROGRAM

## 2022-08-15 PROCEDURE — 74011250636 HC RX REV CODE- 250/636: Performed by: STUDENT IN AN ORGANIZED HEALTH CARE EDUCATION/TRAINING PROGRAM

## 2022-08-15 PROCEDURE — 80069 RENAL FUNCTION PANEL: CPT

## 2022-08-15 PROCEDURE — 74011000250 HC RX REV CODE- 250: Performed by: INTERNAL MEDICINE

## 2022-08-15 PROCEDURE — C9113 INJ PANTOPRAZOLE SODIUM, VIA: HCPCS | Performed by: HOSPITALIST

## 2022-08-15 PROCEDURE — 74011250637 HC RX REV CODE- 250/637: Performed by: INTERNAL MEDICINE

## 2022-08-15 PROCEDURE — 76937 US GUIDE VASCULAR ACCESS: CPT

## 2022-08-15 PROCEDURE — 74011000258 HC RX REV CODE- 258: Performed by: INTERNAL MEDICINE

## 2022-08-15 PROCEDURE — 36415 COLL VENOUS BLD VENIPUNCTURE: CPT

## 2022-08-15 RX ORDER — POLYETHYLENE GLYCOL 3350 17 G/17G
17 POWDER, FOR SOLUTION ORAL EVERY 4 HOURS
Status: DISCONTINUED | OUTPATIENT
Start: 2022-08-15 | End: 2022-08-20

## 2022-08-15 RX ADMIN — SENNOSIDES AND DOCUSATE SODIUM 1 TABLET: 50; 8.6 TABLET ORAL at 22:26

## 2022-08-15 RX ADMIN — SODIUM CHLORIDE 40 MG: 9 INJECTION, SOLUTION INTRAMUSCULAR; INTRAVENOUS; SUBCUTANEOUS at 12:03

## 2022-08-15 RX ADMIN — SODIUM CHLORIDE, PRESERVATIVE FREE 10 ML: 5 INJECTION INTRAVENOUS at 05:44

## 2022-08-15 RX ADMIN — MIRTAZAPINE 15 MG: 15 TABLET, FILM COATED ORAL at 12:01

## 2022-08-15 RX ADMIN — POLYETHYLENE GLYCOL 3350 17 G: 17 POWDER, FOR SOLUTION ORAL at 20:26

## 2022-08-15 RX ADMIN — BISACODYL 10 MG: 10 SUPPOSITORY RECTAL at 12:11

## 2022-08-15 RX ADMIN — MEROPENEM 1 G: 1 INJECTION, POWDER, FOR SOLUTION INTRAVENOUS at 20:24

## 2022-08-15 RX ADMIN — MEROPENEM 1 G: 1 INJECTION, POWDER, FOR SOLUTION INTRAVENOUS at 13:38

## 2022-08-15 RX ADMIN — ONDANSETRON 4 MG: 4 TABLET, ORALLY DISINTEGRATING ORAL at 12:11

## 2022-08-15 RX ADMIN — METOPROLOL TARTRATE 25 MG: 25 TABLET ORAL at 12:15

## 2022-08-15 RX ADMIN — METOPROLOL TARTRATE 25 MG: 25 TABLET ORAL at 12:00

## 2022-08-15 RX ADMIN — SODIUM CHLORIDE, PRESERVATIVE FREE 10 ML: 5 INJECTION INTRAVENOUS at 22:27

## 2022-08-15 RX ADMIN — NORTRIPTYLINE HYDROCHLORIDE 50 MG: 25 CAPSULE ORAL at 22:26

## 2022-08-15 RX ADMIN — POLYETHYLENE GLYCOL 3350 17 G: 17 POWDER, FOR SOLUTION ORAL at 16:55

## 2022-08-15 RX ADMIN — OXYCODONE HYDROCHLORIDE AND ACETAMINOPHEN 500 MG: 500 TABLET ORAL at 12:14

## 2022-08-15 RX ADMIN — SODIUM CHLORIDE, PRESERVATIVE FREE 10 ML: 5 INJECTION INTRAVENOUS at 13:28

## 2022-08-15 RX ADMIN — ENOXAPARIN SODIUM 40 MG: 100 INJECTION SUBCUTANEOUS at 12:03

## 2022-08-15 RX ADMIN — MEROPENEM 1 G: 1 INJECTION, POWDER, FOR SOLUTION INTRAVENOUS at 04:00

## 2022-08-15 NOTE — PROGRESS NOTES
Hospitalist Progress Note    NAME: Yisel Sarabia   :  1960   MRN:  [id number]       Assessment / Plan:  Sepsis due to urinary tract infection. POA. status post neobladder. Cerebral palsy. Anxiety/depression. History of bladder cancer. Urine culture grew ESBL E coli  Cw  meropenem, will do 10 day course, last day  (doing 10 day course instead of 7 days as he had remained febrile for 3-4 days despite being on meropenem)  No fever spikes for last 3 days  CXR negative, Rapid covid negative  Midline ordered    Sinus tachycardia  Likely dehydration/ urinary retention  Resolved  C/w metoprolol    Hx of neobladder  Urinary retention:    Does straight cath in group home. C/w carrera catheter here, will discharge with this  Having intermittent retention even with carrera, nursing doing irrigation PRN  Urology following  Repeat CT done today : showed stable left hydroureteronephrosis, Calculi in neobladder. Constipation  Fecal impaction:  Now having BM, had 1 yesterday  GI evaluated and signed off  However repeat CT still showing colonic distension with stool mass  C/w aggressive bowel regimen, colace/ lactulose/ dulcolax daily, enema as needed  GI reconsulted for follow up    Dislodged PEG tube:  PEG replaced by IR   C/w feeding    Hypernatremia:  VAZQUEZ    Creatinine better, Na better as well  Cw free water bolus with feeding     Cerebral palsy. Continue with supportive care. Code Status: DO NOT RESUSCITATE. Surrogate Decision Maker: His neice     DVT Prophylaxis: Lovenox. GI Prophylaxis: not indicated     Baseline: Dependent for activity of daily living.   Non verbal, but can communicated with nodding/ gestures, better when family at bedside    JESSE:   Barriers: Urology/ GI clearance/ iv abx setup at group home     Subjective:     Chief Complaint / Reason for Physician Visit  Seen and evaluated in ED  Discussed with sister at bedside    Review of Systems:  Symptom Y/N Comments Symptom Y/N Comments   Fever/Chills    Chest Pain     Poor Appetite    Edema     Cough    Abdominal Pain     Sputum    Joint Pain     SOB/FANG    Pruritis/Rash     Nausea/vomit    Tolerating PT/OT     Diarrhea    Tolerating Diet     Constipation    Other       Could NOT obtain due to: Non verbal     Objective:     VITALS:   Last 24hrs VS reviewed since prior progress note. Most recent are:  Patient Vitals for the past 24 hrs:   Temp Pulse Resp BP SpO2   08/15/22 0845 98.5 °F (36.9 °C) (!) 102 -- (!) 141/81 96 %   08/15/22 0500 98.9 °F (37.2 °C) 88 20 136/82 95 %   08/15/22 0048 98.6 °F (37 °C) (!) 119 19 110/68 95 %       Intake/Output Summary (Last 24 hours) at 8/15/2022 1540  Last data filed at 8/15/2022 0400  Gross per 24 hour   Intake 540 ml   Output 1800 ml   Net -1260 ml        I had a face to face encounter and independently examined this patient on 8/15/2022, as outlined below:  PHYSICAL EXAM:  General: Awake, No acute distress  EENT:  EOMI. Anicteric sclerae. MMM  Resp:  CTA bilaterally, no wheezing or rales. No accessory muscle use  CV:  Regular  rhythm,  No edema  GI:  Soft, Non distended, Non tender. +Bowel sounds  Neurologic:  Awake, non verbal at baseline, contracted extremity  Psych:   Not anxious nor agitated  Skin:  No rashes. No jaundice    Reviewed most current lab test results and cultures  YES  Reviewed most current radiology test results   YES  Review and summation of old records today    NO  Reviewed patient's current orders and MAR    YES  PMH/ reviewed - no change compared to H&P  ________________________________________________________________________  Care Plan discussed with:    Comments   Patient y    Family  y Sister at the bedside   RN y    Care Manager     Consultant                        Multidiciplinary team rounds were held today with , nursing, pharmacist and clinical coordinator.   Patient's plan of care was discussed; medications were reviewed and discharge planning was addressed. ________________________________________________________________________  Total NON critical care TIME:  36   Minutes    Total CRITICAL CARE TIME Spent:   Minutes non procedure based      Comments   >50% of visit spent in counseling and coordination of care     ________________________________________________________________________  Shaheen Hairston MD     Procedures: see electronic medical records for all procedures/Xrays and details which were not copied into this note but were reviewed prior to creation of Plan. LABS:  I reviewed today's most current labs and imaging studies. Pertinent labs include:  No results for input(s): WBC, HGB, HCT, PLT, HGBEXT, HCTEXT, PLTEXT, HGBEXT, HCTEXT, PLTEXT in the last 72 hours.     Recent Labs     08/15/22  0307 08/14/22  0315 08/13/22  0703   * 152* 150*   K 4.4 4.8 4.2   * 118* 115*   CO2 34* 29 28   GLU 99 102* 140*   BUN 29* 33* 42*   CREA 0.79 0.98 1.37*   CA 9.5 9.6 10.4*   PHOS 3.2 3.2 3.3   ALB 2.7* 2.7* 3.5       Signed: Shaheen Hairston MD

## 2022-08-15 NOTE — PROGRESS NOTES
Transition of Care Plan:     RUR: 13% (low)  Disposition: Home with HH and IV abx., pending. Follow up appointments: PCP/urology/nephrology. DME needed: None. Transportation at Discharge: [facility] [place] [address] on 8/16/2022, nursing call report to [phone]. Keys or means to access home:   Facility has keys.  Medicare Letter: 2nd IMM signed 8/15/2022. Is patient a BCPI-A Bundle:   N/A                   If yes, was Bundle Letter given?:  N/A  Is patient a  and connected with the [place]? N/A              If yes, was [place] transfer form completed and VA notified? N/A  Caregiver Contact: Colette Estarda - sister - [phone]. Discharge Caregiver contacted prior to discharge? To be contacted prior to discharge. Care Conference needed?:  No.          6961 - IV abx and line care orders sent to [facility] on [employer], pending. IV abx orders sent to [facility] on [employer], pending. Facesheet, H&P, hospitalist/GI/urology/nephrology notes, blood and urine cultures, IV abx and V/S faxed to [facility] group [place] for anticipated patient discharge today, pending, fax number [phone]. Patient to have line placed today and to discharge with carrera. [facility] able to accept patient for PT/OT/nursing/IV abx/line care. Jac Gan has received referral and checking benefits. [facility] contacted by ARON, spoke with supervisor (extension #[phone]) regarding patient's return, said physician should be able to review faxed information and let them know by 3pm.  ARON contacted patient's sister, Colette Estrada. She is agreeable to discharge plan of returning to [facility] with [facility] and [facility]. Myla and patient's brother, Emy Watts, present in room. Louise Keith deferring signing of [address] and 2nd IMM to patient's brother. [address] able to accept patient. [facility] able to accept patient, aware of 10 am discharge tomorrow.   Patient's sister made aware of discharge plan and [facility] scheduled for EWELINA Miller, RN    Care Management  [phone]

## 2022-08-15 NOTE — PROGRESS NOTES
Midline insertion procedure note:  Pt has very limited vascular access. Procedure explained to sister Traci Mota) along with risks and benefits. Procedure teaching completed. Pre procedure assessment done. Sister denies questions or concerns at this time. Maximum sterile barrier precautions observed throughout procedure. Lidocaine 1% 3ml sc injected to site prior to access the vein. Cannulated basilic vein using ultrasound guidance. Inserted 4.5 Fr. single lumen midline to right upper arm arm. Blood return verified and flushed with 20ml normal saline. Sterile dressing applied with Biopatch, StatLock and occlusive dressing as per protocol. Curos caps applied to port. Patient tolerated procedure well with minimal blood loss. Reason for access : Reliable IV access  Complications related to insertion : with difficulty due to muscle stiffness (cerebral palsy)  This midline to be removed after IV antibiotics therapy done. See nursing message on [employer] for midline reminders. Midline is CT and MRI compatible. Inserted by : Vitaliy Garcia RN Vascular Access Nurse  Assisted by : Tyler Griffith RN [facility], PICC Nurse, Vascular Access Team       Catheter Length : 15 cm   External Length : 2 cm   arm circumference : 30 cm. Hard to get get more accurate measurement due to muscle stiffness. Catheter occupies 18  % of vein. Type of Midline: Arrow  Ref #:   [id number]  Lot #: [id number]  Expiration Date: 2023/12/31  Informed primary nurse Ruth Sousa RN midline is ready for use and to hang new infusion tubing prior to use. Vitaliy Garcia RN, PICC Nurse.   Vascular Acces Team details… detailed exam joint swelling/no calf tenderness/normal strength/right knee

## 2022-08-15 NOTE — PROGRESS NOTES
Nephrology Progress Note  [facility] [address] , [place], [address]  Phone - [phone]  Fax - [phone]                 Patient: Eunice Garcia                   YOB: 1960        Date- 8/15/2022                      Admit Date: 8/8/2022  CC: Follow up for Hypernatremia          IMPRESSION & PLAN:   Hypernatremia(secondary to free water deficit )  Malfunction of PEG tube (replacement by IR 8/9/2022)  Nausea vomiting  History of cerebral palsy  History of bladder cancer status post radical cystoprostatectomy with neobladder  Intermittent malfunction of Short catheter. UTI    PLAN-  Sodium continues to improve. Reduce D5W to 75 mils per hour. Plan for CT of abdomen pelvis to assess obstructing versus nonobstructing celestine bladder stones. Management of Short catheter per urology. Will follow with you     Subjective: Interval History:   -Seen and examined today. Objective:   Vitals:    08/14/22 1520 08/15/22 0048 08/15/22 0500 08/15/22 0845   BP: 107/63 110/68 136/82 (!) 141/81   Pulse: (!) 104 (!) 119 88 (!) 102   Resp: 19 19 20    Temp: 98.6 °F (37 °C) 98.6 °F (37 °C) 98.9 °F (37.2 °C) 98.5 °F (36.9 °C)   SpO2: 94% 95% 95% 96%   Weight:       Height:          08/14 0701 - 08/15 0700  In: 1620   Out: 3400 [Urine:3400]  Last 3 Recorded Weights in this Encounter    08/08/22 1845   Weight: 50.8 kg (112 lb)      Physical exam:    GEN: NAD  NECK- Supple, no mass  RESP: No wheezing, Clear b/l  CVS: S1,S2  RRR  NEURO: Normal speech, Non focal  EXT: No Edema   PSYCH: Normal Mood    Chart reviewed. Pertinent Notes reviewed.      Data Review :  Recent Labs     08/15/22  0307 08/14/22  0315 08/13/22  0703   * 152* 150*   K 4.4 4.8 4.2   * 118* 115*   CO2 34* 29 28   BUN 29* 33* 42*   CREA 0.79 0.98 1.37*   GLU 99 102* 140*   CA 9.5 9.6 10.4*   PHOS 3.2 3.2 3.3     No results for input(s): WBC, HGB, HCT, PLT, HGBEXT, HCTEXT, PLTEXT in the last 72 hours. No results for input(s): FE, TIBC, PSAT, FERR in the last 72 hours.    Medication list  reviewed  Current Facility-Administered Medications   Medication Dose Route Frequency    meropenem (MERREM) 1 g in 0.9% sodium chloride (MBP/ADV) 50 mL MBP  1 g IntraVENous Q8H    dextrose 5% infusion  75 mL/hr IntraVENous CONTINUOUS    metoprolol tartrate (LOPRESSOR) tablet 25 mg  25 mg Per G Tube Q12H    pantoprazole (PROTONIX) 40 mg in 0.9% sodium chloride 10 mL injection  40 mg IntraVENous DAILY    senna-docusate (PERICOLACE) 8.6-50 mg per tablet 1 Tablet  1 Tablet Per G Tube QHS    bisacodyL (DULCOLAX) suppository 10 mg  10 mg Rectal DAILY    sodium chloride (NS) flush 5-40 mL  5-40 mL IntraVENous Q8H    sodium chloride (NS) flush 5-40 mL  5-40 mL IntraVENous PRN    acetaminophen (TYLENOL) tablet 650 mg  650 mg Oral Q6H PRN    Or    acetaminophen (TYLENOL) suppository 650 mg  650 mg Rectal Q6H PRN    ondansetron (ZOFRAN ODT) tablet 4 mg  4 mg Oral Q8H PRN    Or    ondansetron (ZOFRAN) injection 4 mg  4 mg IntraVENous Q6H PRN    enoxaparin (LOVENOX) injection 40 mg  40 mg SubCUTAneous DAILY    ascorbic acid (vitamin C) (VITAMIN C) tablet 500 mg  500 mg Per G Tube DAILY    fluticasone propionate (FLONASE) 50 mcg/actuation nasal spray 2 Spray  2 Spray Both Nostrils DAILY    mirtazapine (REMERON) tablet 15 mg  15 mg Per G Tube DAILY    nortriptyline (PAMELOR) capsule 50 mg  50 mg Per G Tube QHS        Zechariah Chapa MD  8/15/2022

## 2022-08-15 NOTE — PROGRESS NOTES
GI PROGRESS NOTE  Dave Sheridan, JOLENE  [phone] NP in-hospital cell phone M-F until 4:30  After 5pm or on weekends, please call  for physician on call    NAME:Basilio Mitchell :1960 JNU:[id number]   ATTG: Dr. Lenora Geller  PCP: Stanford Rodríguez MD  Date/Time:  8/15/2022 1542     Primary GI: Dr. Dwain Nails- Dr Asim Krishnamurthy covering    Reason for following: rectal ball    Assessment:     Constipation, large stool burden on CT scan  - pt restless and uncomfortable in room  -CT 8/15/22 : Significant colonic distention with very large stool ball in the rectum this is not changed. - No stools x 2 days  - Hx of constipation  -Sister thought pt is to be nauseated but she is unable to tell if nauseated versus overall discomfort  - Abdomen is distended, rigid, and tender on palpation. From previous note:  PEG Tube Malfunction; replaced by IR  - Patient with significant PMH including cerebral palsy, bladder cancer, celestine bladder; initially came in to ED for abdominal pain 8/10 and diffuse; had even had episode of vomiting  - PEG tube was dislodged/slid out unsure how this happened; sister who is with him states it happens often and nurses at long term care facility where he lives replace it  - PEG was reportedly dislodged around noon 22 but may have been longer since dislodgement  - Replaced by IR 2022 with 18Fr  - CT ABD PEL W CONT 22 Obstipation, Air-filled stomach, Incidental Moderate hiatal hernia, emphysema.  -Constipation, s/p enema with bowel movement   -chronic back pain, s/p stimulator, located in RLQ  Plan:     -Tap Water enema every 4 hours  - Hold Tube feeds today, only miralax every 4 hours with liquids  - KUB tomorrow AM to see if improved  - Will hold off on stimulant laxatives as not fully able to communicate. Plan discussed with Dr Asim Krishnamurthy and sister who is at bedside. Also spoke to Titus Paula, bedside RN. Subjective:   Discussed with RN events overnight.  Resting in room but restless. Complaint Y/N Description   Abdominal Pain N    Hematemesis n    Hematochezia     Melena     Constipation     Diarrhea n    Dyspepsia     Dysphagia     Jaundiced     Nausea/vomiting N      Review of Systems:  Symptom Y/N Comments  Symptom Y/N Comments   Fever/Chills    Chest Pain     Cough    Headaches     Sputum    Joint Pain     SOB/FANG    Pruritis/Rash     Tolerating Diet    Other       Could NOT obtain due to:      Objective:   VITALS:   Last 24hrs VS reviewed since prior progress note. Most recent are:  Visit Vitals  BP (!) 141/81   Pulse (!) 102   Temp 98.5 °F (36.9 °C)   Resp 20   Ht 5' 7\" (1.702 m)   Wt 50.8 kg (112 lb)   SpO2 96%   BMI 17.54 kg/m²       Intake/Output Summary (Last 24 hours) at 8/15/2022 1542  Last data filed at 8/15/2022 0400  Gross per 24 hour   Intake 540 ml   Output 1800 ml   Net -1260 ml       PHYSICAL EXAM:  General: WD, WN. Alert, cooperative, no acute distress    HEENT: NC, Atraumatic. Anicteric sclerae. Lungs:  CTA Bilaterally. No Wheezing/Rhonchi/Rales. Heart:  Regular  rhythm,  No Rubs, No Gallops  Abdomen: Soft, Non distended, Non tender. +Bowel sounds, no HSM. PEG in lower quadrant, site c/d/I. Stimulator in RLQ  Extremities: No c/c/e  Neurologic:  Alert and oriented X 2  No acute neurological distress   Psych:   Not anxious nor agitated. Lab and Radiology Data Reviewed: (see below)    Medications Reviewed: (see below)  PMH/SH reviewed - no change compared to H&P  ________________________________________________________________________  Total time spent with patient: 20minutes ________________________________________________________________________  Care Plan discussed with:  Patient Y   Family  Y - sister   RN Micheline Caldera              Consultant: Tierney Mendoza NP     Procedures: see electronic medical records for all procedures/Xrays and details which were not copied into this note but were reviewed prior to creation of Plan.       LABS:  No results for input(s): WBC, HGB, HCT, PLT, HGBEXT, HCTEXT, PLTEXT, HGBEXT, HCTEXT, PLTEXT in the last 72 hours. Recent Labs     08/15/22  0307 08/14/22  0315 08/13/22  0703   * 152* 150*   K 4.4 4.8 4.2   * 118* 115*   CO2 34* 29 28   BUN 29* 33* 42*   CREA 0.79 0.98 1.37*   GLU 99 102* 140*   CA 9.5 9.6 10.4*   PHOS 3.2 3.2 3.3       Recent Labs     08/15/22  0307 08/14/22  0315 08/13/22  0703   ALB 2.7* 2.7* 3.5       No results for input(s): INR, PTP, APTT, INREXT, INREXT in the last 72 hours. No results for input(s): FE, TIBC, PSAT, FERR in the last 72 hours. No results found for: FOL, RBCF  No results for input(s): PH, PCO2, PO2 in the last 72 hours. No results for input(s): CPK, CKMB in the last 72 hours.     No lab exists for component: TROPONINI  Lab Results   Component Value Date/Time    Color DARK YELLOW 08/08/2022 04:50 PM    Appearance CLOUDY (A) 08/08/2022 04:50 PM    Specific gravity >1.030 (H) 08/08/2022 04:50 PM    Specific gravity 1.021 03/18/2021 08:00 PM    pH (UA) >8.5 (H) 08/08/2022 04:50 PM    Protein 100 (A) 08/08/2022 04:50 PM    Glucose Negative 08/08/2022 04:50 PM    Ketone Negative 08/08/2022 04:50 PM    Bilirubin Negative 08/08/2022 04:50 PM    Urobilinogen 1.0 08/08/2022 04:50 PM    Nitrites Positive (A) 08/08/2022 04:50 PM    Leukocyte Esterase LARGE (A) 08/08/2022 04:50 PM    Epithelial cells FEW 08/08/2022 04:50 PM    Bacteria 3+ (A) 08/08/2022 04:50 PM    WBC >100 (H) 08/08/2022 04:50 PM    RBC  08/08/2022 04:50 PM       MEDICATIONS:  Current Facility-Administered Medications   Medication Dose Route Frequency    meropenem (MERREM) 1 g in 0.9% sodium chloride (MBP/ADV) 50 mL MBP  1 g IntraVENous Q8H    dextrose 5% infusion  75 mL/hr IntraVENous CONTINUOUS    metoprolol tartrate (LOPRESSOR) tablet 25 mg  25 mg Per G Tube Q12H    pantoprazole (PROTONIX) 40 mg in 0.9% sodium chloride 10 mL injection  40 mg IntraVENous DAILY    senna-docusate (PERICOLACE) 8.6-50 mg per tablet 1 Tablet  1 Tablet Per G Tube QHS    bisacodyL (DULCOLAX) suppository 10 mg  10 mg Rectal DAILY    sodium chloride (NS) flush 5-40 mL  5-40 mL IntraVENous Q8H    sodium chloride (NS) flush 5-40 mL  5-40 mL IntraVENous PRN    acetaminophen (TYLENOL) tablet 650 mg  650 mg Oral Q6H PRN    Or    acetaminophen (TYLENOL) suppository 650 mg  650 mg Rectal Q6H PRN    ondansetron (ZOFRAN ODT) tablet 4 mg  4 mg Oral Q8H PRN    Or    ondansetron (ZOFRAN) injection 4 mg  4 mg IntraVENous Q6H PRN    enoxaparin (LOVENOX) injection 40 mg  40 mg SubCUTAneous DAILY    ascorbic acid (vitamin C) (VITAMIN C) tablet 500 mg  500 mg Per G Tube DAILY    fluticasone propionate (FLONASE) 50 mcg/actuation nasal spray 2 Spray  2 Spray Both Nostrils DAILY    mirtazapine (REMERON) tablet 15 mg  15 mg Per G Tube DAILY    nortriptyline (PAMELOR) capsule 50 mg  50 mg Per G Tube QHS

## 2022-08-15 NOTE — PROGRESS NOTES
Progress Note    Patient: Josep Rothman MRN: [id number]  SSN: [id number]    YOB: 1960  Age: 64 y.o. Sex: male          ADMITTED:  2022 to Emely Austin MD  for UTI (urinary tract infection) [N39.0]           Josep Rothman was admitted for UTI (urinary tract infection) [N39.0]. Plan for repeat CT a/p today to further evaluate obstructing vs nonobstructive neobladder stones and left hydronephrosis. Carrera draining clear yellow UA with good UOP.     +++CT demonstrating stable left hydronephrosis with severe colonic distention and stool burden. Treat severe constipation     Vitals:  Temp (24hrs), Av.7 °F (37.1 °C), Min:98.5 °F (36.9 °C), Max:98.9 °F (37.2 °C)     Blood pressure (!) 141/81, pulse (!) 102, temperature 98.5 °F (36.9 °C), resp. rate 20, height 5' 7\" (1.702 m), weight 50.8 kg (112 lb), SpO2 96 %. I&O's:   1901 - 08/15 0700  In: 1740   Out: 5100 [Urine:5100]   No intake/output data recorded. Exam:   NAD. abdomen soft, NT     Labs:   No results for input(s): WBC, HGB, HCT, PLT, HGBEXT, HCTEXT, PLTEXT in the last 72 hours. Recent Labs     08/15/22  0307 22  0315 22  0703   * 152* 150*   K 4.4 4.8 4.2   * 118* 115*   CO2 34* 29 28   GLU 99 102* 140*   BUN 29* 33* 42*   CREA 0.79 0.98 1.37*   CA 9.5 9.6 10.4*        Cultures:        Imaging:       Assessment:     - Active Problems:    UTI (urinary tract infection) (2018)        Plan:     - Do not remove carrera while he remains inpatient. He can resume IC at the [facility] as previously stated.    - treat severe constipation  -has urology follow up arranged     Supervising Dr. Ryan SMITH Ahr By: Rosa Rodriguez, JOLENE - August 15, 2022

## 2022-08-16 ENCOUNTER — APPOINTMENT (OUTPATIENT)
Dept: CT IMAGING | Age: 62
DRG: 698 | End: 2022-08-16
Attending: STUDENT IN AN ORGANIZED HEALTH CARE EDUCATION/TRAINING PROGRAM
Payer: MEDICARE

## 2022-08-16 ENCOUNTER — APPOINTMENT (OUTPATIENT)
Dept: GENERAL RADIOLOGY | Age: 62
DRG: 698 | End: 2022-08-16
Attending: NURSE PRACTITIONER
Payer: MEDICARE

## 2022-08-16 LAB
ALBUMIN SERPL-MCNC: 2.8 G/DL (ref 3.5–5)
ANION GAP SERPL CALC-SCNC: 3 MMOL/L (ref 5–15)
BUN SERPL-MCNC: 26 MG/DL (ref 6–20)
BUN/CREAT SERPL: 33 (ref 12–20)
CALCIUM SERPL-MCNC: 9.1 MG/DL (ref 8.5–10.1)
CHLORIDE SERPL-SCNC: 104 MMOL/L (ref 97–108)
CO2 SERPL-SCNC: 31 MMOL/L (ref 21–32)
CREAT SERPL-MCNC: 0.78 MG/DL (ref 0.7–1.3)
ERYTHROCYTE [DISTWIDTH] IN BLOOD BY AUTOMATED COUNT: 13.6 % (ref 11.5–14.5)
GLUCOSE BLD STRIP.AUTO-MCNC: 131 MG/DL (ref 65–117)
GLUCOSE SERPL-MCNC: 116 MG/DL (ref 65–100)
HCT VFR BLD AUTO: 44.2 % (ref 36.6–50.3)
HGB BLD-MCNC: 14.3 G/DL (ref 12.1–17)
MCH RBC QN AUTO: 29.4 PG (ref 26–34)
MCHC RBC AUTO-ENTMCNC: 32.4 G/DL (ref 30–36.5)
MCV RBC AUTO: 90.8 FL (ref 80–99)
NRBC # BLD: 0 K/UL (ref 0–0.01)
NRBC BLD-RTO: 0 PER 100 WBC
PHOSPHATE SERPL-MCNC: 3.1 MG/DL (ref 2.6–4.7)
PLATELET # BLD AUTO: 259 K/UL (ref 150–400)
PMV BLD AUTO: 11.1 FL (ref 8.9–12.9)
POTASSIUM SERPL-SCNC: 3.7 MMOL/L (ref 3.5–5.1)
RBC # BLD AUTO: 4.87 M/UL (ref 4.1–5.7)
SERVICE CMNT-IMP: ABNORMAL
SODIUM SERPL-SCNC: 138 MMOL/L (ref 136–145)
WBC # BLD AUTO: 9 K/UL (ref 4.1–11.1)

## 2022-08-16 PROCEDURE — 74011250636 HC RX REV CODE- 250/636: Performed by: HOSPITALIST

## 2022-08-16 PROCEDURE — 74011250637 HC RX REV CODE- 250/637: Performed by: STUDENT IN AN ORGANIZED HEALTH CARE EDUCATION/TRAINING PROGRAM

## 2022-08-16 PROCEDURE — 74011250637 HC RX REV CODE- 250/637: Performed by: NURSE PRACTITIONER

## 2022-08-16 PROCEDURE — 74011636637 HC RX REV CODE- 636/637: Performed by: NURSE PRACTITIONER

## 2022-08-16 PROCEDURE — 85027 COMPLETE CBC AUTOMATED: CPT

## 2022-08-16 PROCEDURE — 74011000250 HC RX REV CODE- 250: Performed by: HOSPITALIST

## 2022-08-16 PROCEDURE — 74160 CT ABDOMEN W/CONTRAST: CPT

## 2022-08-16 PROCEDURE — 74011250636 HC RX REV CODE- 250/636: Performed by: INTERNAL MEDICINE

## 2022-08-16 PROCEDURE — 65270000029 HC RM PRIVATE

## 2022-08-16 PROCEDURE — 74011000258 HC RX REV CODE- 258: Performed by: STUDENT IN AN ORGANIZED HEALTH CARE EDUCATION/TRAINING PROGRAM

## 2022-08-16 PROCEDURE — C9113 INJ PANTOPRAZOLE SODIUM, VIA: HCPCS | Performed by: HOSPITALIST

## 2022-08-16 PROCEDURE — 74011250636 HC RX REV CODE- 250/636: Performed by: STUDENT IN AN ORGANIZED HEALTH CARE EDUCATION/TRAINING PROGRAM

## 2022-08-16 PROCEDURE — 36415 COLL VENOUS BLD VENIPUNCTURE: CPT

## 2022-08-16 PROCEDURE — 80069 RENAL FUNCTION PANEL: CPT

## 2022-08-16 PROCEDURE — 74018 RADEX ABDOMEN 1 VIEW: CPT

## 2022-08-16 PROCEDURE — 74011250637 HC RX REV CODE- 250/637: Performed by: INTERNAL MEDICINE

## 2022-08-16 PROCEDURE — 74011000250 HC RX REV CODE- 250: Performed by: INTERNAL MEDICINE

## 2022-08-16 PROCEDURE — 74011000636 HC RX REV CODE- 636: Performed by: STUDENT IN AN ORGANIZED HEALTH CARE EDUCATION/TRAINING PROGRAM

## 2022-08-16 PROCEDURE — 82962 GLUCOSE BLOOD TEST: CPT

## 2022-08-16 RX ORDER — SORBITOL SOLUTION 70 %
30 SOLUTION, ORAL MISCELLANEOUS EVERY 4 HOURS
Status: DISCONTINUED | OUTPATIENT
Start: 2022-08-16 | End: 2022-08-16

## 2022-08-16 RX ADMIN — OXYCODONE HYDROCHLORIDE AND ACETAMINOPHEN 500 MG: 500 TABLET ORAL at 09:22

## 2022-08-16 RX ADMIN — SODIUM CHLORIDE 40 MG: 9 INJECTION, SOLUTION INTRAMUSCULAR; INTRAVENOUS; SUBCUTANEOUS at 09:22

## 2022-08-16 RX ADMIN — POLYETHYLENE GLYCOL 3350 17 G: 17 POWDER, FOR SOLUTION ORAL at 09:22

## 2022-08-16 RX ADMIN — SODIUM CHLORIDE, PRESERVATIVE FREE 10 ML: 5 INJECTION INTRAVENOUS at 22:34

## 2022-08-16 RX ADMIN — ENOXAPARIN SODIUM 40 MG: 100 INJECTION SUBCUTANEOUS at 09:22

## 2022-08-16 RX ADMIN — POLYETHYLENE GLYCOL 3350 17 G: 17 POWDER, FOR SOLUTION ORAL at 13:06

## 2022-08-16 RX ADMIN — POLYETHYLENE GLYCOL 3350 17 G: 17 POWDER, FOR SOLUTION ORAL at 00:21

## 2022-08-16 RX ADMIN — MEROPENEM 1 G: 1 INJECTION, POWDER, FOR SOLUTION INTRAVENOUS at 20:53

## 2022-08-16 RX ADMIN — DEXTROSE MONOHYDRATE 75 ML/HR: 50 INJECTION, SOLUTION INTRAVENOUS at 03:28

## 2022-08-16 RX ADMIN — METOPROLOL TARTRATE 25 MG: 25 TABLET ORAL at 21:11

## 2022-08-16 RX ADMIN — ONDANSETRON 4 MG: 2 INJECTION INTRAMUSCULAR; INTRAVENOUS at 17:20

## 2022-08-16 RX ADMIN — POLYETHYLENE GLYCOL 3350 17 G: 17 POWDER, FOR SOLUTION ORAL at 20:53

## 2022-08-16 RX ADMIN — POLYETHYLENE GLYCOL 3350 17 G: 17 POWDER, FOR SOLUTION ORAL at 17:18

## 2022-08-16 RX ADMIN — METHYLNALTREXONE BROMIDE 8 MG: 12 INJECTION, SOLUTION SUBCUTANEOUS at 17:20

## 2022-08-16 RX ADMIN — IOPAMIDOL 100 ML: 755 INJECTION, SOLUTION INTRAVENOUS at 18:46

## 2022-08-16 RX ADMIN — MIRTAZAPINE 15 MG: 15 TABLET, FILM COATED ORAL at 09:22

## 2022-08-16 RX ADMIN — MEROPENEM 1 G: 1 INJECTION, POWDER, FOR SOLUTION INTRAVENOUS at 04:29

## 2022-08-16 RX ADMIN — SODIUM CHLORIDE, PRESERVATIVE FREE 10 ML: 5 INJECTION INTRAVENOUS at 05:48

## 2022-08-16 RX ADMIN — POLYETHYLENE GLYCOL 3350 17 G: 17 POWDER, FOR SOLUTION ORAL at 04:28

## 2022-08-16 RX ADMIN — ACETAMINOPHEN 650 MG: 325 TABLET ORAL at 13:06

## 2022-08-16 RX ADMIN — MEROPENEM 1 G: 1 INJECTION, POWDER, FOR SOLUTION INTRAVENOUS at 12:58

## 2022-08-16 RX ADMIN — METOPROLOL TARTRATE 25 MG: 25 TABLET ORAL at 09:22

## 2022-08-16 RX ADMIN — NORTRIPTYLINE HYDROCHLORIDE 50 MG: 25 CAPSULE ORAL at 22:34

## 2022-08-16 NOTE — PROGRESS NOTES
Transition of Care Plan: Return to [facility] (group home) with [facility] and [facility]. RUR: 12% (low)  Disposition: Return to [facility] (group home) with [facility] and [facility]. Follow up appointments: PCP/urology/nephrology. DME needed: None. Transportation at Discharge: [facility] to Friday, 8/19/2022 at 10am, nursing call report to [phone]. Keys or means to access home:   Facility has keys.  Medicare Letter: 2nd IMM signed 8/15/2022. Is patient a BCPI-A Bundle:   N/A                   If yes, was Bundle Letter given?:  N/A  Is patient a [place] and connected with the [place]? N/A              If yes, was [employer] transfer form completed and VA notified? N/A  Caregiver Contact: Joe Marcos Luis Alberto [facility] - [phone]. Discharge Caregiver contacted prior to discharge? Family aware of transport this morning. Care Conference needed?:  No.    Patient and family agreeable to discharge plan, aware of transport this morning of 10am.      Attending confirmed that patient is not discharging today. [facility] moved to Friday morning at 10 am.  Patient and family made aware of this. Candace Guzman,  [address] made aware of discharge cancelled for today.       Nicole Herrera, BSN, RN    Care Management  [phone]

## 2022-08-16 NOTE — PROGRESS NOTES
GI PROGRESS NOTE  Hay Goldberg NP  [phone] NP in-hospital cell phone M-F until 4:30  After 5pm or on weekends, please call  for physician on call    NAME:Basilio Mitchell :1960 NCF:[id number]   ATTG: Dr. Peter Marinelli  PCP: Pee Yadav MD  Date/Time:  2022 1045    Primary GI: Dr. Starr Mendoza- Dr Nila Larry covering    Reason for following: rectal ball    Assessment:     Constipation, large stool burden on CT scan  Persistent stool on KUB today  - pt restless and uncomfortable in room  -CT 8/15/22 : Significant colonic distention with very large stool ball in the rectum this is not changed. - Pt had multiple enemas overnight, large ball passed overnight but abdomen remains distended and still ful of stool of KUB  - Tolerating feedings again this AM okay  From previous note:  PEG Tube Malfunction; replaced by IR  - Patient with significant PMH including cerebral palsy, bladder cancer, celestine bladder; initially came in to ED for abdominal pain 8/10 and diffuse; had even had episode of vomiting  - PEG tube was dislodged/slid out unsure how this happened; sister who is with him states it happens often and nurses at long term care facility where he lives replace it  - PEG was reportedly dislodged around noon 22 but may have been longer since dislodgement  - Replaced by IR 2022 with 18Fr  - CT ABD PEL W CONT 22 Obstipation, Air-filled stomach, Incidental Moderate hiatal hernia, emphysema.  -Constipation, s/p enema with bowel movement     Plan:     - Sorbitol every 4 hours x 4 doses  - Okay to resume tube feeds  - Continue miralax  - KUB tomorrow AM to see if improved  - Will hold off on stimulant laxatives as not fully able to communicate. Plan discussed with Dr Nila Larry also spoke to bedside RN. Subjective:   Discussed with RN events overnight. Resting in room , calmer today.     Could NOT obtain due to: Cerebral palsy     Objective:   VITALS:   Last 24hrs VS reviewed since prior progress note. Most recent are:  Visit Vitals  /80   Pulse 95   Temp 98.3 °F (36.8 °C)   Resp 18   Ht 5' 7\" (1.702 m)   Wt 50.8 kg (112 lb)   SpO2 95%   BMI 17.54 kg/m²       Intake/Output Summary (Last 24 hours) at 8/16/2022 1340  Last data filed at 8/16/2022 0900  Gross per 24 hour   Intake 1515 ml   Output 1200 ml   Net 315 ml       PHYSICAL EXAM:  General: WD, WN. Alert, cooperative, no acute distress. contracted    HEENT: NC, Atraumatic. Anicteric sclerae. Lungs:  CTA Bilaterally. No Wheezing/Rhonchi/Rales. Heart:  Regular  rhythm,  No Rubs, No Gallops  Abdomen: Soft, Non distended, Non tender. +Bowel sounds, no HSM. PEG in lower quadrant, site c/d/I. Stimulator in RLQ  Extremities: No c/c/e  Neurologic:  Alert and oriented X 2  No acute neurological distress   Psych:   Not anxious nor agitated. Lab and Radiology Data Reviewed: (see below)    Medications Reviewed: (see below)  PMH/SH reviewed - no change compared to H&P  ________________________________________________________________________  Total time spent with patient: 20 minutes ________________________________________________________________________  Care Plan discussed with:  Patient Y   Family     RN Y              Consultant: Michel Moe NP     Procedures: see electronic medical records for all procedures/Xrays and details which were not copied into this note but were reviewed prior to creation of Plan. LABS:  Recent Labs     08/16/22 0443   WBC 9.0   HGB 14.3   HCT 44.2          Recent Labs     08/16/22  0443 08/15/22  0307 08/14/22 0315    146* 152*   K 3.7 4.4 4.8    109* 118*   CO2 31 34* 29   BUN 26* 29* 33*   CREA 0.78 0.79 0.98   * 99 102*   CA 9.1 9.5 9.6   PHOS 3.1 3.2 3.2       Recent Labs     08/16/22  0443 08/15/22  0307 08/14/22 0315   ALB 2.8* 2.7* 2.7*       No results for input(s): INR, PTP, APTT, INREXT, INREXT in the last 72 hours.    No results for input(s): FE, TIBC, PSAT, FERR in the last 72 hours. No results found for: FOL, RBCF  No results for input(s): PH, PCO2, PO2 in the last 72 hours. No results for input(s): CPK, CKMB in the last 72 hours.     No lab exists for component: TROPONINI  Lab Results   Component Value Date/Time    Color DARK YELLOW 08/08/2022 04:50 PM    Appearance CLOUDY (A) 08/08/2022 04:50 PM    Specific gravity >1.030 (H) 08/08/2022 04:50 PM    Specific gravity 1.021 03/18/2021 08:00 PM    pH (UA) >8.5 (H) 08/08/2022 04:50 PM    Protein 100 (A) 08/08/2022 04:50 PM    Glucose Negative 08/08/2022 04:50 PM    Ketone Negative 08/08/2022 04:50 PM    Bilirubin Negative 08/08/2022 04:50 PM    Urobilinogen 1.0 08/08/2022 04:50 PM    Nitrites Positive (A) 08/08/2022 04:50 PM    Leukocyte Esterase LARGE (A) 08/08/2022 04:50 PM    Epithelial cells FEW 08/08/2022 04:50 PM    Bacteria 3+ (A) 08/08/2022 04:50 PM    WBC >100 (H) 08/08/2022 04:50 PM    RBC  08/08/2022 04:50 PM       MEDICATIONS:  Current Facility-Administered Medications   Medication Dose Route Frequency    sorbitoL 70 % solution 30 mL  30 mL Per G Tube Q4H    meropenem (MERREM) 1 g in 0.9% sodium chloride (MBP/ADV) 50 mL MBP  1 g IntraVENous Q8H    polyethylene glycol (MIRALAX) packet 17 g  17 g Oral Q4H    metoprolol tartrate (LOPRESSOR) tablet 25 mg  25 mg Per G Tube Q12H    pantoprazole (PROTONIX) 40 mg in 0.9% sodium chloride 10 mL injection  40 mg IntraVENous DAILY    senna-docusate (PERICOLACE) 8.6-50 mg per tablet 1 Tablet  1 Tablet Per G Tube QHS    bisacodyL (DULCOLAX) suppository 10 mg  10 mg Rectal DAILY    sodium chloride (NS) flush 5-40 mL  5-40 mL IntraVENous Q8H    sodium chloride (NS) flush 5-40 mL  5-40 mL IntraVENous PRN    acetaminophen (TYLENOL) tablet 650 mg  650 mg Oral Q6H PRN    Or    acetaminophen (TYLENOL) suppository 650 mg  650 mg Rectal Q6H PRN    ondansetron (ZOFRAN ODT) tablet 4 mg  4 mg Oral Q8H PRN    Or    ondansetron (ZOFRAN) injection 4 mg  4 mg IntraVENous Q6H PRN    enoxaparin (LOVENOX) injection 40 mg  40 mg SubCUTAneous DAILY    ascorbic acid (vitamin C) (VITAMIN C) tablet 500 mg  500 mg Per G Tube DAILY    fluticasone propionate (FLONASE) 50 mcg/actuation nasal spray 2 Spray  2 Spray Both Nostrils DAILY    mirtazapine (REMERON) tablet 15 mg  15 mg Per G Tube DAILY    nortriptyline (PAMELOR) capsule 50 mg  50 mg Per G Tube QHS

## 2022-08-16 NOTE — PROGRESS NOTES
Comprehensive Nutrition Assessment    Type and Reason for Visit: Reassess    Nutrition Recommendations/Plan:   Continue current TF orders as tolerated      Nutrition Assessment:    Chart reviewed; medically noted for UTI and constipation. GI following. New bowel regimen started today per RN. Patient with abdominal pain after morning bolus so next feeding held. RN to reassess readiness for TF at next feeding. Will continue to follow. Nutrition Related Findings:   Vitamin C, Dulcolax, Lopressor, Remeron, Protonix, Miralax, Pericolace, Sorbitol   BM 8/16  Na 138,     Current Nutrition Intake & Therapies:        Current Tube Feeding (TF) Orders:   Feeding Route: Gastrostomy  Formula: Standard with fiber  Schedule: Bolus    Regimen: 355 mL 3x/day  Additives/Modulars: None  Water Flushes: 480 mL after each bolus  Current TF & Flush Orders Provides: 1600 kcals, 68g protein, 2249 mL water    Anthropometric Measures:  Height: 5' 7\" (170.2 cm)  Ideal Body Weight (IBW): 148 lbs (67 kg)     Current Body Wt:  50.8 kg (111 lb 15.9 oz), 75.7 % IBW. Current BMI (kg/m2): 17.5                          BMI Category: Underweight (BMI less than 18.5)    Estimated Daily Nutrient Needs:  Energy Requirements Based On: Formula  Weight Used for Energy Requirements: Current  Energy (kcal/day): 1656 kcals (BMR 1274 x 1. 3AF)  Weight Used for Protein Requirements: Current  Protein (g/day): 66-77g (1.3-1.5 g/kg bw)  Method Used for Fluid Requirements: 1 ml/kcal  Fluid (ml/day): 1650 mL    Nutrition Diagnosis:   Inadequate protein-energy intake related to altered GI function as evidenced by constipation (TF held)    Nutrition Interventions:   Food and/or Nutrient Delivery: Continue tube feeding  Nutrition Education/Counseling: No recommendations at this time  Coordination of Nutrition Care: Continue to monitor while inpatient       Goals:  Previous Goal Met: Progressing toward goal(s)  Goals:  (tolerate bolus feedings with no s/sx of intolerance next 2-4 days)       Nutrition Monitoring and Evaluation:   Behavioral-Environmental Outcomes: None identified  Food/Nutrient Intake Outcomes: Enteral nutrition intake/tolerance  Physical Signs/Symptoms Outcomes: Biochemical data, Weight, GI status, Constipation    Discharge Planning:    Enteral nutrition    Marquita Li RD  Contact: ext [phone]

## 2022-08-16 NOTE — PROGRESS NOTES
Informed by RN of brown colored vomitus. Assessed at the bedside. On palpation of belly, is firm but doesn't seem to have tenderness. Will get a CT abdomen stat, could have obstruction. If continues to vomit will need, NG tube.   Hold all feeding for now, keep NPO    Disussed with niece at the bedside

## 2022-08-16 NOTE — PROGRESS NOTES
He is close to completing his course of Merrem for the urinary tract infection. I have spoken with his sister, Jennifer Tan. GI consult reviewed. He has had some results from the first enema with more planned. I have him scheduled for a general anesthetic, cystoscopy with laser lithotripsy of bladder stones on Thursday, 8/18. He should be able to be discharged with a catheter on 8/19 to resume [facility] at the [facility].

## 2022-08-16 NOTE — PROGRESS NOTES
Held 1200 bolus feeding. Pt excessively diaphoretic after AM feedings, abd distended and c/o abd pain. MD aware    1306  Tylenol given    1430  Short flush family concerned of low urine output.  Pt has had 400+ since 7am. Notifed Ev FERNÁNDEZ.

## 2022-08-16 NOTE — PROGRESS NOTES
Nephrology Progress Note  [facility] / [address], [place] Poles [place], [address]  Phone - [phone]  Fax - [phone]                 Patient: Juan Miguel Dawkins                   YOB: 1960        Date- 8/16/2022                      Admit Date: 8/8/2022  CC: Follow up for Hypernatremia          IMPRESSION & PLAN:   Hypernatremia(secondary to free water deficit )  Malfunction of PEG tube (replacement by IR 8/9/2022)  Nausea vomiting  Severe constipation with stool impaction  History of cerebral palsy  History of bladder cancer status post radical cystoprostatectomy with neobladder  Intermittent malfunction of Short catheter. UTI    PLAN-  Hypernatremia is resolved. Creatinine is at baseline. DC IV D5W  Continue on free water flushes with bolus feeding  Currently on bowel regimen for severe constipation. Noted persistent hydro suspect secondary to severe constipation. CT scan also shows celestine bladder stones. Plan for lithotripsy per urology on 18th. Subjective: Interval History:   -Seen and examined today.  -Spoke to sister at bedside    Objective:   Vitals:    08/15/22 1930 08/15/22 2300 08/16/22 0300 08/16/22 0850   BP: 133/89 (!) 140/80 (!) 152/95 102/72   Pulse: (!) 101 95 98 91   Resp: 22 20 24 20   Temp: 99 °F (37.2 °C) 98.7 °F (37.1 °C) 98.9 °F (37.2 °C) 98 °F (36.7 °C)   SpO2: 93% 94% 95% 96%   Weight:       Height:          08/15 0701 - 08/16 0700  In: 100   Out: 1200 [Urine:1200]  Last 3 Recorded Weights in this Encounter    08/08/22 1845   Weight: 50.8 kg (112 lb)      Physical exam:    GEN: Noncommunicative  NECK- Supple, no mass  RESP: No wheezing, Clear b/l  CVS: Tachycardia  NEURO: Noncommunicative, cerebral palsy  EXT: No Edema       Chart reviewed. Pertinent Notes reviewed.      Data Review :  Recent Labs     08/16/22  0443 08/15/22  0307 08/14/22  0315    146* 152*   K 3.7 4.4 4.8    109* 118*   CO2 31 34* 29   BUN 26* 29* 33*   CREA 0.78 0.79 0.98   * 99 102*   CA 9.1 9.5 9.6   PHOS 3.1 3.2 3.2       Recent Labs     08/16/22  0443   WBC 9.0   HGB 14.3   HCT 44.2        No results for input(s): FE, TIBC, PSAT, FERR in the last 72 hours.    Medication list  reviewed  Current Facility-Administered Medications   Medication Dose Route Frequency    meropenem (MERREM) 1 g in 0.9% sodium chloride (MBP/ADV) 50 mL MBP  1 g IntraVENous Q8H    polyethylene glycol (MIRALAX) packet 17 g  17 g Oral Q4H    metoprolol tartrate (LOPRESSOR) tablet 25 mg  25 mg Per G Tube Q12H    pantoprazole (PROTONIX) 40 mg in 0.9% sodium chloride 10 mL injection  40 mg IntraVENous DAILY    senna-docusate (PERICOLACE) 8.6-50 mg per tablet 1 Tablet  1 Tablet Per G Tube QHS    bisacodyL (DULCOLAX) suppository 10 mg  10 mg Rectal DAILY    sodium chloride (NS) flush 5-40 mL  5-40 mL IntraVENous Q8H    sodium chloride (NS) flush 5-40 mL  5-40 mL IntraVENous PRN    acetaminophen (TYLENOL) tablet 650 mg  650 mg Oral Q6H PRN    Or    acetaminophen (TYLENOL) suppository 650 mg  650 mg Rectal Q6H PRN    ondansetron (ZOFRAN ODT) tablet 4 mg  4 mg Oral Q8H PRN    Or    ondansetron (ZOFRAN) injection 4 mg  4 mg IntraVENous Q6H PRN    enoxaparin (LOVENOX) injection 40 mg  40 mg SubCUTAneous DAILY    ascorbic acid (vitamin C) (VITAMIN C) tablet 500 mg  500 mg Per G Tube DAILY    fluticasone propionate (FLONASE) 50 mcg/actuation nasal spray 2 Spray  2 Spray Both Nostrils DAILY    mirtazapine (REMERON) tablet 15 mg  15 mg Per G Tube DAILY    nortriptyline (PAMELOR) capsule 50 mg  50 mg Per G Tube QHS          Igor Kim MD  8/16/2022

## 2022-08-16 NOTE — PROGRESS NOTES
Hospitalist Progress Note    NAME: Mita Madera   :  1960   MRN:  [id number]       Assessment / Plan:  Sepsis due to urinary tract infection   ESBL E coli UTI    Urine culture grew ESBL E coli  Cw  meropenem, will do 10 day course, last day  (doing 10 day course instead of 7 days as he had remained febrile for 3-4 days despite being on meropenem)  No fever spikes   CXR negative, Rapid covid negative  Gotten midline for discharge on iv abx, but can be removed prior to discharge (as abx will be complete tomorrow)    Hx bladder cancer s/p neobladder  Urinary retention:    Does straight cath in group home. C/w carrera catheter here, will discharge with this  Having intermittent retention even with carrera, nursing doing irrigation PRN  Urology following  Repeat CT done today : showed stable left hydroureteronephrosis, Calculi in neobladder. Urology planning for cystoscopy with laser lithotripsy of bladder stones on     Sinus tachycardia  Intermittent  Likely d/t discomfort from contipation/ urinary retention  C/w metoprolol  Improved now    Constipation  Fecal impaction:  Gi following  Having BM  However repeat CT still showing colonic distension with stool mass  C/w aggressive bowel regimen, colace/ lactulose/ dulcolax daily, enema as needed  Sorbitol enema q4 hours for 4 doses  Repeat Xray kub in am    Dislodged PEG tube:  PEG replaced by IR   C/w feeding    Hypernatremia:  VAZQUEZ    Improved now  Fluids stopped  Nephrology eval appreciated     Cerebral palsy. Anxiety/ Depression  Continue with supportive care. Code Status: DO NOT RESUSCITATE. Surrogate Decision Maker: His neice     DVT Prophylaxis: Lovenox. GI Prophylaxis: not indicated     Baseline: Dependent for activity of daily living.   Non verbal, but can communicated with nodding/ gestures, better when family at bedside    JESSE: , back to group home  Barriers: Lithotripsy on / Resolution for colonic distension Subjective:     Chief Complaint / Reason for Physician Visit  Seen and evaluated in ED  He doesn't seem to be in discomfort during my evaluation  Was tachycardiac in the afternoon    Review of Systems:  Symptom Y/N Comments  Symptom Y/N Comments   Fever/Chills    Chest Pain     Poor Appetite    Edema     Cough    Abdominal Pain     Sputum    Joint Pain     SOB/FANG    Pruritis/Rash     Nausea/vomit    Tolerating PT/OT     Diarrhea    Tolerating Diet     Constipation    Other       Could NOT obtain due to: Non verbal     Objective:     VITALS:   Last 24hrs VS reviewed since prior progress note. Most recent are:  Patient Vitals for the past 24 hrs:   Temp Pulse Resp BP SpO2   08/16/22 1137 98.3 °F (36.8 °C) 95 18 130/80 95 %   08/16/22 0850 98 °F (36.7 °C) 91 20 102/72 96 %   08/16/22 0300 98.9 °F (37.2 °C) 98 24 (!) 152/95 95 %   08/15/22 2300 98.7 °F (37.1 °C) 95 20 (!) 140/80 94 %   08/15/22 1930 99 °F (37.2 °C) (!) 101 22 133/89 93 %   08/15/22 1805 -- (!) 103 -- -- 91 %   08/15/22 1635 100.3 °F (37.9 °C) 100 -- (!) 136/91 90 %       Intake/Output Summary (Last 24 hours) at 8/16/2022 1422  Last data filed at 8/16/2022 1105  Gross per 24 hour   Intake 1515 ml   Output 1600 ml   Net -85 ml        I had a face to face encounter and independently examined this patient on 8/16/2022, as outlined below:  PHYSICAL EXAM:  General: Awake, No acute distress  EENT:  EOMI. Anicteric sclerae. MMM  Resp:  CTA bilaterally, no wheezing or rales. No accessory muscle use  CV:  Regular  rhythm,  No edema  GI:  Soft, Non distended, Non tender. +Bowel sounds  Neurologic:  Awake, non verbal at baseline, contracted extremity  Psych:   Not anxious nor agitated  Skin:  No rashes.   No jaundice    Reviewed most current lab test results and cultures  YES  Reviewed most current radiology test results   YES  Review and summation of old records today    NO  Reviewed patient's current orders and MAR    YES  PMH/SH reviewed - no change compared to H&P  ________________________________________________________________________  Care Plan discussed with:    Comments   Patient y    Family      RN y    Care Manager     Consultant                        Multidiciplinary team rounds were held today with , nursing, pharmacist and clinical coordinator. Patient's plan of care was discussed; medications were reviewed and discharge planning was addressed. ________________________________________________________________________  Total NON critical care TIME:  36   Minutes    Total CRITICAL CARE TIME Spent:   Minutes non procedure based      Comments   >50% of visit spent in counseling and coordination of care     ________________________________________________________________________  Dustin Kamara MD     Procedures: see electronic medical records for all procedures/Xrays and details which were not copied into this note but were reviewed prior to creation of Plan. LABS:  I reviewed today's most current labs and imaging studies.   Pertinent labs include:  Recent Labs     08/16/22  0443   WBC 9.0   HGB 14.3   HCT 44.2          Recent Labs     08/16/22  0443 08/15/22  0307 08/14/22  0315    146* 152*   K 3.7 4.4 4.8    109* 118*   CO2 31 34* 29   * 99 102*   BUN 26* 29* 33*   CREA 0.78 0.79 0.98   CA 9.1 9.5 9.6   PHOS 3.1 3.2 3.2   ALB 2.8* 2.7* 2.7*       Signed: Dustin Kamara MD

## 2022-08-17 ENCOUNTER — APPOINTMENT (OUTPATIENT)
Dept: GENERAL RADIOLOGY | Age: 62
DRG: 698 | End: 2022-08-17
Attending: NURSE PRACTITIONER
Payer: MEDICARE

## 2022-08-17 ENCOUNTER — ANESTHESIA EVENT (OUTPATIENT)
Dept: SURGERY | Age: 62
DRG: 698 | End: 2022-08-17
Payer: MEDICARE

## 2022-08-17 LAB
ALBUMIN SERPL-MCNC: 3.1 G/DL (ref 3.5–5)
ANION GAP SERPL CALC-SCNC: 5 MMOL/L (ref 5–15)
BUN SERPL-MCNC: 35 MG/DL (ref 6–20)
BUN/CREAT SERPL: 30 (ref 12–20)
CALCIUM SERPL-MCNC: 9.8 MG/DL (ref 8.5–10.1)
CHLORIDE SERPL-SCNC: 104 MMOL/L (ref 97–108)
CO2 SERPL-SCNC: 27 MMOL/L (ref 21–32)
CREAT SERPL-MCNC: 1.18 MG/DL (ref 0.7–1.3)
GLUCOSE SERPL-MCNC: 133 MG/DL (ref 65–100)
PHOSPHATE SERPL-MCNC: 4.8 MG/DL (ref 2.6–4.7)
POTASSIUM SERPL-SCNC: 4.1 MMOL/L (ref 3.5–5.1)
SODIUM SERPL-SCNC: 136 MMOL/L (ref 136–145)

## 2022-08-17 PROCEDURE — 74011250636 HC RX REV CODE- 250/636: Performed by: HOSPITALIST

## 2022-08-17 PROCEDURE — 74011000258 HC RX REV CODE- 258: Performed by: STUDENT IN AN ORGANIZED HEALTH CARE EDUCATION/TRAINING PROGRAM

## 2022-08-17 PROCEDURE — 74011250637 HC RX REV CODE- 250/637: Performed by: NURSE PRACTITIONER

## 2022-08-17 PROCEDURE — 74011000250 HC RX REV CODE- 250: Performed by: HOSPITALIST

## 2022-08-17 PROCEDURE — 74011000250 HC RX REV CODE- 250: Performed by: INTERNAL MEDICINE

## 2022-08-17 PROCEDURE — 74011250636 HC RX REV CODE- 250/636: Performed by: STUDENT IN AN ORGANIZED HEALTH CARE EDUCATION/TRAINING PROGRAM

## 2022-08-17 PROCEDURE — 65270000029 HC RM PRIVATE

## 2022-08-17 PROCEDURE — 74018 RADEX ABDOMEN 1 VIEW: CPT

## 2022-08-17 PROCEDURE — 74011250636 HC RX REV CODE- 250/636: Performed by: INTERNAL MEDICINE

## 2022-08-17 PROCEDURE — 74011250637 HC RX REV CODE- 250/637: Performed by: STUDENT IN AN ORGANIZED HEALTH CARE EDUCATION/TRAINING PROGRAM

## 2022-08-17 PROCEDURE — 99223 1ST HOSP IP/OBS HIGH 75: CPT | Performed by: SURGERY

## 2022-08-17 PROCEDURE — C9113 INJ PANTOPRAZOLE SODIUM, VIA: HCPCS | Performed by: HOSPITALIST

## 2022-08-17 PROCEDURE — 80069 RENAL FUNCTION PANEL: CPT

## 2022-08-17 PROCEDURE — 74011250637 HC RX REV CODE- 250/637: Performed by: INTERNAL MEDICINE

## 2022-08-17 PROCEDURE — 36415 COLL VENOUS BLD VENIPUNCTURE: CPT

## 2022-08-17 RX ORDER — METOPROLOL TARTRATE 5 MG/5ML
1.25 INJECTION INTRAVENOUS EVERY 6 HOURS
Status: DISCONTINUED | OUTPATIENT
Start: 2022-08-17 | End: 2022-08-18

## 2022-08-17 RX ORDER — DEXTROSE MONOHYDRATE AND SODIUM CHLORIDE 5; .45 G/100ML; G/100ML
50 INJECTION, SOLUTION INTRAVENOUS CONTINUOUS
Status: DISPENSED | OUTPATIENT
Start: 2022-08-17 | End: 2022-08-17

## 2022-08-17 RX ORDER — DEXTROSE MONOHYDRATE 50 MG/ML
75 INJECTION, SOLUTION INTRAVENOUS CONTINUOUS
Status: DISCONTINUED | OUTPATIENT
Start: 2022-08-17 | End: 2022-08-17

## 2022-08-17 RX ADMIN — ENOXAPARIN SODIUM 40 MG: 100 INJECTION SUBCUTANEOUS at 09:58

## 2022-08-17 RX ADMIN — METOPROLOL TARTRATE 25 MG: 25 TABLET ORAL at 09:56

## 2022-08-17 RX ADMIN — OXYCODONE HYDROCHLORIDE AND ACETAMINOPHEN 500 MG: 500 TABLET ORAL at 09:56

## 2022-08-17 RX ADMIN — ACETAMINOPHEN 650 MG: 325 TABLET ORAL at 05:09

## 2022-08-17 RX ADMIN — POLYETHYLENE GLYCOL 3350 17 G: 17 POWDER, FOR SOLUTION ORAL at 00:24

## 2022-08-17 RX ADMIN — MIRTAZAPINE 15 MG: 15 TABLET, FILM COATED ORAL at 09:56

## 2022-08-17 RX ADMIN — POLYETHYLENE GLYCOL 3350 17 G: 17 POWDER, FOR SOLUTION ORAL at 05:09

## 2022-08-17 RX ADMIN — SODIUM CHLORIDE 40 MG: 9 INJECTION, SOLUTION INTRAMUSCULAR; INTRAVENOUS; SUBCUTANEOUS at 09:56

## 2022-08-17 RX ADMIN — MEROPENEM 1 G: 1 INJECTION, POWDER, FOR SOLUTION INTRAVENOUS at 04:46

## 2022-08-17 RX ADMIN — BISACODYL 10 MG: 10 SUPPOSITORY RECTAL at 09:56

## 2022-08-17 RX ADMIN — POLYETHYLENE GLYCOL 3350 17 G: 17 POWDER, FOR SOLUTION ORAL at 09:56

## 2022-08-17 RX ADMIN — SODIUM CHLORIDE, PRESERVATIVE FREE 10 ML: 5 INJECTION INTRAVENOUS at 22:20

## 2022-08-17 RX ADMIN — FLUTICASONE PROPIONATE 2 SPRAY: 50 SPRAY, METERED NASAL at 10:02

## 2022-08-17 RX ADMIN — SODIUM CHLORIDE, PRESERVATIVE FREE 10 ML: 5 INJECTION INTRAVENOUS at 07:47

## 2022-08-17 NOTE — PROGRESS NOTES
Nephrology Progress Note  [facility] / [address] [facility] [address]  [place], [address]  Phone - [phone]  Fax - [phone]                 Patient: Ethan Trinidad                   YOB: 1960        Date- 8/17/2022                      Admit Date: 8/8/2022  CC: Follow up for Hypernatremia          IMPRESSION & PLAN:   Hypernatremia(secondary to free water deficit )  Malfunction of PEG tube (replacement by IR 8/9/2022)  Nausea vomiting  Severe constipation with stool impaction  History of cerebral palsy  History of bladder cancer status post radical cystoprostatectomy with neobladder  Intermittent malfunction of Shrot catheter. UTI    PLAN-  Hypernatremia is resolved. Creatinine is at baseline. IV fluids per internal medicine team as PEG tube not working. IV fluids primarily to prevent hypoglycemia. Advised to internal medicine team not to use D5W by itself but use D5W with NS as he can became hyponatremic. Currently on bowel regimen for severe constipation. Noted persistent hydro suspect secondary to severe constipation. CT scan also shows celestine bladder stones. Plan for lithotripsy per urology on 18th. Subjective: Interval History:   -Seen and examined today.  -Spoke to niece at bedside    Objective:   Vitals:    08/16/22 1524 08/16/22 1632 08/16/22 2053 08/17/22 0830   BP:  125/72 (!) 157/86 128/81   Pulse:  91 98 100   Resp:  18 18 18   Temp:  98 °F (36.7 °C) 98.3 °F (36.8 °C) 98 °F (36.7 °C)   SpO2:  95% 93% 95%   Weight:       Height: 5' 7\" (1.702 m)         08/16 0701 - 08/17 0700  In: 1445   Out: 900 [Urine:900]  Last 3 Recorded Weights in this Encounter    08/08/22 1845   Weight: 50.8 kg (112 lb)      Physical exam:    GEN: Noncommunicative  NECK- Supple, no mass  RESP: No wheezing, Clear b/l  CVS: Tachycardia  NEURO: Noncommunicative, cerebral palsy  EXT: No Edema       Chart reviewed. Pertinent Notes reviewed. Data Review :  Recent Labs     08/17/22  0308 08/16/22  0443 08/15/22  0307    138 146*   K 4.1 3.7 4.4    104 109*   CO2 27 31 34*   BUN 35* 26* 29*   CREA 1.18 0.78 0.79   * 116* 99   CA 9.8 9.1 9.5   PHOS 4.8* 3.1 3.2       Recent Labs     08/16/22 0443   WBC 9.0   HGB 14.3   HCT 44.2          No results for input(s): FE, TIBC, PSAT, FERR in the last 72 hours.    Medication list  reviewed  Current Facility-Administered Medications   Medication Dose Route Frequency    dextrose 5 % - 0.45% NaCl infusion  50 mL/hr IntraVENous CONTINUOUS    polyethylene glycol (MIRALAX) packet 17 g  17 g Oral Q4H    metoprolol tartrate (LOPRESSOR) tablet 25 mg  25 mg Per G Tube Q12H    pantoprazole (PROTONIX) 40 mg in 0.9% sodium chloride 10 mL injection  40 mg IntraVENous DAILY    [Held by provider] senna-docusate (PERICOLACE) 8.6-50 mg per tablet 1 Tablet  1 Tablet Per G Tube QHS    bisacodyL (DULCOLAX) suppository 10 mg  10 mg Rectal DAILY    sodium chloride (NS) flush 5-40 mL  5-40 mL IntraVENous Q8H    sodium chloride (NS) flush 5-40 mL  5-40 mL IntraVENous PRN    acetaminophen (TYLENOL) tablet 650 mg  650 mg Oral Q6H PRN    Or    acetaminophen (TYLENOL) suppository 650 mg  650 mg Rectal Q6H PRN    ondansetron (ZOFRAN ODT) tablet 4 mg  4 mg Oral Q8H PRN    Or    ondansetron (ZOFRAN) injection 4 mg  4 mg IntraVENous Q6H PRN    enoxaparin (LOVENOX) injection 40 mg  40 mg SubCUTAneous DAILY    ascorbic acid (vitamin C) (VITAMIN C) tablet 500 mg  500 mg Per G Tube DAILY    fluticasone propionate (FLONASE) 50 mcg/actuation nasal spray 2 Spray  2 Spray Both Nostrils DAILY    mirtazapine (REMERON) tablet 15 mg  15 mg Per G Tube DAILY    nortriptyline (PAMELOR) capsule 50 mg  50 mg Per G Tube QHS          Shaka Ibarra MD  8/17/2022

## 2022-08-17 NOTE — PROGRESS NOTES
New CT shows sbo, GI and surgery consulted. This likely needs to be addressed prior to non-emergent urology surgery on 8/18.

## 2022-08-17 NOTE — PROGRESS NOTES
Transition of Care Plan: Return to [facility] (group home). RUR: 12% (low)  Disposition: Return to [facility] (group home). Follow up appointments: PCP/urology/nephrology. DME needed: None. Transportation at Discharge: [facility] transport moved to Friday, 8/19/2022 at 10am, nursing call report to [phone]. Eliza or means to access home:   Facility has keys. IM Medicare Letter: 2nd IMM signed 8/15/2022. Is patient a BCPI-A Bundle:   N/A                   If yes, was Bundle Letter given?:  N/A  Is patient a  and connected with the [place]? N/A              If yes, was [employer] transfer form completed and [facility] notified? N/A  Caregiver Contact: Gulshan Harden - [facility] - [phone]. Discharge Caregiver contacted prior to discharge? Family aware of transport this morning. Care Conference needed?:  No.    Patient is no longer needing IV antibiotics on discharge. CM contacted [facility] who stated that patient will have PT/OT/nursing services at [facility] and [facility] 178 would not be needed. CM canceled [facility].       Faith Beasley, ALMAN, RN    Care Management  [phone]

## 2022-08-17 NOTE — CONSULTS
Surgery Consult    Subjective:   Patient 64 y.o. male with a history of cerebral palsy. History obtained from the chart and patient's sister at the bedside. He has been admitted since on 8/9 for sepsis secondary to UTI and a dislodged feeding tube. Reports some vomiting. Gastrostomy tube was replaced by radiology on 8/9. Reviewed CT scans of the abdomen pelvis from 8/9 and 8/15. Also had a CT scan of abdomen only on 8/16. Has had very very large stool ball in the rectum since 8/9. Received several enemas and reportedly had a bowel movement as big as a baby yesterday per nursing. Reporting some pain. Mental status reportedly at baseline. Past Medical & Surgical History:  Past Medical History:   Diagnosis Date    Anxiety     Bladder cancer ([address]     Bladder cancer ([address]     Cancer ([address]     BLADDER    CP (cerebral palsy) ([address]     Depression     Hernia     Nausea & vomiting     Other ill-defined conditions(799.89)     Cerebral palsy    Psychiatric disorder     ANXIETY DEPRESSION    Unspecified constipation 5/13/2013      Past Surgical History:   Procedure Laterality Date    HX APPENDECTOMY      HX CHOLECYSTECTOMY      HX HERNIA REPAIR      Inc HR, PEG TUBE 2012    HX ORTHOPAEDIC  2013    Right knee surgery on 8/15 with Dr. Adilia Farrell.     HX UROLOGICAL      BLADDER REMOVED, HAS NADYA-BLADDER     IR INJ NEPHRO/URETEROGRAM LT EXISTING ACCESS SI  4/1/2021    IR INSERT GASTROSTOMY TUBE PERC  8/9/2022    IR NEPHROSTOMY PERC RT PLC CATH  SI  3/19/2021    IR URETERAL STENT RT PERC PLC EXISTING SI  3/24/2021    UT LAP PROCEDURE, UNLISTED, BLADDER      neobladder    UT LAP,CHOLECYSTECTOMY      UT LAP,LYSIS OF ADHESIONS      UT REMOVE MESH FROM ABD WALL      due to infection       Social History:  Social History     Socioeconomic History    Marital status: SINGLE     Spouse name: Not on file    Number of children: Not on file    Years of education: Not on file    Highest education level: Not on file   Occupational History    Not on file   Tobacco Use    Smoking status: Never    Smokeless tobacco: Never   Substance and Sexual Activity    Alcohol use: No    Drug use: No    Sexual activity: Not on file   Other Topics Concern    Not on file   Social History Narrative    Not on file     Social Determinants of Health     Financial Resource Strain: Not on file   Food Insecurity: Not on file   Transportation Needs: Not on file   Physical Activity: Not on file   Stress: Not on file   Social Connections: Not on file   Intimate Partner Violence: Not on file   Housing Stability: Not on file        Family History:  Family History   Problem Relation Age of Onset    Heart Disease Mother         TACHYCARDIA    Anesth Problems Neg Hx         Prior to Admission Medications:  Prior to Admission Medications   Prescriptions Last Dose Informant Patient Reported? Taking? Esomeprazole Magnesium (NEXIUM PACKET)   Yes No   Si mg by Per G Tube route daily. Indications: gastroesophageal reflux disease   HYDROcodone-acetaminophen (HYCET) 0.5-21.7 mg/mL oral solution   Yes No   Si mL by Per G Tube route four (4) times daily as needed for Pain. LORazepam (ATIVAN) 1 mg tablet   Yes No   Si.75 mg by Per G Tube route every six (6) hours. acetaminophen (TYLENOL) 160 mg/5 mL liquid   Yes No   Si mg by Per G Tube route every four (4) hours as needed for Fever or Pain. ascorbic acid, vitamin C, (VITAMIN C) 500 mg tablet   Yes No   Si mg by Per G Tube route daily. cholecalciferol (VITAMIN D3) 1,000 unit tablet   Yes No   Si,000 Units by Per G Tube route daily. Indications: Vitamin D Deficiency   ferrous sulfate 220 mg (44 mg iron)/5 mL solution   Yes No   Sig: Take 220 mg by mouth daily. fluticasone (FLONASE) 50 mcg/actuation nasal spray   Yes No   Si Sprays by Both Nostrils route daily. mirtazapine (REMERON) 15 mg tablet   Yes No   Sig: 15 mg by Per G Tube route daily.    nortriptyline (PAMELOR) 10 mg/5 mL solution Yes No   Si mg by Per G Tube route nightly. rizatriptan (MAXALT) 10 mg tablet   Yes No   Sig: 10 mg by SubLINGual route as needed for Migraine. May repeat dose after 2 hours not to exceed 3 tablets per 24hr      Facility-Administered Medications: None       Allergies: Allergies   Allergen Reactions    Avelox [Moxifloxacin] Rash    Amikacin Unknown (comments)    Amoxicillin Rash    Betadine Surgi-Prep Rash     Mother states this does not happen all the time and if betadine is washed off, it's ok    Ciprofloxacin Unknown (comments)    Dilaudid [Hydromorphone] Unable to Obtain    Effexor [Venlafaxine] Unknown (comments)    Fentanyl Unknown (comments)    Keflex [Cephalexin] Rash    Lyrica [Pregabalin] Unknown (comments)    Morphine Unknown (comments)    Neurontin [Gabapentin] Unknown (comments)    Prozac [Fluoxetine] Unknown (comments)    Reglan [Metoclopramide] Unknown (comments)    Serzone [Nefazodone] Unknown (comments)    Sulfa (Sulfonamide Antibiotics) Unknown (comments)    Tetracycline Unknown (comments)    Tricyclamol Chloride Unknown (comments)    Valium [Diazepam] Unknown (comments)     Tolerates Lorazepam    Demerol [Meperidine] Other (comments)     Spastic movements       Review of Systems  Review of systems not obtained due to patient factors.     Objective:     Exam:    Visit Vitals  /85   Pulse (!) 119   Temp 98.6 °F (37 °C)   Resp 18   Ht 5' 7\" (1.702 m)   Wt 50.8 kg (112 lb)   SpO2 100%   BMI 17.54 kg/m²     General appearance: alert, cooperative, no distress, appears stated age  Head: Normocephalic, without obvious abnormality, atraumatic  Neck: supple, symmetrical, trachea midline, no adenopathy, thyroid: not enlarged, symmetric, no tenderness/mass/nodules, no carotid bruit, and no JVD  Lungs: clear to auscultation bilaterally  Heart: regular rate and rhythm, S1, S2 normal, no murmur, click, rub or gallop  Abdomen: Firm, reportedly at baseline, tenderness seems to mostly be localized to recently replaced gastrostomy tube    Extremities: extremities normal, atraumatic, no cyanosis or edema  Skin: Skin color, texture, turgor normal. No rashes or lesions  Rectal: Large stool ball that was present on CT scan on 8/15 is no longer present. Only some soft stool in the vault. Data Review  Recent Results (from the past 24 hour(s))   RENAL FUNCTION PANEL    Collection Time: 08/17/22  3:08 AM   Result Value Ref Range    Sodium 136 136 - 145 mmol/L    Potassium 4.1 3.5 - 5.1 mmol/L    Chloride 104 97 - 108 mmol/L    CO2 27 21 - 32 mmol/L    Anion gap 5 5 - 15 mmol/L    Glucose 133 (H) 65 - 100 mg/dL    BUN 35 (H) 6 - 20 MG/DL    Creatinine 1.18 0.70 - 1.30 MG/DL    BUN/Creatinine ratio 30 (H) 12 - 20      GFR est AA >60 >60 ml/min/1.73m2    GFR est non-AA >60 >60 ml/min/1.73m2    Calcium 9.8 8.5 - 10.1 MG/DL    Phosphorus 4.8 (H) 2.6 - 4.7 MG/DL    Albumin 3.1 (L) 3.5 - 5.0 g/dL       Assessment:     Active Problems:    UTI (urinary tract infection) (8/22/2018)        Plan: This primarily appears to be a motility issue. The severe obstipation seems to be improved with the enemas. Today, I only feel a small amount of soft stool in the rectal vault. Did have some significant gastric distention on CT scan yesterday, including into the hiatal hernia. KUB today looks to be mostly colonic distention with significant bladder distention and bilateral hydronephrosis. Agree with NG tube for upper GI decompression. This would help relieve his symptoms quicker and also help identify a bowel obstruction. Patient is refusing until he talks to Dr. Usama Moreira. We will hold off on NG tube for now. Dr. Usama Moreira is planning cystoscopy tomorrow. Maybe patient will be agreeable to place an NGT while he is sedated for the procedure. Repeat CT scan of abdomen and pelvis in a few days if his symptoms do not improve.

## 2022-08-17 NOTE — PROGRESS NOTES
Hospitalist Progress Note    NAME: Eunice Garcia   :  1960   MRN:  [id number]       Assessment / Plan:  Sepsis due to urinary tract infection   ESBL E coli UTI  Urine culture grew ESBL E coli  Completing 7 days of meropenum on   CXR negative, Rapid covid negative    Hx bladder cancer s/p neobladder  Urinary retention  Does straight cath in group home. C/w carrera catheter here, will discharge with this  Having intermittent retention even with carrera, nursing doing irrigation PRN  Urology following  Repeat CT done today : showed stable left hydroureteronephrosis, Calculi in neobladder. Urology planning for cystoscopy with laser lithotripsy of bladder stones on     Sinus tachycardia  Intermittent  Likely d/t discomfort from contipation/ urinary retention  C/w metoprolol  Improved now    Constipation  Fecal impaction  Will keep NPO, change meds to IV as able   Had been getting aggressive bowel regimen. No bm with relistor overnight. Pt had TF and bile material coming out of his G tube. Had vomiting overnight  Repeat KUB this AM showed Marked bladder distention and bilateral hydronephrosis and hydroureter  Gi following, recommending surg evaluation. NGT for decompression is recommended as per GI    Dislodged PEG tube  PEG replaced by IR   TF on hold currently    Hypernatremia  VAZQUEZ  Improved now  Fluids restarted x24 hrs due to NPO status. Nephrology eval appreciated     Cerebral palsy  Anxiety/ Depression  Continue with supportive care. Pt's niece was updated at bedside      Code Status: DO NOT RESUSCITATE. Surrogate Decision Maker: His neice   DVT Prophylaxis: Lovenox. GI Prophylaxis: not indicated     Baseline: Dependent for activity of daily living.   Non verbal, but can communicated with nodding/ gestures, better when family at bedside    JESSE: , back to group home  Barriers: Lithotripsy on / Resolution for colonic distension     Subjective:     Chief Complaint / Reason for Physician Visit  Seen and evaluated in ED  Appears uncomfortable. No bm overnight. Had vomiting with TF material coming out of G tube. Review of Systems:  Symptom Y/N Comments  Symptom Y/N Comments   Fever/Chills    Chest Pain     Poor Appetite    Edema     Cough    Abdominal Pain     Sputum    Joint Pain     SOB/FANG    Pruritis/Rash     Nausea/vomit    Tolerating PT/OT     Diarrhea    Tolerating Diet     Constipation    Other       Could NOT obtain due to: Non verbal     Objective:     VITALS:   Last 24hrs VS reviewed since prior progress note. Most recent are:  Patient Vitals for the past 24 hrs:   Temp Pulse Resp BP SpO2   08/17/22 0830 98 °F (36.7 °C) 100 18 128/81 95 %   08/16/22 2053 98.3 °F (36.8 °C) 98 18 (!) 157/86 93 %   08/16/22 1632 98 °F (36.7 °C) 91 18 125/72 95 %         Intake/Output Summary (Last 24 hours) at 8/17/2022 1510  Last data filed at 8/17/2022 0645  Gross per 24 hour   Intake --   Output 500 ml   Net -500 ml          I had a face to face encounter and independently examined this patient on 8/17/2022, as outlined below:  PHYSICAL EXAM:  General: Awake, No acute distress  EENT:  EOMI. Anicteric sclerae. MMM  Resp:  CTA bilaterally, no wheezing or rales. No accessory muscle use  CV:  Regular  rhythm,  No edema  GI:  Soft, Non distended, Non tender. +Bowel sounds  Neurologic:  Awake, non verbal at baseline, contracted extremity  Psych:   Not anxious nor agitated  Skin:  No rashes.   No jaundice    Reviewed most current lab test results and cultures  YES  Reviewed most current radiology test results   YES  Review and summation of old records today    NO  Reviewed patient's current orders and MAR    YES  PMH/SH reviewed - no change compared to H&P  ________________________________________________________________________  Care Plan discussed with:    Comments   Patient y    Family  y    RN y    Care Manager     Consultant                        Multidiciplinary team rounds were held today with , nursing, pharmacist and clinical coordinator. Patient's plan of care was discussed; medications were reviewed and discharge planning was addressed. ________________________________________________________________________  Total NON critical care TIME:  36   Minutes    Total CRITICAL CARE TIME Spent:   Minutes non procedure based      Comments   >50% of visit spent in counseling and coordination of care     ________________________________________________________________________  Mishel Oakes MD     Procedures: see electronic medical records for all procedures/Xrays and details which were not copied into this note but were reviewed prior to creation of Plan. LABS:  I reviewed today's most current labs and imaging studies.   Pertinent labs include:  Recent Labs     08/16/22  0443   WBC 9.0   HGB 14.3   HCT 44.2            Recent Labs     08/17/22  0308 08/16/22  0443 08/15/22  0307    138 146*   K 4.1 3.7 4.4    104 109*   CO2 27 31 34*   * 116* 99   BUN 35* 26* 29*   CREA 1.18 0.78 0.79   CA 9.8 9.1 9.5   PHOS 4.8* 3.1 3.2   ALB 3.1* 2.8* 2.7*         Signed: Mishel Oakes MD

## 2022-08-17 NOTE — PROGRESS NOTES
Received notification from bedside RN about patient with regards to: lost IV access, difficult stick. Multiple failed attempts to obtain new access. Had central line in the past for same issue.   VS: /86, HR 98, RR 18, O2 sat 93%     Intervention given: Central line insertion ordered

## 2022-08-17 NOTE — PROGRESS NOTES
GI PROGRESS NOTE  Bindu Melendez NP  [phone] NP in-hospital cell phone M-F until 4:30  After 5pm or on weekends, please call  for physician on call    NAME:Basilio Mitchell :1960 XMX:[id number]   ATTG: Dr. Espinal Duty  PCP: Louie Grant MD  Date/Time:  2022 1040    Primary GI: Dr. Jaret Wheeler- Dr Lynda Toro covering    Assessment:     New Small Bowel Obstruction on CT scan   - pt began vomiting last night and continued throughout the night  - CT scan : 1. The study only includes the abdomen and again demonstrates significant  distention of the small bowel consistent with small bowel obstruction. No pneumoperitoneum  Constipation, large stool burden on CT scan  Persistent stool on KUB   - CT 22 :  Normal caliber colon. Previously seen large rectal stool ball was not imaged as this is located in the pelvis and the study only covers the abdomen. - Attempted relistor last night but no BM   - pt restless and uncomfortable in room  -CT 8/15/22 : Significant colonic distention with very large stool ball in the rectum this is not changed. - CT ABD PEL W CONT 22 Obstipation, Air-filled stomach, Incidental Moderate hiatal hernia, emphysema. Plan:     - Gtube to suction  - NPO and hold oral medications  - Surgery consult - personally reached out to Dr Kaleb Webster discussed with Dr Lynda Toro also spoke to bedside RN. Subjective:   Discussed with RN events overnight. Resting in room ,restless. Could NOT obtain due to: Cerebral palsy     Objective:   VITALS:   Last 24hrs VS reviewed since prior progress note. Most recent are:  Visit Vitals  /81   Pulse 100   Temp 98 °F (36.7 °C)   Resp 18   Ht 5' 7\" (1.702 m)   Wt 50.8 kg (112 lb)   SpO2 95%   BMI 17.54 kg/m²       Intake/Output Summary (Last 24 hours) at 2022 1040  Last data filed at 2022 0645  Gross per 24 hour   Intake 30 ml   Output 900 ml   Net -870 ml       PHYSICAL EXAM:  General: WD, WN.  Alert, cooperative, mild acute distress. contracted    HEENT: NC, Atraumatic. Anicteric sclerae. Lungs:  CTA Bilaterally. No Wheezing/Rhonchi/Rales. Heart:  Regular  rhythm,  No Rubs, No Gallops  Abdomen: Firm distended. +Bowel sounds, no HSM. PEG in lower quadrant, site c/d/I. Stimulator in RLQ  Extremities: No c/c/e  Neurologic:  Alert and oriented X 2  No acute neurological distress   Psych:   Not anxious nor agitated. Lab and Radiology Data Reviewed: (see below)    Medications Reviewed: (see below)  PMH/SH reviewed - no change compared to H&P  ________________________________________________________________________  Total time spent with patient: 20 minutes ________________________________________________________________________  Care Plan discussed with:  Patient Y   Family  Y at bedside   RN ARGENIS irving              Consultant:  Christy HOBBS and Dr Ladarius Nayak, NP     Procedures: see electronic medical records for all procedures/Xrays and details which were not copied into this note but were reviewed prior to creation of Plan. LABS:  Recent Labs     08/16/22 0443   WBC 9.0   HGB 14.3   HCT 44.2          Recent Labs     08/17/22  0308 08/16/22 0443 08/15/22  0307    138 146*   K 4.1 3.7 4.4    104 109*   CO2 27 31 34*   BUN 35* 26* 29*   CREA 1.18 0.78 0.79   * 116* 99   CA 9.8 9.1 9.5   PHOS 4.8* 3.1 3.2       Recent Labs     08/17/22  0308 08/16/22 0443 08/15/22  0307   ALB 3.1* 2.8* 2.7*       No results for input(s): INR, PTP, APTT, INREXT, INREXT in the last 72 hours. No results for input(s): FE, TIBC, PSAT, FERR in the last 72 hours. No results found for: FOL, RBCF  No results for input(s): PH, PCO2, PO2 in the last 72 hours. No results for input(s): CPK, CKMB in the last 72 hours.     No lab exists for component: TROPONINI  Lab Results   Component Value Date/Time    Color DARK YELLOW 08/08/2022 04:50 PM    Appearance CLOUDY (A) 08/08/2022 04:50 PM    Specific gravity >1.030 (H) 08/08/2022 04:50 PM    Specific gravity 1.021 03/18/2021 08:00 PM    pH (UA) >8.5 (H) 08/08/2022 04:50 PM    Protein 100 (A) 08/08/2022 04:50 PM    Glucose Negative 08/08/2022 04:50 PM    Ketone Negative 08/08/2022 04:50 PM    Bilirubin Negative 08/08/2022 04:50 PM    Urobilinogen 1.0 08/08/2022 04:50 PM    Nitrites Positive (A) 08/08/2022 04:50 PM    Leukocyte Esterase LARGE (A) 08/08/2022 04:50 PM    Epithelial cells FEW 08/08/2022 04:50 PM    Bacteria 3+ (A) 08/08/2022 04:50 PM    WBC >100 (H) 08/08/2022 04:50 PM    RBC  08/08/2022 04:50 PM       MEDICATIONS:  Current Facility-Administered Medications   Medication Dose Route Frequency    dextrose 5 % - 0.45% NaCl infusion  50 mL/hr IntraVENous CONTINUOUS    polyethylene glycol (MIRALAX) packet 17 g  17 g Oral Q4H    metoprolol tartrate (LOPRESSOR) tablet 25 mg  25 mg Per G Tube Q12H    pantoprazole (PROTONIX) 40 mg in 0.9% sodium chloride 10 mL injection  40 mg IntraVENous DAILY    [Held by provider] senna-docusate (PERICOLACE) 8.6-50 mg per tablet 1 Tablet  1 Tablet Per G Tube QHS    bisacodyL (DULCOLAX) suppository 10 mg  10 mg Rectal DAILY    sodium chloride (NS) flush 5-40 mL  5-40 mL IntraVENous Q8H    sodium chloride (NS) flush 5-40 mL  5-40 mL IntraVENous PRN    acetaminophen (TYLENOL) tablet 650 mg  650 mg Oral Q6H PRN    Or    acetaminophen (TYLENOL) suppository 650 mg  650 mg Rectal Q6H PRN    ondansetron (ZOFRAN ODT) tablet 4 mg  4 mg Oral Q8H PRN    Or    ondansetron (ZOFRAN) injection 4 mg  4 mg IntraVENous Q6H PRN    enoxaparin (LOVENOX) injection 40 mg  40 mg SubCUTAneous DAILY    ascorbic acid (vitamin C) (VITAMIN C) tablet 500 mg  500 mg Per G Tube DAILY    fluticasone propionate (FLONASE) 50 mcg/actuation nasal spray 2 Spray  2 Spray Both Nostrils DAILY    mirtazapine (REMERON) tablet 15 mg  15 mg Per G Tube DAILY    nortriptyline (PAMELOR) capsule 50 mg  50 mg Per G Tube QHS

## 2022-08-18 ENCOUNTER — APPOINTMENT (OUTPATIENT)
Dept: GENERAL RADIOLOGY | Age: 62
DRG: 698 | End: 2022-08-18
Attending: UROLOGY
Payer: MEDICARE

## 2022-08-18 ENCOUNTER — APPOINTMENT (OUTPATIENT)
Dept: GENERAL RADIOLOGY | Age: 62
DRG: 698 | End: 2022-08-18
Attending: INTERNAL MEDICINE
Payer: MEDICARE

## 2022-08-18 ENCOUNTER — ANESTHESIA (OUTPATIENT)
Dept: SURGERY | Age: 62
DRG: 698 | End: 2022-08-18
Payer: MEDICARE

## 2022-08-18 LAB
ALBUMIN SERPL-MCNC: 3.3 G/DL (ref 3.5–5)
ANION GAP SERPL CALC-SCNC: 7 MMOL/L (ref 5–15)
BACTERIA SPEC CULT: NORMAL
BUN SERPL-MCNC: 50 MG/DL (ref 6–20)
BUN/CREAT SERPL: 25 (ref 12–20)
CALCIUM SERPL-MCNC: 9.5 MG/DL (ref 8.5–10.1)
CHLORIDE SERPL-SCNC: 107 MMOL/L (ref 97–108)
CO2 SERPL-SCNC: 27 MMOL/L (ref 21–32)
CREAT SERPL-MCNC: 2.02 MG/DL (ref 0.7–1.3)
ERYTHROCYTE [DISTWIDTH] IN BLOOD BY AUTOMATED COUNT: 13.5 % (ref 11.5–14.5)
GLUCOSE SERPL-MCNC: 121 MG/DL (ref 65–100)
HCT VFR BLD AUTO: 48.5 % (ref 36.6–50.3)
HGB BLD-MCNC: 15.6 G/DL (ref 12.1–17)
MCH RBC QN AUTO: 29.6 PG (ref 26–34)
MCHC RBC AUTO-ENTMCNC: 32.2 G/DL (ref 30–36.5)
MCV RBC AUTO: 92 FL (ref 80–99)
NRBC # BLD: 0 K/UL (ref 0–0.01)
NRBC BLD-RTO: 0 PER 100 WBC
PHOSPHATE SERPL-MCNC: 4.7 MG/DL (ref 2.6–4.7)
PLATELET # BLD AUTO: 385 K/UL (ref 150–400)
PMV BLD AUTO: 11.4 FL (ref 8.9–12.9)
POTASSIUM SERPL-SCNC: 4.8 MMOL/L (ref 3.5–5.1)
RBC # BLD AUTO: 5.27 M/UL (ref 4.1–5.7)
SERVICE CMNT-IMP: NORMAL
SODIUM SERPL-SCNC: 141 MMOL/L (ref 136–145)
WBC # BLD AUTO: 12.2 K/UL (ref 4.1–11.1)

## 2022-08-18 PROCEDURE — 74018 RADEX ABDOMEN 1 VIEW: CPT

## 2022-08-18 PROCEDURE — 74011250637 HC RX REV CODE- 250/637: Performed by: STUDENT IN AN ORGANIZED HEALTH CARE EDUCATION/TRAINING PROGRAM

## 2022-08-18 PROCEDURE — 74011250636 HC RX REV CODE- 250/636: Performed by: INTERNAL MEDICINE

## 2022-08-18 PROCEDURE — C9113 INJ PANTOPRAZOLE SODIUM, VIA: HCPCS | Performed by: HOSPITALIST

## 2022-08-18 PROCEDURE — 74011000250 HC RX REV CODE- 250: Performed by: HOSPITALIST

## 2022-08-18 PROCEDURE — 74011250637 HC RX REV CODE- 250/637: Performed by: INTERNAL MEDICINE

## 2022-08-18 PROCEDURE — 74011000250 HC RX REV CODE- 250: Performed by: INTERNAL MEDICINE

## 2022-08-18 PROCEDURE — 99232 SBSQ HOSP IP/OBS MODERATE 35: CPT | Performed by: SURGERY

## 2022-08-18 PROCEDURE — 88300 SURGICAL PATH GROSS: CPT

## 2022-08-18 PROCEDURE — 77030019905 HC CATH URETH INTMIT MDII -A: Performed by: UROLOGY

## 2022-08-18 PROCEDURE — 74011250636 HC RX REV CODE- 250/636: Performed by: ANESTHESIOLOGY

## 2022-08-18 PROCEDURE — 74011250636 HC RX REV CODE- 250/636: Performed by: HOSPITALIST

## 2022-08-18 PROCEDURE — 36415 COLL VENOUS BLD VENIPUNCTURE: CPT

## 2022-08-18 PROCEDURE — 74011250637 HC RX REV CODE- 250/637: Performed by: NURSE PRACTITIONER

## 2022-08-18 PROCEDURE — 77030040830 HC CATH URETH FOL MDII -A: Performed by: UROLOGY

## 2022-08-18 PROCEDURE — BT141ZZ FLUOROSCOPY OF KIDNEYS, URETERS AND BLADDER USING LOW OSMOLAR CONTRAST: ICD-10-PCS | Performed by: UROLOGY

## 2022-08-18 PROCEDURE — 0D9670Z DRAINAGE OF STOMACH WITH DRAINAGE DEVICE, VIA NATURAL OR ARTIFICIAL OPENING: ICD-10-PCS | Performed by: SURGERY

## 2022-08-18 PROCEDURE — 77030013079 HC BLNKT BAIR HGGR 3M -A: Performed by: ANESTHESIOLOGY

## 2022-08-18 PROCEDURE — 80069 RENAL FUNCTION PANEL: CPT

## 2022-08-18 PROCEDURE — 77030008684 HC TU ET CUF COVD -B: Performed by: ANESTHESIOLOGY

## 2022-08-18 PROCEDURE — C1758 CATHETER, URETERAL: HCPCS | Performed by: UROLOGY

## 2022-08-18 PROCEDURE — 82360 CALCULUS ASSAY QUANT: CPT

## 2022-08-18 PROCEDURE — C1769 GUIDE WIRE: HCPCS | Performed by: UROLOGY

## 2022-08-18 PROCEDURE — 74420 UROGRAPHY RTRGR +-KUB: CPT

## 2022-08-18 PROCEDURE — 74011000250 HC RX REV CODE- 250: Performed by: ANESTHESIOLOGY

## 2022-08-18 PROCEDURE — 76210000006 HC OR PH I REC 0.5 TO 1 HR: Performed by: UROLOGY

## 2022-08-18 PROCEDURE — 65270000029 HC RM PRIVATE

## 2022-08-18 PROCEDURE — 2709999900 HC NON-CHARGEABLE SUPPLY: Performed by: UROLOGY

## 2022-08-18 PROCEDURE — 76060000035 HC ANESTHESIA 2 TO 2.5 HR: Performed by: UROLOGY

## 2022-08-18 PROCEDURE — 0TCB8ZZ EXTIRPATION OF MATTER FROM BLADDER, VIA NATURAL OR ARTIFICIAL OPENING ENDOSCOPIC: ICD-10-PCS | Performed by: UROLOGY

## 2022-08-18 PROCEDURE — 76010000131 HC OR TIME 2 TO 2.5 HR: Performed by: UROLOGY

## 2022-08-18 PROCEDURE — 85027 COMPLETE CBC AUTOMATED: CPT

## 2022-08-18 RX ORDER — SODIUM CHLORIDE 450 MG/100ML
75 INJECTION, SOLUTION INTRAVENOUS CONTINUOUS
Status: DISCONTINUED | OUTPATIENT
Start: 2022-08-18 | End: 2022-08-19

## 2022-08-18 RX ORDER — ONDANSETRON 2 MG/ML
INJECTION INTRAMUSCULAR; INTRAVENOUS AS NEEDED
Status: DISCONTINUED | OUTPATIENT
Start: 2022-08-18 | End: 2022-08-18 | Stop reason: HOSPADM

## 2022-08-18 RX ORDER — LIDOCAINE HYDROCHLORIDE 20 MG/ML
INJECTION, SOLUTION EPIDURAL; INFILTRATION; INTRACAUDAL; PERINEURAL AS NEEDED
Status: DISCONTINUED | OUTPATIENT
Start: 2022-08-18 | End: 2022-08-18 | Stop reason: HOSPADM

## 2022-08-18 RX ORDER — METOPROLOL TARTRATE 5 MG/5ML
2.5 INJECTION INTRAVENOUS EVERY 6 HOURS
Status: DISCONTINUED | OUTPATIENT
Start: 2022-08-18 | End: 2022-08-20

## 2022-08-18 RX ORDER — DEXAMETHASONE SODIUM PHOSPHATE 4 MG/ML
INJECTION, SOLUTION INTRA-ARTICULAR; INTRALESIONAL; INTRAMUSCULAR; INTRAVENOUS; SOFT TISSUE AS NEEDED
Status: DISCONTINUED | OUTPATIENT
Start: 2022-08-18 | End: 2022-08-18 | Stop reason: HOSPADM

## 2022-08-18 RX ORDER — SODIUM CHLORIDE, SODIUM LACTATE, POTASSIUM CHLORIDE, CALCIUM CHLORIDE 600; 310; 30; 20 MG/100ML; MG/100ML; MG/100ML; MG/100ML
25 INJECTION, SOLUTION INTRAVENOUS CONTINUOUS
Status: CANCELLED | OUTPATIENT
Start: 2022-08-18 | End: 2022-08-19

## 2022-08-18 RX ORDER — FENTANYL CITRATE 50 UG/ML
50 INJECTION, SOLUTION INTRAMUSCULAR; INTRAVENOUS AS NEEDED
Status: CANCELLED | OUTPATIENT
Start: 2022-08-18

## 2022-08-18 RX ORDER — HYDROMORPHONE HYDROCHLORIDE 1 MG/ML
.2-.5 INJECTION, SOLUTION INTRAMUSCULAR; INTRAVENOUS; SUBCUTANEOUS
Status: DISCONTINUED | OUTPATIENT
Start: 2022-08-18 | End: 2022-08-18 | Stop reason: HOSPADM

## 2022-08-18 RX ORDER — PROPOFOL 10 MG/ML
INJECTION, EMULSION INTRAVENOUS AS NEEDED
Status: DISCONTINUED | OUTPATIENT
Start: 2022-08-18 | End: 2022-08-18 | Stop reason: HOSPADM

## 2022-08-18 RX ORDER — SODIUM CHLORIDE, SODIUM LACTATE, POTASSIUM CHLORIDE, CALCIUM CHLORIDE 600; 310; 30; 20 MG/100ML; MG/100ML; MG/100ML; MG/100ML
25 INJECTION, SOLUTION INTRAVENOUS CONTINUOUS
Status: DISCONTINUED | OUTPATIENT
Start: 2022-08-18 | End: 2022-08-18 | Stop reason: HOSPADM

## 2022-08-18 RX ORDER — FENTANYL CITRATE 50 UG/ML
INJECTION, SOLUTION INTRAMUSCULAR; INTRAVENOUS AS NEEDED
Status: DISCONTINUED | OUTPATIENT
Start: 2022-08-18 | End: 2022-08-18 | Stop reason: HOSPADM

## 2022-08-18 RX ORDER — LIDOCAINE HYDROCHLORIDE 10 MG/ML
0.1 INJECTION, SOLUTION EPIDURAL; INFILTRATION; INTRACAUDAL; PERINEURAL AS NEEDED
Status: CANCELLED | OUTPATIENT
Start: 2022-08-18

## 2022-08-18 RX ORDER — ONDANSETRON 2 MG/ML
4 INJECTION INTRAMUSCULAR; INTRAVENOUS AS NEEDED
Status: DISCONTINUED | OUTPATIENT
Start: 2022-08-18 | End: 2022-08-18 | Stop reason: HOSPADM

## 2022-08-18 RX ORDER — PHENYLEPHRINE HCL IN 0.9% NACL 0.4MG/10ML
SYRINGE (ML) INTRAVENOUS AS NEEDED
Status: DISCONTINUED | OUTPATIENT
Start: 2022-08-18 | End: 2022-08-18 | Stop reason: HOSPADM

## 2022-08-18 RX ORDER — FENTANYL CITRATE 50 UG/ML
25 INJECTION, SOLUTION INTRAMUSCULAR; INTRAVENOUS
Status: DISCONTINUED | OUTPATIENT
Start: 2022-08-18 | End: 2022-08-18 | Stop reason: HOSPADM

## 2022-08-18 RX ORDER — SODIUM CHLORIDE, SODIUM LACTATE, POTASSIUM CHLORIDE, CALCIUM CHLORIDE 600; 310; 30; 20 MG/100ML; MG/100ML; MG/100ML; MG/100ML
INJECTION, SOLUTION INTRAVENOUS
Status: DISCONTINUED | OUTPATIENT
Start: 2022-08-18 | End: 2022-08-18 | Stop reason: HOSPADM

## 2022-08-18 RX ORDER — ROCURONIUM BROMIDE 10 MG/ML
INJECTION, SOLUTION INTRAVENOUS AS NEEDED
Status: DISCONTINUED | OUTPATIENT
Start: 2022-08-18 | End: 2022-08-18 | Stop reason: HOSPADM

## 2022-08-18 RX ADMIN — POLYETHYLENE GLYCOL 3350 17 G: 17 POWDER, FOR SOLUTION ORAL at 20:49

## 2022-08-18 RX ADMIN — PROPOFOL 80 MG: 10 INJECTION, EMULSION INTRAVENOUS at 07:32

## 2022-08-18 RX ADMIN — Medication 80 MCG: at 07:52

## 2022-08-18 RX ADMIN — SODIUM CHLORIDE, PRESERVATIVE FREE 10 ML: 5 INJECTION INTRAVENOUS at 12:12

## 2022-08-18 RX ADMIN — FENTANYL CITRATE 50 MCG: 50 INJECTION INTRAMUSCULAR; INTRAVENOUS at 07:55

## 2022-08-18 RX ADMIN — POLYETHYLENE GLYCOL 3350 17 G: 17 POWDER, FOR SOLUTION ORAL at 18:06

## 2022-08-18 RX ADMIN — SODIUM CHLORIDE 40 MG: 9 INJECTION, SOLUTION INTRAMUSCULAR; INTRAVENOUS; SUBCUTANEOUS at 12:04

## 2022-08-18 RX ADMIN — ONDANSETRON HYDROCHLORIDE 4 MG: 2 INJECTION, SOLUTION INTRAMUSCULAR; INTRAVENOUS at 07:37

## 2022-08-18 RX ADMIN — ENOXAPARIN SODIUM 40 MG: 100 INJECTION SUBCUTANEOUS at 12:03

## 2022-08-18 RX ADMIN — METOPROLOL TARTRATE 1.25 MG: 5 INJECTION INTRAVENOUS at 12:11

## 2022-08-18 RX ADMIN — SODIUM CHLORIDE, POTASSIUM CHLORIDE, SODIUM LACTATE AND CALCIUM CHLORIDE: 600; 310; 30; 20 INJECTION, SOLUTION INTRAVENOUS at 08:06

## 2022-08-18 RX ADMIN — LIDOCAINE HYDROCHLORIDE 40 MG: 20 INJECTION, SOLUTION EPIDURAL; INFILTRATION; INTRACAUDAL; PERINEURAL at 07:32

## 2022-08-18 RX ADMIN — Medication 40 MCG: at 07:48

## 2022-08-18 RX ADMIN — SODIUM CHLORIDE, POTASSIUM CHLORIDE, SODIUM LACTATE AND CALCIUM CHLORIDE: 600; 310; 30; 20 INJECTION, SOLUTION INTRAVENOUS at 07:26

## 2022-08-18 RX ADMIN — SODIUM CHLORIDE, PRESERVATIVE FREE 10 ML: 5 INJECTION INTRAVENOUS at 21:29

## 2022-08-18 RX ADMIN — OXYCODONE HYDROCHLORIDE AND ACETAMINOPHEN 500 MG: 500 TABLET ORAL at 12:04

## 2022-08-18 RX ADMIN — MIRTAZAPINE 15 MG: 15 TABLET, FILM COATED ORAL at 12:04

## 2022-08-18 RX ADMIN — SODIUM CHLORIDE, PRESERVATIVE FREE 10 ML: 5 INJECTION INTRAVENOUS at 05:29

## 2022-08-18 RX ADMIN — FENTANYL CITRATE 50 MCG: 50 INJECTION INTRAMUSCULAR; INTRAVENOUS at 07:46

## 2022-08-18 RX ADMIN — SODIUM CHLORIDE 75 ML/HR: 4.5 INJECTION, SOLUTION INTRAVENOUS at 12:15

## 2022-08-18 RX ADMIN — POLYETHYLENE GLYCOL 3350 17 G: 17 POWDER, FOR SOLUTION ORAL at 12:03

## 2022-08-18 RX ADMIN — FENTANYL CITRATE 150 MCG: 50 INJECTION INTRAMUSCULAR; INTRAVENOUS at 07:32

## 2022-08-18 RX ADMIN — SODIUM CHLORIDE, PRESERVATIVE FREE 10 ML: 5 INJECTION INTRAVENOUS at 13:41

## 2022-08-18 RX ADMIN — BISACODYL 10 MG: 10 SUPPOSITORY RECTAL at 12:04

## 2022-08-18 RX ADMIN — METOPROLOL TARTRATE 2.5 MG: 5 INJECTION INTRAVENOUS at 18:44

## 2022-08-18 RX ADMIN — DEXAMETHASONE SODIUM PHOSPHATE 4 MG: 4 INJECTION, SOLUTION INTRAMUSCULAR; INTRAVENOUS at 07:37

## 2022-08-18 RX ADMIN — ROCURONIUM BROMIDE 30 MG: 10 INJECTION INTRAVENOUS at 07:32

## 2022-08-18 RX ADMIN — NORTRIPTYLINE HYDROCHLORIDE 50 MG: 25 CAPSULE ORAL at 21:28

## 2022-08-18 NOTE — PROGRESS NOTES
[facility] placed on will call for patient's return to [facility].       Jose Alfredo Mayer, BSN, RN    Care Management  [phone]

## 2022-08-18 NOTE — PERIOP NOTES
36 - TRANSFER - IN REPORT:    Verbal report received from [id number] [facility] RN(name) on Lucio Buckner  being received from 2134(unit) for ordered procedure      Report consisted of patients Situation, Background, Assessment and   Recommendations(SBAR). Information from the following report(s) Pre Procedure Checklist and Procedure Verification was reviewed with the receiving nurse. Opportunity for questions and clarification was provided. Assessment completed upon patients arrival to unit and care assumed.   0702 -PT ARRIVED INTO PRE-OP HOLDING. [address] COMMANDS. KEN UPPER EXT CONTRACTED. NODS YES /NO TO ANSWER SIMPLE QUESTIONS. RESP EVEN AND UNLABORED. SOB NOTED ON EXERTION. POX ON RA > 94%. BSB AND DIMINISHED IN BASES. ABD TENDER TO TOUCH - NGT PLACEMENT CHECKED WITH [facility]. DRAINING GREEN FLUID - CONNECTED TO CON'T WALL SX.  PRE-OP TCHING DONE - UNABLE TO ASCERTAIN PT'S LEVEL OF UNDERSTANDING. SECONDARY TO PT HAS CP AND IS NON-VERBAL. 1675 - DR. PIERCE AT BEDSIDE TO PLACE IV UNDER ULTRASOUND.

## 2022-08-18 NOTE — BRIEF OP NOTE
Brief Postoperative Note    Patient: Demetrio Jain  YOB: 1960  MRN: [id number]    Date of Procedure: 8/18/2022     Pre-Op Diagnosis: NEOBLADDER STONES, RECENT UTI, LEFT HYDRO VS EXTRARENAL PELVIS, H/O BLADDER CANCER    Post-Op Diagnosis:   NEOBLADDER STONES, RECENT UTI, LEFT HYDRO AND EXTRARENAL PELVIS, H/O BLADDER CANCER      Procedure(s):  CYSTOLITHOLAPAXY, BILATERAL PYELOGRAM, STONE REMOVAL    Surgeon(s):  Kanu Jenkins MD    Surgical Assistant: None    Anesthesia: General     Estimated Blood Loss (mL): Minimal    Complications: None    Specimens:   ID Type Source Tests Collected by Time Destination   1 : BLADDER STONES  Bladder  STONE ANALYSIS Kanu Jenkins MD 8/18/2022 0831 Other (See Notes)        Implants:  22 Fr Short    Drains:   Nasogastric Tube 08/18/22 (Active)   Site Assessment Clean, dry, & intact 08/18/22 0702   Securement Device Adhesive-based morgan 08/18/22 0702   G Port Status Continuous Suction 08/18/22 0702   Action Taken Placement verified (comment) 08/18/22 0702   Drainage Description Green 08/18/22 0702   Output (ml) 450 ml 08/18/22 0045       PEG/Gastrostomy Tube 08/09/22 (Active)   Site Assessment Clean, dry, & intact 08/17/22 1706   Dressing Status Clean, dry, & intact 08/17/22 1706   G Port Status Clamped 08/17/22 1706   Action Taken Feed set changed 08/14/22 0956   Drainage Description Dolly Kurt 08/17/22 0815   Gastric Residual (mL) 300 ml 08/17/22 0815   Tube Feeding/Formula Options Standard with fiber 08/15/22 0400   Tube Feeding/Verify Rate (mL/hr) 355 08/16/22 0900   Water Flush Volume (mL) 480 mL 08/16/22 0900   Intake (ml) 885 ml 08/16/22 0900   Medication Volume 50 ml 08/16/22 0900       [REMOVED] Feeding Tube (Removed)   Site Assessment Clean, dry, & intact 08/09/22 0822   Tube Status Flushed 08/09/22 2880   Gastric Residual (mL) 0 ml 08/09/22 0822   Medication Volume 100 ml 08/09/22 5944       Findings: dictated    Electronically Signed by Miguel Angel Link MD on 8/18/2022 at 9:38 AM

## 2022-08-18 NOTE — PROGRESS NOTES
Surgical Note    Date/Time:  2022         Assessment :    Plan:  Patient Active Problem List   Diagnosis Code    Abdominal pain R10.9    Abdominal pain, other specified site R10.9    Abdominal pain R10.9    CP (cerebral palsy) ([facility] [address] G80.9    Quadriplegic cerebral palsy ([facility] [address] G80.8    Esophageal stricture K22.2    Abdominal pain, LLQ (left lower quadrant) R10.32    UTI (urinary tract infection) N39.0    Sepsis ([address] A41.9    Constipation K59.00    Fecal impaction (HCC) K56.41    Hiatal hernia K44.9    Severe sepsis (HCC) A41.9, R65.20    Acute kidney injury ([address] N17.9    Nephrolithiasis N20.0    Hydronephrosis of right kidney N13.30        NG tube placed last night  Status post cystoscopy with Dr. Bhat Cage today    Abdomen is much softer. KUB this morning with continued ileus and distention of the colon    Continue NG tube and record output  Once bowel function improves, will DC and start p.o. Subjective:     Chief Complaint:      Review of Systems:  Y  N       Y  N  [] [x]  Fever/chills                                               [] [x]  Chest Pain  [] [x]  Cough                                                       [] [x]  Diarrhea   [] [x]  Sputum                                                     [] [x]  Constipation  [] [x]  SOB/FANG                                                [] [x]  Nausea/Vomit  [x] []  Abd Pain                                                    [x] []  Tolerating diet  [] [x]  Dysuria                                                           []Unable to obtain  ROS due to  []mental status change  []sedated   []intubated    Objective:     VITALS:   Last 24hrs VS reviewed since prior hospitalist progress note.  Most recent are:  Visit Vitals  BP (!) 156/82 (BP 1 Location: Left arm, BP Patient Position: At rest)   Pulse (!) 109   Temp 97 °F (36.1 °C)   Resp 14   Ht 5' 7\" (1.702 m)   Wt 50.8 kg (111 lb 15.9 oz)   SpO2 96%   BMI 17.54 kg/m²     Temp (24hrs), Av.2 °F (36.8 °C), Min:97 °F (36.1 °C), Max:98.7 °F (37.1 °C)      Intake/Output Summary (Last 24 hours) at 8/18/2022 1437  Last data filed at 8/18/2022 2340  Gross per 24 hour   Intake 1000 ml   Output 800 ml   Net 200 ml         []Short []NGT  []Intubated on vent    PHYSICAL EXAM:  Gen:  [] A&O  []non-toxic  [x] No acute distress             [] ill apearing  []  Critical        HEENT:   [x]NC/AT/PERRLA/EOMI    []pink conjunctivae      []pale conjunctivae                  PERRL  []yes  []no      []moist mucosa    []dry mucosa    hearing intact to voice []yes  []No    RESP:   [x]CTA bialterally/no wheezing/rhonchi/rales/crackles    []clear bilaterally  []wheezing   []rhonchi   []crackles     use of accessory muscles []yes []no    CARD:   [x] regular rate and rhythm/No murmurs/rubs/gallops    murmur  []yes ()  []no      Rubs  []yes  []no       Gallops []yes  []no    Rate [] regular  [] irregular        carotid bruits  []Right  [] Left                 LE edema []yes  []no           JVP  [] yes   [] no    ABD:    [x]soft  [x]non distended  [x]non tender , gastrostomy tube in place [] NABS    SKIN:   Rashes [] yes   [x] no    Ulcers [] yes   [x] no               [x]normal   []tight to palpitation    skin turgor [] good  []poor  []decreased               Cyanosis/clubbing [] yes [x] no    NEUR:   [x]cranial nerves II-XII grossly intact       []Cranial nerves deficit                 [] facial droop    [] slurred speech   []aphasic     []Strength normal     [] weakness  [] LUE  []  RUE/ [] LLE  []  RLE    follows commands  [] yes [] no           PSYCH:   insight []poor [x]good   Alert and Oriented to  [x]person  [x]place  [x] time                    []depressed []anxious []agitated  []lethargic []stuporous  []sedated           Lab Data Reviewed: (see below)    Medications Reviewed: (see below)    PMH/SH reviewed - no change compared to H&P    Care Plan discussed with:  [x]Patient   []Family    []Care Manager   []RN []Consultant/Specialist :    Prophylaxis:  []Lovenox  []Coumadin  []Hep SQ  []SCDs  []H2B/PPI    Disposition:  []Home w/ Family   []HH PT,OT,RN   []SNF/LTC   []SAH/Rehab    Ancillary Serices:   [] PT     []OT      []SW      []Nut      []HH ________________________________________________________________________  LABS:  Recent Labs     08/18/22  0430 08/16/22  0443   WBC 12.2* 9.0   HGB 15.6 14.3   HCT 48.5 44.2    259     Recent Labs     08/18/22 0430 08/17/22  0308 08/16/22  0443    136 138   K 4.8 4.1 3.7    104 104   CO2 27 27 31   BUN 50* 35* 26*   CREA 2.02* 1.18 0.78   * 133* 116*   CA 9.5 9.8 9.1   PHOS 4.7 4.8* 3.1     Recent Labs     08/18/22 0430 08/17/22  0308 08/16/22  0443   ALB 3.3* 3.1* 2.8*     No results for input(s): INR, PTP, APTT, INREXT in the last 72 hours. No results for input(s): FE, TIBC, PSAT, FERR in the last 72 hours. No results for input(s): PH, PCO2, PO2 in the last 72 hours. No results for input(s): CPK, CKMB in the last 72 hours.     No lab exists for component: TROPONINI  Lab Results   Component Value Date/Time    Glucose (POC) 131 (H) 08/16/2022 10:57 AM    Glucose (POC) 80 08/08/2022 12:52 PM    Glucose (POC) 158 (H) 03/19/2021 05:15 PM    Glucose (POC) 114 (H) 03/02/2012 12:39 PM    Glucose (POC) 88 03/02/2012 06:00 AM       MEDICATIONS:  Current Facility-Administered Medications   Medication Dose Route Frequency    meropenem (MERREM) 1 g in 0.9% sodium chloride (MBP/ADV) 50 mL MBP  1 g IntraVENous Once at Delivery    0.45% sodium chloride infusion  75 mL/hr IntraVENous CONTINUOUS    metoprolol (LOPRESSOR) injection 2.5 mg  2.5 mg IntraVENous Q6H    polyethylene glycol (MIRALAX) packet 17 g  17 g Oral Q4H    [Held by provider] metoprolol tartrate (LOPRESSOR) tablet 25 mg  25 mg Per G Tube Q12H    pantoprazole (PROTONIX) 40 mg in 0.9% sodium chloride 10 mL injection  40 mg IntraVENous DAILY    [Held by provider] senna-docusate (PERICOLACE) 8.6-50 mg per tablet 1 Tablet  1 Tablet Per G Tube QHS    bisacodyL (DULCOLAX) suppository 10 mg  10 mg Rectal DAILY    sodium chloride (NS) flush 5-40 mL  5-40 mL IntraVENous Q8H    sodium chloride (NS) flush 5-40 mL  5-40 mL IntraVENous PRN    acetaminophen (TYLENOL) tablet 650 mg  650 mg Oral Q6H PRN    Or    acetaminophen (TYLENOL) suppository 650 mg  650 mg Rectal Q6H PRN    ondansetron (ZOFRAN ODT) tablet 4 mg  4 mg Oral Q8H PRN    Or    ondansetron (ZOFRAN) injection 4 mg  4 mg IntraVENous Q6H PRN    enoxaparin (LOVENOX) injection 40 mg  40 mg SubCUTAneous DAILY    ascorbic acid (vitamin C) (VITAMIN C) tablet 500 mg  500 mg Per G Tube DAILY    fluticasone propionate (FLONASE) 50 mcg/actuation nasal spray 2 Spray  2 Spray Both Nostrils DAILY    mirtazapine (REMERON) tablet 15 mg  15 mg Per G Tube DAILY    nortriptyline (PAMELOR) capsule 50 mg  50 mg Per G Tube QHS

## 2022-08-18 NOTE — PROGRESS NOTES
GI PROGRESS NOTE   Ani Cornell NP  [phone] NP in-hospital cell phone M-F until 4:30  After 5pm or on weekends, please call  for physician on call    NAME:Basilio Mitchell :1960 AVERY:[id number]   ATTG: Dr. Yannick Arriaga  PCP: Francisco Segovia MD  Date/Time:  2022 3:50 PM     Primary GI: Dr. Fatuma Grey    Reason for following: SBO, N/V    Assessment:     Small Bowel Obstruction on CT scan   - pt began vomiting last night and continued throughout the night  - CT scan : 1. The study only includes the abdomen and again demonstrates significant  distention of the small bowel consistent with small bowel obstruction. No pneumoperitoneum  Constipation, large stool burden on CT scan  Persistent stool on KUB   - CT 22 :  Normal caliber colon. Previously seen large rectal stool ball was not imaged as this is located in the pelvis and the study only covers the abdomen. - Attempted relistor last night but no BM   - pt restless and uncomfortable in room  -CT 8/15/22 : Significant colonic distention with very large stool ball in the rectum this is not changed. - CT ABD PEL W CONT 22 Obstipation, Air-filled stomach, Incidental Moderate hiatal hernia, emphysema. -KUB 2022 NG tube tipin stomach. Marked right colonic constipation and ileus     Plan:     -NG to suction  -Continue NPO  -Continue Miralax and dulcolax suppositories  -Avoid narcotics  -Repeat KUB in am  -Supportive measure per primary team  -Will continue to follow. Please call GI with any further questions or concerns. Thank you! *Plan discussed with Dr. Dawn Armendariz  Subjective:   Discussed with RN events overnight. No further bowel movements, having significant brown output from NG tube.     Complaint Y/N Description   Abdominal Pain N    Hematemesis     Hematochezia     Melena     Constipation     Diarrhea     Dyspepsia     Dysphagia     Jaundiced     Nausea/vomiting N      Review of Systems:  Symptom Y/N Comments  Symptom Y/N Comments   Fever/Chills    Chest Pain     Cough    Headaches     Sputum    Joint Pain     SOB/FANG    Pruritis/Rash     Tolerating Diet  NPO  Other       Could NOT obtain due to:      Objective:   VITALS:   Last 24hrs VS reviewed since prior progress note. Most recent are:  Visit Vitals  /74 (BP 1 Location: Left arm)   Pulse (!) 109   Temp 97 °F (36.1 °C)   Resp 14   Ht 5' 7\" (1.702 m)   Wt 50.8 kg (111 lb 15.9 oz)   SpO2 96%   BMI 17.54 kg/m²       Intake/Output Summary (Last 24 hours) at 8/18/2022 1550  Last data filed at 8/18/2022 0806  Gross per 24 hour   Intake 1000 ml   Output 800 ml   Net 200 ml     PHYSICAL EXAM:  General: WD, WN. Alert, cooperative, no acute distress    HEENT: NC, Atraumatic. Anicteric sclerae. Lungs:  CTA Bilaterally. No Wheezing/Rhonchi/Rales. Heart:  Regular  rhythm, No Rubs, No Gallops  Abdomen: Soft, Non distended, Non tender. +Bowel sounds, no HSM PEG in midline, excoriated, brown/black drainage, clamped  Extremities: No c/c/e  Neurologic:  Alert . No acute neurological distress   Psych:   Not anxious nor agitated. Lab and Radiology Data Reviewed: (see below)    Medications Reviewed: (see below)  PMH/SH reviewed - no change compared to H&P  ________________________________________________________________________  Total time spent with patient: 20 minutes ________________________________________________________________________  Care Plan discussed with:  Patient Y   Family     RN Y              Consultant:       Ronal Rahman NP     Procedures: see electronic medical records for all procedures/Xrays and details which were not copied into this note but were reviewed prior to creation of Plan.       LABS:  Recent Labs     08/18/22  0430 08/16/22  0443   WBC 12.2* 9.0   HGB 15.6 14.3   HCT 48.5 44.2    259     Recent Labs     08/18/22  0430 08/17/22  0308 08/16/22  0443    136 138   K 4.8 4.1 3.7    104 104   CO2 27 27 31   BUN 50* 35* 26*   CREA 2.02* 1.18 0.78   * 133* 116*   CA 9.5 9.8 9.1   PHOS 4.7 4.8* 3.1     Recent Labs     08/18/22  0430 08/17/22  0308 08/16/22  0443   ALB 3.3* 3.1* 2.8*     No results for input(s): INR, PTP, APTT, INREXT in the last 72 hours. No results for input(s): FE, TIBC, PSAT, FERR in the last 72 hours. No results found for: FOL, RBCF  No results for input(s): PH, PCO2, PO2 in the last 72 hours. No results for input(s): CPK, CKMB in the last 72 hours.     No lab exists for component: TROPONINI  Lab Results   Component Value Date/Time    Color DARK YELLOW 08/08/2022 04:50 PM    Appearance CLOUDY (A) 08/08/2022 04:50 PM    Specific gravity >1.030 (H) 08/08/2022 04:50 PM    Specific gravity 1.021 03/18/2021 08:00 PM    pH (UA) >8.5 (H) 08/08/2022 04:50 PM    Protein 100 (A) 08/08/2022 04:50 PM    Glucose Negative 08/08/2022 04:50 PM    Ketone Negative 08/08/2022 04:50 PM    Bilirubin Negative 08/08/2022 04:50 PM    Urobilinogen 1.0 08/08/2022 04:50 PM    Nitrites Positive (A) 08/08/2022 04:50 PM    Leukocyte Esterase LARGE (A) 08/08/2022 04:50 PM    Epithelial cells FEW 08/08/2022 04:50 PM    Bacteria 3+ (A) 08/08/2022 04:50 PM    WBC >100 (H) 08/08/2022 04:50 PM    RBC  08/08/2022 04:50 PM       MEDICATIONS:  Current Facility-Administered Medications   Medication Dose Route Frequency    meropenem (MERREM) 1 g in 0.9% sodium chloride (MBP/ADV) 50 mL MBP  1 g IntraVENous Once at Delivery    0.45% sodium chloride infusion  75 mL/hr IntraVENous CONTINUOUS    metoprolol (LOPRESSOR) injection 2.5 mg  2.5 mg IntraVENous Q6H    polyethylene glycol (MIRALAX) packet 17 g  17 g Oral Q4H    [Held by provider] metoprolol tartrate (LOPRESSOR) tablet 25 mg  25 mg Per G Tube Q12H    pantoprazole (PROTONIX) 40 mg in 0.9% sodium chloride 10 mL injection  40 mg IntraVENous DAILY    [Held by provider] senna-docusate (PERICOLACE) 8.6-50 mg per tablet 1 Tablet  1 Tablet Per G Tube QHS    bisacodyL (DULCOLAX) suppository 10 mg  10 mg Rectal DAILY    sodium chloride (NS) flush 5-40 mL  5-40 mL IntraVENous Q8H    sodium chloride (NS) flush 5-40 mL  5-40 mL IntraVENous PRN    acetaminophen (TYLENOL) tablet 650 mg  650 mg Oral Q6H PRN    Or    acetaminophen (TYLENOL) suppository 650 mg  650 mg Rectal Q6H PRN    ondansetron (ZOFRAN ODT) tablet 4 mg  4 mg Oral Q8H PRN    Or    ondansetron (ZOFRAN) injection 4 mg  4 mg IntraVENous Q6H PRN    enoxaparin (LOVENOX) injection 40 mg  40 mg SubCUTAneous DAILY    ascorbic acid (vitamin C) (VITAMIN C) tablet 500 mg  500 mg Per G Tube DAILY    fluticasone propionate (FLONASE) 50 mcg/actuation nasal spray 2 Spray  2 Spray Both Nostrils DAILY    mirtazapine (REMERON) tablet 15 mg  15 mg Per G Tube DAILY    nortriptyline (PAMELOR) capsule 50 mg  50 mg Per G Tube QHS

## 2022-08-18 NOTE — ANESTHESIA POSTPROCEDURE EVALUATION
Procedure(s):  CYSTOLITHOLAPAXY, BILATERAL PYELOGRAM, STONE REMOVAL. general    Anesthesia Post Evaluation      Multimodal analgesia: multimodal analgesia used between 6 hours prior to anesthesia start to PACU discharge  Patient location during evaluation: PACU  Patient participation: complete - patient cannot participate  Level of consciousness: sleepy but conscious and responsive to verbal stimuli  Pain score: 1  Pain management: adequate  Airway patency: patent  Anesthetic complications: no  Cardiovascular status: acceptable  Respiratory status: acceptable  Hydration status: acceptable  Comments: +Post-Anesthesia Evaluation and Assessment    Patient: Lashonda Rankin MRN: [id number]  SSN: [id number]   YOB: 1960  Age: 64 y.o. Sex: male      Cardiovascular Function/Vital Signs    /74   Pulse 90   Temp 37 °C (98.6 °F)   Resp 12   Ht 5' 7\" (1.702 m)   Wt 50.8 kg (111 lb 15.9 oz)   SpO2 98%   BMI 17.54 kg/m²     Patient is status post Procedure(s):  CYSTOLITHOLAPAXY, BILATERAL PYELOGRAM, STONE REMOVAL. Nausea/Vomiting: Controlled. Postoperative hydration reviewed and adequate. Pain:  Pain Scale 1: Adult Nonverbal Pain Scale (08/18/22 0702)  Pain Intensity 1: 1 (08/18/22 9330)   Managed. Neurological Status:   Neuro (WDL): Exceptions to WDL (08/18/22 0702)   At baseline. Mental Status and Level of Consciousness: Arousable. Pulmonary Status:   O2 Device: Nasal cannula (08/18/22 0942)   Adequate oxygenation and airway patent. Complications related to anesthesia: None    Post-anesthesia assessment completed. No concerns.     Signed By: Noe Wilks MD    8/18/2022  Post anesthesia nausea and vomiting:  controlled  Final Post Anesthesia Temperature Assessment:  Normothermia (36.0-37.5 degrees C)      INITIAL Post-op Vital signs:   Vitals Value Taken Time   /74 08/18/22 0942   Temp 37 °C (98.6 °F) 08/18/22 0942   Pulse 90 08/18/22 0942   Resp 12 08/18/22 0942   SpO2 98 % 08/18/22 0942

## 2022-08-18 NOTE — ANESTHESIA PREPROCEDURE EVALUATION
Relevant Problems   GASTROINTESTINAL   (+) Hiatal hernia      RENAL FAILURE   (+) Acute kidney injury ([address]   (+) Hydronephrosis of right kidney   (+) Nephrolithiasis       Anesthetic History     PONV          Review of Systems / Medical History  Patient summary reviewed, nursing notes reviewed and pertinent labs reviewed    Pulmonary  Within defined limits                 Neuro/Psych         Psychiatric history     Cardiovascular  Within defined limits                Exercise tolerance: >4 METS     GI/Hepatic/Renal  Within defined limits              Endo/Other  Within defined limits           Other Findings   Comments: CP (cerebral palsy)  Quadriplegic cerebral palsy   Esophageal stricture  UTI (urinary tract infection)  Sepsis            Physical Exam    Airway  Mallampati: III  TM Distance: 4 - 6 cm  Neck ROM: normal range of motion   Mouth opening: Normal     Cardiovascular  Regular rate and rhythm,  S1 and S2 normal,  no murmur, click, rub, or gallop  Rhythm: regular  Rate: normal         Dental  No notable dental hx       Pulmonary  Breath sounds clear to auscultation               Abdominal  GI exam deferred       Other Findings            Anesthetic Plan    ASA: 3  Anesthesia type: general    Monitoring Plan: BIS      Induction: Intravenous  Anesthetic plan and risks discussed with: Patient

## 2022-08-18 NOTE — OP NOTES
[address]  OPERATIVE REPORT    Name:  Rex Ennis  MR#:  [id number]  :  1960  ACCOUNT #:  [de-identified]  DATE OF SERVICE:  2022    PREOPERATIVE DIAGNOSES:  1.  Neobladder stones. 2.  Recent urinary tract infection. 3.  Left hydronephrosis versus extrarenal pelvis. 4.  Bladder cancer status post radical cystoprostatectomy with ileal neobladder urinary diversion. POSTOPERATIVE DIAGNOSES:  1.  Neobladder stones. 2.  Recent urinary tract infection. 3.  Chronic left hydronephrosis with extrarenal pelvis. 4.  Bladder cancer status post radical cystoprostatectomy with ileal neobladder urinary diversion. PROCEDURE PERFORMED:  scope of neobladder, mucus evacuation, extended laser bladder stones, Short. SURGEON:  Janice Aviles MD    ASSISTANT:  None. ANESTHESIA:  General endotracheal.    COMPLICATIONS:  None. SPECIMENS REMOVED:  Neobladder stones. IMPLANTS:  A 22-Setswana Short catheter that is a drain as well. ESTIMATED BLOOD LOSS:  Scant. INDICATIONS:  See diagnosis. PROCEDURE IN DETAIL:  He underwent a general endotracheal anesthetic. He already had a nasogastric tube in. He was later given meropenem 1 g. He was placed gently into lithotomy position, but due to his contracture, this had to be modified by raising his legs. He was prepped and draped in sterile fashion. Time-out was called confirming the planned procedure. A 21-Setswana cystoscope with a 30-degree lens was used. The anterior urethra was normal.  Posterior urethra was surgically absent. The anastomosis was fine. The bladder had evidence of mucus as well as some bladder stones, which were incompletely seen until all of the mucus was irrigated out with Ellik evacuators. Then, about four bladder stones were seen. They were typical [place] in appearance. A 1000 micron holmium laser fiber was then used at dusting and popcorn settings. Please see the laser log for details.   I was able to fragment all of these stones, but I had to dinah them around the medial bladder and irrigate the pieces out with the [facility] evacuator. This was done with minimal trauma to the mucosa and no bleeding. Eventual result after an extended period of fragmentation left only some very small pieces and dust, and all mucus was cleared out. During all of this exploration, a ureteral reimplant orifice was seen with exuberant extrusion of concentrated urine. He was intubated with a TigerTail catheter and contrast was injected demonstrating a dilated tortuous ureter. I had to use a 0.035 Bentson wire in a bump and run technique to get the TigerTail up into the left renal pelvis and then do a pullout. Pyelogram was demonstrating a dilated renal pelvis, mild narrowing at the UPJ and a mild plus dilated ureter all the way down to the neobladder which obviously was non-obstructed giving a passage of urine observed as well as passage of the contrast.  After all of this, a 22-Romanian Short catheter was inserted which was very mildly snug at the anastomosis, but passed easily. The efflux was clear. The balloon was filled with 10 mL of sterile water and it was connected to a gravity drainage bag completing the procedure which he tolerated well. Operative findings discussed with his sister, Florentino Dick. We will leave the catheter in until he goes back to the [facility] where they can take the catheter out and resume CIC. Obviously, his GI problems with the ileus is the current impediment to discharge. He should not need more antibiotics.         MD WICHO [email or web address]  D:  08/18/2022 9:48  T:  08/18/2022 14:14  JOB #:  [id number]

## 2022-08-18 NOTE — PERIOP NOTES
TRANSFER - OUT REPORT:    Verbal report given to MANI Pandya(name) on Lena Barrow  being transferred to 2134(unit) for routine post - op       Report consisted of patients Situation, Background, Assessment and   Recommendations(SBAR). Information from the following report(s) OR Summary, Procedure Summary, Intake/Output, and MAR was reviewed with the receiving nurse. Opportunity for questions and clarification was provided.       Patient transported with:   [employer]

## 2022-08-18 NOTE — PERIOP NOTES
Handoff Report from Operating Room to PACU    Report received from Miguel Ángel Salazar RN and Linh Jackson CRNA regarding Mita Mad[place]. Surgeon(s):  Fozia Quinonez MD  And Procedure(s) (LRB):  CYSTOLITHOLAPAXY, BILATERAL PYELOGRAM, STONE REMOVAL (N/A)  confirmed   with dressings discussed. Anesthesia type, drugs, patient history, complications, estimated blood loss, vital signs, intake and output, and last pain medication, lines, and temperature were reviewed.

## 2022-08-18 NOTE — PROGRESS NOTES
Hospitalist Progress Note    NAME: Kael Sow   :  1960   MRN:  [id number]       Assessment / Plan:  Sepsis due to urinary tract infection   ESBL E coli UTI  Urine culture grew ESBL E coli  Completed 7 days of meropenum on   CXR negative, Rapid covid negative    Hx bladder cancer s/p neobladder  Urinary retention  Does straight cath in group home. C/w carrera catheter here, will discharge with this  Having intermittent retention even with carrera, nursing doing irrigation PRN  S/p cystolitholapaxy, b/l pyelogram, stone removal on . Repeat CT done today : showed stable left hydroureteronephrosis, Calculi in neobladder. Appreciate urology following    Sinus tachycardia  Intermittent  Likely d/t discomfort from contipation/ urinary retention  C/w metoprolol, increase dose    Constipation with fecal impaction  Will keep NPO, change meds to IV as able   Had been getting aggressive bowel regimen. No bm with relistor overnight. Pt had TF and bile material coming out of his G tube on . Repeat KUB showed hydronephrosis and hydroureter  Gi following, recommending surg evaluation. NGT placed for decompression, placed on . Repeat KUB tomorrow  Surgery evaluated, recommended repeat CT scan of abdomen and pelvis in a few days if his symptoms do not improve. Dislodged PEG tube  PEG replaced by IR   NGT placeemnt on   TF on hold currently, restart once ok with GI    Hypernatremia  VAZQUEZ  Cr worst today at 2  Cont' IVF, appreciate nephrology following    Cerebral palsy  Anxiety/ Depression  Continue with supportive care. Pt's sister was updated at bedside      Code Status: DO NOT RESUSCITATE. Surrogate Decision Maker: brothers and sister  DVT Prophylaxis: Lovenox. GI Prophylaxis: not indicated     Baseline: Dependent for activity of daily living.   Non verbal, but can communicated with nodding/ gestures, better when family at bedside    JESSE: , back to group home  Barriers: Lithotripsy on 8/19/ Resolution for colonic distension     Subjective:     Chief Complaint / Reason for Physician Visit  NAD. Review of Systems:  Symptom Y/N Comments  Symptom Y/N Comments   Fever/Chills    Chest Pain     Poor Appetite    Edema     Cough    Abdominal Pain     Sputum    Joint Pain     SOB/FANG    Pruritis/Rash     Nausea/vomit    Tolerating PT/OT     Diarrhea    Tolerating Diet     Constipation    Other       Could NOT obtain due to: Non verbal     Objective:     VITALS:   Last 24hrs VS reviewed since prior progress note. Most recent are:  Patient Vitals for the past 24 hrs:   Temp Pulse Resp BP SpO2   08/18/22 1211 -- (!) 109 -- -- --   08/18/22 1045 97 °F (36.1 °C) 100 14 (!) 156/82 96 %   08/18/22 1030 97.6 °F (36.4 °C) (!) 101 11 (!) 140/61 98 %   08/18/22 1015 -- (!) 101 12 (!) 146/59 97 %   08/18/22 1000 -- 100 11 (!) 143/61 98 %   08/18/22 0955 -- (!) 102 11 (!) 147/62 98 %   08/18/22 0950 -- (!) 103 -- (!) 144/64 98 %   08/18/22 0945 -- (!) 104 -- 132/60 99 %   08/18/22 0942 98.6 °F (37 °C) 90 12 122/74 98 %   08/18/22 0702 98.3 °F (36.8 °C) (!) 126 20 132/80 94 %   08/18/22 0635 98.4 °F (36.9 °C) 100 18 139/88 97 %   08/18/22 0345 98.7 °F (37.1 °C) (!) 109 18 (!) 143/82 97 %   08/17/22 2340 98.2 °F (36.8 °C) (!) 112 19 133/82 97 %   08/17/22 1930 98.4 °F (36.9 °C) -- 18 (!) 140/76 98 %   08/17/22 1554 98.6 °F (37 °C) (!) 119 18 135/85 100 %         Intake/Output Summary (Last 24 hours) at 8/18/2022 1246  Last data filed at 8/18/2022 5506  Gross per 24 hour   Intake 1000 ml   Output 800 ml   Net 200 ml          I had a face to face encounter and independently examined this patient on 8/18/2022, as outlined below:  PHYSICAL EXAM:  General: Awake, No acute distress  EENT:  EOMI. Anicteric sclerae. MMM  Resp:  CTA bilaterally, no wheezing or rales. No accessory muscle use  CV:  Regular  rhythm,  No edema  GI:  Soft, Non distended, Non tender.   +Bowel sounds  Neurologic: Awake, non verbal at baseline, contracted extremity  Psych:   Not anxious nor agitated  Skin:  No rashes. No jaundice    Reviewed most current lab test results and cultures  YES  Reviewed most current radiology test results   YES  Review and summation of old records today    NO  Reviewed patient's current orders and MAR    YES  PMH/SH reviewed - no change compared to H&P  ________________________________________________________________________  Care Plan discussed with:    Comments   Patient y    Family  y sister   RN y    Care Manager     Consultant                        Multidiciplinary team rounds were held today with , nursing, pharmacist and clinical coordinator. Patient's plan of care was discussed; medications were reviewed and discharge planning was addressed. ________________________________________________________________________  Total NON critical care TIME:  36   Minutes    Total CRITICAL CARE TIME Spent:   Minutes non procedure based      Comments   >50% of visit spent in counseling and coordination of care     ________________________________________________________________________  Jeri Vences MD     Procedures: see electronic medical records for all procedures/Xrays and details which were not copied into this note but were reviewed prior to creation of Plan. LABS:  I reviewed today's most current labs and imaging studies.   Pertinent labs include:  Recent Labs     08/18/22  0430 08/16/22  0443   WBC 12.2* 9.0   HGB 15.6 14.3   HCT 48.5 44.2    259         Recent Labs     08/18/22  0430 08/17/22  0308 08/16/22  0443    136 138   K 4.8 4.1 3.7    104 104   CO2 27 27 31   * 133* 116*   BUN 50* 35* 26*   CREA 2.02* 1.18 0.78   CA 9.5 9.8 9.1   PHOS 4.7 4.8* 3.1   ALB 3.3* 3.1* 2.8*         Signed: Jeri Vences MD

## 2022-08-18 NOTE — PROGRESS NOTES
Nephrology Progress Note  [facility] / [address], [place], [address]  Phone - [phone]  Fax - [phone]                 Patient: Eunice Garcia                   YOB: 1960        Date- 8/18/2022                      Admit Date: 8/8/2022  CC: Follow up for ish, Hypernatremia          IMPRESSION & PLAN:   Ish due to ATN, IV DYE,hydronephrosis, prerenal factors  B/o hydronephrosis  Hypernatremia(secondary to free water deficit )  Malfunction of PEG tube (replacement by IR 8/9/2022)  Severe constipation with stool impaction  History of cerebral palsy  History of bladder cancer status post radical cystoprostatectomy with neobladder  Intermittent malfunction of Short catheter. UTIS/P CYSOLITHOLAPAXY, b/l pyelogram, stone removal---8-18-22    PLAN-  Continue ivf  Urology input noted  Follow bmp     Subjective: Interval History:   -Seen and examined today. CR WORSE  S/P CYSOLITHOLAPAXY, b/l pyelogram, stone removal    Objective:   Vitals:    08/18/22 0955 08/18/22 1000 08/18/22 1015 08/18/22 1030   BP: (!) 147/62 (!) 143/61 (!) 146/59 (!) 140/61   Pulse: (!) 102 100 (!) 101 (!) 101   Resp: 11 11 12 11   Temp:    97.6 °F (36.4 °C)   SpO2: 98% 98% 97% 98%   Weight:       Height:          08/17 0701 - 08/18 0700  In: -   Out: 800 [Urine:350]  Last 3 Recorded Weights in this Encounter    08/08/22 1845 08/18/22 0702   Weight: 50.8 kg (112 lb) 50.8 kg (111 lb 15.9 oz)      Physical exam:    GEN: Noncommunicative,NAD  NECK- no mass  RESP: No wheezing, clear b/l  NEURO: Noncommunicative, cerebral palsy  EXT: No Edema       Chart reviewed. Pertinent Notes reviewed.      Data Review :  Recent Labs     08/18/22  0430 08/17/22  0308 08/16/22  0443    136 138   K 4.8 4.1 3.7    104 104   CO2 27 27 31   BUN 50* 35* 26*   CREA 2.02* 1.18 0.78   * 133* 116*   CA 9.5 9.8 9.1   PHOS 4.7 4. 8* 3.1       Recent Labs     08/18/22  0430 08/16/22  0443   WBC 12.2* 9.0   HGB 15.6 14.3   HCT 48.5 44.2    259       No results for input(s): FE, TIBC, PSAT, FERR in the last 72 hours.    Medication list  reviewed  Current Facility-Administered Medications   Medication Dose Route Frequency    lactated Ringers infusion  25 mL/hr IntraVENous CONTINUOUS    fentaNYL citrate (PF) injection 25 mcg  25 mcg IntraVENous Multiple    HYDROmorphone (DILAUDID) injection 0.2-0.5 mg  0.2-0.5 mg IntraVENous Multiple    ondansetron (ZOFRAN) injection 4 mg  4 mg IntraVENous PRN    meperidine (DEMEROL) injection 12.5 mg  12.5 mg IntraVENous Q5MIN PRN    meropenem (MERREM) 1 g in 0.9% sodium chloride (MBP/ADV) 50 mL MBP  1 g IntraVENous Once at Delivery    metoprolol (LOPRESSOR) injection 1.25 mg  1.25 mg IntraVENous Q6H    polyethylene glycol (MIRALAX) packet 17 g  17 g Oral Q4H    [Held by provider] metoprolol tartrate (LOPRESSOR) tablet 25 mg  25 mg Per G Tube Q12H    pantoprazole (PROTONIX) 40 mg in 0.9% sodium chloride 10 mL injection  40 mg IntraVENous DAILY    [Held by provider] senna-docusate (PERICOLACE) 8.6-50 mg per tablet 1 Tablet  1 Tablet Per G Tube QHS    bisacodyL (DULCOLAX) suppository 10 mg  10 mg Rectal DAILY    sodium chloride (NS) flush 5-40 mL  5-40 mL IntraVENous Q8H    sodium chloride (NS) flush 5-40 mL  5-40 mL IntraVENous PRN    acetaminophen (TYLENOL) tablet 650 mg  650 mg Oral Q6H PRN    Or    acetaminophen (TYLENOL) suppository 650 mg  650 mg Rectal Q6H PRN    ondansetron (ZOFRAN ODT) tablet 4 mg  4 mg Oral Q8H PRN    Or    ondansetron (ZOFRAN) injection 4 mg  4 mg IntraVENous Q6H PRN    enoxaparin (LOVENOX) injection 40 mg  40 mg SubCUTAneous DAILY    ascorbic acid (vitamin C) (VITAMIN C) tablet 500 mg  500 mg Per G Tube DAILY    fluticasone propionate (FLONASE) 50 mcg/actuation nasal spray 2 Spray  2 Spray Both Nostrils DAILY    mirtazapine (REMERON) tablet 15 mg  15 mg Per G Tube DAILY nortriptyline (PAMELOR) capsule 50 mg  50 mg Per G Tube QHS          Analia Prescott MD  8/18/2022

## 2022-08-18 NOTE — PROGRESS NOTES
0020  Patient agrees to NG tube placement  for upper GI decompression. Patient used his communication devise to point to letter \"YES\" for the procedure in the presence of Family member (sister) and Wicho Johns RN.     0252   Informed consent signed. 0030   NG tube inserted at bedside. Green colored fluid rushed out upon placement. Connected to continuous suction as ordered. New orders placed for x-ray per protocol. 0045 X-Ray done at bed side. Patient remains calm and stable. Will continue to monitor. 9985   Verbal report to Fina Moon RN  for ordered procedure. Family member at bedside notified that patient will be transported at [address] [place] [address] Rd[address].

## 2022-08-18 NOTE — PROGRESS NOTES
Comprehensive Nutrition Assessment    Type and Reason for Visit: Reassess    Nutrition Recommendations/Plan:   Resume TF when medically appropriate      Nutrition Assessment:    Chart reviewed; medically noted for SBO. TF on hold. NGT placed for decompression. GI and surgery following. Will monitor ability to resume TF. Nutrition Related Findings:    K+ 4.8, , BUN 50, Cr 2.02  BM 8/17  Vitamin C, Dulcolax, Lopressor, Remeron, Protonix, Miralax    Current Nutrition Intake & Therapies:        DIET NPO    Anthropometric Measures:  Height: 5' 7\" (170.2 cm)  Ideal Body Weight (IBW): 148 lbs (67 kg)     Current Body Wt:  50.8 kg (111 lb 15.9 oz), 75.7 % IBW. Current BMI (kg/m2): 17.5                          BMI Category: Underweight (BMI less than 18.5)    Estimated Daily Nutrient Needs:  Energy Requirements Based On: Formula  Weight Used for Energy Requirements: Current  Energy (kcal/day): 1656 kcals (BMR 1274 x 1. 3AF)  Weight Used for Protein Requirements: Current  Protein (g/day): 66-77g (1.3-1.5 g/kg bw)  Method Used for Fluid Requirements: 1 ml/kcal  Fluid (ml/day): 1650 mL    Nutrition Diagnosis:   Inadequate protein-energy intake related to altered GI function as evidenced by constipation (TF held)    Nutrition Interventions:   Food and/or Nutrient Delivery: Start tube feeding  Nutrition Education/Counseling: No recommendations at this time  Coordination of Nutrition Care: Continue to monitor while inpatient       Goals:  Previous Goal Met: No progress toward goal(s)  Goals: Initiate nutrition support, by next RD assessment       Nutrition Monitoring and Evaluation:   Behavioral-Environmental Outcomes: None identified  Food/Nutrient Intake Outcomes: Enteral nutrition intake/tolerance  Physical Signs/Symptoms Outcomes: Biochemical data, Weight, GI status    Discharge Planning:    Enteral nutrition    Sandy Feng RD  Contact: ext [phone]

## 2022-08-19 ENCOUNTER — APPOINTMENT (OUTPATIENT)
Dept: GENERAL RADIOLOGY | Age: 62
DRG: 698 | End: 2022-08-19
Attending: INTERNAL MEDICINE
Payer: MEDICARE

## 2022-08-19 LAB
ALBUMIN SERPL-MCNC: 2.7 G/DL (ref 3.5–5)
ANION GAP SERPL CALC-SCNC: 4 MMOL/L (ref 5–15)
BASOPHILS # BLD: 0.1 K/UL (ref 0–0.1)
BASOPHILS NFR BLD: 1 % (ref 0–1)
BUN SERPL-MCNC: 35 MG/DL (ref 6–20)
BUN/CREAT SERPL: 33 (ref 12–20)
CALCIUM SERPL-MCNC: 8.8 MG/DL (ref 8.5–10.1)
CHLORIDE SERPL-SCNC: 114 MMOL/L (ref 97–108)
CO2 SERPL-SCNC: 29 MMOL/L (ref 21–32)
COMMENT, HOLDF: NORMAL
CREAT SERPL-MCNC: 1.05 MG/DL (ref 0.7–1.3)
DIFFERENTIAL METHOD BLD: NORMAL
EOSINOPHIL # BLD: 0.3 K/UL (ref 0–0.4)
EOSINOPHIL NFR BLD: 3 % (ref 0–7)
ERYTHROCYTE [DISTWIDTH] IN BLOOD BY AUTOMATED COUNT: 13.8 % (ref 11.5–14.5)
GLUCOSE SERPL-MCNC: 80 MG/DL (ref 65–100)
HCT VFR BLD AUTO: 43.5 % (ref 36.6–50.3)
HGB BLD-MCNC: 13.6 G/DL (ref 12.1–17)
IMM GRANULOCYTES # BLD AUTO: 0 K/UL (ref 0–0.04)
IMM GRANULOCYTES NFR BLD AUTO: 0 % (ref 0–0.5)
LYMPHOCYTES # BLD: 1.9 K/UL (ref 0.8–3.5)
LYMPHOCYTES NFR BLD: 24 % (ref 12–49)
MCH RBC QN AUTO: 28.8 PG (ref 26–34)
MCHC RBC AUTO-ENTMCNC: 31.3 G/DL (ref 30–36.5)
MCV RBC AUTO: 92 FL (ref 80–99)
MONOCYTES # BLD: 0.6 K/UL (ref 0–1)
MONOCYTES NFR BLD: 8 % (ref 5–13)
NEUTS SEG # BLD: 5.1 K/UL (ref 1.8–8)
NEUTS SEG NFR BLD: 64 % (ref 32–75)
NRBC # BLD: 0 K/UL (ref 0–0.01)
NRBC BLD-RTO: 0 PER 100 WBC
PHOSPHATE SERPL-MCNC: 2.7 MG/DL (ref 2.6–4.7)
PLATELET # BLD AUTO: 390 K/UL (ref 150–400)
PMV BLD AUTO: 10.8 FL (ref 8.9–12.9)
POTASSIUM SERPL-SCNC: 4.7 MMOL/L (ref 3.5–5.1)
RBC # BLD AUTO: 4.73 M/UL (ref 4.1–5.7)
SAMPLES BEING HELD,HOLD: NORMAL
SODIUM SERPL-SCNC: 147 MMOL/L (ref 136–145)
WBC # BLD AUTO: 8 K/UL (ref 4.1–11.1)

## 2022-08-19 PROCEDURE — 36415 COLL VENOUS BLD VENIPUNCTURE: CPT

## 2022-08-19 PROCEDURE — 74018 RADEX ABDOMEN 1 VIEW: CPT

## 2022-08-19 PROCEDURE — C9113 INJ PANTOPRAZOLE SODIUM, VIA: HCPCS | Performed by: HOSPITALIST

## 2022-08-19 PROCEDURE — 74011250636 HC RX REV CODE- 250/636: Performed by: INTERNAL MEDICINE

## 2022-08-19 PROCEDURE — 74011250636 HC RX REV CODE- 250/636: Performed by: HOSPITALIST

## 2022-08-19 PROCEDURE — 74011250637 HC RX REV CODE- 250/637: Performed by: STUDENT IN AN ORGANIZED HEALTH CARE EDUCATION/TRAINING PROGRAM

## 2022-08-19 PROCEDURE — 74011250637 HC RX REV CODE- 250/637: Performed by: NURSE PRACTITIONER

## 2022-08-19 PROCEDURE — 74011000250 HC RX REV CODE- 250: Performed by: INTERNAL MEDICINE

## 2022-08-19 PROCEDURE — 65270000029 HC RM PRIVATE

## 2022-08-19 PROCEDURE — 99232 SBSQ HOSP IP/OBS MODERATE 35: CPT | Performed by: SURGERY

## 2022-08-19 PROCEDURE — 74011250637 HC RX REV CODE- 250/637: Performed by: INTERNAL MEDICINE

## 2022-08-19 PROCEDURE — 80069 RENAL FUNCTION PANEL: CPT

## 2022-08-19 PROCEDURE — 74011000250 HC RX REV CODE- 250: Performed by: HOSPITALIST

## 2022-08-19 PROCEDURE — 85025 COMPLETE CBC W/AUTO DIFF WBC: CPT

## 2022-08-19 RX ORDER — DEXTROSE MONOHYDRATE 50 MG/ML
75 INJECTION, SOLUTION INTRAVENOUS CONTINUOUS
Status: DISCONTINUED | OUTPATIENT
Start: 2022-08-19 | End: 2022-08-20

## 2022-08-19 RX ADMIN — POLYETHYLENE GLYCOL 3350 17 G: 17 POWDER, FOR SOLUTION ORAL at 00:49

## 2022-08-19 RX ADMIN — METOPROLOL TARTRATE 2.5 MG: 5 INJECTION INTRAVENOUS at 18:13

## 2022-08-19 RX ADMIN — SODIUM CHLORIDE, PRESERVATIVE FREE 10 ML: 5 INJECTION INTRAVENOUS at 16:44

## 2022-08-19 RX ADMIN — DEXTROSE MONOHYDRATE 75 ML/HR: 50 INJECTION, SOLUTION INTRAVENOUS at 11:00

## 2022-08-19 RX ADMIN — METOPROLOL TARTRATE 2.5 MG: 5 INJECTION INTRAVENOUS at 12:49

## 2022-08-19 RX ADMIN — METOPROLOL TARTRATE 2.5 MG: 5 INJECTION INTRAVENOUS at 06:35

## 2022-08-19 RX ADMIN — POLYETHYLENE GLYCOL 3350 17 G: 17 POWDER, FOR SOLUTION ORAL at 21:40

## 2022-08-19 RX ADMIN — SODIUM CHLORIDE, PRESERVATIVE FREE 10 ML: 5 INJECTION INTRAVENOUS at 06:38

## 2022-08-19 RX ADMIN — POLYETHYLENE GLYCOL 3350 17 G: 17 POWDER, FOR SOLUTION ORAL at 10:58

## 2022-08-19 RX ADMIN — METOPROLOL TARTRATE 2.5 MG: 5 INJECTION INTRAVENOUS at 00:49

## 2022-08-19 RX ADMIN — MIRTAZAPINE 15 MG: 15 TABLET, FILM COATED ORAL at 10:59

## 2022-08-19 RX ADMIN — OXYCODONE HYDROCHLORIDE AND ACETAMINOPHEN 500 MG: 500 TABLET ORAL at 10:59

## 2022-08-19 RX ADMIN — FLUTICASONE PROPIONATE 2 SPRAY: 50 SPRAY, METERED NASAL at 10:59

## 2022-08-19 RX ADMIN — POLYETHYLENE GLYCOL 3350 17 G: 17 POWDER, FOR SOLUTION ORAL at 04:55

## 2022-08-19 RX ADMIN — POLYETHYLENE GLYCOL 3350 17 G: 17 POWDER, FOR SOLUTION ORAL at 15:06

## 2022-08-19 RX ADMIN — NORTRIPTYLINE HYDROCHLORIDE 50 MG: 25 CAPSULE ORAL at 21:39

## 2022-08-19 RX ADMIN — ENOXAPARIN SODIUM 40 MG: 100 INJECTION SUBCUTANEOUS at 10:58

## 2022-08-19 RX ADMIN — SODIUM CHLORIDE 75 ML/HR: 4.5 INJECTION, SOLUTION INTRAVENOUS at 02:12

## 2022-08-19 RX ADMIN — BISACODYL 10 MG: 10 SUPPOSITORY RECTAL at 10:59

## 2022-08-19 RX ADMIN — SODIUM CHLORIDE, PRESERVATIVE FREE 10 ML: 5 INJECTION INTRAVENOUS at 21:40

## 2022-08-19 RX ADMIN — SODIUM CHLORIDE 40 MG: 9 INJECTION, SOLUTION INTRAMUSCULAR; INTRAVENOUS; SUBCUTANEOUS at 10:58

## 2022-08-19 NOTE — PROGRESS NOTES
Spiritual Care Assessment/Progress Note  [facility]      NAME: Bhaskar Benites      MRN: [id number]  AGE: 64 y.o. SEX: male  Baptism Affiliation: No Methodist   Language: English     8/19/2022     Total Time (in minutes): 10     Spiritual Assessment begun in [facility] 2 MED TELE through conversation with:         []Patient        [] Family    [] Friend(s)        Reason for Consult: Follow-up, routine     Spiritual beliefs: (Please include comment if needed)     [] Identifies with a ginny tradition:         [] Supported by a ginny community:            [] Claims no spiritual orientation:           [] Seeking spiritual identity:                [] Adheres to an individual form of spirituality:           [x] Not able to assess:                           Identified resources for coping:      [] Prayer                               [] Music                  [] Guided Imagery     [] Family/friends                 [] Pet visits     [] Devotional reading                         [x] Unknown     [] Other:                                                Interventions offered during this visit: (See comments for more details)    Patient Interventions: Other (comment) (Attempt)     Family/Friend(s):  Affirmation of ginny, Catharsis/review of pertinent events in supportive environment, Iconic (affirming the presence of God/Higher Power), Initial Assessment, Prayer (assurance of)     Plan of Care:     [] Support spiritual and/or cultural needs    [] Support AMD and/or advance care planning process      [] Support grieving process   [] Coordinate Rites and/or Rituals    [] Coordination with community clergy   [] No spiritual needs identified at this time   [] Detailed Plan of Care below (See Comments)  [] Make referral to Music Therapy  [] Make referral to Pet Therapy     [] Make referral to Addiction services  [] Make referral to [facility]  [] Make referral to Spiritual Care Partner  [] No future visits requested        [x] Contact Spiritual Care for further referrals     Comments:   attempted visiting Mr. Mitchell in 2134 for a follow up visit. Patient appeared to be sleeping and he looked comfortable. No family present. Please contact spiritual care for any furher referraks.        Visited by: Marissa Swain  To maxim gates: John Cohen  ([phone])

## 2022-08-19 NOTE — PROGRESS NOTES
Nephrology Progress Note  [facility] / [address] [facility] [place], [address]  Phone - [phone]  Fax - [phone]                 Patient: Ban Parnell                   YOB: 1960        Date- 8/19/2022                      Admit Date: 8/8/2022  CC: Follow up for acute kidney injury, hypernatremia          IMPRESSION & PLAN:   Acute kidney injury due to ATN, IV DYE,hydronephrosis, prerenal factors  B/o hydronephrosis  Hypernatremia(secondary to free water deficit )  Malfunction of PEG tube (replacement by IR 8/9/2022)  Severe constipation with stool impaction  History of cerebral palsy  History of bladder cancer status post radical cystoprostatectomy with neobladder  Intermittent malfunction of Short catheter. UTIS/P CYSOLITHOLAPAXY, b/l pyelogram, stone removal---8-18-22    PLAN-  Change IV fluids to hypotonic  follow bmp in a.m. Subjective: Interval History:   -Creatinine improved, sodium worse-147  BP stable    8/18/22    CR WORSE  S/P CYSOLITHOLAPAXY, b/l pyelogram, stone removal    Objective:   Vitals:    08/18/22 1844 08/19/22 0049 08/19/22 0635 08/19/22 0803   BP: 108/67 (!) 141/69  134/69   Pulse: (!) 117 (!) 111 (!) 109 (!) 104   Resp:    18   Temp:    97.3 °F (36.3 °C)   SpO2: 96%   91%   Weight:       Height:          08/18 0701 - 08/19 0700  In: 1000 [I.V.:1000]  Out: 1700 [Urine:500]  Last 3 Recorded Weights in this Encounter    08/08/22 1845 08/18/22 0702   Weight: 50.8 kg (112 lb) 50.8 kg (111 lb 15.9 oz)      Physical exam:    GEN: Noncommunicative,NAD  NECK- no mass  RESP: No wheezing, clear bilaterally  NEURO: Noncommunicative  ABDO: Soft ileal conduit present  EXT: No Edema       Chart reviewed. Pertinent Notes reviewed.      Data Review :  Recent Labs     08/19/22  0516 08/18/22  0430 08/17/22  0308   * 141 136   K 4.7 4.8 4.1   * 107 104   CO2 29 27 27 BUN 35* 50* 35*   CREA 1.05 2.02* 1.18   GLU 80 121* 133*   CA 8.8 9.5 9.8   PHOS 2.7 4.7 4.8*       Recent Labs     08/19/22  0516 08/18/22  0430   WBC 8.0 12.2*   HGB 13.6 15.6   HCT 43.5 48.5    385       No results for input(s): FE, TIBC, PSAT, FERR in the last 72 hours.    Medication list  reviewed  Current Facility-Administered Medications   Medication Dose Route Frequency    dextrose 5% infusion  75 mL/hr IntraVENous CONTINUOUS    meropenem (MERREM) 1 g in 0.9% sodium chloride (MBP/ADV) 50 mL MBP  1 g IntraVENous Once at Delivery    metoprolol (LOPRESSOR) injection 2.5 mg  2.5 mg IntraVENous Q6H    polyethylene glycol (MIRALAX) packet 17 g  17 g Oral Q4H    [Held by provider] metoprolol tartrate (LOPRESSOR) tablet 25 mg  25 mg Per G Tube Q12H    pantoprazole (PROTONIX) 40 mg in 0.9% sodium chloride 10 mL injection  40 mg IntraVENous DAILY    [Held by provider] senna-docusate (PERICOLACE) 8.6-50 mg per tablet 1 Tablet  1 Tablet Per G Tube QHS    bisacodyL (DULCOLAX) suppository 10 mg  10 mg Rectal DAILY    sodium chloride (NS) flush 5-40 mL  5-40 mL IntraVENous Q8H    sodium chloride (NS) flush 5-40 mL  5-40 mL IntraVENous PRN    acetaminophen (TYLENOL) tablet 650 mg  650 mg Oral Q6H PRN    Or    acetaminophen (TYLENOL) suppository 650 mg  650 mg Rectal Q6H PRN    ondansetron (ZOFRAN ODT) tablet 4 mg  4 mg Oral Q8H PRN    Or    ondansetron (ZOFRAN) injection 4 mg  4 mg IntraVENous Q6H PRN    enoxaparin (LOVENOX) injection 40 mg  40 mg SubCUTAneous DAILY    ascorbic acid (vitamin C) (VITAMIN C) tablet 500 mg  500 mg Per G Tube DAILY    fluticasone propionate (FLONASE) 50 mcg/actuation nasal spray 2 Spray  2 Spray Both Nostrils DAILY    mirtazapine (REMERON) tablet 15 mg  15 mg Per G Tube DAILY    nortriptyline (PAMELOR) capsule 50 mg  50 mg Per G Tube QHS          Analia rPescott MD  8/19/2022

## 2022-08-19 NOTE — PROGRESS NOTES
Hospitalist Progress Note    NAME: Jose Mcmullen   :  1960   MRN:  [id number]       Assessment / Plan:  Sepsis due to urinary tract infection   ESBL E coli UTI  Urine culture grew ESBL E coli  Completed 7 days of meropenum on   CXR negative, Rapid covid negative    Hx bladder cancer s/p neobladder  Urinary retention  Does straight cath in group home. C/w carrera catheter here, will discharge with this  Having intermittent retention even with carrera, nursing doing irrigation PRN  S/p cystolitholapaxy, b/l pyelogram, stone removal on . Repeat CT done today : showed stable left hydroureteronephrosis, Calculi in neobladder. Carrera irrigation daily and prn. Disdcharge to [address] with carrera  Appreciate urology following    Sinus tachycardia  Intermittent  Likely d/t discomfort from contipation/ urinary retention  C/w metoprolol, increase dose    Constipation with fecal impaction  Will keep NPO, change meds to IV as able   Had been getting aggressive bowel regimen. No bm with relistor overnight. Pt had TF and bile material coming out of his G tube on . Repeat KUB showed hydronephrosis and hydroureter  Gi following, recommending surg evaluation. NGT placed for decompression, placed on , plan to remove today on . Repeat KUB showed decreased moderate amount of colonic stool with overall decreased colonic distention. Discussed with Dr [facility] this AM  vent G-tube to gravity for n/v. Cont' bowel regimen with daily suppositories. Dislodged PEG tube  PEG replaced by IR   NGT placeemnt on , removal on   TF on hold currently, likely re-start tomorrow    Hypernatremia  VAZQUEZ  Cr improved post cystoscopy  Cont' IVF, appreciate nephrology following    Cerebral palsy  Anxiety/ Depression  Continue with supportive care. Code Status: DO NOT RESUSCITATE. Surrogate Decision Maker: brothers and sister  DVT Prophylaxis: Lovenox.   GI Prophylaxis: not indicated     Baseline: Dependent for activity of daily living. Non verbal, but can communicated with nodding/ gestures, better when family at bedside    JESSE: 8/19, back to group home  Barriers: Lithotripsy on 8/19/ Resolution for colonic distension     Subjective:     Chief Complaint / Reason for Physician Visit  NAD. Comfortable today. Denied pain. Sister at bedside, updated on plan of care and possible discharge in 24-48 hrs. Review of Systems:  Symptom Y/N Comments  Symptom Y/N Comments   Fever/Chills    Chest Pain     Poor Appetite    Edema     Cough    Abdominal Pain     Sputum    Joint Pain     SOB/FANG    Pruritis/Rash     Nausea/vomit    Tolerating PT/OT     Diarrhea    Tolerating Diet     Constipation    Other       Could NOT obtain due to: Non verbal     Objective:     VITALS:   Last 24hrs VS reviewed since prior progress note. Most recent are:  Patient Vitals for the past 24 hrs:   Temp Pulse Resp BP SpO2   08/19/22 0803 97.3 °F (36.3 °C) (!) 104 18 134/69 91 %   08/19/22 0635 -- (!) 109 -- -- --   08/19/22 0049 -- (!) 111 -- (!) 141/69 --   08/18/22 1844 -- (!) 117 -- 108/67 96 %         Intake/Output Summary (Last 24 hours) at 8/19/2022 1355  Last data filed at 8/19/2022 0640  Gross per 24 hour   Intake --   Output 1700 ml   Net -1700 ml          I had a face to face encounter and independently examined this patient on 8/19/2022, as outlined below:  PHYSICAL EXAM:  General: Awake, No acute distress  EENT:  EOMI. Anicteric sclerae. MMM  Resp:  CTA bilaterally, no wheezing or rales. No accessory muscle use  CV:  Regular  rhythm,  No edema  GI:  Soft, Non distended, Non tender. +Bowel sounds  Neurologic:  Awake, non verbal at baseline, contracted extremity  Psych:   Not anxious nor agitated  Skin:  No rashes.   No jaundice    Reviewed most current lab test results and cultures  YES  Reviewed most current radiology test results   YES  Review and summation of old records today    NO  Reviewed patient's current orders and MAR    YES  PMH/SH reviewed - no change compared to H&P  ________________________________________________________________________  Care Plan discussed with:    Comments   Patient y    Family  y sister   RN y    Care Manager     Consultant                        Multidiciplinary team rounds were held today with , nursing, pharmacist and clinical coordinator. Patient's plan of care was discussed; medications were reviewed and discharge planning was addressed. ________________________________________________________________________  Total NON critical care TIME:  36   Minutes    Total CRITICAL CARE TIME Spent:   Minutes non procedure based      Comments   >50% of visit spent in counseling and coordination of care     ________________________________________________________________________  Arturo Marin MD     Procedures: see electronic medical records for all procedures/Xrays and details which were not copied into this note but were reviewed prior to creation of Plan. LABS:  I reviewed today's most current labs and imaging studies.   Pertinent labs include:  Recent Labs     08/19/22  0516 08/18/22  0430   WBC 8.0 12.2*   HGB 13.6 15.6   HCT 43.5 48.5    385         Recent Labs     08/19/22  0516 08/18/22  0430 08/17/22  0308   * 141 136   K 4.7 4.8 4.1   * 107 104   CO2 29 27 27   GLU 80 121* 133*   BUN 35* 50* 35*   CREA 1.05 2.02* 1.18   CA 8.8 9.5 9.8   PHOS 2.7 4.7 4.8*   ALB 2.7* 3.3* 3.1*         Signed: Arturo Marin MD

## 2022-08-19 NOTE — PROGRESS NOTES
Problem: Pressure Injury - Risk of  Goal: *Prevention of pressure injury  Description: Document Rick Scale and appropriate interventions in the flowsheet.   Outcome: Progressing Towards Goal  Note: Pressure Injury Interventions:  Sensory Interventions: Keep linens dry and wrinkle-free    Moisture Interventions: Minimize layers    Activity Interventions: Pressure redistribution bed/mattress(bed type)    Mobility Interventions: Pressure redistribution bed/mattress (bed type)    Nutrition Interventions: Document food/fluid/supplement intake    Friction and Shear Interventions: Feet elevated on foot rest

## 2022-08-19 NOTE — PROGRESS NOTES
Surgical Note    Date/Time:  8/19/2022         Assessment :    Plan:  Patient Active Problem List   Diagnosis Code    Abdominal pain R10.9    Abdominal pain, other specified site R10.9    Abdominal pain R10.9    CP (cerebral palsy) ([facility] [address] G80.9    Quadriplegic cerebral palsy ([facility] [address] G80.8    Esophageal stricture K22.2    Abdominal pain, LLQ (left lower quadrant) R10.32    UTI (urinary tract infection) N39.0    Sepsis ([address] A41.9    Constipation K59.00    Fecal impaction (HCC) K56.41    Hiatal hernia K44.9    Severe sepsis (HCC) A41.9, R65.20    Acute kidney injury ([facility] [address] N17.9    Nephrolithiasis N20.0    Hydronephrosis of right kidney N13.30        Abdomen is much softer (although with hx of intermittent spasticity, following exam not reliable)    NGT output less bilious, more gastric    KUB this morning with decreased colonic distention    DC NGT    Sips of clears  Maybe ready to resume tube feeds tomorrow. Continue bowel regimen and daily suppositories    Vent g-tube to gravity for nausea or vomiting             Subjective:     Chief Complaint:  feels much better. \"Thank you\"      Review of Systems:  Y  N       Y  N  [] [x]  Fever/chills                                               [] [x]  Chest Pain  [] [x]  Cough                                                       [] [x]  Diarrhea   [] [x]  Sputum                                                     [] [x]  Constipation  [] [x]  SOB/FANG                                                [] [x]  Nausea/Vomit  [] [x]  Abd Pain                                                    [] []  Tolerating diet  [] [x]  Dysuria                                                           []Unable to obtain  ROS due to  []mental status change  []sedated   []intubated    Objective:     VITALS:   Last 24hrs VS reviewed since prior hospitalist progress note.  Most recent are:  Visit Vitals  /69   Pulse (!) 104   Temp 97.3 °F (36.3 °C)   Resp 18   Ht 5' 7\" (1.702 m)   Wt 50.8 kg (111 lb 15.9 oz)   SpO2 91%   BMI 17.54 kg/m²     Temp (24hrs), Av.6 °F (36.4 °C), Min:97 °F (36.1 °C), Max:98.6 °F (37 °C)      Intake/Output Summary (Last 24 hours) at 2022 0840  Last data filed at 2022 0640  Gross per 24 hour   Intake --   Output 1700 ml   Net -1700 ml           []Short []NGT  []Intubated on vent    PHYSICAL EXAM:  Gen:  [] A&O  []non-toxic  [x] No acute distress             [] ill apearing  []  Critical        HEENT:   [x]NC/AT/PERRLA/EOMI    []pink conjunctivae      []pale conjunctivae                  PERRL  []yes  []no      []moist mucosa    []dry mucosa    hearing intact to voice []yes  []No    RESP:   [x]CTA bialterally/no wheezing/rhonchi/rales/crackles    []clear bilaterally  []wheezing   []rhonchi   []crackles     use of accessory muscles []yes []no    CARD:   [x] regular rate and rhythm/No murmurs/rubs/gallops    murmur  []yes ()  []no      Rubs  []yes  []no       Gallops []yes  []no    Rate [] regular  [] irregular        carotid bruits  []Right  [] Left                 LE edema []yes  []no           JVP  [] yes   [] no    ABD:    [x]soft  [x]non distended  [x]non tender , gastrostomy tube in place [] NABS    SKIN:   Rashes [] yes   [x] no    Ulcers [] yes   [x] no               [x]normal   []tight to palpitation    skin turgor [] good  []poor  []decreased               Cyanosis/clubbing [] yes [x] no    NEUR:   [x]cranial nerves II-XII grossly intact       []Cranial nerves deficit                 [] facial droop    [] slurred speech   []aphasic     []Strength normal     [] weakness  [] LUE  []  RUE/ [] LLE  []  RLE    follows commands  [] yes [] no           PSYCH:   insight []poor [x]good   Alert and Oriented to  [x]person  [x]place  [x] time                    []depressed []anxious []agitated  []lethargic []stuporous  []sedated           Lab Data Reviewed: (see below)    Medications Reviewed: (see below)    PMH/SH reviewed - no change compared to H&P    Care Plan discussed with:  [x]Patient   []Family    []Care Manager   []RN    []Consultant/Specialist :    Prophylaxis:  []Lovenox  []Coumadin  []Hep SQ  []SCDs  []H2B/PPI    Disposition:  []Home w/ Family   []HH PT,OT,RN   []SNF/LTC   []SAH/Rehab    Ancillary Serices:   [] PT     []OT      []SW      []Nut      []HH ________________________________________________________________________  LABS:  Recent Labs     08/19/22 0516 08/18/22  0430   WBC 8.0 12.2*   HGB 13.6 15.6   HCT 43.5 48.5    385       Recent Labs     08/19/22  0516 08/18/22  0430 08/17/22  0308   * 141 136   K 4.7 4.8 4.1   * 107 104   CO2 29 27 27   BUN 35* 50* 35*   CREA 1.05 2.02* 1.18   GLU 80 121* 133*   CA 8.8 9.5 9.8   PHOS 2.7 4.7 4.8*       Recent Labs     08/19/22  0516 08/18/22  0430 08/17/22  0308   ALB 2.7* 3.3* 3.1*       No results for input(s): INR, PTP, APTT, INREXT, INREXT in the last 72 hours. No results for input(s): FE, TIBC, PSAT, FERR in the last 72 hours. No results for input(s): PH, PCO2, PO2 in the last 72 hours. No results for input(s): CPK, CKMB in the last 72 hours.     No lab exists for component: TROPONINI  Lab Results   Component Value Date/Time    Glucose (POC) 131 (H) 08/16/2022 10:57 AM    Glucose (POC) 80 08/08/2022 12:52 PM    Glucose (POC) 158 (H) 03/19/2021 05:15 PM    Glucose (POC) 114 (H) 03/02/2012 12:39 PM    Glucose (POC) 88 03/02/2012 06:00 AM       MEDICATIONS:  Current Facility-Administered Medications   Medication Dose Route Frequency    dextrose 5% infusion  75 mL/hr IntraVENous CONTINUOUS    meropenem (MERREM) 1 g in 0.9% sodium chloride (MBP/ADV) 50 mL MBP  1 g IntraVENous Once at Delivery    metoprolol (LOPRESSOR) injection 2.5 mg  2.5 mg IntraVENous Q6H    polyethylene glycol (MIRALAX) packet 17 g  17 g Oral Q4H    [Held by provider] metoprolol tartrate (LOPRESSOR) tablet 25 mg  25 mg Per G Tube Q12H    pantoprazole (PROTONIX) 40 mg in 0.9% sodium chloride 10 mL injection  40 mg IntraVENous DAILY    [Held by provider] senna-docusate (PERICOLACE) 8.6-50 mg per tablet 1 Tablet  1 Tablet Per G Tube QHS    bisacodyL (DULCOLAX) suppository 10 mg  10 mg Rectal DAILY    sodium chloride (NS) flush 5-40 mL  5-40 mL IntraVENous Q8H    sodium chloride (NS) flush 5-40 mL  5-40 mL IntraVENous PRN    acetaminophen (TYLENOL) tablet 650 mg  650 mg Oral Q6H PRN    Or    acetaminophen (TYLENOL) suppository 650 mg  650 mg Rectal Q6H PRN    ondansetron (ZOFRAN ODT) tablet 4 mg  4 mg Oral Q8H PRN    Or    ondansetron (ZOFRAN) injection 4 mg  4 mg IntraVENous Q6H PRN    enoxaparin (LOVENOX) injection 40 mg  40 mg SubCUTAneous DAILY    ascorbic acid (vitamin C) (VITAMIN C) tablet 500 mg  500 mg Per G Tube DAILY    fluticasone propionate (FLONASE) 50 mcg/actuation nasal spray 2 Spray  2 Spray Both Nostrils DAILY    mirtazapine (REMERON) tablet 15 mg  15 mg Per G Tube DAILY    nortriptyline (PAMELOR) capsule 50 mg  50 mg Per G Tube QHS

## 2022-08-19 NOTE — PROGRESS NOTES
Sister in room. Discussed with Dr Daphney Sanchez. Abd much better. KUB pending. NG output 800 ml overnight. Instructed staff in Short irrigations and changed order to every day and prn. Call of needed.

## 2022-08-19 NOTE — PROGRESS NOTES
Urology Progress Note    Subjective:     Daily Progress Note: 2022 11:20 AM    Nini Alvarez is doing better. He reports pain is well controlled. Sister here. He is currently NPO with NGT. Indwelling catheter is draining well. Objective:     Visit Vitals  /69   Pulse (!) 104   Temp 97.3 °F (36.3 °C)   Resp 18   Ht 5' 7\" (1.702 m)   Wt 50.8 kg (111 lb 15.9 oz)   SpO2 91%   BMI 17.54 kg/m²        Temp (24hrs), Av.3 °F (36.3 °C), Min:97.3 °F (36.3 °C), Max:97.3 °F (36.3 °C)      Intake and Output:   1901 -  0700  In: 1000 [I.V.:1000]  Out: 2500 [Urine:850]  No intake/output data recorded. Physical Exam:   Delio timmons, NT    Lab/Data Review: All lab results for the last 24 hours reviewed. Assessment/Plan:     Active Problems:    UTI (urinary tract infection) (2018)        Plan:  Doing well and continue with treatment. Short irrigation every day and prn. Discussed w Dr Jet Grullon. Tx to [address] with Short when GI issues resolved.   Call as needed

## 2022-08-19 NOTE — PROGRESS NOTES
GI PROGRESS NOTE   Karlo Chairez, NP  [phone] NP in-hospital cell phone M-F until 4:30  After 5pm or on weekends, please call  for physician on call    NAME:Basilio Mitchell :1960 AEB:[id number]   ATTG: Dr. Lexus Quiñones  PCP: Kaia Doss MD  Date/Time:  2022 3:50 PM     Primary GI: Dr. Alyson Lainez    Reason for following: SBO, N/V    Assessment:     Small Bowel Obstruction on CT scan   - pt began vomiting last night and continued throughout the night  - CT scan : 1. The study only includes the abdomen and again demonstrates significant  distention of the small bowel consistent with small bowel obstruction. No pneumoperitoneum  Constipation, large stool burden on CT scan  Persistent stool on KUB   - CT 22 :  Normal caliber colon. Previously seen large rectal stool ball was not imaged as this is located in the pelvis and the study only covers the abdomen. - Attempted relistor last night but no BM   - pt restless and uncomfortable in room  -CT 8/15/22 : Significant colonic distention with very large stool ball in the rectum this is not changed. - CT ABD PEL W CONT 22 Obstipation, Air-filled stomach, Incidental Moderate hiatal hernia, emphysema. -KUB 2022 NG tube tipin stomach. Marked right colonic constipation and ileus   -KUB 2022- decreased moderate amount of colonic stool with overall decreased colonic distention  Plan:     -Agree with surgery DC NG tube  -Continue Miralax and dulcolax suppositories  -Avoid narcotics  -No gauze to be placed under PEG bumper  -Defer to surgery for TF restart recs  -Supportive measure per primary team  -Will continue to follow. Please call GI with any further questions or concerns. Thank you! *Plan discussed with Dr. Fide Albrecht  Subjective:   Discussed with RN events overnight. Patient awake in bed, much more alert, looks better.  Daughter at bedside    Complaint Y/N Description   Abdominal Pain N    Hematemesis Hematochezia     Melena     Constipation     Diarrhea     Dyspepsia     Dysphagia     Jaundiced     Nausea/vomiting N      Review of Systems:  Symptom Y/N Comments  Symptom Y/N Comments   Fever/Chills    Chest Pain     Cough    Headaches     Sputum    Joint Pain     SOB/FANG    Pruritis/Rash     Tolerating Diet  NPO  Other       Could NOT obtain due to:      Objective:   VITALS:   Last 24hrs VS reviewed since prior progress note. Most recent are:  Visit Vitals  /69   Pulse (!) 104   Temp 97.3 °F (36.3 °C)   Resp 18   Ht 5' 7\" (1.702 m)   Wt 50.8 kg (111 lb 15.9 oz)   SpO2 91%   BMI 17.54 kg/m²       Intake/Output Summary (Last 24 hours) at 8/19/2022 0916  Last data filed at 8/19/2022 0640  Gross per 24 hour   Intake --   Output 1700 ml   Net -1700 ml       PHYSICAL EXAM:  General: WD, WN. Alert, cooperative, no acute distress    HEENT: NC, Atraumatic. Anicteric sclerae. Lungs:  CTA Bilaterally. No Wheezing/Rhonchi/Rales. Heart:  Regular  rhythm, No Rubs, No Gallops  Abdomen: Soft, Non distended, Non tender. +Bowel sounds, no HSM PEG in midline, erythema, clamped  Extremities: No c/c/e  Neurologic:  Alert . No acute neurological distress   Psych:   Not anxious nor agitated. Lab and Radiology Data Reviewed: (see below)    Medications Reviewed: (see below)  PMH/SH reviewed - no change compared to H&P  ________________________________________________________________________  Total time spent with patient: 20 minutes ________________________________________________________________________  Care Plan discussed with:  Patient Y   Family  Y   RN Y              Consultant:       Krystyna League, NP     Procedures: see electronic medical records for all procedures/Xrays and details which were not copied into this note but were reviewed prior to creation of Plan.       LABS:  Recent Labs     08/19/22  0516 08/18/22  0430   WBC 8.0 12.2*   HGB 13.6 15.6   HCT 43.5 48.5    385       Recent Labs 08/19/22  0516 08/18/22  0430 08/17/22  0308   * 141 136   K 4.7 4.8 4.1   * 107 104   CO2 29 27 27   BUN 35* 50* 35*   CREA 1.05 2.02* 1.18   GLU 80 121* 133*   CA 8.8 9.5 9.8   PHOS 2.7 4.7 4.8*       Recent Labs     08/19/22  0516 08/18/22  0430 08/17/22  0308   ALB 2.7* 3.3* 3.1*       No results for input(s): INR, PTP, APTT, INREXT, INREXT in the last 72 hours. No results for input(s): FE, TIBC, PSAT, FERR in the last 72 hours. No results found for: FOL, RBCF  No results for input(s): PH, PCO2, PO2 in the last 72 hours. No results for input(s): CPK, CKMB in the last 72 hours.     No lab exists for component: TROPONINI  Lab Results   Component Value Date/Time    Color DARK YELLOW 08/08/2022 04:50 PM    Appearance CLOUDY (A) 08/08/2022 04:50 PM    Specific gravity >1.030 (H) 08/08/2022 04:50 PM    Specific gravity 1.021 03/18/2021 08:00 PM    pH (UA) >8.5 (H) 08/08/2022 04:50 PM    Protein 100 (A) 08/08/2022 04:50 PM    Glucose Negative 08/08/2022 04:50 PM    Ketone Negative 08/08/2022 04:50 PM    Bilirubin Negative 08/08/2022 04:50 PM    Urobilinogen 1.0 08/08/2022 04:50 PM    Nitrites Positive (A) 08/08/2022 04:50 PM    Leukocyte Esterase LARGE (A) 08/08/2022 04:50 PM    Epithelial cells FEW 08/08/2022 04:50 PM    Bacteria 3+ (A) 08/08/2022 04:50 PM    WBC >100 (H) 08/08/2022 04:50 PM    RBC  08/08/2022 04:50 PM       MEDICATIONS:  Current Facility-Administered Medications   Medication Dose Route Frequency    dextrose 5% infusion  75 mL/hr IntraVENous CONTINUOUS    meropenem (MERREM) 1 g in 0.9% sodium chloride (MBP/ADV) 50 mL MBP  1 g IntraVENous Once at Delivery    metoprolol (LOPRESSOR) injection 2.5 mg  2.5 mg IntraVENous Q6H    polyethylene glycol (MIRALAX) packet 17 g  17 g Oral Q4H    [Held by provider] metoprolol tartrate (LOPRESSOR) tablet 25 mg  25 mg Per G Tube Q12H    pantoprazole (PROTONIX) 40 mg in 0.9% sodium chloride 10 mL injection  40 mg IntraVENous DAILY    [Held by provider] senna-docusate (PERICOLACE) 8.6-50 mg per tablet 1 Tablet  1 Tablet Per G Tube QHS    bisacodyL (DULCOLAX) suppository 10 mg  10 mg Rectal DAILY    sodium chloride (NS) flush 5-40 mL  5-40 mL IntraVENous Q8H    sodium chloride (NS) flush 5-40 mL  5-40 mL IntraVENous PRN    acetaminophen (TYLENOL) tablet 650 mg  650 mg Oral Q6H PRN    Or    acetaminophen (TYLENOL) suppository 650 mg  650 mg Rectal Q6H PRN    ondansetron (ZOFRAN ODT) tablet 4 mg  4 mg Oral Q8H PRN    Or    ondansetron (ZOFRAN) injection 4 mg  4 mg IntraVENous Q6H PRN    enoxaparin (LOVENOX) injection 40 mg  40 mg SubCUTAneous DAILY    ascorbic acid (vitamin C) (VITAMIN C) tablet 500 mg  500 mg Per G Tube DAILY    fluticasone propionate (FLONASE) 50 mcg/actuation nasal spray 2 Spray  2 Spray Both Nostrils DAILY    mirtazapine (REMERON) tablet 15 mg  15 mg Per G Tube DAILY    nortriptyline (PAMELOR) capsule 50 mg  50 mg Per G Tube QHS

## 2022-08-20 ENCOUNTER — APPOINTMENT (OUTPATIENT)
Dept: GENERAL RADIOLOGY | Age: 62
DRG: 698 | End: 2022-08-20
Attending: INTERNAL MEDICINE
Payer: MEDICARE

## 2022-08-20 ENCOUNTER — APPOINTMENT (OUTPATIENT)
Dept: GENERAL RADIOLOGY | Age: 62
DRG: 698 | End: 2022-08-20
Attending: ANESTHESIOLOGY
Payer: MEDICARE

## 2022-08-20 ENCOUNTER — ANESTHESIA EVENT (OUTPATIENT)
Dept: SURGERY | Age: 62
DRG: 698 | End: 2022-08-20
Payer: MEDICARE

## 2022-08-20 ENCOUNTER — ANESTHESIA (OUTPATIENT)
Dept: SURGERY | Age: 62
DRG: 698 | End: 2022-08-20
Payer: MEDICARE

## 2022-08-20 LAB
ALBUMIN SERPL-MCNC: 3.1 G/DL (ref 3.5–5)
ANION GAP SERPL CALC-SCNC: 6 MMOL/L (ref 5–15)
BUN SERPL-MCNC: 28 MG/DL (ref 6–20)
BUN/CREAT SERPL: 29 (ref 12–20)
CALCIUM SERPL-MCNC: 9.3 MG/DL (ref 8.5–10.1)
CHLORIDE SERPL-SCNC: 110 MMOL/L (ref 97–108)
CO2 SERPL-SCNC: 22 MMOL/L (ref 21–32)
CREAT SERPL-MCNC: 0.97 MG/DL (ref 0.7–1.3)
GLUCOSE SERPL-MCNC: 102 MG/DL (ref 65–100)
PHOSPHATE SERPL-MCNC: 2.5 MG/DL (ref 2.6–4.7)
POTASSIUM SERPL-SCNC: 4.2 MMOL/L (ref 3.5–5.1)
SODIUM SERPL-SCNC: 138 MMOL/L (ref 136–145)

## 2022-08-20 PROCEDURE — 74011250637 HC RX REV CODE- 250/637: Performed by: INTERNAL MEDICINE

## 2022-08-20 PROCEDURE — C9113 INJ PANTOPRAZOLE SODIUM, VIA: HCPCS | Performed by: HOSPITALIST

## 2022-08-20 PROCEDURE — 74011000250 HC RX REV CODE- 250: Performed by: INTERNAL MEDICINE

## 2022-08-20 PROCEDURE — 74011250636 HC RX REV CODE- 250/636: Performed by: HOSPITALIST

## 2022-08-20 PROCEDURE — 74011000250 HC RX REV CODE- 250: Performed by: HOSPITALIST

## 2022-08-20 PROCEDURE — 74011250637 HC RX REV CODE- 250/637: Performed by: STUDENT IN AN ORGANIZED HEALTH CARE EDUCATION/TRAINING PROGRAM

## 2022-08-20 PROCEDURE — 74018 RADEX ABDOMEN 1 VIEW: CPT

## 2022-08-20 PROCEDURE — 80069 RENAL FUNCTION PANEL: CPT

## 2022-08-20 PROCEDURE — 36556 INSERT NON-TUNNEL CV CATH: CPT

## 2022-08-20 PROCEDURE — B548ZZA ULTRASONOGRAPHY OF SUPERIOR VENA CAVA, GUIDANCE: ICD-10-PCS | Performed by: STUDENT IN AN ORGANIZED HEALTH CARE EDUCATION/TRAINING PROGRAM

## 2022-08-20 PROCEDURE — 74011250636 HC RX REV CODE- 250/636: Performed by: INTERNAL MEDICINE

## 2022-08-20 PROCEDURE — 36415 COLL VENOUS BLD VENIPUNCTURE: CPT

## 2022-08-20 PROCEDURE — 65270000029 HC RM PRIVATE

## 2022-08-20 PROCEDURE — 71045 X-RAY EXAM CHEST 1 VIEW: CPT

## 2022-08-20 PROCEDURE — 74011250637 HC RX REV CODE- 250/637: Performed by: NURSE PRACTITIONER

## 2022-08-20 PROCEDURE — 02HV33Z INSERTION OF INFUSION DEVICE INTO SUPERIOR VENA CAVA, PERCUTANEOUS APPROACH: ICD-10-PCS | Performed by: STUDENT IN AN ORGANIZED HEALTH CARE EDUCATION/TRAINING PROGRAM

## 2022-08-20 RX ORDER — SORBITOL SOLUTION 70 %
45 SOLUTION, ORAL MISCELLANEOUS EVERY 4 HOURS
Status: DISPENSED | OUTPATIENT
Start: 2022-08-20 | End: 2022-08-21

## 2022-08-20 RX ORDER — SODIUM CHLORIDE 9 MG/ML
75 INJECTION, SOLUTION INTRAVENOUS CONTINUOUS
Status: DISCONTINUED | OUTPATIENT
Start: 2022-08-20 | End: 2022-08-21

## 2022-08-20 RX ORDER — METOPROLOL TARTRATE 25 MG/1
12.5 TABLET, FILM COATED ORAL EVERY 12 HOURS
Status: DISCONTINUED | OUTPATIENT
Start: 2022-08-20 | End: 2022-08-31 | Stop reason: HOSPADM

## 2022-08-20 RX ADMIN — FLUTICASONE PROPIONATE 2 SPRAY: 50 SPRAY, METERED NASAL at 09:04

## 2022-08-20 RX ADMIN — SORBITOL SOLUTION (BULK) 45 ML: 70 SOLUTION at 17:35

## 2022-08-20 RX ADMIN — SODIUM CHLORIDE 75 ML/HR: 9 INJECTION, SOLUTION INTRAVENOUS at 09:03

## 2022-08-20 RX ADMIN — SODIUM CHLORIDE 75 ML/HR: 9 INJECTION, SOLUTION INTRAVENOUS at 16:29

## 2022-08-20 RX ADMIN — POTASSIUM PHOSPHATE, MONOBASIC POTASSIUM PHOSPHATE, DIBASIC: 224; 236 INJECTION, SOLUTION, CONCENTRATE INTRAVENOUS at 16:33

## 2022-08-20 RX ADMIN — METOPROLOL TARTRATE 25 MG: 25 TABLET ORAL at 22:26

## 2022-08-20 RX ADMIN — OXYCODONE HYDROCHLORIDE AND ACETAMINOPHEN 500 MG: 500 TABLET ORAL at 09:03

## 2022-08-20 RX ADMIN — POLYETHYLENE GLYCOL 3350 17 G: 17 POWDER, FOR SOLUTION ORAL at 09:02

## 2022-08-20 RX ADMIN — METOPROLOL TARTRATE 2.5 MG: 5 INJECTION INTRAVENOUS at 01:06

## 2022-08-20 RX ADMIN — NORTRIPTYLINE HYDROCHLORIDE 50 MG: 25 CAPSULE ORAL at 22:27

## 2022-08-20 RX ADMIN — POLYETHYLENE GLYCOL 3350 17 G: 17 POWDER, FOR SOLUTION ORAL at 00:59

## 2022-08-20 RX ADMIN — METOPROLOL TARTRATE 2.5 MG: 5 INJECTION INTRAVENOUS at 07:03

## 2022-08-20 RX ADMIN — MIRTAZAPINE 15 MG: 15 TABLET, FILM COATED ORAL at 09:03

## 2022-08-20 RX ADMIN — DEXTROSE MONOHYDRATE 75 ML/HR: 50 INJECTION, SOLUTION INTRAVENOUS at 03:20

## 2022-08-20 RX ADMIN — METOPROLOL TARTRATE 12.5 MG: 25 TABLET, FILM COATED ORAL at 22:29

## 2022-08-20 RX ADMIN — POLYETHYLENE GLYCOL 3350 17 G: 17 POWDER, FOR SOLUTION ORAL at 03:20

## 2022-08-20 RX ADMIN — SORBITOL SOLUTION (BULK) 45 ML: 70 SOLUTION at 11:47

## 2022-08-20 RX ADMIN — BISACODYL 10 MG: 10 SUPPOSITORY RECTAL at 09:03

## 2022-08-20 RX ADMIN — ENOXAPARIN SODIUM 40 MG: 100 INJECTION SUBCUTANEOUS at 09:02

## 2022-08-20 RX ADMIN — SODIUM CHLORIDE, PRESERVATIVE FREE 10 ML: 5 INJECTION INTRAVENOUS at 06:41

## 2022-08-20 RX ADMIN — SODIUM CHLORIDE 40 MG: 9 INJECTION, SOLUTION INTRAMUSCULAR; INTRAVENOUS; SUBCUTANEOUS at 09:02

## 2022-08-20 RX ADMIN — SODIUM CHLORIDE, PRESERVATIVE FREE 10 ML: 5 INJECTION INTRAVENOUS at 22:28

## 2022-08-20 NOTE — ANESTHESIA PROCEDURE NOTES
Central Line Placement    Start time: 8/20/2022 3:05 PM  End time: 8/20/2022 3:28 PM  Performed by: Abimael Solitario CRNA  Authorized by: Destinee Barlow MD     Indications: vascular access  Preanesthetic Checklist: patient identified, risks and benefits discussed, anesthesia consent, site marked, patient being monitored, timeout performed and fire risk safety assessment completed and verbalized    Timeout Time: 15:05 EDT       Pre-procedure: All elements of maximal sterile barrier technique followed?  Yes    2% Chlorhexidine for cutaneous antisepsis, Hand hygiene performed prior to catheter insertion and Ultrasound guidance    Sterile Ultrasound Technique followed?: Yes            Procedure:   Prep:  ChloraPrep  Location: internal jugular  Orientation:  Right  Patient position:  Trendelenburg  Catheter type:  Quad lumen  Catheter size:  8.5 Fr  Catheter length:  16 cm  Number of attempts:  1  Successful placement: Yes      Assessment:   Post-procedure:  Catheter secured, sterile dressing applied and sterile dressing with CHG applied  Assessment:  Blood return through all ports, placement verified by x-ray, guidewire removal verified and free fluid flow  Insertion:  Uncomplicated  Patient tolerance:  Patient tolerated the procedure well with no immediate complications

## 2022-08-20 NOTE — PROGRESS NOTES
Hospitalist Progress Note    NAME: Lucio Buckner   :  1960   MRN:  [id number]       Assessment / Plan:  L arm edema  PIV to be removed. Discussed with RN. Pt is laying on his R side, could be dependent edema. Will obtain duplex L arm to r/o DVT  Keep arm elevated    Sepsis due to urinary tract infection   ESBL E coli UTI  Urine culture grew ESBL E coli  Completed 7 days of meropenum on   CXR negative, Rapid covid negative    Hx bladder cancer s/p neobladder  Urinary retention  Does straight cath in group home. C/w carrera catheter here, will discharge with this  Having intermittent retention even with carrera, nursing doing irrigation PRN  S/p cystolitholapaxy, b/l pyelogram, stone removal on . Repeat CT done today : showed stable left hydroureteronephrosis, Calculi in neobladder. Carrera irrigation daily and prn. Discharge to [address] with carrera  Appreciate urology following    Sinus tachycardia  Intermittent  Likely d/t discomfort from contipation/ urinary retention  C/w metoprolol, increase dose    Constipation with fecal impaction  Had been getting aggressive bowel regimen. No bm with relistor overnight. Pt had TF and bile material coming out of his G tube on . Repeat KUB showed hydronephrosis and hydroureter  Gi following, recommending surg evaluation. NGT placed for decompression, placed on , plan to remove on . Repeat KUB today showed persistent large amount of stool in the rectum  Discussed with GI, will dc miralax, change to sorbital and dulcolax suppository. Vent G-tube to gravity for n/v.    Dislodged PEG tube  PEG replaced by IR   NGT placeemnt on , removal on   TF on hold currently, likely re-start tomorrow    Hypernatremia  VAZQUEZ  Cr improved post cystoscopy  Cont' IVF, appreciate nephrology following    Cerebral palsy  Anxiety/ Depression  Continue with supportive care. Code Status: DO NOT RESUSCITATE.   Surrogate Decision Maker: brothers and sister  DVT Prophylaxis: Lovenox. GI Prophylaxis: not indicated     Baseline: Dependent for activity of daily living. Non verbal, but can communicated with nodding/ gestures, better when family at bedside    JESSE: 8/19, back to group home  Barriers: Lithotripsy on 8/19/ Resolution for colonic distension     Subjective:     Chief Complaint / Reason for Physician Visit  NAD. Abd soft and NT.  L arm edema with PIV in place. Review of Systems:  Symptom Y/N Comments  Symptom Y/N Comments   Fever/Chills    Chest Pain     Poor Appetite    Edema     Cough    Abdominal Pain     Sputum    Joint Pain     SOB/FANG    Pruritis/Rash     Nausea/vomit    Tolerating PT/OT     Diarrhea    Tolerating Diet     Constipation    Other       Could NOT obtain due to: Non verbal     Objective:     VITALS:   Last 24hrs VS reviewed since prior progress note. Most recent are:  Patient Vitals for the past 24 hrs:   Temp Pulse Resp BP SpO2   08/20/22 0703 97.5 °F (36.4 °C) (!) 119 16 (!) 146/85 --   08/20/22 0101 -- -- -- (!) 149/79 --   08/19/22 2350 (!) 96.3 °F (35.7 °C) (!) 107 14 (!) 156/85 93 %   08/19/22 2039 97.6 °F (36.4 °C) 96 18 126/84 94 %   08/19/22 1514 97.4 °F (36.3 °C) 95 16 (!) 141/80 92 %         Intake/Output Summary (Last 24 hours) at 8/20/2022 1052  Last data filed at 8/19/2022 1700  Gross per 24 hour   Intake 570 ml   Output 200 ml   Net 370 ml          I had a face to face encounter and independently examined this patient on 8/20/2022, as outlined below:  PHYSICAL EXAM:  General: Awake, No acute distress  EENT:  EOMI. Anicteric sclerae. MMM  Resp:  CTA bilaterally, no wheezing or rales. No accessory muscle use  CV:  Regular  rhythm,  L arm edema  GI:  Soft, Non distended, Non tender. +Bowel sounds  Neurologic:  Awake, non verbal at baseline, contracted extremity  Psych:   Not anxious nor agitated  Skin:  No rashes.   No jaundice    Reviewed most current lab test results and cultures  YES  Reviewed most current radiology test results   YES  Review and summation of old records today    NO  Reviewed patient's current orders and MAR    YES  PMH/SH reviewed - no change compared to H&P  ________________________________________________________________________  Care Plan discussed with:    Comments   Patient y    Family  y Pt's brother   RN y    Care Manager     Consultant                        Multidiciplinary team rounds were held today with , nursing, pharmacist and clinical coordinator. Patient's plan of care was discussed; medications were reviewed and discharge planning was addressed. ________________________________________________________________________  Total NON critical care TIME:  36   Minutes    Total CRITICAL CARE TIME Spent:   Minutes non procedure based      Comments   >50% of visit spent in counseling and coordination of care     ________________________________________________________________________  Leah Wong MD     Procedures: see electronic medical records for all procedures/Xrays and details which were not copied into this note but were reviewed prior to creation of Plan. LABS:  I reviewed today's most current labs and imaging studies.   Pertinent labs include:  Recent Labs     08/19/22  0516 08/18/22  0430   WBC 8.0 12.2*   HGB 13.6 15.6   HCT 43.5 48.5    385         Recent Labs     08/20/22  0543 08/19/22  0516 08/18/22  0430    147* 141   K 4.2 4.7 4.8   * 114* 107   CO2 22 29 27   * 80 121*   BUN 28* 35* 50*   CREA 0.97 1.05 2.02*   CA 9.3 8.8 9.5   PHOS 2.5* 2.7 4.7   ALB 3.1* 2.7* 3.3*         Signed: Leah Wong MD

## 2022-08-20 NOTE — PROGRESS NOTES
VAZQUEZ and hyponatremia resolved  Change IVF to NS  Daily labs and I/Os over weekend  Call with any issues        Fatmata Mehta MD  [facility]   [address] [address]  Phone - [phone]   Fax - [phone]  [email or web address]

## 2022-08-20 NOTE — PROGRESS NOTES
GI PROGRESS NOTE    NAME:             Juan Miguel Dawkins   :              1960   MRN:              [id number]   Admit Date:     2022  Todays Date:  2022      Subjective:           No stools. NG tube out. No vomiting. TF not back on yet. Brother in room---discussed with him.     Medications-reviewed     Current Facility-Administered Medications   Medication Dose Route Frequency    0.9% sodium chloride infusion  75 mL/hr IntraVENous CONTINUOUS    potassium phosphate 20 mmol in 0.9% sodium chloride 500 mL infusion   IntraVENous ONCE    sorbitoL 70 % solution 45 mL  45 mL Oral Q4H    meropenem (MERREM) 1 g in 0.9% sodium chloride (MBP/ADV) 50 mL MBP  1 g IntraVENous Once at Delivery    metoprolol (LOPRESSOR) injection 2.5 mg  2.5 mg IntraVENous Q6H    [Held by provider] metoprolol tartrate (LOPRESSOR) tablet 25 mg  25 mg Per G Tube Q12H    pantoprazole (PROTONIX) 40 mg in 0.9% sodium chloride 10 mL injection  40 mg IntraVENous DAILY    [Held by provider] senna-docusate (PERICOLACE) 8.6-50 mg per tablet 1 Tablet  1 Tablet Per G Tube QHS    bisacodyL (DULCOLAX) suppository 10 mg  10 mg Rectal DAILY    sodium chloride (NS) flush 5-40 mL  5-40 mL IntraVENous Q8H    sodium chloride (NS) flush 5-40 mL  5-40 mL IntraVENous PRN    acetaminophen (TYLENOL) tablet 650 mg  650 mg Oral Q6H PRN    Or    acetaminophen (TYLENOL) suppository 650 mg  650 mg Rectal Q6H PRN    ondansetron (ZOFRAN ODT) tablet 4 mg  4 mg Oral Q8H PRN    Or    ondansetron (ZOFRAN) injection 4 mg  4 mg IntraVENous Q6H PRN    enoxaparin (LOVENOX) injection 40 mg  40 mg SubCUTAneous DAILY    ascorbic acid (vitamin C) (VITAMIN C) tablet 500 mg  500 mg Per G Tube DAILY    fluticasone propionate (FLONASE) 50 mcg/actuation nasal spray 2 Spray  2 Spray Both Nostrils DAILY    mirtazapine (REMERON) tablet 15 mg  15 mg Per G Tube DAILY    nortriptyline (PAMELOR) capsule 50 mg  50 mg Per G Tube QHS        Objective:   Patient Vitals for the past 8 hrs:   BP Pulse   08/20/22 0703 (!) 146/85 (!) 119     No intake/output data recorded.   08/18 1901 - 08/20 0700  In: 690 [I.V.:450]  Out: 1000 [Urine:200]    EXAM:     NEURO-awake, alert, nods head to questions      ABD-soft , no tenderness, no rebound, good bs        LABS:  Recent Labs     08/19/22  0516 08/18/22  0430   WBC 8.0 12.2*   HGB 13.6 15.6   HCT 43.5 48.5    385     Recent Labs     08/20/22  0543 08/19/22  0516 08/18/22  0430    147* 141   K 4.2 4.7 4.8   * 114* 107   CO2 22 29 27   BUN 28* 35* 50*   CREA 0.97 1.05 2.02*   * 80 121*   CA 9.3 8.8 9.5   PHOS 2.5* 2.7 4.7     Recent Labs     08/20/22 0543 08/19/22  0516 08/18/22  0430   ALB 3.1* 2.7* 3.3*                        Assessment:    Severe constipation/obstipation---not felt to be sbo---not responding to current regimen of miralax, dulcolax supp  NG tube out per surgery  Malnutrition---needs to be back on tube feedings  S/p PEG replacement by IR on 8/12  Cerebral palsy         Active Problems:    UTI (urinary tract infection) (8/22/2018)        Plan:   Would stop miralax  Sorbitol 45 cc via NG q 4 hours for 6 doses then reasses  - Can try to resume tube feedings  Continue dulcolax suppository daily  Would also give enema daily    Discussed with Dr Obed Holland

## 2022-08-20 NOTE — PROGRESS NOTES
Surgical Note    Date/Time:  2022         Assessment :    Plan:  Patient Active Problem List   Diagnosis Code    Abdominal pain R10.9    Abdominal pain, other specified site R10.9    Abdominal pain R10.9    CP (cerebral palsy) ([facility] [address] G80.9    Quadriplegic cerebral palsy ([facility] [address] G80.8    Esophageal stricture K22.2    Abdominal pain, LLQ (left lower quadrant) R10.32    UTI (urinary tract infection) N39.0    Sepsis ([address] A41.9    Constipation K59.00    Fecal impaction ([facility]) K56.41    Hiatal hernia K44.9    Severe sepsis (HCC) A41.9, R65.20    Acute kidney injury ([facility] [address] N17.9    Nephrolithiasis N20.0    Hydronephrosis of right kidney N13.30        Abdomen remains soft  NGT out    Sips of clears tolerated    Recommend resume tube feeds. Continue bowel regimen and daily suppositories    Vent g-tube to gravity for nausea or vomiting             Subjective:     Chief Complaint:  feels much better. Review of Systems:  Y  N       Y  N  [] [x]  Fever/chills                                               [] [x]  Chest Pain  [] [x]  Cough                                                       [] [x]  Diarrhea   [] [x]  Sputum                                                     [] [x]  Constipation  [] [x]  SOB/FANG                                                [] [x]  Nausea/Vomit  [] [x]  Abd Pain                                                    [] []  Tolerating diet  [] [x]  Dysuria                                                           []Unable to obtain  ROS due to  []mental status change  []sedated   []intubated    Objective:     VITALS:   Last 24hrs VS reviewed since prior hospitalist progress note.  Most recent are:  Visit Vitals  BP (!) 146/85   Pulse (!) 119   Temp (!) 96.3 °F (35.7 °C)   Resp 14   Ht 5' 7\" (1.702 m)   Wt 111 lb 15.9 oz (50.8 kg)   SpO2 93%   BMI 17.54 kg/m²     Temp (24hrs), Av.1 °F (36.2 °C), Min:96.3 °F (35.7 °C), Max:97.6 °F (36.4 °C)      Intake/Output Summary (Last 24 hours) at 8/20/2022 0943  Last data filed at 8/19/2022 1700  Gross per 24 hour   Intake 690 ml   Output 200 ml   Net 490 ml           []Short []NGT  []Intubated on vent    PHYSICAL EXAM:  Gen:  [] A&O  []non-toxic  [x] No acute distress             [] ill apearing  []  Critical        HEENT:   [x]NC/AT/PERRLA/EOMI    []pink conjunctivae      []pale conjunctivae                  PERRL  []yes  []no      []moist mucosa    []dry mucosa    hearing intact to voice []yes  []No    RESP:   [x]CTA bialterally/no wheezing/rhonchi/rales/crackles    []clear bilaterally  []wheezing   []rhonchi   []crackles     use of accessory muscles []yes []no    CARD:   [x] regular rate and rhythm/No murmurs/rubs/gallops    murmur  []yes ()  []no      Rubs  []yes  []no       Gallops []yes  []no    Rate [] regular  [] irregular        carotid bruits  []Right  [] Left                 LE edema []yes  []no           JVP  [] yes   [] no    ABD:    [x]soft  [x]non distended  [x]non tender , gastrostomy tube in place [] NABS    SKIN:   Rashes [] yes   [x] no    Ulcers [] yes   [x] no               [x]normal   []tight to palpitation    skin turgor [] good  []poor  []decreased               Cyanosis/clubbing [] yes [x] no    NEUR:   [x]cranial nerves II-XII grossly intact       []Cranial nerves deficit                 [] facial droop    [] slurred speech   []aphasic     []Strength normal     [] weakness  [] LUE  []  RUE/ [] LLE  []  RLE    follows commands  [] yes [] no           PSYCH:   insight []poor [x]good   Alert and Oriented to  [x]person  [x]place  [x] time                    []depressed []anxious []agitated  []lethargic []stuporous  []sedated           Lab Data Reviewed: (see below)    Medications Reviewed: (see below)    PMH/SH reviewed - no change compared to H&P    Care Plan discussed with:  [x]Patient   []Family    []Care Manager   []RN    []Consultant/Specialist :    Prophylaxis:  []Lovenox  []Coumadin  []Hep SQ  []SCDs  []H2B/PPI    Disposition: []Home w/ Family   [] PT,OT,RN   []SNF/LTC   []SAH/Rehab    Ancillary Serices:   [] PT     []OT      []SW      []Nut      []HH ________________________________________________________________________  LABS:  Recent Labs     08/19/22  0516 08/18/22  0430   WBC 8.0 12.2*   HGB 13.6 15.6   HCT 43.5 48.5    385       Recent Labs     08/20/22  0543 08/19/22  0516 08/18/22  0430    147* 141   K 4.2 4.7 4.8   * 114* 107   CO2 22 29 27   BUN 28* 35* 50*   CREA 0.97 1.05 2.02*   * 80 121*   CA 9.3 8.8 9.5   PHOS 2.5* 2.7 4.7       Recent Labs     08/20/22  0543 08/19/22  0516 08/18/22  0430   ALB 3.1* 2.7* 3.3*       No results for input(s): INR, PTP, APTT, INREXT, INREXT in the last 72 hours. No results for input(s): FE, TIBC, PSAT, FERR in the last 72 hours. No results for input(s): PH, PCO2, PO2 in the last 72 hours. No results for input(s): CPK, CKMB in the last 72 hours.     No lab exists for component: TROPONINI  Lab Results   Component Value Date/Time    Glucose (POC) 131 (H) 08/16/2022 10:57 AM    Glucose (POC) 80 08/08/2022 12:52 PM    Glucose (POC) 158 (H) 03/19/2021 05:15 PM    Glucose (POC) 114 (H) 03/02/2012 12:39 PM    Glucose (POC) 88 03/02/2012 06:00 AM       MEDICATIONS:  Current Facility-Administered Medications   Medication Dose Route Frequency    0.9% sodium chloride infusion  75 mL/hr IntraVENous CONTINUOUS    potassium phosphate 20 mmol in 0.9% sodium chloride 500 mL infusion   IntraVENous ONCE    sorbitoL 70 % solution 45 mL  45 mL Oral Q4H    meropenem (MERREM) 1 g in 0.9% sodium chloride (MBP/ADV) 50 mL MBP  1 g IntraVENous Once at Delivery    metoprolol (LOPRESSOR) injection 2.5 mg  2.5 mg IntraVENous Q6H    [Held by provider] metoprolol tartrate (LOPRESSOR) tablet 25 mg  25 mg Per G Tube Q12H    pantoprazole (PROTONIX) 40 mg in 0.9% sodium chloride 10 mL injection  40 mg IntraVENous DAILY    [Held by provider] senna-docusate (PERICOLACE) 8.6-50 mg per tablet 1 Tablet 1 Tablet Per G Tube QHS    bisacodyL (DULCOLAX) suppository 10 mg  10 mg Rectal DAILY    sodium chloride (NS) flush 5-40 mL  5-40 mL IntraVENous Q8H    sodium chloride (NS) flush 5-40 mL  5-40 mL IntraVENous PRN    acetaminophen (TYLENOL) tablet 650 mg  650 mg Oral Q6H PRN    Or    acetaminophen (TYLENOL) suppository 650 mg  650 mg Rectal Q6H PRN    ondansetron (ZOFRAN ODT) tablet 4 mg  4 mg Oral Q8H PRN    Or    ondansetron (ZOFRAN) injection 4 mg  4 mg IntraVENous Q6H PRN    enoxaparin (LOVENOX) injection 40 mg  40 mg SubCUTAneous DAILY    ascorbic acid (vitamin C) (VITAMIN C) tablet 500 mg  500 mg Per G Tube DAILY    fluticasone propionate (FLONASE) 50 mcg/actuation nasal spray 2 Spray  2 Spray Both Nostrils DAILY    mirtazapine (REMERON) tablet 15 mg  15 mg Per G Tube DAILY    nortriptyline (PAMELOR) capsule 50 mg  50 mg Per G Tube QHS

## 2022-08-20 NOTE — PROGRESS NOTES
1800H   Patient is tolerating sips/small amount of Iced tea. No nausea/vomiting noted. Noticed L arm more swollen from IV infusion, stopped at this time. Will ask IV team assistance for another IV placement. 1930H   Bedside and Verbal shift change report given to Rene Borja RN (oncoming nurse) by Fer Chatterjee RN (offgoing nurse). Report included the following information SBAR, Kardex, Intake/Output, MAR and Recent Results.

## 2022-08-20 NOTE — PERIOP NOTES
1452 Patient transported to PACU for central line placement. Connected to Tele, 2L NC, oriented to unit and routine. Confirmed consent and completed timeout. List of allergies reviewed. [phone] Patient transported back to room 5415. Brother at bedside. Updated Primary RN that xray was completed but awaiting results. Allowed time for questions and answers.

## 2022-08-21 LAB
ALBUMIN SERPL-MCNC: 2.5 G/DL (ref 3.5–5)
ANION GAP SERPL CALC-SCNC: 4 MMOL/L (ref 5–15)
BUN SERPL-MCNC: 26 MG/DL (ref 6–20)
BUN/CREAT SERPL: 32 (ref 12–20)
CALCIUM SERPL-MCNC: 8.4 MG/DL (ref 8.5–10.1)
CHLORIDE SERPL-SCNC: 116 MMOL/L (ref 97–108)
CO2 SERPL-SCNC: 26 MMOL/L (ref 21–32)
CREAT SERPL-MCNC: 0.82 MG/DL (ref 0.7–1.3)
GLUCOSE SERPL-MCNC: 99 MG/DL (ref 65–100)
PHOSPHATE SERPL-MCNC: 2.9 MG/DL (ref 2.6–4.7)
POTASSIUM SERPL-SCNC: 4 MMOL/L (ref 3.5–5.1)
SODIUM SERPL-SCNC: 146 MMOL/L (ref 136–145)

## 2022-08-21 PROCEDURE — 74011250637 HC RX REV CODE- 250/637: Performed by: INTERNAL MEDICINE

## 2022-08-21 PROCEDURE — 74011000250 HC RX REV CODE- 250: Performed by: INTERNAL MEDICINE

## 2022-08-21 PROCEDURE — 80069 RENAL FUNCTION PANEL: CPT

## 2022-08-21 PROCEDURE — 65270000029 HC RM PRIVATE

## 2022-08-21 PROCEDURE — 36415 COLL VENOUS BLD VENIPUNCTURE: CPT

## 2022-08-21 PROCEDURE — 74011250636 HC RX REV CODE- 250/636: Performed by: HOSPITALIST

## 2022-08-21 PROCEDURE — 74011250636 HC RX REV CODE- 250/636: Performed by: INTERNAL MEDICINE

## 2022-08-21 PROCEDURE — 74011000250 HC RX REV CODE- 250: Performed by: HOSPITALIST

## 2022-08-21 PROCEDURE — 74011250637 HC RX REV CODE- 250/637: Performed by: STUDENT IN AN ORGANIZED HEALTH CARE EDUCATION/TRAINING PROGRAM

## 2022-08-21 PROCEDURE — C9113 INJ PANTOPRAZOLE SODIUM, VIA: HCPCS | Performed by: HOSPITALIST

## 2022-08-21 RX ORDER — DEXTROSE MONOHYDRATE AND SODIUM CHLORIDE 5; .45 G/100ML; G/100ML
75 INJECTION, SOLUTION INTRAVENOUS CONTINUOUS
Status: DISCONTINUED | OUTPATIENT
Start: 2022-08-21 | End: 2022-08-22

## 2022-08-21 RX ORDER — DEXTROSE MONOHYDRATE AND SODIUM CHLORIDE 5; .45 G/100ML; G/100ML
75 INJECTION, SOLUTION INTRAVENOUS CONTINUOUS
Status: DISCONTINUED | OUTPATIENT
Start: 2022-08-21 | End: 2022-08-22 | Stop reason: SDUPTHER

## 2022-08-21 RX ADMIN — METOPROLOL TARTRATE 25 MG: 25 TABLET ORAL at 21:31

## 2022-08-21 RX ADMIN — OXYCODONE HYDROCHLORIDE AND ACETAMINOPHEN 500 MG: 500 TABLET ORAL at 09:11

## 2022-08-21 RX ADMIN — ENOXAPARIN SODIUM 40 MG: 100 INJECTION SUBCUTANEOUS at 09:10

## 2022-08-21 RX ADMIN — SODIUM CHLORIDE, PRESERVATIVE FREE 10 ML: 5 INJECTION INTRAVENOUS at 16:43

## 2022-08-21 RX ADMIN — SORBITOL SOLUTION (BULK) 45 ML: 70 SOLUTION at 03:14

## 2022-08-21 RX ADMIN — METOPROLOL TARTRATE 12.5 MG: 25 TABLET, FILM COATED ORAL at 09:11

## 2022-08-21 RX ADMIN — DEXTROSE AND SODIUM CHLORIDE 75 ML/HR: 5; 450 INJECTION, SOLUTION INTRAVENOUS at 16:44

## 2022-08-21 RX ADMIN — SODIUM CHLORIDE 40 MG: 9 INJECTION, SOLUTION INTRAMUSCULAR; INTRAVENOUS; SUBCUTANEOUS at 09:11

## 2022-08-21 RX ADMIN — BISACODYL 10 MG: 10 SUPPOSITORY RECTAL at 08:11

## 2022-08-21 RX ADMIN — SODIUM CHLORIDE 75 ML/HR: 9 INJECTION, SOLUTION INTRAVENOUS at 05:49

## 2022-08-21 RX ADMIN — METOPROLOL TARTRATE 25 MG: 25 TABLET ORAL at 09:00

## 2022-08-21 RX ADMIN — SODIUM CHLORIDE, PRESERVATIVE FREE 10 ML: 5 INJECTION INTRAVENOUS at 21:38

## 2022-08-21 RX ADMIN — MIRTAZAPINE 15 MG: 15 TABLET, FILM COATED ORAL at 09:11

## 2022-08-21 RX ADMIN — METOPROLOL TARTRATE 12.5 MG: 25 TABLET, FILM COATED ORAL at 21:31

## 2022-08-21 RX ADMIN — FLUTICASONE PROPIONATE 2 SPRAY: 50 SPRAY, METERED NASAL at 09:16

## 2022-08-21 RX ADMIN — SODIUM CHLORIDE, PRESERVATIVE FREE 10 ML: 5 INJECTION INTRAVENOUS at 05:49

## 2022-08-21 RX ADMIN — NORTRIPTYLINE HYDROCHLORIDE 50 MG: 25 CAPSULE ORAL at 21:31

## 2022-08-21 RX ADMIN — SORBITOL SOLUTION (BULK) 45 ML: 70 SOLUTION at 09:16

## 2022-08-21 NOTE — ACP (ADVANCE CARE PLANNING)
Advance Care Planning   Advance Care Planning Inpatient Note  [address]  Spiritual Care Department    Today's Date: 8/21/2022  Unit: [facility] 2 MED TELE    Received request from  in basket . Upon review of chart and communication with care team, patient's decision making abilities are not in question. Patient and sister Bibi Cabral  were present in the room during visit. Goals of ACP Conversation:  Discuss Advance Care planning documents  Establish Healthcare decision makers    Health Care Decision Makers:      Primary Decision Maker: Paddy Joseph Sister - [phone]    Primary Decision Maker: Marilin Marquis - [phone]    Summary:  Completed New Documents    Advance Care Planning Documents (Patient Wishes) on file:  Healthcare Power of /Advance Directive appointment of Health care agent  Portable DNR form     Assessment:     visited earlier today with patient on Med Tele to discuss who he would want to be his medical decision makers. Returned this afternoon when his sister Murrell Najjar was present. Though nonverbal, with his nurse Teo Putnam present he was able to verify that he would want his brother, Maricel Saldaña and sister Bibi Cabral to serve together as primary decision makers on his behalf. Reviewed document and explained state hierarchy of decision makers. Patient's mother is living but has dementia making it necessary for Davonte Friend to make known his preferences. Leah Goff is unable to hold a pen for a signature,  asked him in presence of Teo Putnam, his nurse, if it would be okay for his sister to sign, and with head gestures and facial expressions he agreed. Made copies, returned original and two copies to his sister and made a copy for his chart.        Interventions:  Provided education on documents for clarity and greater understanding  Discussed and provided education on state decision maker hierarchy  Assisted in the completion of documents according to patient's wishes at this time    Care Preferences Communicated:  No    Outcomes/Plan:  ACP Discussion Completed  New Advance Directive completed  Returned original document(s) to patient, as well as copies for distribution to appointed agents  Placed copy on patient's chart    Blanka Hickey [facility]  on 8/21/2022 at 2:57 PM

## 2022-08-21 NOTE — PROGRESS NOTES
GI PROGRESS NOTE    NAME:             Josep Rothman   :              1960   MRN:              [id number]   Admit Date:     2022  Todays Date:  2022      Subjective:           Large stool output overnight with the sorbitol  Started back on TF and no vomiting     Brother in room---discussed with him.     Medications-reviewed     Current Facility-Administered Medications   Medication Dose Route Frequency    metoprolol tartrate (LOPRESSOR) tablet 12.5 mg  12.5 mg Per G Tube Q12H    metoprolol tartrate (LOPRESSOR) tablet 25 mg  25 mg Per G Tube Q12H    pantoprazole (PROTONIX) 40 mg in 0.9% sodium chloride 10 mL injection  40 mg IntraVENous DAILY    [Held by provider] senna-docusate (PERICOLACE) 8.6-50 mg per tablet 1 Tablet  1 Tablet Per G Tube QHS    bisacodyL (DULCOLAX) suppository 10 mg  10 mg Rectal DAILY    sodium chloride (NS) flush 5-40 mL  5-40 mL IntraVENous Q8H    sodium chloride (NS) flush 5-40 mL  5-40 mL IntraVENous PRN    acetaminophen (TYLENOL) tablet 650 mg  650 mg Oral Q6H PRN    Or    acetaminophen (TYLENOL) suppository 650 mg  650 mg Rectal Q6H PRN    ondansetron (ZOFRAN ODT) tablet 4 mg  4 mg Oral Q8H PRN    Or    ondansetron (ZOFRAN) injection 4 mg  4 mg IntraVENous Q6H PRN    enoxaparin (LOVENOX) injection 40 mg  40 mg SubCUTAneous DAILY    ascorbic acid (vitamin C) (VITAMIN C) tablet 500 mg  500 mg Per G Tube DAILY    fluticasone propionate (FLONASE) 50 mcg/actuation nasal spray 2 Spray  2 Spray Both Nostrils DAILY    mirtazapine (REMERON) tablet 15 mg  15 mg Per G Tube DAILY    nortriptyline (PAMELOR) capsule 50 mg  50 mg Per G Tube QHS        Objective:   Patient Vitals for the past 8 hrs:   BP Temp Pulse Resp SpO2   22 0957 (!) 154/78 99.6 °F (37.6 °C) (!) 107 18 98 %   22 0810 -- -- (!) 101 -- --        07 - 1900  In:    Out: -   1901 -  0700  In: 5725.8 [I.V.:483.8]  Out: 1850 [Urine:1850]    EXAM:     NEURO-awake, alert, nods head to questions      ABD-soft , no tenderness, no rebound, good bs        LABS:  Recent Labs     08/19/22  0516   WBC 8.0   HGB 13.6   HCT 43.5          Recent Labs     08/21/22  0318 08/20/22  0543 08/19/22  0516   * 138 147*   K 4.0 4.2 4.7   * 110* 114*   CO2 26 22 29   BUN 26* 28* 35*   CREA 0.82 0.97 1.05   GLU 99 102* 80   CA 8.4* 9.3 8.8   PHOS 2.9 2.5* 2.7       Recent Labs     08/21/22  0318 08/20/22  0543 08/19/22  0516   ALB 2.5* 3.1* 2.7*                          Assessment:    Severe constipation/obstipation---not felt to be sbo---not responding to current regimen of miralax, dulcolax supp  NG tube out per surgery  Malnutrition---needs to be back on tube feedings  S/p PEG replacement by IR on 8/12  Cerebral palsy         Active Problems:    UTI (urinary tract infection) (8/22/2018)      Plan:   Hold sorbitol for now  Advance tube feedings  Continue dulcolax suppository daily    May need daily sorbitol

## 2022-08-21 NOTE — PROGRESS NOTES
Problem: Discharge Planning  Goal: *Discharge to safe environment  Outcome: Progressing Towards Goal     Problem: Falls - Risk of  Goal: *Absence of Falls  Description: Document Laretta Mike Fall Risk and appropriate interventions in the flowsheet. Outcome: Progressing Towards Goal  Note: Fall Risk Interventions:  Mobility Interventions: Communicate number of staff needed for ambulation/transfer    Mentation Interventions: Adequate sleep, hydration, pain control, Door open when patient unattended    Medication Interventions: Bed/chair exit alarm    Elimination Interventions: Call light in reach, Elevated toilet seat, Patient to call for help with toileting needs, Stay With Me (per policy), Toilet paper/wipes in reach, Toileting schedule/hourly rounds              Problem: Patient Education: Go to Patient Education Activity  Goal: Patient/Family Education  Outcome: Progressing Towards Goal     Problem: Pressure Injury - Risk of  Goal: *Prevention of pressure injury  Description: Document Rick Scale and appropriate interventions in the flowsheet. Outcome: Progressing Towards Goal  Note: Pressure Injury Interventions:  Sensory Interventions: Assess changes in LOC, Avoid rigorous massage over bony prominences, Minimize linen layers, Keep linens dry and wrinkle-free, Float heels, Monitor skin under medical devices, Pad between skin to skin, Turn and reposition approx.  every two hours (pillows and wedges if needed)    Moisture Interventions: Absorbent underpads, Apply protective barrier, creams and emollients, Internal/External urinary devices, Limit adult briefs, Maintain skin hydration (lotion/cream)    Activity Interventions: Pressure redistribution bed/mattress(bed type)    Mobility Interventions: Pressure redistribution bed/mattress (bed type), Trapeze to reposition    Nutrition Interventions: Document food/fluid/supplement intake, Offer support with meals,snacks and hydration    Friction and Shear Interventions: Apply protective barrier, creams and emollients, Feet elevated on foot rest, Lift sheet, Lift team/patient mobility team, Minimize layers                Problem: Patient Education: Go to Patient Education Activity  Goal: Patient/Family Education  Outcome: Progressing Towards Goal     Problem: Risk for Spread of Infection  Goal: Prevent transmission of infectious organism to others  Description: Prevent the transmission of infectious organisms to other patients, staff members, and visitors.   Outcome: Progressing Towards Goal     Problem: Patient Education:  Go to Education Activity  Goal: Patient/Family Education  Outcome: Progressing Towards Goal

## 2022-08-21 NOTE — PROGRESS NOTES
Hospitalist Progress Note    NAME: Alli Heredia   :  1960   MRN:  [id number]       Assessment / Plan:  L arm edema  PIV to be removed. Likely due to dependent edema. Improved today with arm elevated and PIV removed. Required CVC placement  due to lack of IV access. Will obtain duplex L arm to r/o DVT  Keep arm elevated, supportive care. Sepsis due to urinary tract infection   ESBL E coli UTI  Urine culture grew ESBL E coli  Completed 7 days of meropenum on   CXR negative, Rapid covid negative    Hx bladder cancer s/p neobladder  Urinary retention  Does straight cath in group home. C/w carrera catheter here, will discharge with this  Having intermittent retention even with carrera, nursing doing irrigation PRN  S/p cystolitholapaxy, b/l pyelogram, stone removal on . Repeat CT done today : showed stable left hydroureteronephrosis, Calculi in neobladder. Carrera irrigation daily and prn. Discharge to [address] with carrera  Appreciate urology following    Sinus tachycardia  Intermittent  Likely d/t discomfort from contipation/ urinary retention  C/w metoprolol, increase dose    Constipation with fecal impaction  Had been getting aggressive bowel regimen and reslitor. Pt had TF and bile material coming out of his G tube on . Repeat KUB showed hydronephrosis and hydroureter  Gi following, recommending surg evaluation. NGT placed for decompression, placed on , removed . Repeat KUB today showed persistent large amount of stool in the rectum  Pt with multiple BM after sorbital, dulcolax supp. Abd remains NT and soft    Vent G-tube to gravity if has n/v.    Dislodged PEG tube  PEG replaced by IR   NGT placeemnt on , removal on   TF on hold currently, likely re-start tomorrow    Hypernatremia  VAZQUEZ  Cr improved post cystoscopy  Cont' IVF, appreciate nephrology following    Cerebral palsy  Anxiety/ Depression  Continue with supportive care.        Code Status: DO NOT RESUSCITATE. Surrogate Decision Maker: brothers and sister  DVT Prophylaxis: Lovenox. GI Prophylaxis: not indicated     Baseline: Dependent for activity of daily living. Non verbal, but can communicated with nodding/ gestures, better when family at bedside    JESSE: 8/19, back to group home  Barriers: Lithotripsy on 8/19/ Resolution for colonic distension     Subjective:     Chief Complaint / Reason for Physician Visit  NAD. Wants some water. Review of Systems:  Symptom Y/N Comments  Symptom Y/N Comments   Fever/Chills    Chest Pain     Poor Appetite    Edema     Cough    Abdominal Pain     Sputum    Joint Pain     SOB/FANG    Pruritis/Rash     Nausea/vomit    Tolerating PT/OT     Diarrhea    Tolerating Diet     Constipation    Other       Could NOT obtain due to: Non verbal     Objective:     VITALS:   Last 24hrs VS reviewed since prior progress note. Most recent are:  Patient Vitals for the past 24 hrs:   Temp Pulse Resp BP SpO2   08/21/22 0957 99.6 °F (37.6 °C) (!) 107 18 (!) 154/78 98 %   08/21/22 0810 -- (!) 101 -- -- --   08/20/22 2226 -- (!) 117 -- 134/70 --   08/20/22 2055 -- (!) 125 19 135/84 94 %   08/20/22 1530 -- (!) 135 18 (!) 131/92 93 %   08/20/22 1515 -- (!) 134 21 130/81 96 %   08/20/22 1503 -- (!) 134 20 133/75 95 %         Intake/Output Summary (Last 24 hours) at 8/21/2022 1321  Last data filed at 8/21/2022 1200  Gross per 24 hour   Intake 3823.75 ml   Output 1850 ml   Net 1973.75 ml          I had a face to face encounter and independently examined this patient on 8/21/2022, as outlined below:  PHYSICAL EXAM:  General: Awake, No acute distress  EENT:  EOMI. Anicteric sclerae. MMM  Resp:  CTA bilaterally, no wheezing or rales. No accessory muscle use  CV:  Regular  rhythm,  L arm edema  GI:  Soft, Non distended, Non tender. +Bowel sounds  Neurologic:  Awake, non verbal at baseline but can make his needs known, contracted extremity  Psych:   Not anxious nor agitated  Skin:  No rashes.   No jaundice    Reviewed most current lab test results and cultures  YES  Reviewed most current radiology test results   YES  Review and summation of old records today    NO  Reviewed patient's current orders and MAR    YES  PMH/SH reviewed - no change compared to H&P  ________________________________________________________________________  Care Plan discussed with:    Comments   Patient y    Family      RN y    Care Manager     Consultant                        Multidiciplinary team rounds were held today with , nursing, pharmacist and clinical coordinator. Patient's plan of care was discussed; medications were reviewed and discharge planning was addressed. ________________________________________________________________________  Total NON critical care TIME:  36   Minutes    Total CRITICAL CARE TIME Spent:   Minutes non procedure based      Comments   >50% of visit spent in counseling and coordination of care     ________________________________________________________________________  Leif Almaraz MD     Procedures: see electronic medical records for all procedures/Xrays and details which were not copied into this note but were reviewed prior to creation of Plan. LABS:  I reviewed today's most current labs and imaging studies.   Pertinent labs include:  Recent Labs     08/19/22  0516   WBC 8.0   HGB 13.6   HCT 43.5            Recent Labs     08/21/22  0318 08/20/22  0543 08/19/22  0516   * 138 147*   K 4.0 4.2 4.7   * 110* 114*   CO2 26 22 29   GLU 99 102* 80   BUN 26* 28* 35*   CREA 0.82 0.97 1.05   CA 8.4* 9.3 8.8   PHOS 2.9 2.5* 2.7   ALB 2.5* 3.1* 2.7*         Signed: Leif Almaraz MD

## 2022-08-21 NOTE — PROGRESS NOTES
Bedside shift change report given to Demetria Talbert RN (oncoming nurse) by Casimiro Otto RN (offgoing nurse). Report included the following information SBAR, Kardex, MAR, and Recent Results. 0900: Discussed with  MD. Rafia Torres to put feed on pump to ensure it does not overload his stomach. Feeding going at a 355mL/hour for patient to receive total feed in 1 hour. 1315: pt. Has had several bowel movements today. Loose at times, but soft, non watery. Sister updated via phone. 1350: Pastoral Care perfect serve at this time. 1409: MD perfect serve    I emptied his carrera [id number], but he has since had no urine output. sister was concerned, so I have bladder scanned he has 249mL retained urine. i asked if his stomach was hurting r has pressure and he says no. Fluid gtt started, see MAR. End of Shift Note    Bedside shift change report given to Ramon Escalona (oncoming nurse) by Julian Gonsalez RN (offgoing nurse). Report included the following information SBAR, Kardex, Intake/Output, and MAR    Shift worked:  dayshift     Shift summary and any significant changes:     Pt. Continues to have bowel movements and deny constipation. Carrera decreased in output. Fluid started. Concerns for physician to address:  Does patient still need carrera?       Zone phone for oncoming shift:   0         Julian Gonsalez RN

## 2022-08-21 NOTE — PROGRESS NOTES
Transition of Care Plan: Return to [facility] (group home). RUR: 12% (low)  Disposition: Return to [facility] (rapid test needed)  Follow up appointments: PCP/urology/nephrology. DME needed: None. Transportation at Discharge: [facility] on will call. Nursing call report to [phone]. Keys or means to access home:   Facility has keys. IM Medicare Letter: 2nd IMM needed  Is patient a BCPI-A Bundle:   N/A                   If yes, was Bundle Letter given?:  N/A  Is patient a  and connected with the [place]? N/A              If yes, was [place] transfer form completed and [facility] notified? N/A  Caregiver Contact: El Hines - sister - [phone]. Discharge Caregiver contacted prior to discharge? Family aware of transport this morning. Care Conference needed?:  No.    CM updated by attending physician that pt should be ready for d/c tomorrow. CM called the [facility] to update. They will need an updated rapid covid test.  CM will place [facility] on will call.       JAYCOB Hughes  Care ManagementHarry

## 2022-08-21 NOTE — PROGRESS NOTES
Surgical Note    Date/Time:  8/21/2022         Assessment :    Plan:  Patient Active Problem List   Diagnosis Code    Abdominal pain R10.9    Abdominal pain, other specified site R10.9    Abdominal pain R10.9    CP (cerebral palsy) ([facility] G80.9    Quadriplegic cerebral palsy ([facility] G80.8    Esophageal stricture K22.2    Abdominal pain, LLQ (left lower quadrant) R10.32    UTI (urinary tract infection) N39.0    Sepsis ([address] A41.9    Constipation K59.00    Fecal impaction (HCC) K56.41    Hiatal hernia K44.9    Severe sepsis (HCC) A41.9, R65.20    Acute kidney injury ([facility] N17.9    Nephrolithiasis N20.0    Hydronephrosis of right kidney N13.30        Abdomen remains soft  NGT out    Sips of clears tolerated    Recommend resume tube feeds. Continue bowel regimen and daily suppositories - pt indicates he is now having BMs    Vent g-tube to gravity for nausea or vomiting             Subjective:     Chief Complaint:  feels much better. Review of Systems:  Y  N       Y  N  [] [x]  Fever/chills                                               [] [x]  Chest Pain  [] [x]  Cough                                                       [] [x]  Diarrhea   [] [x]  Sputum                                                     [] [x]  Constipation  [] [x]  SOB/FANG                                                [] [x]  Nausea/Vomit  [] [x]  Abd Pain                                                    [] []  Tolerating diet  [] [x]  Dysuria                                                           []Unable to obtain  ROS due to  []mental status change  []sedated   []intubated    Objective:     VITALS:   Last 24hrs VS reviewed since prior hospitalist progress note. Most recent are:  Visit Vitals  /70   Pulse (!) 117   Temp 97.5 °F (36.4 °C)   Resp 19   Ht 5' 7\" (1.702 m)   Wt 111 lb 15.9 oz (50.8 kg)   SpO2 94%   BMI 17.54 kg/m²     No data recorded.       Intake/Output Summary (Last 24 hours) at 8/21/2022 0967  Last data filed at 8/21/2022 0551  Gross per 24 hour   Intake 2438.75 ml   Output 1850 ml   Net 588.75 ml           []Short []NGT  []Intubated on vent    PHYSICAL EXAM:  Gen:  [] A&O  []non-toxic  [x] No acute distress             [] ill apearing  []  Critical        HEENT:   [x]NC/AT/PERRLA/EOMI    []pink conjunctivae      []pale conjunctivae                  PERRL  []yes  []no      []moist mucosa    []dry mucosa    hearing intact to voice []yes  []No    RESP:   [x]CTA bialterally/no wheezing/rhonchi/rales/crackles    []clear bilaterally  []wheezing   []rhonchi   []crackles     use of accessory muscles []yes []no    CARD:   [x] regular rate and rhythm/No murmurs/rubs/gallops    murmur  []yes ()  []no      Rubs  []yes  []no       Gallops []yes  []no    Rate [] regular  [] irregular        carotid bruits  []Right  [] Left                 LE edema []yes  []no           JVP  [] yes   [] no    ABD:    [x]soft  [x]non distended  [x]non tender , gastrostomy tube in place [] NABS    SKIN:   Rashes [] yes   [x] no    Ulcers [] yes   [x] no               [x]normal   []tight to palpitation    skin turgor [] good  []poor  []decreased               Cyanosis/clubbing [] yes [x] no    NEUR:   [x]cranial nerves II-XII grossly intact       []Cranial nerves deficit                 [] facial droop    [] slurred speech   []aphasic     []Strength normal     [] weakness  [] LUE  []  RUE/ [] LLE  []  RLE    follows commands  [] yes [] no           PSYCH:   insight []poor [x]good   Alert and Oriented to  [x]person  [x]place  [x] time                    []depressed []anxious []agitated  []lethargic []stuporous  []sedated           Lab Data Reviewed: (see below)    Medications Reviewed: (see below)    PMH/SH reviewed - no change compared to H&P    Care Plan discussed with:  [x]Patient   []Family    []Care Manager   []RN    []Consultant/Specialist :    Prophylaxis:  []Lovenox  []Coumadin  []Hep SQ  []SCDs  []H2B/PPI    Disposition:  []Home w/ Family   []HH PT,OT,RN   []SNF/LTC   []SAH/Rehab    Ancillary Serices:   [] PT     []OT      []SW      []Nut      []HH ________________________________________________________________________  LABS:  Recent Labs     08/19/22  0516   WBC 8.0   HGB 13.6   HCT 43.5          Recent Labs     08/21/22 0318 08/20/22  0543 08/19/22  0516   * 138 147*   K 4.0 4.2 4.7   * 110* 114*   CO2 26 22 29   BUN 26* 28* 35*   CREA 0.82 0.97 1.05   GLU 99 102* 80   CA 8.4* 9.3 8.8   PHOS 2.9 2.5* 2.7       Recent Labs     08/21/22 0318 08/20/22  0543 08/19/22  0516   ALB 2.5* 3.1* 2.7*       No results for input(s): INR, PTP, APTT, INREXT, INREXT in the last 72 hours. No results for input(s): FE, TIBC, PSAT, FERR in the last 72 hours. No results for input(s): PH, PCO2, PO2 in the last 72 hours. No results for input(s): CPK, CKMB in the last 72 hours.     No lab exists for component: TROPONINI  Lab Results   Component Value Date/Time    Glucose (POC) 131 (H) 08/16/2022 10:57 AM    Glucose (POC) 80 08/08/2022 12:52 PM    Glucose (POC) 158 (H) 03/19/2021 05:15 PM    Glucose (POC) 114 (H) 03/02/2012 12:39 PM    Glucose (POC) 88 03/02/2012 06:00 AM       MEDICATIONS:  Current Facility-Administered Medications   Medication Dose Route Frequency    sorbitoL 70 % solution 45 mL  45 mL Oral Q4H    metoprolol tartrate (LOPRESSOR) tablet 12.5 mg  12.5 mg Per G Tube Q12H    metoprolol tartrate (LOPRESSOR) tablet 25 mg  25 mg Per G Tube Q12H    pantoprazole (PROTONIX) 40 mg in 0.9% sodium chloride 10 mL injection  40 mg IntraVENous DAILY    [Held by provider] senna-docusate (PERICOLACE) 8.6-50 mg per tablet 1 Tablet  1 Tablet Per G Tube QHS    bisacodyL (DULCOLAX) suppository 10 mg  10 mg Rectal DAILY    sodium chloride (NS) flush 5-40 mL  5-40 mL IntraVENous Q8H    sodium chloride (NS) flush 5-40 mL  5-40 mL IntraVENous PRN    acetaminophen (TYLENOL) tablet 650 mg  650 mg Oral Q6H PRN    Or    acetaminophen (TYLENOL) suppository 650 mg 650 mg Rectal Q6H PRN    ondansetron (ZOFRAN ODT) tablet 4 mg  4 mg Oral Q8H PRN    Or    ondansetron (ZOFRAN) injection 4 mg  4 mg IntraVENous Q6H PRN    enoxaparin (LOVENOX) injection 40 mg  40 mg SubCUTAneous DAILY    ascorbic acid (vitamin C) (VITAMIN C) tablet 500 mg  500 mg Per G Tube DAILY    fluticasone propionate (FLONASE) 50 mcg/actuation nasal spray 2 Spray  2 Spray Both Nostrils DAILY    mirtazapine (REMERON) tablet 15 mg  15 mg Per G Tube DAILY    nortriptyline (PAMELOR) capsule 50 mg  50 mg Per G Tube QHS

## 2022-08-21 NOTE — PROGRESS NOTES
2000- in room to assess patient and administer soap suds enema; patient with small liquid stool;enema administered however patient unable to retain. 2200- patient with large amount of liquid/mushy stool.  Siomara Gonzalez paged to discuss holding/continuing sorbitol and metoprolol dosing

## 2022-08-21 NOTE — PROGRESS NOTES
ACP Assessment:    Received in basket request for advance medical directive consult. Consulted with Mert Salinas, RN who stated patient, although mostly nonverbal, seems to comprehend what is said to him and is able to make needs and preferences known. No family present when  spoke with Grand althea. Through head gestures and facial expressions he acknowledged he would want his brother Napoleon Payan and his sister [address] [place] [address] to make decisions together. He did not want to name one of them primary and another secondary. His sister is visiting some time today. Cory Brownlee will page  and  will return to assist with paperwork for advance medical directive.        EULALIA Holliday, [facility], Staff [address]    [address] Paging Service  [phone]

## 2022-08-21 NOTE — ACP (ADVANCE CARE PLANNING)
Advance Care Planning   Advance Care Planning Inpatient Note  [address]  Spiritual Care Department    Today's Date: 8/21/2022  Unit: [facility] 2 MED TELE    Received request from  in basket . Upon review of chart and communication with care team, patient's decision making abilities are not in question. Patient was present in the room during visit. Goals of ACP Conversation:  Discuss Advance Care planning documents  Establish healthcare decision makers     Health Care Decision Makers:    No healthcare decision makers have been documented. Click here to complete [address] including selection of the Healthcare Decision Maker Relationship (ie \"Primary\")    Summary:  No Decision Maker named by patient at this time      Advance Care Planning Documents (Patient Wishes) on file:  Portable DNR form     Assessment:    Received in basket request for advance medical directive consult. Consulted with Nydia Benites RN who stated patient, although mostly nonverbal, seems to comprehend what is said to him and is able to make needs and preferences known. No family present when  spoke with Grand althea. Through head gestures and facial expressions he acknowledged he would want his brother Elaine Fatima and his sister Lyle Sung to make decisions together. He did not want to name one of them primary and another secondary. His sister is visiting some time today. Toyin Madison will page  and  will return to assist with paperwork for advance medical directive.       Interventions:  Deferred conversation until another family member is present    Care Preferences Communicated:  No    Outcomes/Plan:  ACP Discussion Postponed    Sawyer Cohen, [facility]  on 8/21/2022 at 10:15 AM

## 2022-08-21 NOTE — PROGRESS NOTES
Assessment:     visited earlier today with patient on Med Tele to discuss who he would want to be his medical decision makers. Returned this afternoon when his sister Clemencia Hicks was present. Though nonverbal, with his nurse Crys Babin present he was able to verify that he would want his brother, Anette Alvarado and sister Florina Ronquillo to serve together as primary decision makers on his behalf. Reviewed document and explained state hierarchy of decision makers. Patient's mother is living but has dementia making it necessary for Kim Lemus to make known his preferences. Cristiano Russell is unable to hold a pen for a signature,  asked him in presence of Crys Babin, his nurse, if it would be okay for his sister to sign, and with head gestures and facial expressions he agreed. Made copies, returned original and two copies to his sister and made a copy for his chart.         EULALIA Hadley, [facility], Staff [address]    [address] Paging Service  [phone]

## 2022-08-22 ENCOUNTER — APPOINTMENT (OUTPATIENT)
Dept: GENERAL RADIOLOGY | Age: 62
DRG: 698 | End: 2022-08-22
Attending: INTERNAL MEDICINE
Payer: MEDICARE

## 2022-08-22 ENCOUNTER — APPOINTMENT (OUTPATIENT)
Dept: VASCULAR SURGERY | Age: 62
DRG: 698 | End: 2022-08-22
Attending: INTERNAL MEDICINE
Payer: MEDICARE

## 2022-08-22 LAB
ALBUMIN SERPL-MCNC: 2.4 G/DL (ref 3.5–5)
AMORPH CRY URNS QL MICRO: ABNORMAL
ANION GAP SERPL CALC-SCNC: 4 MMOL/L (ref 5–15)
APPEARANCE UR: CLEAR
BACTERIA URNS QL MICRO: ABNORMAL /HPF
BILIRUB UR QL: NEGATIVE
BUN SERPL-MCNC: 25 MG/DL (ref 6–20)
BUN/CREAT SERPL: 30 (ref 12–20)
CALCIUM SERPL-MCNC: 8.5 MG/DL (ref 8.5–10.1)
CHLORIDE SERPL-SCNC: 116 MMOL/L (ref 97–108)
CO2 SERPL-SCNC: 26 MMOL/L (ref 21–32)
COLOR UR: ABNORMAL
COVID-19 RAPID TEST, COVR: NOT DETECTED
CREAT SERPL-MCNC: 0.84 MG/DL (ref 0.7–1.3)
EPITH CASTS URNS QL MICRO: ABNORMAL /LPF
GLUCOSE SERPL-MCNC: 108 MG/DL (ref 65–100)
GLUCOSE UR STRIP.AUTO-MCNC: NEGATIVE MG/DL
HGB UR QL STRIP: ABNORMAL
KETONES UR QL STRIP.AUTO: NEGATIVE MG/DL
LEUKOCYTE ESTERASE UR QL STRIP.AUTO: ABNORMAL
MUCOUS THREADS URNS QL MICRO: ABNORMAL /LPF
NITRITE UR QL STRIP.AUTO: NEGATIVE
PH UR STRIP: 7 [PH] (ref 5–8)
PHOSPHATE SERPL-MCNC: 2.9 MG/DL (ref 2.6–4.7)
POTASSIUM SERPL-SCNC: 4.1 MMOL/L (ref 3.5–5.1)
PROCALCITONIN SERPL-MCNC: <0.05 NG/ML
PROT UR STRIP-MCNC: NEGATIVE MG/DL
RBC #/AREA URNS HPF: ABNORMAL /HPF (ref 0–5)
SODIUM SERPL-SCNC: 146 MMOL/L (ref 136–145)
SOURCE, COVRS: NORMAL
SP GR UR REFRACTOMETRY: 1.01
UA: UC IF INDICATED,UAUC: ABNORMAL
UROBILINOGEN UR QL STRIP.AUTO: 0.2 EU/DL (ref 0.2–1)
WBC URNS QL MICRO: ABNORMAL /HPF (ref 0–4)

## 2022-08-22 PROCEDURE — 87086 URINE CULTURE/COLONY COUNT: CPT

## 2022-08-22 PROCEDURE — 74011250636 HC RX REV CODE- 250/636: Performed by: INTERNAL MEDICINE

## 2022-08-22 PROCEDURE — 74011000250 HC RX REV CODE- 250: Performed by: INTERNAL MEDICINE

## 2022-08-22 PROCEDURE — 84145 PROCALCITONIN (PCT): CPT

## 2022-08-22 PROCEDURE — 93971 EXTREMITY STUDY: CPT

## 2022-08-22 PROCEDURE — 74011250637 HC RX REV CODE- 250/637: Performed by: INTERNAL MEDICINE

## 2022-08-22 PROCEDURE — 74011250636 HC RX REV CODE- 250/636: Performed by: HOSPITALIST

## 2022-08-22 PROCEDURE — 87077 CULTURE AEROBIC IDENTIFY: CPT

## 2022-08-22 PROCEDURE — 74011000250 HC RX REV CODE- 250: Performed by: HOSPITALIST

## 2022-08-22 PROCEDURE — 87186 SC STD MICRODIL/AGAR DIL: CPT

## 2022-08-22 PROCEDURE — 65270000029 HC RM PRIVATE

## 2022-08-22 PROCEDURE — 87635 SARS-COV-2 COVID-19 AMP PRB: CPT

## 2022-08-22 PROCEDURE — C9113 INJ PANTOPRAZOLE SODIUM, VIA: HCPCS | Performed by: HOSPITALIST

## 2022-08-22 PROCEDURE — 80069 RENAL FUNCTION PANEL: CPT

## 2022-08-22 PROCEDURE — 74011250637 HC RX REV CODE- 250/637: Performed by: STUDENT IN AN ORGANIZED HEALTH CARE EDUCATION/TRAINING PROGRAM

## 2022-08-22 PROCEDURE — 87040 BLOOD CULTURE FOR BACTERIA: CPT

## 2022-08-22 PROCEDURE — 36415 COLL VENOUS BLD VENIPUNCTURE: CPT

## 2022-08-22 PROCEDURE — 99232 SBSQ HOSP IP/OBS MODERATE 35: CPT | Performed by: SURGERY

## 2022-08-22 PROCEDURE — 81001 URINALYSIS AUTO W/SCOPE: CPT

## 2022-08-22 PROCEDURE — 71045 X-RAY EXAM CHEST 1 VIEW: CPT

## 2022-08-22 PROCEDURE — 74011000258 HC RX REV CODE- 258: Performed by: INTERNAL MEDICINE

## 2022-08-22 RX ORDER — POLYETHYLENE GLYCOL 3350 17 G/17G
17 POWDER, FOR SOLUTION ORAL 2 TIMES DAILY
Qty: 60 PACKET | Refills: 0 | Status: SHIPPED | OUTPATIENT
Start: 2022-08-22 | End: 2022-09-21

## 2022-08-22 RX ORDER — DEXTROSE MONOHYDRATE 50 MG/ML
75 INJECTION, SOLUTION INTRAVENOUS CONTINUOUS
Status: DISCONTINUED | OUTPATIENT
Start: 2022-08-22 | End: 2022-08-22

## 2022-08-22 RX ORDER — METOPROLOL TARTRATE 25 MG/1
12.5 TABLET, FILM COATED ORAL EVERY 12 HOURS
Qty: 30 TABLET | Refills: 0 | Status: SHIPPED | OUTPATIENT
Start: 2022-08-22 | End: 2022-09-21

## 2022-08-22 RX ORDER — DEXTROSE MONOHYDRATE 50 MG/ML
75 INJECTION, SOLUTION INTRAVENOUS CONTINUOUS
Status: DISCONTINUED | OUTPATIENT
Start: 2022-08-22 | End: 2022-08-23

## 2022-08-22 RX ORDER — CEFTRIAXONE 500 MG/1
500 INJECTION, POWDER, FOR SOLUTION INTRAMUSCULAR; INTRAVENOUS EVERY 24 HOURS
Status: DISCONTINUED | OUTPATIENT
Start: 2022-08-22 | End: 2022-08-22

## 2022-08-22 RX ORDER — METOPROLOL TARTRATE 25 MG/1
25 TABLET, FILM COATED ORAL EVERY 12 HOURS
Qty: 60 TABLET | Refills: 0 | Status: SHIPPED | OUTPATIENT
Start: 2022-08-22 | End: 2022-09-21

## 2022-08-22 RX ORDER — FACIAL-BODY WIPES
10 EACH TOPICAL DAILY
Qty: 30 SUPPOSITORY | Refills: 0 | Status: SHIPPED | OUTPATIENT
Start: 2022-08-22

## 2022-08-22 RX ADMIN — MEROPENEM 1 G: 1 INJECTION, POWDER, FOR SOLUTION INTRAVENOUS at 21:21

## 2022-08-22 RX ADMIN — DEXTROSE MONOHYDRATE 50 ML/HR: 50 INJECTION, SOLUTION INTRAVENOUS at 17:40

## 2022-08-22 RX ADMIN — SODIUM CHLORIDE, PRESERVATIVE FREE 10 ML: 5 INJECTION INTRAVENOUS at 21:20

## 2022-08-22 RX ADMIN — OXYCODONE HYDROCHLORIDE AND ACETAMINOPHEN 500 MG: 500 TABLET ORAL at 09:49

## 2022-08-22 RX ADMIN — METOPROLOL TARTRATE 25 MG: 25 TABLET ORAL at 21:21

## 2022-08-22 RX ADMIN — SODIUM CHLORIDE, PRESERVATIVE FREE 10 ML: 5 INJECTION INTRAVENOUS at 06:43

## 2022-08-22 RX ADMIN — NORTRIPTYLINE HYDROCHLORIDE 50 MG: 25 CAPSULE ORAL at 21:22

## 2022-08-22 RX ADMIN — SODIUM CHLORIDE 40 MG: 9 INJECTION, SOLUTION INTRAMUSCULAR; INTRAVENOUS; SUBCUTANEOUS at 09:48

## 2022-08-22 RX ADMIN — DEXTROSE AND SODIUM CHLORIDE 75 ML/HR: 5; 450 INJECTION, SOLUTION INTRAVENOUS at 06:43

## 2022-08-22 RX ADMIN — METOPROLOL TARTRATE 12.5 MG: 25 TABLET, FILM COATED ORAL at 21:22

## 2022-08-22 RX ADMIN — BISACODYL 10 MG: 10 SUPPOSITORY RECTAL at 09:48

## 2022-08-22 RX ADMIN — ENOXAPARIN SODIUM 40 MG: 100 INJECTION SUBCUTANEOUS at 09:47

## 2022-08-22 RX ADMIN — METOPROLOL TARTRATE 25 MG: 25 TABLET ORAL at 09:49

## 2022-08-22 RX ADMIN — MIRTAZAPINE 15 MG: 15 TABLET, FILM COATED ORAL at 09:48

## 2022-08-22 RX ADMIN — FLUTICASONE PROPIONATE 2 SPRAY: 50 SPRAY, METERED NASAL at 09:49

## 2022-08-22 RX ADMIN — SODIUM CHLORIDE, PRESERVATIVE FREE 10 ML: 5 INJECTION INTRAVENOUS at 17:39

## 2022-08-22 RX ADMIN — ACETAMINOPHEN 650 MG: 650 SUPPOSITORY RECTAL at 11:40

## 2022-08-22 RX ADMIN — METOPROLOL TARTRATE 12.5 MG: 25 TABLET, FILM COATED ORAL at 09:47

## 2022-08-22 NOTE — PROGRESS NOTES
Asked to see patient for IV access and labs. Patient seen this weekend and unable to get IV access suggested to RN to ask for EJ as a possible access. Spoke with primary RN today and explained unsuccessful attempt at access and the need for EJ or other line not in arms/hands.       Nuno Gómez, RN, BSN, VA-BC  Vascular Access Team

## 2022-08-22 NOTE — PROGRESS NOTES
Nephrology Progress Note  [facility] / [address], [place], [address]  Phone - [phone]  Fax - [phone]                 Patient: Daria Richardson                   YOB: 1960        Date- 8/22/2022                      Admit Date: 8/8/2022  CC: Follow up for vazquez, hypernatremia          IMPRESSION & PLAN:   VAZQUEZ due to ATN, IV DYE,hydronephrosis, prerenal factors  B/L hydronephrosis  HYPERNATREMIA  Malfunction of PEG tube (replacement by IR 8/9/2022)  Severe constipation with stool impaction  History of cerebral palsy  History of bladder cancer status post radical cystoprostatectomy with neobladder  Intermittent malfunction of Short catheter. UTIS/P CYSOLITHOLAPAXY, b/l pyelogram, stone removal---8-18-22    PLAN-  START D5W  Fever work up DR. HOBBS  follow bmp in a.m. Subjective: Interval History:   -cr stable  Na high 146  Patient developed fever this am    8/18/22    CR WORSE  S/P CYSOLITHOLAPAXY, b/l pyelogram, stone removal    Objective:   Vitals:    08/21/22 1938 08/21/22 2131 08/22/22 0746 08/22/22 1132   BP: (!) 145/81 (!) 151/81 128/86 90/63   Pulse: (!) 132 (!) 123 (!) 114 89   Resp: 18   20   Temp: 97.4 °F (36.3 °C)  97.5 °F (36.4 °C) (!) 101.4 °F (38.6 °C)   SpO2: 95%  96% 93%   Weight:       Height:          08/21 0701 - 08/22 0700  In: 2950   Out: 2000 [Urine:2000]  Last 3 Recorded Weights in this Encounter    08/08/22 1845 08/18/22 7330   Weight: 50.8 kg (112 lb) 50.8 kg (111 lb 15.9 oz)      Physical exam:    GEN: Noncommunicative,NAD  NECK- no mass  RESP: No wheezing, clear bilaterally  NEURO: Noncommunicative  ABDO: Soft , ileal conduit present  EXT: No Edema       Chart reviewed. Pertinent Notes reviewed.      Data Review :  Recent Labs     08/22/22  0532 08/21/22  0318 08/20/22  0543   * 146* 138   K 4.1 4.0 4.2   * 116* 110*   CO2 26 26 22   BUN 25* 26* 28* CREA 0.84 0.82 0.97   * 99 102*   CA 8.5 8.4* 9.3   PHOS 2.9 2.9 2.5*       No results for input(s): WBC, HGB, HCT, PLT, HGBEXT, HCTEXT, PLTEXT, HGBEXT, HCTEXT, PLTEXT in the last 72 hours. No results for input(s): FE, TIBC, PSAT, FERR in the last 72 hours.    Medication list  reviewed  Current Facility-Administered Medications   Medication Dose Route Frequency    dextrose 5% infusion  75 mL/hr IntraVENous CONTINUOUS    metoprolol tartrate (LOPRESSOR) tablet 12.5 mg  12.5 mg Per G Tube Q12H    metoprolol tartrate (LOPRESSOR) tablet 25 mg  25 mg Per G Tube Q12H    pantoprazole (PROTONIX) 40 mg in 0.9% sodium chloride 10 mL injection  40 mg IntraVENous DAILY    [Held by provider] senna-docusate (PERICOLACE) 8.6-50 mg per tablet 1 Tablet  1 Tablet Per G Tube QHS    bisacodyL (DULCOLAX) suppository 10 mg  10 mg Rectal DAILY    sodium chloride (NS) flush 5-40 mL  5-40 mL IntraVENous Q8H    sodium chloride (NS) flush 5-40 mL  5-40 mL IntraVENous PRN    acetaminophen (TYLENOL) tablet 650 mg  650 mg Oral Q6H PRN    Or    acetaminophen (TYLENOL) suppository 650 mg  650 mg Rectal Q6H PRN    ondansetron (ZOFRAN ODT) tablet 4 mg  4 mg Oral Q8H PRN    Or    ondansetron (ZOFRAN) injection 4 mg  4 mg IntraVENous Q6H PRN    enoxaparin (LOVENOX) injection 40 mg  40 mg SubCUTAneous DAILY    ascorbic acid (vitamin C) (VITAMIN C) tablet 500 mg  500 mg Per G Tube DAILY    fluticasone propionate (FLONASE) 50 mcg/actuation nasal spray 2 Spray  2 Spray Both Nostrils DAILY    mirtazapine (REMERON) tablet 15 mg  15 mg Per G Tube DAILY    nortriptyline (PAMELOR) capsule 50 mg  50 mg Per G Tube QHS          Marilin Kelly MD  8/22/2022

## 2022-08-22 NOTE — PROGRESS NOTES
Surgical Note    Date/Time:  2022         Assessment :    Plan:  Patient Active Problem List   Diagnosis Code    Abdominal pain R10.9    Abdominal pain, other specified site R10.9    Abdominal pain R10.9    CP (cerebral palsy) ([facility] [address] G80.9    Quadriplegic cerebral palsy ([facility] [address] G80.8    Esophageal stricture K22.2    Abdominal pain, LLQ (left lower quadrant) R10.32    UTI (urinary tract infection) N39.0    Sepsis ([address] A41.9    Constipation K59.00    Fecal impaction (HCC) K56.41    Hiatal hernia K44.9    Severe sepsis (HCC) A41.9, R65.20    Acute kidney injury ([facility] [address] N17.9    Nephrolithiasis N20.0    Hydronephrosis of right kidney N13.30        Abdomen remains soft    Can vent g-tube to gravity for nausea or vomiting    +BMs  Aggressive bowel regimen adjusted by GI    Undergoing fever w/o    No longer obstructed  Will sign off    Thank you for this consult. Subjective:     Chief Complaint:  feels much better and smiling      Review of Systems:  Y  N       Y  N  [] [x]  Fever/chills                                               [] [x]  Chest Pain  [] [x]  Cough                                                       [] [x]  Diarrhea   [] [x]  Sputum                                                     [] [x]  Constipation  [] [x]  SOB/FANG                                                [] [x]  Nausea/Vomit  [] [x]  Abd Pain                                                    [] []  Tolerating diet  [] [x]  Dysuria                                                           []Unable to obtain  ROS due to  []mental status change  []sedated   []intubated    Objective:     VITALS:   Last 24hrs VS reviewed since prior hospitalist progress note.  Most recent are:  Visit Vitals  BP 90/63   Pulse 89   Temp (!) 101.4 °F (38.6 °C)   Resp 20   Ht 5' 7\" (1.702 m)   Wt 50.8 kg (111 lb 15.9 oz)   SpO2 93%   BMI 17.54 kg/m²     Temp (24hrs), Av.8 °F (37.1 °C), Min:97.4 °F (36.3 °C), Max:101.4 °F (38.6 °C)      Intake/Output Summary (Last 24 hours) at 8/22/2022 1226  Last data filed at 8/22/2022 0545  Gross per 24 hour   Intake 865 ml   Output 2000 ml   Net -1135 ml           []Short []NGT  []Intubated on vent    PHYSICAL EXAM:  Gen:  [] A&O  []non-toxic  [x] No acute distress             [] ill apearing  []  Critical        HEENT:   [x]NC/AT/PERRLA/EOMI    []pink conjunctivae      []pale conjunctivae                  PERRL  []yes  []no      []moist mucosa    []dry mucosa    hearing intact to voice []yes  []No    RESP:   [x]CTA bialterally/no wheezing/rhonchi/rales/crackles    []clear bilaterally  []wheezing   []rhonchi   []crackles     use of accessory muscles []yes []no    CARD:   [x] regular rate and rhythm/No murmurs/rubs/gallops    murmur  []yes ()  []no      Rubs  []yes  []no       Gallops []yes  []no    Rate [] regular  [] irregular        carotid bruits  []Right  [] Left                 LE edema []yes  []no           JVP  [] yes   [] no    ABD:    [x]soft  [x]non distended  [x]non tender , gastrostomy tube in place [] NABS    SKIN:   Rashes [] yes   [x] no    Ulcers [] yes   [x] no               [x]normal   []tight to palpitation    skin turgor [] good  []poor  []decreased               Cyanosis/clubbing [] yes [x] no    NEUR:   [x]cranial nerves II-XII grossly intact       []Cranial nerves deficit                 [] facial droop    [] slurred speech   []aphasic     []Strength normal     [] weakness  [] LUE  []  RUE/ [] LLE  []  RLE    follows commands  [] yes [] no           PSYCH:   insight []poor [x]good   Alert and Oriented to  [x]person  [x]place  [x] time                    []depressed []anxious []agitated  []lethargic []stuporous  []sedated           Lab Data Reviewed: (see below)    Medications Reviewed: (see below)    PMH/SH reviewed - no change compared to H&P    Care Plan discussed with:  [x]Patient   []Family    []Care Manager   []RN    []Consultant/Specialist :    Prophylaxis:  []Lovenox []Coumadin  []Hep SQ  []SCDs  []H2B/PPI    Disposition:  []Home w/ Family   []HH PT,OT,RN   []SNF/LTC   []SAH/Rehab    Ancillary Serices:   [] PT     []OT      []SW      []Nut      []HH ________________________________________________________________________  LABS:  No results for input(s): WBC, HGB, HCT, PLT, HGBEXT, HCTEXT, PLTEXT, HGBEXT, HCTEXT, PLTEXT in the last 72 hours. Recent Labs     08/22/22  0532 08/21/22 0318 08/20/22  0543   * 146* 138   K 4.1 4.0 4.2   * 116* 110*   CO2 26 26 22   BUN 25* 26* 28*   CREA 0.84 0.82 0.97   * 99 102*   CA 8.5 8.4* 9.3   PHOS 2.9 2.9 2.5*       Recent Labs     08/22/22  0532 08/21/22  0318 08/20/22  0543   ALB 2.4* 2.5* 3.1*       No results for input(s): INR, PTP, APTT, INREXT, INREXT in the last 72 hours. No results for input(s): FE, TIBC, PSAT, FERR in the last 72 hours. No results for input(s): PH, PCO2, PO2 in the last 72 hours. No results for input(s): CPK, CKMB in the last 72 hours.     No lab exists for component: TROPONINI  Lab Results   Component Value Date/Time    Glucose (POC) 131 (H) 08/16/2022 10:57 AM    Glucose (POC) 80 08/08/2022 12:52 PM    Glucose (POC) 158 (H) 03/19/2021 05:15 PM    Glucose (POC) 114 (H) 03/02/2012 12:39 PM    Glucose (POC) 88 03/02/2012 06:00 AM       MEDICATIONS:  Current Facility-Administered Medications   Medication Dose Route Frequency    dextrose 5% infusion  75 mL/hr IntraVENous CONTINUOUS    metoprolol tartrate (LOPRESSOR) tablet 12.5 mg  12.5 mg Per G Tube Q12H    metoprolol tartrate (LOPRESSOR) tablet 25 mg  25 mg Per G Tube Q12H    pantoprazole (PROTONIX) 40 mg in 0.9% sodium chloride 10 mL injection  40 mg IntraVENous DAILY    [Held by provider] senna-docusate (PERICOLACE) 8.6-50 mg per tablet 1 Tablet  1 Tablet Per G Tube QHS    bisacodyL (DULCOLAX) suppository 10 mg  10 mg Rectal DAILY    sodium chloride (NS) flush 5-40 mL  5-40 mL IntraVENous Q8H    sodium chloride (NS) flush 5-40 mL  5-40 mL IntraVENous PRN    acetaminophen (TYLENOL) tablet 650 mg  650 mg Oral Q6H PRN    Or    acetaminophen (TYLENOL) suppository 650 mg  650 mg Rectal Q6H PRN    ondansetron (ZOFRAN ODT) tablet 4 mg  4 mg Oral Q8H PRN    Or    ondansetron (ZOFRAN) injection 4 mg  4 mg IntraVENous Q6H PRN    enoxaparin (LOVENOX) injection 40 mg  40 mg SubCUTAneous DAILY    ascorbic acid (vitamin C) (VITAMIN C) tablet 500 mg  500 mg Per G Tube DAILY    fluticasone propionate (FLONASE) 50 mcg/actuation nasal spray 2 Spray  2 Spray Both Nostrils DAILY    mirtazapine (REMERON) tablet 15 mg  15 mg Per G Tube DAILY    nortriptyline (PAMELOR) capsule 50 mg  50 mg Per G Tube QHS

## 2022-08-22 NOTE — PROGRESS NOTES
Pharmacist Discharge Medication Reconciliation    Significant PMH:   Past Medical History:   Diagnosis Date    Anxiety     Bladder cancer ([facility] [address]     Cancer ([facility]     BLADDER    CP (cerebral palsy) ([facility] [address]     Depression     Hernia     Nausea & vomiting     Other ill-defined conditions(799.89)     Cerebral palsy    Psychiatric disorder     ANXIETY DEPRESSION    Unspecified constipation 05/13/2013     Encounter Diagnoses:   Encounter Diagnoses   Name Primary? Acute abdominal pain Yes    Acute cystitis with hematuria     Fecal impaction (HCC)     Sinus tachycardia      Allergies: Avelox [moxifloxacin], Amikacin, Amoxicillin, Betadine surgi-prep, Ciprofloxacin, Dilaudid [hydromorphone], Effexor [venlafaxine], Fentanyl, Keflex [cephalexin], Lyrica [pregabalin], Morphine, Neurontin [gabapentin], Prozac [fluoxetine], Reglan [metoclopramide], Serzone [nefazodone], Sulfa (sulfonamide antibiotics), Tetracycline, Tricyclamol chloride, Valium [diazepam], and Demerol [meperidine]    Discharge Medications:   Current Discharge Medication List        START taking these medications    Details   bisacodyL (DULCOLAX) 10 mg supp Insert 10 mg into rectum daily. Qty: 30 Suppository, Refills: 0  Start date: 8/22/2022      !! metoprolol tartrate (LOPRESSOR) 25 mg tablet 1 Tablet by Per G Tube route every twelve (12) hours for 30 days. Qty: 60 Tablet, Refills: 0  Start date: 8/22/2022, End date: 9/21/2022      !! metoprolol tartrate (LOPRESSOR) 25 mg tablet 0.5 Tablets by Per G Tube route every twelve (12) hours for 30 days. Qty: 30 Tablet, Refills: 0  Start date: 8/22/2022, End date: 9/21/2022      polyethylene glycol (Miralax) 17 gram packet Take 1 Packet by mouth two (2) times a day for 30 days. Qty: 60 Packet, Refills: 0  Start date: 8/22/2022, End date: 9/21/2022       !! - Potential duplicate medications found. Please discuss with provider.         CONTINUE these medications which have NOT CHANGED    Details   ferrous sulfate 220 mg (44 mg iron)/5 mL solution Take 220 mg by mouth daily. mirtazapine (REMERON) 15 mg tablet 15 mg by Per G Tube route daily. nortriptyline (PAMELOR) 10 mg/5 mL solution 50 mg by Per G Tube route nightly. ascorbic acid, vitamin C, (VITAMIN C) 500 mg tablet 500 mg by Per G Tube route daily. cholecalciferol (VITAMIN D3) 1,000 unit tablet 2,000 Units by Per G Tube route daily. Indications: Vitamin D Deficiency      acetaminophen (TYLENOL) 160 mg/5 mL liquid 640 mg by Per G Tube route every four (4) hours as needed for Fever or Pain. LORazepam (ATIVAN) 1 mg tablet 0.75 mg by Per G Tube route every six (6) hours. fluticasone (FLONASE) 50 mcg/actuation nasal spray 2 Sprays by Both Nostrils route daily. Esomeprazole Magnesium (NEXIUM PACKET) 40 mg by Per G Tube route daily. Indications: gastroesophageal reflux disease      rizatriptan (MAXALT) 10 mg tablet 10 mg by SubLINGual route as needed for Migraine. May repeat dose after 2 hours not to exceed 3 tablets per 24hr           STOP taking these medications       HYDROcodone-acetaminophen (HYCET) 0.5-21.7 mg/mL oral solution Comments:   Reason for Stopping:               The patient's chart, MAR and AVS were reviewed by Ramiro Deng, [facility].     Discharging Provider: Alex Jacobsen MD    Thank you,     Ramiro Deng, [facility]

## 2022-08-22 NOTE — PROGRESS NOTES
GI PROGRESS NOTE   Coleman Begum NP  [phone] NP in-hospital cell phone M-F until 4:30  After 5pm or on weekends, please call  for physician on call    NAME:Basilio Mitchell :1960 UWD:[id number]   ATTG: Dr. aMck Mora Primary GI: Dr. Kelley More MD  Date/Time:  2022 3:50 PM     Reason for following: SBO, N/V  Assessment:     Small Bowel Obstruction on CT scan   - CT scan : 1. The study only includes the abdomen and again demonstrates significant  distention of the small bowel consistent with small bowel obstruction. No pneumoperitoneum  Constipation, large stool burden on CT scan  Persistent stool on KUB   - CT 22 :  Normal caliber colon. Previously seen large rectal stool ball was not imaged as this is located in the pelvis and the study only covers the abdomen.  - S/p relistor without results   -KUB 2022- decreased moderate amount of colonic stool with overall decreased colonic distention  -KUB 2022- Persistent large amount of stool in the rectum  -Large BM yesterday s/p sorbitol   Plan:     -Restart MiraLax BID and Dulcolax suppositories at discharge   -Avoid narcotics  -No gauze to be placed under PEG bumper  -Defer to surgery for TF   -Supportive measure per primary team  -Nothing further to add from a GI standpoint. GI signing-off. Please call with any questions. Thank you for this consult. Plan of care discussed with Dr. Hannah Brennan   This patient was seen and examined by me in a face-to-face visit today. I reviewed the medical record including lab work, imaging and other provider notes. I confirmed the interval history as described above. I spoke to the patient, discussing our findings and plans. I discussed this case in detail with JOLENE Patricia. I formulated an updated  assessment of this patient and guided our treatment plan. I agree with the above progress note.  I agree with the history, exam and assessment and plan as outlined in the note. I would like to add the followinyo M w/ cerebral palsy w/ resolving constipation and SBO, s/p sorbitol. PEG site w/ minimal erythema and jennifer found under bumper of PEG  Plan:1 ) nAvoid narcotics; 2) Resume Miralax BID and Dulcolax supp at discharge; 3) Decrease sorbitol dosing; 4) Do not place gauze under external PEG bumper; 5) Resume TF as per surgery. Will sign off. Gwen Whatley MD    Subjective:   Discussed with RN events overnight. Family and hospitalist at bedside. Plan to D/C today     Review of Systems:  Symptom Y/N Comments  Symptom Y/N Comments   Fever/Chills    Chest Pain     Cough    Headaches     Sputum    Joint Pain     SOB/FANG    Pruritis/Rash     Tolerating Diet  Tube feed  Other       Could NOT obtain due to:      Objective:   VITALS:   Last 24hrs VS reviewed since prior progress note. Most recent are:  Visit Vitals  /86   Pulse (!) 114   Temp 97.5 °F (36.4 °C)   Resp 18   Ht 5' 7\" (1.702 m)   Wt 50.8 kg (111 lb 15.9 oz)   SpO2 96%   BMI 17.54 kg/m²       Intake/Output Summary (Last 24 hours) at 2022 1027  Last data filed at 2022 0545  Gross per 24 hour   Intake 2055 ml   Output 2000 ml   Net 55 ml       PHYSICAL EXAM:  General: Awake. NAD  HEENT: NC, Atraumatic. Anicteric sclerae. Lungs:  CTA Bilaterally. No Wheezing/Rhonchi/Rales. Heart:  Regular rhythm, No Rubs, No Gallops  Abdomen: Soft, Non distended, Non tender. +Bowel sounds, no HSM PEG in midline, erythema,   Extremities: No c/c/e  Neurologic:  Alert . No acute neurological distress   Psych:   Not anxious nor agitated.     Lab and Radiology Data Reviewed: (see below)    Medications Reviewed: (see below)  PMH/SH reviewed - no change compared to H&P  ________________________________________________________________________  Total time spent with patient: 20 minutes ________________________________________________________________________  Care Plan discussed with:  Patient x   Family  x   RN Consultant:  Hospitalist     Lindy Avila NP     Procedures: see electronic medical records for all procedures/Xrays and details which were not copied into this note but were reviewed prior to creation of Plan. LABS:  No results for input(s): WBC, HGB, HCT, PLT, HGBEXT, HCTEXT, PLTEXT, HGBEXT, HCTEXT, PLTEXT in the last 72 hours. Recent Labs     08/22/22  0532 08/21/22  0318 08/20/22  0543   * 146* 138   K 4.1 4.0 4.2   * 116* 110*   CO2 26 26 22   BUN 25* 26* 28*   CREA 0.84 0.82 0.97   * 99 102*   CA 8.5 8.4* 9.3   PHOS 2.9 2.9 2.5*       Recent Labs     08/22/22  0532 08/21/22  0318 08/20/22  0543   ALB 2.4* 2.5* 3.1*       No results for input(s): INR, PTP, APTT, INREXT, INREXT in the last 72 hours. No results for input(s): FE, TIBC, PSAT, FERR in the last 72 hours. No results found for: FOL, RBCF  No results for input(s): PH, PCO2, PO2 in the last 72 hours. No results for input(s): CPK, CKMB in the last 72 hours.     No lab exists for component: TROPONINI  Lab Results   Component Value Date/Time    Color DARK YELLOW 08/08/2022 04:50 PM    Appearance CLOUDY (A) 08/08/2022 04:50 PM    Specific gravity >1.030 (H) 08/08/2022 04:50 PM    Specific gravity 1.021 03/18/2021 08:00 PM    pH (UA) >8.5 (H) 08/08/2022 04:50 PM    Protein 100 (A) 08/08/2022 04:50 PM    Glucose Negative 08/08/2022 04:50 PM    Ketone Negative 08/08/2022 04:50 PM    Bilirubin Negative 08/08/2022 04:50 PM    Urobilinogen 1.0 08/08/2022 04:50 PM    Nitrites Positive (A) 08/08/2022 04:50 PM    Leukocyte Esterase LARGE (A) 08/08/2022 04:50 PM    Epithelial cells FEW 08/08/2022 04:50 PM    Bacteria 3+ (A) 08/08/2022 04:50 PM    WBC >100 (H) 08/08/2022 04:50 PM    RBC  08/08/2022 04:50 PM       MEDICATIONS:  Current Facility-Administered Medications   Medication Dose Route Frequency    dextrose 5 % - 0.45% NaCl infusion  75 mL/hr IntraVENous CONTINUOUS    metoprolol tartrate (LOPRESSOR) tablet 12.5 mg 12.5 mg Per G Tube Q12H    metoprolol tartrate (LOPRESSOR) tablet 25 mg  25 mg Per G Tube Q12H    pantoprazole (PROTONIX) 40 mg in 0.9% sodium chloride 10 mL injection  40 mg IntraVENous DAILY    [Held by provider] senna-docusate (PERICOLACE) 8.6-50 mg per tablet 1 Tablet  1 Tablet Per G Tube QHS    bisacodyL (DULCOLAX) suppository 10 mg  10 mg Rectal DAILY    sodium chloride (NS) flush 5-40 mL  5-40 mL IntraVENous Q8H    sodium chloride (NS) flush 5-40 mL  5-40 mL IntraVENous PRN    acetaminophen (TYLENOL) tablet 650 mg  650 mg Oral Q6H PRN    Or    acetaminophen (TYLENOL) suppository 650 mg  650 mg Rectal Q6H PRN    ondansetron (ZOFRAN ODT) tablet 4 mg  4 mg Oral Q8H PRN    Or    ondansetron (ZOFRAN) injection 4 mg  4 mg IntraVENous Q6H PRN    enoxaparin (LOVENOX) injection 40 mg  40 mg SubCUTAneous DAILY    ascorbic acid (vitamin C) (VITAMIN C) tablet 500 mg  500 mg Per G Tube DAILY    fluticasone propionate (FLONASE) 50 mcg/actuation nasal spray 2 Spray  2 Spray Both Nostrils DAILY    mirtazapine (REMERON) tablet 15 mg  15 mg Per G Tube DAILY    nortriptyline (PAMELOR) capsule 50 mg  50 mg Per G Tube QHS

## 2022-08-22 NOTE — PROGRESS NOTES
Pharmacist Discharge Medication Reconciliation    Significant PMH:   Past Medical History:   Diagnosis Date    Anxiety     Bladder cancer ([facility] [address]     Cancer ([facility]     BLADDER    CP (cerebral palsy) ([facility] [address]     Depression     Hernia     Nausea & vomiting     Other ill-defined conditions(799.89)     Cerebral palsy    Psychiatric disorder     ANXIETY DEPRESSION    Unspecified constipation 05/13/2013     Encounter Diagnoses:   Encounter Diagnoses   Name Primary? Acute abdominal pain Yes    Acute cystitis with hematuria     Fecal impaction (HCC)     Sinus tachycardia      Allergies: Avelox [moxifloxacin], Amikacin, Amoxicillin, Betadine surgi-prep, Ciprofloxacin, Dilaudid [hydromorphone], Effexor [venlafaxine], Fentanyl, Keflex [cephalexin], Lyrica [pregabalin], Morphine, Neurontin [gabapentin], Prozac [fluoxetine], Reglan [metoclopramide], Serzone [nefazodone], Sulfa (sulfonamide antibiotics), Tetracycline, Tricyclamol chloride, Valium [diazepam], and Demerol [meperidine]    Discharge Medications:   Current Discharge Medication List        START taking these medications    Details   bisacodyL (DULCOLAX) 10 mg supp Insert 10 mg into rectum daily. Qty: 30 Suppository, Refills: 0  Start date: 8/22/2022      !! metoprolol tartrate (LOPRESSOR) 25 mg tablet 1 Tablet by Per G Tube route every twelve (12) hours for 30 days. Qty: 60 Tablet, Refills: 0  Start date: 8/22/2022, End date: 9/21/2022      !! metoprolol tartrate (LOPRESSOR) 25 mg tablet 0.5 Tablets by Per G Tube route every twelve (12) hours for 30 days. Qty: 30 Tablet, Refills: 0  Start date: 8/22/2022, End date: 9/21/2022      polyethylene glycol (Miralax) 17 gram packet Take 1 Packet by mouth two (2) times a day for 30 days. Qty: 60 Packet, Refills: 0  Start date: 8/22/2022, End date: 9/21/2022       !! - Potential duplicate medications found. Please discuss with provider.         CONTINUE these medications which have NOT CHANGED    Details   ferrous sulfate 220 mg (44 mg iron)/5 mL solution Take 220 mg by mouth daily. mirtazapine (REMERON) 15 mg tablet 15 mg by Per G Tube route daily. nortriptyline (PAMELOR) 10 mg/5 mL solution 50 mg by Per G Tube route nightly. ascorbic acid, vitamin C, (VITAMIN C) 500 mg tablet 500 mg by Per G Tube route daily. cholecalciferol (VITAMIN D3) 1,000 unit tablet 2,000 Units by Per G Tube route daily. Indications: Vitamin D Deficiency      acetaminophen (TYLENOL) 160 mg/5 mL liquid 640 mg by Per G Tube route every four (4) hours as needed for Fever or Pain. LORazepam (ATIVAN) 1 mg tablet 0.75 mg by Per G Tube route every six (6) hours. fluticasone (FLONASE) 50 mcg/actuation nasal spray 2 Sprays by Both Nostrils route daily. Esomeprazole Magnesium (NEXIUM PACKET) 40 mg by Per G Tube route daily. Indications: gastroesophageal reflux disease      rizatriptan (MAXALT) 10 mg tablet 10 mg by SubLINGual route as needed for Migraine. May repeat dose after 2 hours not to exceed 3 tablets per 24hr           STOP taking these medications       HYDROcodone-acetaminophen (HYCET) 0.5-21.7 mg/mL oral solution Comments:   Reason for Stopping:               The patient's chart, MAR and AVS were reviewed by Alexandra Belle, [address] [place] [address].     Discharging Provider: Leda Starks MD    Thank you,     Alexandra Belle[address]

## 2022-08-22 NOTE — PROGRESS NOTES
No UOP noted in carrera bag; Carrera irrigated with 30 MLS NS clear yellow urine returned and now draining into bag

## 2022-08-22 NOTE — PROGRESS NOTES
Notified PICC team that patient needs a peripheral line and labs for sepsis workup. Unable to start line.

## 2022-08-22 NOTE — PROGRESS NOTES
Comprehensive Nutrition Assessment    Type and Reason for Visit: Reassess    Nutrition Recommendations/Plan:   Continue current TF regimen      Nutrition Assessment:    Chart reviewed; TF restarted over the weekend. Tolerating goal bolus volume per RN. No issues reported. New fever today of unclear source; work up ongoing. Possible discharge tomorrow. Malnutrition Assessment:  Malnutrition Status:  No malnutrition (08/22/22 1608)      Nutrition Related Findings:    Na 146, --80  BM 8/21  Vitamin C, Dulcolax, Lopressor, Remeron, Protonix, D5% IVF    Current Nutrition Intake & Therapies:        DIET NPO Sips of Water with Meds, Sips of Clear Liquids  DIET ONE TIME MESSAGE  ADULT TUBE FEEDING Gastrostomy; Standard without Fiber; Delivery Method: Bolus; Bolus Frequency: 3 Times Daily; Bolus Volume (mL): 355; Water Flush Volume (mL): 480; Water Flush Frequency: After bolus  Current Tube Feeding (TF) Orders:   Feeding Route: Gastrostomy  Formula: Standard with fiber  Schedule: Bolus    Regimen: 355 mL 3x/day  Additives/Modulars: None  Water Flushes: 480 mL after each bolus  Current TF & Flush Orders Provides: 1600 kcals, 68g protein, 2249 mL water    Anthropometric Measures:  Height: 5' 7\" (170.2 cm)  Ideal Body Weight (IBW): 148 lbs (67 kg)     Current Body Wt:  50.8 kg (111 lb 15.9 oz), 75.7 % IBW. Current BMI (kg/m2): 17.5                          BMI Category: Underweight (BMI less than 18.5)    Estimated Daily Nutrient Needs:  Energy Requirements Based On: Formula  Weight Used for Energy Requirements: Current  Energy (kcal/day): 1656 kcals (BMR 1274 x 1. 3AF)  Weight Used for Protein Requirements: Current  Protein (g/day): 61-72g (1.2-1.5 g/kg bw)  Method Used for Fluid Requirements: 1 ml/kcal  Fluid (ml/day): 1650 mL    Nutrition Diagnosis:   Inadequate protein-energy intake related to altered GI function as evidenced by constipation (TF held)    Nutrition Interventions:   Food and/or Nutrient Delivery: Continue tube feeding  Nutrition Education/Counseling: No recommendations at this time  Coordination of Nutrition Care: Continue to monitor while inpatient       Goals:  Previous Goal Met: Goal(s) achieved  Goals:  Tolerate nutrition support at goal rate, by next RD assessment       Nutrition Monitoring and Evaluation:   Behavioral-Environmental Outcomes: None identified  Food/Nutrient Intake Outcomes: Enteral nutrition intake/tolerance  Physical Signs/Symptoms Outcomes: Biochemical data, Weight, Constipation    Discharge Planning:    Enteral nutrition    Marquita Li RD  Contact: ext [phone]

## 2022-08-22 NOTE — PROGRESS NOTES
Transition of Care Plan: Return to [facility] (group home). RUR: 12% (low)  Disposition: Return to [facility]  Follow up appointments: PCP/urology/nephrology. DME needed: None. Transportation at Discharge: [facility] be [facility]. Nursing call report to [facility] (group home) at [phone]. Keys or means to access home:   Facility has keys. IM Medicare Letter: 2nd IMM signed 8/22/2022 by patient's sister, Madelin Lugo. Is patient a BCPI-A Bundle:   N/A                   If yes, was Bundle Letter given?:  N/A  Is patient a [place] and connected with the [place]? N/A              If yes, was [place] transfer form completed and [facility] notified? N/A  Caregiver Contact: Madelin Lugo - sister - [phone]. Discharge Caregiver contacted prior to discharge? Family aware of transport this morning. Care Conference needed?:  No.    CM spoke with sister regarding discharge plan today with [facility] at 11:30am to [facility], sister agreeable and had no further questions or concerns for CM. 2nd IMM signed with sister. COVID rapid negative on 8/22/2022. [facility] admissions notified of patient's updated notes/med list sent via fax and they are aware of patient's transport at 11:30 am.  Urology procedure note faxed over to [facility]. Discharge summary to be sent once it is written. 0 - RN notified CM that [facility] was canceled and patient will not be discharging. Med Tele CCL confirmed with CM that attending confirmed patient will not be discharging. CM attempted to contact [facility] to update them regarding this, no answer from admissions. Will try to call again at a later time. 56 - Admissions contacted CM, CM unable to answer call at this time. CM called Martha Muro with admissions back regarding patient and left VM message.       [address] from admissions called ARON, ARON explained that patient would not be discharging today and we will plan for tomorrow instead, Leroy Pickett could not verify what information would be needed to accept patient back but will let the administrators know.       ALMA HumphreyN, RN    Care Management  [phone]

## 2022-08-22 NOTE — PROGRESS NOTES
Hospitalist Progress Note    NAME: Naheed Martin   :  1960   MRN:  [id number]       Assessment / Plan:  Fever, Tm 101.4  Unclear source. Pt was for discharge today but vitals obtained prior to leaving showed fever of 101.4. Will obtain cxr, procal, bcx, ua to r/o infection  Tylenol prn  Start gentle IVF     L arm edema  PIV to be removed. Likely due to dependent edema. Improved with arm elevated and PIV removed. Required CVC placement  due to lack of IV access. duplex L arm neg for DVT. Sepsis due to urinary tract infection   ESBL E coli UTI  Urine culture grew ESBL E coli  Completed 7 days of meropenum on   CXR negative, Rapid covid negative     Hx bladder cancer s/p neobladder  Urinary retention  Does straight cath in group home. C/w carrera catheter here, will discharge with this  Having intermittent retention even with carrera, nursing to cont' with irrigation PRN  S/p cystolitholapaxy, b/l pyelogram, stone removal on . Repeat CT showed stable left hydroureteronephrosis, Calculi in neobladder. Carrera irrigation daily and prn. Discharge to [facility] with carrera  Appreciate urology following     Sinus tachycardia  Intermittent  Likely d/t discomfort from contipation/ urinary retention  C/w metoprolol. Constipation with fecal impaction  Had been getting aggressive bowel regimen and reslitor. Pt had TF and bile material coming out of his G tube on . Repeat KUB showed hydronephrosis and hydroureter  Gi evaluated, recommending surg evaluation. NGT placed for decompression, placed on , removed . Pt with multiple BM after sorbital, dulcolax supp. Abd remains NT and soft    Vent G-tube to gravity if has n/v.  Cont' bowel regimen as prescribed. Avoid opioids. .       Dislodged PEG tube  PEG replaced by IR   NGT placeemnt on , removal on   TF on hold currently, likely re-start tomorrow    Hypernatremia  VAZQUEZ  Cr improved post cystoscopy.      Cerebral palsy  Anxiety/ Depression  Continue with supportive care. Code Status: DO NOT RESUSCITATE. Surrogate Decision Maker: brothers and sister  DVT Prophylaxis: Lovenox. GI Prophylaxis: not indicated     Baseline: Dependent for activity of daily living. Non verbal, but can communicated with nodding/ gestures, better when family at bedside    JESSE: 8/19, back to group home  Barriers: Lithotripsy on 8/19/ Resolution for colonic distension     Subjective:     Chief Complaint / Reason for Physician Visit  Seen this AM.  Excited to be discharge today. Developed fever later. Dc canceled. Sister was at bedside. Review of Systems:  Symptom Y/N Comments  Symptom Y/N Comments   Fever/Chills    Chest Pain     Poor Appetite    Edema     Cough    Abdominal Pain     Sputum    Joint Pain     SOB/FANG    Pruritis/Rash     Nausea/vomit    Tolerating PT/OT     Diarrhea    Tolerating Diet     Constipation    Other       Could NOT obtain due to: Non verbal     Objective:     VITALS:   Last 24hrs VS reviewed since prior progress note. Most recent are:  Patient Vitals for the past 24 hrs:   Temp Pulse Resp BP SpO2   08/22/22 1132 (!) 101.4 °F (38.6 °C) 89 20 90/63 93 %   08/22/22 0746 97.5 °F (36.4 °C) (!) 114 -- 128/86 96 %   08/21/22 2131 -- (!) 123 -- (!) 151/81 --   08/21/22 1938 97.4 °F (36.3 °C) (!) 132 18 (!) 145/81 95 %         Intake/Output Summary (Last 24 hours) at 8/22/2022 1355  Last data filed at 8/22/2022 0545  Gross per 24 hour   Intake 865 ml   Output 2000 ml   Net -1135 ml          I had a face to face encounter and independently examined this patient on 8/22/2022, as outlined below:  PHYSICAL EXAM:  General: Awake, No acute distress  EENT:  EOMI. Anicteric sclerae. MMM  Resp:  CTA bilaterally, no wheezing or rales. No accessory muscle use  CV:  Regular  rhythm,  L arm edema  GI:  Soft, Non distended, Non tender.   +Bowel sounds  Neurologic:  Awake, non verbal at baseline but can make his needs known, contracted extremity  Psych:   Not anxious nor agitated  Skin:  No rashes. No jaundice    Reviewed most current lab test results and cultures  YES  Reviewed most current radiology test results   YES  Review and summation of old records today    NO  Reviewed patient's current orders and MAR    YES  PMH/SH reviewed - no change compared to H&P  ________________________________________________________________________  Care Plan discussed with:    Comments   Patient y    Family  y    RN y    Care Manager     Consultant                        Multidiciplinary team rounds were held today with , nursing, pharmacist and clinical coordinator. Patient's plan of care was discussed; medications were reviewed and discharge planning was addressed. ________________________________________________________________________  Total NON critical care TIME:  36   Minutes    Total CRITICAL CARE TIME Spent:   Minutes non procedure based      Comments   >50% of visit spent in counseling and coordination of care     ________________________________________________________________________  Ed Baptiste MD     Procedures: see electronic medical records for all procedures/Xrays and details which were not copied into this note but were reviewed prior to creation of Plan. LABS:  I reviewed today's most current labs and imaging studies. Pertinent labs include:  No results for input(s): WBC, HGB, HCT, PLT, HGBEXT, HCTEXT, PLTEXT, HGBEXT, HCTEXT, PLTEXT in the last 72 hours.       Recent Labs     08/22/22  0532 08/21/22  0318 08/20/22  0543   * 146* 138   K 4.1 4.0 4.2   * 116* 110*   CO2 26 26 22   * 99 102*   BUN 25* 26* 28*   CREA 0.84 0.82 0.97   CA 8.5 8.4* 9.3   PHOS 2.9 2.9 2.5*   ALB 2.4* 2.5* 3.1*         Signed: Ed Baptiste MD

## 2022-08-23 LAB
ALBUMIN SERPL-MCNC: 2.4 G/DL (ref 3.5–5)
AMM URATE MFR STONE: 10 %
ANION GAP SERPL CALC-SCNC: 6 MMOL/L (ref 5–15)
BASOPHILS # BLD: 0 K/UL (ref 0–0.1)
BASOPHILS NFR BLD: 1 % (ref 0–1)
BUN SERPL-MCNC: 21 MG/DL (ref 6–20)
BUN/CREAT SERPL: 26 (ref 12–20)
CALCIUM SERPL-MCNC: 8.6 MG/DL (ref 8.5–10.1)
CARBONATE APATITE: 10 %
CHLORIDE SERPL-SCNC: 108 MMOL/L (ref 97–108)
CO2 SERPL-SCNC: 26 MMOL/L (ref 21–32)
COLOR STONE: NORMAL
COMMENT, 120677: NORMAL
COMMENT, 519994: NORMAL
COMPOSITION, 120440: NORMAL
CREAT SERPL-MCNC: 0.8 MG/DL (ref 0.7–1.3)
DIFFERENTIAL METHOD BLD: NORMAL
DISCLAIMER, STO32L: NORMAL
EOSINOPHIL # BLD: 0.3 K/UL (ref 0–0.4)
EOSINOPHIL NFR BLD: 3 % (ref 0–7)
ERYTHROCYTE [DISTWIDTH] IN BLOOD BY AUTOMATED COUNT: 13.5 % (ref 11.5–14.5)
GLUCOSE SERPL-MCNC: 104 MG/DL (ref 65–100)
HCT VFR BLD AUTO: 42.7 % (ref 36.6–50.3)
HGB BLD-MCNC: 13.4 G/DL (ref 12.1–17)
IMM GRANULOCYTES # BLD AUTO: 0 K/UL (ref 0–0.04)
IMM GRANULOCYTES NFR BLD AUTO: 0 % (ref 0–0.5)
LYMPHOCYTES # BLD: 1.8 K/UL (ref 0.8–3.5)
LYMPHOCYTES NFR BLD: 22 % (ref 12–49)
MCH RBC QN AUTO: 29 PG (ref 26–34)
MCHC RBC AUTO-ENTMCNC: 31.4 G/DL (ref 30–36.5)
MCV RBC AUTO: 92.4 FL (ref 80–99)
MONOCYTES # BLD: 0.4 K/UL (ref 0–1)
MONOCYTES NFR BLD: 5 % (ref 5–13)
NEUTS SEG # BLD: 5.8 K/UL (ref 1.8–8)
NEUTS SEG NFR BLD: 69 % (ref 32–75)
NRBC # BLD: 0 K/UL (ref 0–0.01)
NRBC BLD-RTO: 0 PER 100 WBC
PHOSPHATE SERPL-MCNC: 2.5 MG/DL (ref 2.6–4.7)
PHOTO, 120675: NORMAL
PLATELET # BLD AUTO: 375 K/UL (ref 150–400)
PLEASE NOTE, 130422: NORMAL
PMV BLD AUTO: 10.4 FL (ref 8.9–12.9)
POTASSIUM SERPL-SCNC: 3.9 MMOL/L (ref 3.5–5.1)
RBC # BLD AUTO: 4.62 M/UL (ref 4.1–5.7)
SIZE STONE: NORMAL MM
SODIUM SERPL-SCNC: 140 MMOL/L (ref 136–145)
SPECIMEN SOURCE: NORMAL
TRI-PHOS MFR STONE: 80 %
WBC # BLD AUTO: 8.3 K/UL (ref 4.1–11.1)
WT STONE: 2332 MG

## 2022-08-23 PROCEDURE — 36415 COLL VENOUS BLD VENIPUNCTURE: CPT

## 2022-08-23 PROCEDURE — 74011000258 HC RX REV CODE- 258: Performed by: INTERNAL MEDICINE

## 2022-08-23 PROCEDURE — 74011250637 HC RX REV CODE- 250/637: Performed by: INTERNAL MEDICINE

## 2022-08-23 PROCEDURE — 74011000250 HC RX REV CODE- 250: Performed by: INTERNAL MEDICINE

## 2022-08-23 PROCEDURE — 74011250637 HC RX REV CODE- 250/637: Performed by: STUDENT IN AN ORGANIZED HEALTH CARE EDUCATION/TRAINING PROGRAM

## 2022-08-23 PROCEDURE — 74011250636 HC RX REV CODE- 250/636: Performed by: INTERNAL MEDICINE

## 2022-08-23 PROCEDURE — 85025 COMPLETE CBC W/AUTO DIFF WBC: CPT

## 2022-08-23 PROCEDURE — 65270000029 HC RM PRIVATE

## 2022-08-23 PROCEDURE — C9113 INJ PANTOPRAZOLE SODIUM, VIA: HCPCS | Performed by: HOSPITALIST

## 2022-08-23 PROCEDURE — 74011000250 HC RX REV CODE- 250: Performed by: HOSPITALIST

## 2022-08-23 PROCEDURE — 80069 RENAL FUNCTION PANEL: CPT

## 2022-08-23 PROCEDURE — 74011250636 HC RX REV CODE- 250/636: Performed by: HOSPITALIST

## 2022-08-23 RX ADMIN — METOPROLOL TARTRATE 12.5 MG: 25 TABLET, FILM COATED ORAL at 10:05

## 2022-08-23 RX ADMIN — OXYCODONE HYDROCHLORIDE AND ACETAMINOPHEN 500 MG: 500 TABLET ORAL at 10:05

## 2022-08-23 RX ADMIN — ONDANSETRON 4 MG: 2 INJECTION INTRAMUSCULAR; INTRAVENOUS at 19:19

## 2022-08-23 RX ADMIN — NORTRIPTYLINE HYDROCHLORIDE 50 MG: 25 CAPSULE ORAL at 22:15

## 2022-08-23 RX ADMIN — MIRTAZAPINE 15 MG: 15 TABLET, FILM COATED ORAL at 10:06

## 2022-08-23 RX ADMIN — SODIUM CHLORIDE 40 MG: 9 INJECTION, SOLUTION INTRAMUSCULAR; INTRAVENOUS; SUBCUTANEOUS at 10:05

## 2022-08-23 RX ADMIN — ENOXAPARIN SODIUM 40 MG: 100 INJECTION SUBCUTANEOUS at 10:05

## 2022-08-23 RX ADMIN — METOPROLOL TARTRATE 12.5 MG: 25 TABLET, FILM COATED ORAL at 22:15

## 2022-08-23 RX ADMIN — MEROPENEM 1 G: 1 INJECTION, POWDER, FOR SOLUTION INTRAVENOUS at 23:20

## 2022-08-23 RX ADMIN — BISACODYL 10 MG: 10 SUPPOSITORY RECTAL at 10:05

## 2022-08-23 RX ADMIN — METOPROLOL TARTRATE 25 MG: 25 TABLET ORAL at 10:07

## 2022-08-23 RX ADMIN — SODIUM CHLORIDE, PRESERVATIVE FREE 10 ML: 5 INJECTION INTRAVENOUS at 19:19

## 2022-08-23 RX ADMIN — SODIUM CHLORIDE, PRESERVATIVE FREE 10 ML: 5 INJECTION INTRAVENOUS at 06:28

## 2022-08-23 RX ADMIN — FLUTICASONE PROPIONATE 2 SPRAY: 50 SPRAY, METERED NASAL at 10:07

## 2022-08-23 RX ADMIN — METOPROLOL TARTRATE 25 MG: 25 TABLET ORAL at 22:15

## 2022-08-23 RX ADMIN — MEROPENEM 1 G: 1 INJECTION, POWDER, FOR SOLUTION INTRAVENOUS at 05:05

## 2022-08-23 RX ADMIN — MEROPENEM 1 G: 1 INJECTION, POWDER, FOR SOLUTION INTRAVENOUS at 13:08

## 2022-08-23 NOTE — PROGRESS NOTES
Nephrology Progress Note  Formerly [facility] / [address], [address]  Phone - [phone]  Fax - [phone]                 Patient: Edward Hatch                   YOB: 1960        Date- 8/23/2022                      Admit Date: 8/8/2022  CC: Follow up for VAZQUEZ, HYPERNATREMIA         IMPRESSION & PLAN:   AKIdue to ATN, IV DYE,hydronephrosis, prerenal factors  B/L hydronephrosis  HYPERNATREMIA  Malfunction of PEG tube (replacement by IR 8/9/2022)  Severe constipation with stool impaction  History of cerebral palsy  History of bladder cancer status post radical cystoprostatectomy with neobladder  Intermittent malfunction of Short catheter. UTIS/P CYSOLITHOLAPAXY, b/l pyelogram, stone removal---8-18-22    PLAN-  NO MORE IVF  Agree with tube feed  We will sign off. Please call us if needed. Subjective: Interval History:   -cr and na improved    8/18/22    CR WORSE  S/P CYSOLITHOLAPAXY, b/l pyelogram, stone removal    Objective:   Vitals:    08/22/22 1948 08/22/22 2121 08/22/22 2332 08/23/22 0850   BP: 117/61 (!) 115/59 (!) 106/56 129/70   Pulse: (!) 114 (!) 109 88 (!) 101   Resp: 16  18 18   Temp: 97.7 °F (36.5 °C)  98.4 °F (36.9 °C) 98.2 °F (36.8 °C)   SpO2: 95%  98% 99%   Weight:       Height:          08/22 0701 - 08/23 0700  In: 60   Out: 3800 [Urine:3800]  Last 3 Recorded Weights in this Encounter    08/08/22 1845 08/18/22 0702   Weight: 50.8 kg (112 lb) 50.8 kg (111 lb 15.9 oz)      Physical exam:    GEN: Noncommunicative,nad  NECK- no mass  RESP: No wheezing, clear b/l  NEURO: Noncommunicative  ABDO: soft , ileal conduit present  EXT: No Edema       Chart reviewed. Pertinent Notes reviewed.      Data Review :  Recent Labs     08/23/22  1040 08/22/22  0532 08/21/22  0318    146* 146*   K 3.9 4.1 4.0    116* 116*   CO2 26 26 26   BUN 21* 25* 26*   CREA 0.80 0.84 0.82 * 108* 99   CA 8.6 8.5 8.4*   PHOS 2.5* 2.9 2.9       Recent Labs     08/23/22  1040   WBC 8.3   HGB 13.4   HCT 42.7          No results for input(s): FE, TIBC, PSAT, FERR in the last 72 hours.    Medication list  reviewed  Current Facility-Administered Medications   Medication Dose Route Frequency    meropenem (MERREM) 1 g in 0.9% sodium chloride (MBP/ADV) 50 mL MBP  1 g IntraVENous Q8H    metoprolol tartrate (LOPRESSOR) tablet 12.5 mg  12.5 mg Per G Tube Q12H    metoprolol tartrate (LOPRESSOR) tablet 25 mg  25 mg Per G Tube Q12H    pantoprazole (PROTONIX) 40 mg in 0.9% sodium chloride 10 mL injection  40 mg IntraVENous DAILY    [Held by provider] senna-docusate (PERICOLACE) 8.6-50 mg per tablet 1 Tablet  1 Tablet Per G Tube QHS    bisacodyL (DULCOLAX) suppository 10 mg  10 mg Rectal DAILY    sodium chloride (NS) flush 5-40 mL  5-40 mL IntraVENous Q8H    sodium chloride (NS) flush 5-40 mL  5-40 mL IntraVENous PRN    acetaminophen (TYLENOL) tablet 650 mg  650 mg Oral Q6H PRN    Or    acetaminophen (TYLENOL) suppository 650 mg  650 mg Rectal Q6H PRN    ondansetron (ZOFRAN ODT) tablet 4 mg  4 mg Oral Q8H PRN    Or    ondansetron (ZOFRAN) injection 4 mg  4 mg IntraVENous Q6H PRN    enoxaparin (LOVENOX) injection 40 mg  40 mg SubCUTAneous DAILY    ascorbic acid (vitamin C) (VITAMIN C) tablet 500 mg  500 mg Per G Tube DAILY    fluticasone propionate (FLONASE) 50 mcg/actuation nasal spray 2 Spray  2 Spray Both Nostrils DAILY    mirtazapine (REMERON) tablet 15 mg  15 mg Per G Tube DAILY    nortriptyline (PAMELOR) capsule 50 mg  50 mg Per G Tube QHS          Matilda Montague MD  8/23/2022

## 2022-08-23 NOTE — PROGRESS NOTES
Bedside and Verbal shift change report given to [address] [place] [address] (oncoming nurse) by Rhona Her RN (offgoing nurse). Report included the following information SBAR, Kardex, Intake/Output, MAR, and Recent Results.

## 2022-08-23 NOTE — PROGRESS NOTES
[facility] on will call for expected transport tomorrow. The [address] [facility] contacted regarding patient's expected discharge tomorrow, CM spoke with Dee, who said [facility] [address] [facility] ([phone]) once available, all other paperwork has already been received. [facility] on will call, paperwork on chart.          ALMA EstesN, RN    Care Management  [phone]

## 2022-08-23 NOTE — PROGRESS NOTES
Hospitalist Progress Note    NAME: Jimy Lopez   :  1960   MRN:  [id number]       Assessment / Plan:  Fever, Tm 101.4 likely due to recurrent UTI from urinary retention  Carrera irrigated yesterday yield ~2L UO. UA suggestive of UTI. With hx of ESBL E.Coli, will re-start meropenum  Cont' gentle IVF  CXR neg for acute findings  Re-consult urology, discussed with urology NP, orders for BID irrigation     L arm edema, resolved  duplex L arm neg for DVT. Sepsis due to ESBL E coli UTI  Urine culture grew ESBL E coli  Completed 7 days of meropenum on      Hx bladder cancer s/p neobladder  Urinary retention  Does straight cath in group home. C/w carrera catheter here, will discharge with this  Having intermittent retention even with carrera, nursing to cont' with irrigation PRN  S/p cystolitholapaxy, b/l pyelogram, stone removal on . Repeat CT showed stable left hydroureteronephrosis, Calculi in neobladder. Carrera irrigation daily and prn. Discharge to [facility] with carrera  Appreciate urology following     Sinus tachycardia  Intermittent  Likely d/t discomfort from contipation/ urinary retention  C/w metoprolol. Constipation with fecal impaction  Had been getting aggressive bowel regimen and reslitor. Pt had TF and bile material coming out of his G tube on . Repeat KUB showed hydronephrosis and hydroureter  Gi evaluated, recommending surg evaluation. NGT placed for decompression, placed on , removed . Pt with multiple BM after sorbital, dulcolax supp. Abd remains NT and soft    Vent G-tube to gravity if has n/v.  Cont' bowel regimen as prescribed. Avoid opioids. .       Dislodged PEG tube  PEG replaced by IR   NGT placeemnt on , removal on   TF on hold currently, likely re-start tomorrow    Hypernatremia  VAZQUEZ  resolved     Cerebral palsy  Anxiety/ Depression  Continue with supportive care.         JESSE:  pending Ucx culture      Code Status: DO NOT RESUSCITATE. Surrogate Decision Maker: brothers and sister  DVT Prophylaxis: Lovenox. GI Prophylaxis: not indicated     Baseline: Dependent for activity of daily living. Non verbal, but can communicated with nodding/ gestures, better when family at bedside    JESSE: 8/19, back to group home  Barriers: Lithotripsy on 8/19/ Resolution for colonic distension     Subjective:     Chief Complaint / Reason for Physician Visit  Pt awake, afebrile this AM.  No new complaint. Review of Systems:  Symptom Y/N Comments  Symptom Y/N Comments   Fever/Chills    Chest Pain     Poor Appetite    Edema     Cough    Abdominal Pain     Sputum    Joint Pain     SOB/FANG    Pruritis/Rash     Nausea/vomit    Tolerating PT/OT     Diarrhea    Tolerating Diet     Constipation    Other       Could NOT obtain due to: Non verbal     Objective:     VITALS:   Last 24hrs VS reviewed since prior progress note. Most recent are:  Patient Vitals for the past 24 hrs:   Temp Pulse Resp BP SpO2   08/23/22 0850 98.2 °F (36.8 °C) (!) 101 18 129/70 99 %   08/22/22 2332 98.4 °F (36.9 °C) 88 18 (!) 106/56 98 %   08/22/22 2121 -- (!) 109 -- (!) 115/59 --   08/22/22 1948 97.7 °F (36.5 °C) (!) 114 16 117/61 95 %   08/22/22 1645 (!) 102 °F (38.9 °C) -- -- -- --   08/22/22 1551 (!) 100.5 °F (38.1 °C) (!) 121 20 110/76 96 %   08/22/22 1132 (!) 101.4 °F (38.6 °C) 89 20 90/63 93 %         Intake/Output Summary (Last 24 hours) at 8/23/2022 1130  Last data filed at 8/23/2022 0446  Gross per 24 hour   Intake 60 ml   Output 3800 ml   Net -3740 ml          I had a face to face encounter and independently examined this patient on 8/23/2022, as outlined below:  PHYSICAL EXAM:  General: Awake, No acute distress  EENT:  EOMI. Anicteric sclerae. MMM  Resp:  CTA bilaterally, no wheezing or rales. No accessory muscle use  CV:  Regular  rhythm,  L arm edema  GI:  Soft, Non distended, Non tender.   +Bowel sounds  Neurologic:  Awake, non verbal at baseline but can make his needs known, contracted extremity  Psych:   Not anxious nor agitated  Skin:  No rashes. No jaundice    Reviewed most current lab test results and cultures  YES  Reviewed most current radiology test results   YES  Review and summation of old records today    NO  Reviewed patient's current orders and MAR    YES  PMH/SH reviewed - no change compared to H&P  ________________________________________________________________________  Care Plan discussed with:    Comments   Patient y    Family      RN y    Care Manager     Consultant                        Multidiciplinary team rounds were held today with , nursing, pharmacist and clinical coordinator. Patient's plan of care was discussed; medications were reviewed and discharge planning was addressed. ________________________________________________________________________  Total NON critical care TIME:  36   Minutes    Total CRITICAL CARE TIME Spent:   Minutes non procedure based      Comments   >50% of visit spent in counseling and coordination of care     ________________________________________________________________________  Constance Martínez MD     Procedures: see electronic medical records for all procedures/Xrays and details which were not copied into this note but were reviewed prior to creation of Plan. LABS:  I reviewed today's most current labs and imaging studies.   Pertinent labs include:  Recent Labs     08/23/22  1040   WBC 8.3   HGB 13.4   HCT 42.7            Recent Labs     08/22/22  0532 08/21/22  0318   * 146*   K 4.1 4.0   * 116*   CO2 26 26   * 99   BUN 25* 26*   CREA 0.84 0.82   CA 8.5 8.4*   PHOS 2.9 2.9   ALB 2.4* 2.5*         Signed: Constance Martínez MD

## 2022-08-23 NOTE — PROGRESS NOTES
Patient audibly heard from hallway gaging in room to assess. When questioned patient nodded head yes to are you nauseated. Tube feed stopped. Zofran given. Thick secretions suctioned from mouth.

## 2022-08-24 PROCEDURE — 74011250636 HC RX REV CODE- 250/636: Performed by: INTERNAL MEDICINE

## 2022-08-24 PROCEDURE — 74011000258 HC RX REV CODE- 258: Performed by: INTERNAL MEDICINE

## 2022-08-24 PROCEDURE — 74011250637 HC RX REV CODE- 250/637: Performed by: INTERNAL MEDICINE

## 2022-08-24 PROCEDURE — 74011000250 HC RX REV CODE- 250: Performed by: INTERNAL MEDICINE

## 2022-08-24 PROCEDURE — C9113 INJ PANTOPRAZOLE SODIUM, VIA: HCPCS | Performed by: HOSPITALIST

## 2022-08-24 PROCEDURE — 74011250637 HC RX REV CODE- 250/637: Performed by: STUDENT IN AN ORGANIZED HEALTH CARE EDUCATION/TRAINING PROGRAM

## 2022-08-24 PROCEDURE — 74011000250 HC RX REV CODE- 250: Performed by: HOSPITALIST

## 2022-08-24 PROCEDURE — 74011250636 HC RX REV CODE- 250/636: Performed by: HOSPITALIST

## 2022-08-24 PROCEDURE — 65270000029 HC RM PRIVATE

## 2022-08-24 RX ORDER — ENOXAPARIN SODIUM 100 MG/ML
30 INJECTION SUBCUTANEOUS DAILY
Status: DISCONTINUED | OUTPATIENT
Start: 2022-08-25 | End: 2022-08-31 | Stop reason: HOSPADM

## 2022-08-24 RX ADMIN — MEROPENEM 1 G: 1 INJECTION, POWDER, FOR SOLUTION INTRAVENOUS at 09:41

## 2022-08-24 RX ADMIN — SODIUM CHLORIDE, PRESERVATIVE FREE 10 ML: 5 INJECTION INTRAVENOUS at 21:06

## 2022-08-24 RX ADMIN — MIRTAZAPINE 15 MG: 15 TABLET, FILM COATED ORAL at 09:40

## 2022-08-24 RX ADMIN — NORTRIPTYLINE HYDROCHLORIDE 50 MG: 25 CAPSULE ORAL at 21:05

## 2022-08-24 RX ADMIN — MEROPENEM 1 G: 1 INJECTION, POWDER, FOR SOLUTION INTRAVENOUS at 04:00

## 2022-08-24 RX ADMIN — FLUTICASONE PROPIONATE 2 SPRAY: 50 SPRAY, METERED NASAL at 09:41

## 2022-08-24 RX ADMIN — METOPROLOL TARTRATE 12.5 MG: 25 TABLET, FILM COATED ORAL at 09:40

## 2022-08-24 RX ADMIN — BISACODYL 10 MG: 10 SUPPOSITORY RECTAL at 09:50

## 2022-08-24 RX ADMIN — VANCOMYCIN HYDROCHLORIDE 1000 MG: 1 INJECTION, POWDER, LYOPHILIZED, FOR SOLUTION INTRAVENOUS at 15:31

## 2022-08-24 RX ADMIN — METOPROLOL TARTRATE 25 MG: 25 TABLET ORAL at 21:04

## 2022-08-24 RX ADMIN — METOPROLOL TARTRATE 12.5 MG: 25 TABLET, FILM COATED ORAL at 21:05

## 2022-08-24 RX ADMIN — METOPROLOL TARTRATE 25 MG: 25 TABLET ORAL at 09:41

## 2022-08-24 RX ADMIN — ENOXAPARIN SODIUM 40 MG: 100 INJECTION SUBCUTANEOUS at 09:40

## 2022-08-24 RX ADMIN — MEROPENEM 1 G: 1 INJECTION, POWDER, FOR SOLUTION INTRAVENOUS at 18:41

## 2022-08-24 RX ADMIN — OXYCODONE HYDROCHLORIDE AND ACETAMINOPHEN 500 MG: 500 TABLET ORAL at 09:40

## 2022-08-24 RX ADMIN — SODIUM CHLORIDE 40 MG: 9 INJECTION, SOLUTION INTRAMUSCULAR; INTRAVENOUS; SUBCUTANEOUS at 09:42

## 2022-08-24 RX ADMIN — SODIUM CHLORIDE, PRESERVATIVE FREE 10 ML: 5 INJECTION INTRAVENOUS at 13:06

## 2022-08-24 RX ADMIN — SODIUM CHLORIDE, PRESERVATIVE FREE 10 ML: 5 INJECTION INTRAVENOUS at 06:58

## 2022-08-24 NOTE — PROGRESS NOTES
Per NP Urologists, would not recommend removing carrera cathter to obtain urine sample. Attempt to clamp cathter and obtain specimen. Infection control nurse notified.

## 2022-08-24 NOTE — PROGRESS NOTES
Bedside and Verbal shift change report given to [address] [place] [address] Road (oncoming nurse) by Elvia Castro RN (offgoing nurse). Report included the following information SBAR, Kardex, Intake/Output, and Recent Results.

## 2022-08-24 NOTE — PROGRESS NOTES
Hospitalist Progress Note    NAME: Mitzy Mejia   :  1960   MRN:  [id number]       Assessment / Plan:  Fever, Tm 101.4 likely due to recurrent UTI from urinary retention  Enterococcus UTI  Carrera irrigated yesterday yield ~2L UO. UA suggestive of UTI. With hx of ESBL E.Coli, new urine culture showed Enterococcus , patient was started on meropenem for UTI E. coli ESBL, has Enterococcus species, added vancomycin  Cont' gentle IVF  CXR neg for acute findings  Re-consult urology, discussed with urology NP, orders for BID irrigation  Currently afebrile  L arm edema, resolved  duplex L arm neg for DVT. Sepsis due to ESBL E coli UTI  Urine culture grew ESBL E coli  Completed 7 days of meropenum on , restarted on meropenem on      Hx bladder cancer s/p neobladder  Urinary retention  Does straight cath in group home. C/w carrera catheter here, will discharge with this  Having intermittent retention even with carrera, nursing to cont' with irrigation PRN  S/p cystolitholapaxy, b/l pyelogram, stone removal on . Repeat CT showed stable left hydroureteronephrosis, Calculi in neobladder. Carrera irrigation daily and prn. Discharge to [address] with carrera  Appreciate urology following     Sinus tachycardia  Intermittent  Likely d/t discomfort from contipation/ urinary retention  C/w metoprolol. Constipation with fecal impaction  Had been getting aggressive bowel regimen and reslitor. Pt had TF and bile material coming out of his G tube on . Repeat KUB showed hydronephrosis and hydroureter  Gi evaluated, recommending surg evaluation. NGT placed for decompression, placed on , removed . Pt with multiple BM after sorbital, dulcolax supp. Abd remains NT and soft    Vent G-tube to gravity if has n/v.  Cont' bowel regimen as prescribed. Avoid opioids. .       Dislodged PEG tube  PEG replaced by IR   NGT placeemnt on , removal on   Tube feeding restarted    Hypernatremia  VAZQUEZ  resolved     Cerebral palsy  Anxiety/ Depression  Continue with supportive care. JESSE: 8/25 pending Ucx culture      Code Status: DO NOT RESUSCITATE. Surrogate Decision Maker: brothers and sister  DVT Prophylaxis: Lovenox. GI Prophylaxis: not indicated     Baseline: Dependent for activity of daily living. Non verbal, but can communicated with nodding/ gestures, better when family at bedside       Subjective:     Chief Complaint / Reason for Physician Visit  Follow-up recurrent UTI  disCussed with the nurse    Review of Systems:  Symptom Y/N Comments  Symptom Y/N Comments   Fever/Chills    Chest Pain     Poor Appetite    Edema     Cough    Abdominal Pain     Sputum    Joint Pain     SOB/FANG    Pruritis/Rash     Nausea/vomit    Tolerating PT/OT     Diarrhea    Tolerating Diet     Constipation    Other       Could NOT obtain due to: Non verbal     Objective:     VITALS:   Last 24hrs VS reviewed since prior progress note. Most recent are:  Patient Vitals for the past 24 hrs:   Temp Pulse Resp BP SpO2   08/24/22 1532 97.4 °F (36.3 °C) 92 18 (!) 119/55 93 %   08/24/22 0846 98.6 °F (37 °C) (!) 101 18 (!) 123/52 90 %   08/23/22 2315 98.9 °F (37.2 °C) 87 16 (!) 113/55 97 %   08/23/22 2215 -- (!) 102 -- 106/62 --   08/23/22 1943 97.9 °F (36.6 °C) 98 16 112/61 97 %         Intake/Output Summary (Last 24 hours) at 8/24/2022 1822  Last data filed at 8/24/2022 1532  Gross per 24 hour   Intake 840 ml   Output 2425 ml   Net -1585 ml          I had a face to face encounter and independently examined this patient on 8/24/2022, as outlined below:  PHYSICAL EXAM:  General: Awake, No acute distress  EENT:  EOMI. Anicteric sclerae. MMM  Resp:  CTA bilaterally, no wheezing or rales. No accessory muscle use  CV:  Regular  rhythm,  L arm edema  GI:  Soft, Non distended, Non tender.   +Bowel sounds  Neurologic:  Awake, non verbal at baseline but can make his needs known, contracted extremity  Psych: Not anxious nor agitated  Skin:  No rashes. No jaundice    Reviewed most current lab test results and cultures  YES  Reviewed most current radiology test results   YES  Review and summation of old records today    NO  Reviewed patient's current orders and MAR    YES  PMH/SH reviewed - no change compared to H&P  ________________________________________________________________________  Care Plan discussed with:    Comments   Patient y    Family      RN y    Care Manager     Consultant                        Multidiciplinary team rounds were held today with , nursing, pharmacist and clinical coordinator. Patient's plan of care was discussed; medications were reviewed and discharge planning was addressed. ________________________________________________________________________  Total NON critical care TIME:  36   Minutes    Total CRITICAL CARE TIME Spent:   Minutes non procedure based      Comments   >50% of visit spent in counseling and coordination of care     ________________________________________________________________________  Estevan Dodge MD     Procedures: see electronic medical records for all procedures/Xrays and details which were not copied into this note but were reviewed prior to creation of Plan. LABS:  I reviewed today's most current labs and imaging studies.   Pertinent labs include:  Recent Labs     08/23/22  1040   WBC 8.3   HGB 13.4   HCT 42.7            Recent Labs     08/23/22  1040 08/22/22  0532    146*   K 3.9 4.1    116*   CO2 26 26   * 108*   BUN 21* 25*   CREA 0.80 0.84   CA 8.6 8.5   PHOS 2.5* 2.9   ALB 2.4* 2.4*         Signed: Estevan Dodge MD

## 2022-08-24 NOTE — PROGRESS NOTES
Pharmacy Antimicrobial Kinetic Dosing    Indication for Antimicrobials: UTI     Current Regimen of Each Antimicrobial:  Meropenem 1 gm IV q8h (-; restart ; Day # 3)  Vancomycin 1000 mg IV once, then (Start Date ; Day # 1)     Goal Level: AUC: 400-600 mg/hr/Liter/day    Date Dose & Interval Measured (mcg/mL) Predicted AUC/ARJUN                       Dosing calculator used: Excel-based calculator    Significant Positive Cultures:    urine: ESBL klebsiella, sens to meropenem   blood: NG   blood: NG   blood: NG x 2 days   urine: probable enterococcus species and GNR, pending    Conditions for Dosing Consideration: quadriplegic cerebral palsy, hx VAZQUEZ    Labs:  Recent Labs     22  1040 22  1255 22  0532   CREA 0.80  --  0.84   BUN 21*  --  25*   PCT  --  <0.05  --      Recent Labs     22  1040   WBC 8.3     Temp (24hrs), Av.2 °F (36.8 °C), Min:97.4 °F (36.3 °C), Max:98.9 °F (37.2 °C)    Creatinine Clearance (mL/min):   CrCl (Ideal Body Weight): 90.7   If actual weight < IBW: CrCl (Actual Body Weight) 69.7    Impression/Plan:   POD day 6 cystoscopy laser of neobladder stones  Chronic Left hydronephrosis with extrarenal pelvis. Bladder cancer status post radical cystoprostatectomy with ileal neobladder urinary diversion. Continue meropenem  Vancomycin 1000 mg loading dose, then 500 mg IV q12h (predicted , predicted trough 13.6 mcg/ml)  BMP daily  Antimicrobial stop date TBD     Pharmacy will follow daily and adjust medications as appropriate for renal function and/or serum levels.     Thank you,  Rosaline Osler, PHARMD

## 2022-08-24 NOTE — PROGRESS NOTES
Transition of Care Plan: Return to [facility] (group home). RUR: 12% (low)  Disposition: Return to [facility]  Follow up appointments: PCP/urology/nephrology. DME needed: None. Transportation at Discharge: [facility] on will call. Nursing call report to [facility] (group home) at [phone]. Keys or means to access home:   Facility has keys.  Medicare Letter: [facility] signed 8/22/2022 by patient's sister, Rick Kapoor. Is patient a BCPI-A Bundle:   N/A                   If yes, was Bundle Letter given?:  N/A  Is patient a [place] and connected with the [place]? N/A              If yes, was [place] transfer form completed and [facility] notified? N/A  Caregiver Contact: Rick Kapoor - sister - [phone]. Discharge Caregiver contacted prior to discharge? Family aware of transport this morning. Care Conference needed?:  No.     Patient pending medical stability prior to discharge. CM to notify sister and group [place] regarding patient's discharge once stable.         Colonel Tong, ALMAN, RN     Care Management  [phone]

## 2022-08-24 NOTE — PROGRESS NOTES
P&T-Approved DVT Prophylaxis Dosing    Per P&T Committee-approved protocol enoxaparin 40 mg daily has been adjusted to enoxaparin 30 mg daily based on weight and renal function as shown in the table below.          Saint Furlough, PHARMD

## 2022-08-24 NOTE — PROGRESS NOTES
Short irrigation and output       08/23/22 2112   Urinary Catheter 08/11/22   Placement Date: 08/11/22     Irrigation Volume Input (mL) 60 ml   Urine Output (mL) 1225 ml

## 2022-08-25 LAB
ANION GAP SERPL CALC-SCNC: 5 MMOL/L (ref 5–15)
BUN SERPL-MCNC: 21 MG/DL (ref 6–20)
BUN/CREAT SERPL: 36 (ref 12–20)
CALCIUM SERPL-MCNC: 8.4 MG/DL (ref 8.5–10.1)
CHLORIDE SERPL-SCNC: 110 MMOL/L (ref 97–108)
CO2 SERPL-SCNC: 28 MMOL/L (ref 21–32)
CREAT SERPL-MCNC: 0.59 MG/DL (ref 0.7–1.3)
GLUCOSE SERPL-MCNC: 93 MG/DL (ref 65–100)
POTASSIUM SERPL-SCNC: 3.9 MMOL/L (ref 3.5–5.1)
SODIUM SERPL-SCNC: 143 MMOL/L (ref 136–145)

## 2022-08-25 PROCEDURE — 74011250637 HC RX REV CODE- 250/637: Performed by: INTERNAL MEDICINE

## 2022-08-25 PROCEDURE — 74011000258 HC RX REV CODE- 258: Performed by: INTERNAL MEDICINE

## 2022-08-25 PROCEDURE — 80048 BASIC METABOLIC PNL TOTAL CA: CPT

## 2022-08-25 PROCEDURE — 36415 COLL VENOUS BLD VENIPUNCTURE: CPT

## 2022-08-25 PROCEDURE — 74011250636 HC RX REV CODE- 250/636: Performed by: INTERNAL MEDICINE

## 2022-08-25 PROCEDURE — 65270000029 HC RM PRIVATE

## 2022-08-25 PROCEDURE — 74011250637 HC RX REV CODE- 250/637: Performed by: STUDENT IN AN ORGANIZED HEALTH CARE EDUCATION/TRAINING PROGRAM

## 2022-08-25 PROCEDURE — 74011000250 HC RX REV CODE- 250: Performed by: INTERNAL MEDICINE

## 2022-08-25 RX ADMIN — METOPROLOL TARTRATE 12.5 MG: 25 TABLET, FILM COATED ORAL at 22:06

## 2022-08-25 RX ADMIN — LANSOPRAZOLE 30 MG: KIT at 09:24

## 2022-08-25 RX ADMIN — FLUTICASONE PROPIONATE 2 SPRAY: 50 SPRAY, METERED NASAL at 09:31

## 2022-08-25 RX ADMIN — NORTRIPTYLINE HYDROCHLORIDE 50 MG: 25 CAPSULE ORAL at 22:04

## 2022-08-25 RX ADMIN — MIRTAZAPINE 15 MG: 15 TABLET, FILM COATED ORAL at 09:21

## 2022-08-25 RX ADMIN — METOPROLOL TARTRATE 25 MG: 25 TABLET ORAL at 22:06

## 2022-08-25 RX ADMIN — MEROPENEM 1 G: 1 INJECTION, POWDER, FOR SOLUTION INTRAVENOUS at 09:25

## 2022-08-25 RX ADMIN — SODIUM CHLORIDE, PRESERVATIVE FREE 10 ML: 5 INJECTION INTRAVENOUS at 05:06

## 2022-08-25 RX ADMIN — VANCOMYCIN HYDROCHLORIDE 500 MG: 500 INJECTION, POWDER, LYOPHILIZED, FOR SOLUTION INTRAVENOUS at 16:28

## 2022-08-25 RX ADMIN — SODIUM CHLORIDE, PRESERVATIVE FREE 10 ML: 5 INJECTION INTRAVENOUS at 13:36

## 2022-08-25 RX ADMIN — METOPROLOL TARTRATE 12.5 MG: 25 TABLET, FILM COATED ORAL at 09:20

## 2022-08-25 RX ADMIN — METOPROLOL TARTRATE 25 MG: 25 TABLET ORAL at 09:00

## 2022-08-25 RX ADMIN — OXYCODONE HYDROCHLORIDE AND ACETAMINOPHEN 500 MG: 500 TABLET ORAL at 09:21

## 2022-08-25 RX ADMIN — ENOXAPARIN SODIUM 30 MG: 100 INJECTION SUBCUTANEOUS at 09:21

## 2022-08-25 RX ADMIN — BISACODYL 10 MG: 10 SUPPOSITORY RECTAL at 09:21

## 2022-08-25 RX ADMIN — ACETAMINOPHEN 650 MG: 325 TABLET ORAL at 22:04

## 2022-08-25 RX ADMIN — MEROPENEM 1 G: 1 INJECTION, POWDER, FOR SOLUTION INTRAVENOUS at 00:56

## 2022-08-25 RX ADMIN — VANCOMYCIN HYDROCHLORIDE 500 MG: 500 INJECTION, POWDER, LYOPHILIZED, FOR SOLUTION INTRAVENOUS at 05:06

## 2022-08-25 RX ADMIN — SODIUM CHLORIDE, PRESERVATIVE FREE 10 ML: 5 INJECTION INTRAVENOUS at 23:29

## 2022-08-25 RX ADMIN — MEROPENEM 1 G: 1 INJECTION, POWDER, FOR SOLUTION INTRAVENOUS at 16:28

## 2022-08-25 NOTE — PROGRESS NOTES
Pharmacy Antimicrobial Kinetic Dosing    Indication for Antimicrobials: UTI     Current Regimen of Each Antimicrobial:  Meropenem 1 gm IV q8h (-; restart ; Day # 4)  Vancomycin 1000 mg IV once, then (Start Date ; Day # 2)     Goal Level: AUC: 400-600 mg/hr/Liter/day    Date Dose & Interval Measured (mcg/mL) Predicted AUC/ARJUN                       Date & time of next level: 400    Dosing calculator used: Excel-based calculator    Significant Positive Cultures:    urine: ESBL klebsiella, sens to meropenem   blood: NG   blood: NG   blood: NG x 2 days   urine: probable enterococcus species and GNR, pending    Conditions for Dosing Consideration: quadriplegic cerebral palsy, hx VAZQUEZ    Labs:  Recent Labs     22  0455 22  1040 22  1255   CREA 0.59* 0.80  --    BUN 21* 21*  --    PCT  --   --  <0.05     Recent Labs     22  1040   WBC 8.3     Temp (24hrs), Av.5 °F (36.4 °C), Min:97.4 °F (36.3 °C), Max:97.6 °F (36.4 °C)    Creatinine Clearance (mL/min):   CrCl (Ideal Body Weight): 103.6   If actual weight < IBW: CrCl (Actual Body Weight) 79.6    Impression/Plan:   POD day 7 cystoscopy laser of neobladder stones  Chronic Left hydronephrosis with extrarenal pelvis. Bladder cancer status post radical cystoprostatectomy with ileal neobladder urinary diversion. Continue meropenem and vancomycin  Ordered a vancomycin level 400  [facility] [place] daily  Antimicrobial stop date TBD     Pharmacy will follow daily and adjust medications as appropriate for renal function and/or serum levels.     Thank you,  Bekah Messina, PHARMD

## 2022-08-25 NOTE — PROGRESS NOTES
CM contacted Crystal with [facility] regarding patient's expected discharge today. They do not need anything prior to patient returning to group home, discharge summary to be faxed afterwards to [phone]. Attending notified regarding patient's discharge needs, said they are still waiting for labs to come back to decide on home IV abx. ARON was connected with physician with [facility], requested updated notes and updated regarding discharge expected for tomorrow. [facility] to [facility].       ALMA FerreiraN, RN    Care Management  [phone]

## 2022-08-25 NOTE — PROGRESS NOTES
Hospitalist Progress Note    NAME: Demetrio aJin   :  1960   MRN:  [id number]       Assessment / Plan:  Fever, Tm 101.4 likely due to recurrent UTI from urinary retention  Enterococcus UTI  E. coli ESBL UTI  Carrera irrigated yesterday yield ~2L UO. UA suggestive of UTI. With hx of ESBL E.Coli, new urine culture showed Enterococcus , patient was started on meropenem for UTI E. coli ESBL, has Enterococcus species, added vancomycin  Cont' gentle IVF  CXR neg for acute findings  Re-consult urology, discussed with urology NP, orders for BID irrigation  Currently afebrile  Urine culture showed Enterococcus faecalis, awaiting for speciation of the second gram-negative bacteria. Most likely patient can be discharged on Cipro to complete a 7-day course  L arm edema, resolved  duplex L arm neg for DVT. Sepsis due to ESBL E coli UTI  Urine culture grew ESBL E coli  Completed 7 days of meropenum on , restarted on meropenem on      Hx bladder cancer s/p neobladder  Urinary retention  Does straight cath in group home. C/w carrera catheter here, will discharge with this  Having intermittent retention even with carrera, nursing to cont' with irrigation PRN  S/p cystolitholapaxy, b/l pyelogram, stone removal on . Repeat CT showed stable left hydroureteronephrosis, Calculi in neobladder. Carrera irrigation daily and prn. Discharge to [address] with carrera  Appreciate urology following     Sinus tachycardia  Intermittent  Likely d/t discomfort from contipation/ urinary retention  C/w metoprolol. Constipation with fecal impaction  Had been getting aggressive bowel regimen and reslitor. Pt had TF and bile material coming out of his G tube on . Repeat KUB showed hydronephrosis and hydroureter  Gi evaluated, recommending surg evaluation. NGT placed for decompression, placed on , removed . Pt with multiple BM after sorbital, dulcolax supp.   Abd remains NT and soft    Vent G-tube to gravity if has n/v.  Cont' bowel regimen as prescribed. Avoid opioids. .       Dislodged PEG tube  PEG replaced by IR 8/12  NGT placeemnt on 8/18, removal on 8/19  Tube feeding restarted    Hypernatremia  VAZQUEZ  resolved     Cerebral palsy  Anxiety/ Depression  Continue with supportive care. JESSE: 8/25 pending Ucx culture      Code Status: DO NOT RESUSCITATE. Surrogate Decision Maker: brothers and sister  DVT Prophylaxis: Lovenox. GI Prophylaxis: not indicated     Baseline: Dependent for activity of daily living. Non verbal, but can communicated with nodding/ gestures, better when family at bedside       Subjective:     Chief Complaint / Reason for Physician Visit  Follow-up recurrent UTI  disCussed with the nurse    Review of Systems:  Symptom Y/N Comments  Symptom Y/N Comments   Fever/Chills    Chest Pain     Poor Appetite    Edema     Cough    Abdominal Pain     Sputum    Joint Pain     SOB/FANG    Pruritis/Rash     Nausea/vomit    Tolerating PT/OT     Diarrhea    Tolerating Diet     Constipation    Other       Could NOT obtain due to: Non verbal     Objective:     VITALS:   Last 24hrs VS reviewed since prior progress note. Most recent are:  Patient Vitals for the past 24 hrs:   Temp Pulse Resp BP SpO2   08/25/22 1556 98.2 °F (36.8 °C) 75 18 121/66 98 %   08/25/22 0741 97.8 °F (36.6 °C) 92 18 118/72 94 %   08/25/22 0507 97.5 °F (36.4 °C) 90 16 121/65 --   08/24/22 2105 97.6 °F (36.4 °C) 89 16 116/69 93 %         Intake/Output Summary (Last 24 hours) at 8/25/2022 1652  Last data filed at 8/25/2022 1421  Gross per 24 hour   Intake 310 ml   Output 800 ml   Net -490 ml          I had a face to face encounter and independently examined this patient on 8/25/2022, as outlined below:  PHYSICAL EXAM:  General: Awake, No acute distress  EENT:  EOMI. Anicteric sclerae. MMM  Resp:  CTA bilaterally, no wheezing or rales. No accessory muscle use  CV:  Regular  rhythm,  L arm edema  GI:  Soft, Non distended, Non tender. +Bowel sounds  Neurologic:  Awake, non verbal at baseline but can make his needs known, contracted extremity  Psych:   Not anxious nor agitated  Skin:  No rashes. No jaundice    Reviewed most current lab test results and cultures  YES  Reviewed most current radiology test results   YES  Review and summation of old records today    NO  Reviewed patient's current orders and MAR    YES  PMH/SH reviewed - no change compared to H&P  ________________________________________________________________________  Care Plan discussed with:    Comments   Patient y    Family      RN y    Care Manager     Consultant                        Multidiciplinary team rounds were held today with , nursing, pharmacist and clinical coordinator. Patient's plan of care was discussed; medications were reviewed and discharge planning was addressed. ________________________________________________________________________  Total NON critical care TIME:  36   Minutes    Total CRITICAL CARE TIME Spent:   Minutes non procedure based      Comments   >50% of visit spent in counseling and coordination of care     ________________________________________________________________________  Heriberto Hickman MD     Procedures: see electronic medical records for all procedures/Xrays and details which were not copied into this note but were reviewed prior to creation of Plan. LABS:  I reviewed today's most current labs and imaging studies.   Pertinent labs include:  Recent Labs     08/23/22  1040   WBC 8.3   HGB 13.4   HCT 42.7            Recent Labs     08/25/22  0455 08/23/22  1040    140   K 3.9 3.9   * 108   CO2 28 26   GLU 93 104*   BUN 21* 21*   CREA 0.59* 0.80   CA 8.4* 8.6   PHOS  --  2.5*   ALB  --  2.4*         Signed: Heriberto Hickman MD

## 2022-08-26 LAB
ANION GAP SERPL CALC-SCNC: 5 MMOL/L (ref 5–15)
BUN SERPL-MCNC: 21 MG/DL (ref 6–20)
BUN/CREAT SERPL: 37 (ref 12–20)
CALCIUM SERPL-MCNC: 8.9 MG/DL (ref 8.5–10.1)
CHLORIDE SERPL-SCNC: 106 MMOL/L (ref 97–108)
CO2 SERPL-SCNC: 30 MMOL/L (ref 21–32)
CREAT SERPL-MCNC: 0.57 MG/DL (ref 0.7–1.3)
DATE LAST DOSE: NORMAL
GLUCOSE SERPL-MCNC: 90 MG/DL (ref 65–100)
POTASSIUM SERPL-SCNC: 4.3 MMOL/L (ref 3.5–5.1)
REPORTED DOSE,DOSE: NORMAL UNITS
REPORTED DOSE/TIME,TMG: NORMAL
SODIUM SERPL-SCNC: 141 MMOL/L (ref 136–145)
VANCOMYCIN TROUGH SERPL-MCNC: 7.4 UG/ML (ref 5–10)

## 2022-08-26 PROCEDURE — 36415 COLL VENOUS BLD VENIPUNCTURE: CPT

## 2022-08-26 PROCEDURE — 65270000029 HC RM PRIVATE

## 2022-08-26 PROCEDURE — 80202 ASSAY OF VANCOMYCIN: CPT

## 2022-08-26 PROCEDURE — 74011250637 HC RX REV CODE- 250/637: Performed by: STUDENT IN AN ORGANIZED HEALTH CARE EDUCATION/TRAINING PROGRAM

## 2022-08-26 PROCEDURE — 80048 BASIC METABOLIC PNL TOTAL CA: CPT

## 2022-08-26 PROCEDURE — 74011250637 HC RX REV CODE- 250/637: Performed by: INTERNAL MEDICINE

## 2022-08-26 PROCEDURE — 74011000250 HC RX REV CODE- 250: Performed by: INTERNAL MEDICINE

## 2022-08-26 PROCEDURE — 51798 US URINE CAPACITY MEASURE: CPT

## 2022-08-26 PROCEDURE — 74011000258 HC RX REV CODE- 258: Performed by: INTERNAL MEDICINE

## 2022-08-26 PROCEDURE — 74011250636 HC RX REV CODE- 250/636: Performed by: INTERNAL MEDICINE

## 2022-08-26 RX ADMIN — MEROPENEM 1 G: 1 INJECTION, POWDER, FOR SOLUTION INTRAVENOUS at 01:45

## 2022-08-26 RX ADMIN — NORTRIPTYLINE HYDROCHLORIDE 50 MG: 25 CAPSULE ORAL at 21:53

## 2022-08-26 RX ADMIN — MEROPENEM 1 G: 1 INJECTION, POWDER, FOR SOLUTION INTRAVENOUS at 09:33

## 2022-08-26 RX ADMIN — VANCOMYCIN HYDROCHLORIDE 750 MG: 750 INJECTION, POWDER, LYOPHILIZED, FOR SOLUTION INTRAVENOUS at 15:51

## 2022-08-26 RX ADMIN — LANSOPRAZOLE 30 MG: KIT at 09:30

## 2022-08-26 RX ADMIN — OXYCODONE HYDROCHLORIDE AND ACETAMINOPHEN 500 MG: 500 TABLET ORAL at 09:34

## 2022-08-26 RX ADMIN — METOPROLOL TARTRATE 12.5 MG: 25 TABLET, FILM COATED ORAL at 09:34

## 2022-08-26 RX ADMIN — METOPROLOL TARTRATE 12.5 MG: 25 TABLET, FILM COATED ORAL at 21:53

## 2022-08-26 RX ADMIN — METOPROLOL TARTRATE 25 MG: 25 TABLET ORAL at 21:59

## 2022-08-26 RX ADMIN — BISACODYL 10 MG: 10 SUPPOSITORY RECTAL at 09:34

## 2022-08-26 RX ADMIN — MIRTAZAPINE 15 MG: 15 TABLET, FILM COATED ORAL at 09:34

## 2022-08-26 RX ADMIN — VANCOMYCIN HYDROCHLORIDE 500 MG: 500 INJECTION, POWDER, LYOPHILIZED, FOR SOLUTION INTRAVENOUS at 05:57

## 2022-08-26 RX ADMIN — FLUTICASONE PROPIONATE 2 SPRAY: 50 SPRAY, METERED NASAL at 09:31

## 2022-08-26 RX ADMIN — SODIUM CHLORIDE, PRESERVATIVE FREE 10 ML: 5 INJECTION INTRAVENOUS at 21:59

## 2022-08-26 RX ADMIN — ENOXAPARIN SODIUM 30 MG: 100 INJECTION SUBCUTANEOUS at 09:32

## 2022-08-26 NOTE — PROGRESS NOTES
This RN gave report to Rebecca Flores (oncoming nurse). All questions answered. Pt had an uneventful night. Pt's Short flushed with 100cc twice this shift to keep patency. This AM, pt was awake in bed at handoff.

## 2022-08-26 NOTE — PROGRESS NOTES
[facility] said they would prefer to not accept patient back over the weekend if their pharmacy is required to provide antibiotics because the pharmacy closes on the weekend. They are okay to accept patient back if home health and IV abx company can provide medications over the weekend (if needed). They will need updated urine labs, discharge summary and any IV abx orders faxed to   [phone].       Lexus Forrest, BSN, RN    Care Management  [phone]

## 2022-08-26 NOTE — PROGRESS NOTES
Comprehensive Nutrition Assessment    Type and Reason for Visit: Reassess    Nutrition Recommendations/Plan:   Continue patient's home bolus TF regimen as ordered      Nutrition Assessment:    Chart reviewed; medically noted for UTI. Patient with TF orders according to his home regimen. Midday bolus delayed yesterday due to complaints of abdominal pain per RN but tolerated well later in the day. For potential discharge today. Malnutrition Assessment:  Malnutrition Status:  No malnutrition (08/22/22 1608)      Nutrition Related Findings:    Labs reviewed  BM 8/25  Vitamin C, Dulcolax, Lopressor, remeron     Current Nutrition Intake & Therapies:        Current Tube Feeding (TF) Orders:   Feeding Route: Gastrostomy  Formula: Standard with fiber  Schedule: Bolus    Regimen: 355 mL 3x/day  Additives/Modulars: None  Water Flushes: 480 mL after each bolus  Current TF & Flush Orders Provides: 1600 kcals, 68g protein, 2249 mL water    Anthropometric Measures:  Height: 5' 7\" (170.2 cm)  Ideal Body Weight (IBW): 148 lbs (67 kg)     Current Body Wt:  50.8 kg (111 lb 15.9 oz), 75.7 % IBW. Current BMI (kg/m2): 17.5                          BMI Category: Underweight (BMI less than 18.5)    Estimated Daily Nutrient Needs:  Energy Requirements Based On: Formula  Weight Used for Energy Requirements: Current  Energy (kcal/day): 1656 kcals (BMR 1274 x 1. 3AF)  Weight Used for Protein Requirements: Current  Protein (g/day): 61-72g (1.2-1.5 g/kg bw)  Method Used for Fluid Requirements: 1 ml/kcal  Fluid (ml/day): 1650 mL    Nutrition Diagnosis:   No nutrition diagnosis at this time     Nutrition Interventions:   Food and/or Nutrient Delivery: Continue tube feeding  Nutrition Education/Counseling: No recommendations at this time  Coordination of Nutrition Care: Continue to monitor while inpatient       Goals:  Previous Goal Met: Goal(s) achieved  Goals:  Tolerate nutrition support at goal rate, by next RD assessment       Nutrition Monitoring and Evaluation:   Behavioral-Environmental Outcomes: None identified  Food/Nutrient Intake Outcomes: Enteral nutrition intake/tolerance  Physical Signs/Symptoms Outcomes: Biochemical data, Weight    Discharge Planning:    Enteral nutrition    Patience Screen, RD  Contact: ext [phone]

## 2022-08-26 NOTE — PROGRESS NOTES
Hospitalist Progress Note    NAME: Lashonda Rankin   :  1960   MRN:  [id number]       Assessment / Plan:  Fever, Tm 101.4 likely due to recurrent UTI from urinary retention  Enterococcus UTI  E. coli ESBL UTI  Carrera irrigated yesterday yield ~2L UO. UA suggestive of UTI. With hx of ESBL E.Coli, new urine culture showed Enterococcus , patient was started on meropenem for UTI E. coli ESBL, has Enterococcus species, added vancomycin  Cont' gentle IVF  CXR neg for acute findings  Re-consult urology, discussed with urology NP, orders for BID irrigation  Currently afebrile  Urine culture showed Enterococcus faecalis, awaiting for speciation of the second gram-negative bacteria. Most likely patient can be discharged on Cipro to complete a 7-day course  Second gram-negative bacteria showed Stenotrophomonas (Xantho.) maltophilia   ID consulted due to presence of 3 bacteria in the urine, patient also have documented allergy to a number of antibiotics  L arm edema, resolved  duplex L arm neg for DVT. Sepsis due to ESBL E coli UTI  Urine culture grew ESBL E coli  Completed 7 days of meropenum on , restarted on meropenem on   Discontinue meropenem as new urine culture showed Enterococcus faecalis     Hx bladder cancer s/p neobladder  Urinary retention  Does straight cath in group home. C/w carrera catheter here, will discharge with this  Having intermittent retention even with carrera, nursing to cont' with irrigation PRN  S/p cystolitholapaxy, b/l pyelogram, stone removal on . Repeat CT showed stable left hydroureteronephrosis, Calculi in neobladder. Carrera irrigation daily and prn. Discharge to [address] with carrera  Appreciate urology following     Sinus tachycardia  Intermittent  Likely d/t discomfort from contipation/ urinary retention  C/w metoprolol. Constipation with fecal impaction  Had been getting aggressive bowel regimen and reslitor.   Pt had TF and bile material coming out of his G tube on 8/17. Repeat KUB showed hydronephrosis and hydroureter  Gi evaluated, recommending surg evaluation. NGT placed for decompression, placed on 8/17, removed 8/19. Pt with multiple BM after sorbital, dulcolax supp. Abd remains NT and soft    Vent G-tube to gravity if has n/v.  Cont' bowel regimen as prescribed. Avoid opioids. .       Dislodged PEG tube  PEG replaced by IR 8/12  NGT placeemnt on 8/18, removal on 8/19  Tube feeding restarted    Hypernatremia  VAZQUEZ  resolved     Cerebral palsy  Anxiety/ Depression  Continue with supportive care. JESSE: 8/25 pending Ucx culture      Code Status: DO NOT RESUSCITATE. Surrogate Decision Maker: brothers and sister  DVT Prophylaxis: Lovenox. GI Prophylaxis: not indicated     Baseline: Dependent for activity of daily living. Non verbal, but can communicated with nodding/ gestures, better when family at bedside       Subjective:     Chief Complaint / Reason for Physician Visit  Follow-up recurrent UTI  disCussed with the nurse  No acute issues    Review of Systems:  Symptom Y/N Comments  Symptom Y/N Comments   Fever/Chills    Chest Pain     Poor Appetite    Edema     Cough    Abdominal Pain     Sputum    Joint Pain     SOB/FANG    Pruritis/Rash     Nausea/vomit    Tolerating PT/OT     Diarrhea    Tolerating Diet     Constipation    Other       Could NOT obtain due to: Non verbal     Objective:     VITALS:   Last 24hrs VS reviewed since prior progress note.  Most recent are:  Patient Vitals for the past 24 hrs:   Temp Pulse Resp BP SpO2   08/26/22 0759 98 °F (36.7 °C) 81 18 (!) 114/57 100 %   08/26/22 0552 98.1 °F (36.7 °C) 76 16 111/63 97 %   08/25/22 2239 98.3 °F (36.8 °C) (!) 110 20 108/68 97 %   08/25/22 1556 98.2 °F (36.8 °C) 75 18 121/66 98 %         Intake/Output Summary (Last 24 hours) at 8/26/2022 0836  Last data filed at 8/26/2022 0602  Gross per 24 hour   Intake 1175 ml   Output 2800 ml   Net -1625 ml          I had a face to face encounter and independently examined this patient on 8/26/2022, as outlined below:  PHYSICAL EXAM:  General: Awake, No acute distress  EENT:  EOMI. Anicteric sclerae. MMM  Resp:  CTA bilaterally, no wheezing or rales. No accessory muscle use  CV:  Regular  rhythm,  L arm edema  GI:  Soft, Non distended, Non tender. +Bowel sounds  Neurologic:  Awake, non verbal at baseline but can make his needs known, contracted extremity  Psych:   Not anxious nor agitated  Skin:  No rashes. No jaundice    Reviewed most current lab test results and cultures  YES  Reviewed most current radiology test results   YES  Review and summation of old records today    NO  Reviewed patient's current orders and MAR    YES  PMH/SH reviewed - no change compared to H&P  ________________________________________________________________________  Care Plan discussed with:    Comments   Patient y    Family      RN y    Care Manager     Consultant                        Multidiciplinary team rounds were held today with , nursing, pharmacist and clinical coordinator. Patient's plan of care was discussed; medications were reviewed and discharge planning was addressed. ________________________________________________________________________  Total NON critical care TIME:  36   Minutes    Total CRITICAL CARE TIME Spent:   Minutes non procedure based      Comments   >50% of visit spent in counseling and coordination of care     ________________________________________________________________________  Janina Tellez MD     Procedures: see electronic medical records for all procedures/Xrays and details which were not copied into this note but were reviewed prior to creation of Plan. LABS:  I reviewed today's most current labs and imaging studies.   Pertinent labs include:  Recent Labs     08/23/22  1040   WBC 8.3   HGB 13.4   HCT 42.7            Recent Labs     08/26/22  0443 08/25/22  0455 08/23/22  1040    143 140   K 4.3 3.9 3.9   CL 106 110* 108   CO2 30 28 26   GLU 90 93 104*   BUN 21* 21* 21*   CREA 0.57* 0.59* 0.80   CA 8.9 8.4* 8.6   PHOS  --   --  2.5*   ALB  --   --  2.4*         Signed: Walter Andre MD

## 2022-08-26 NOTE — PROGRESS NOTES
Pharmacy Antimicrobial Kinetic Dosing    Indication for Antimicrobials: UTI     Current Regimen of Each Antimicrobial:  Meropenem 1 gm IV q8h (-; restart ; Day # 5)  Vancomycin 1000 mg IV once, then (Start Date ; Day # 3)     Goal Level: AUC: 400-600 mg/hr/Liter/day    Date Dose & Interval Measured (mcg/mL) Predicted AUC/ARJUN    04:43 500 mg q12h 7.4 263                 Dosing calculator used: Excel-based calculator    Significant Positive Cultures:    urine: ESBL klebsiella, sens to meropenem   blood: NG   blood: NG   blood: NG x 4 days   urine: enterococcus faecalis and stenotrophomonas     Conditions for Dosing Consideration: quadriplegic cerebral palsy, hx VAZQUEZ    Labs:  Recent Labs     22  0443 22  0455   CREA 0.57* 0.59*   BUN 21* 21*     No results for input(s): WBC, BANDS in the last 72 hours. Temp (24hrs), Av.2 °F (36.8 °C), Min:98 °F (36.7 °C), Max:98.3 °F (36.8 °C)    Creatinine Clearance (mL/min):   CrCl (Ideal Body Weight): 103.6   If actual weight < IBW: CrCl (Actual Body Weight) 79.6    Impression/Plan:   POD day 7 cystoscopy laser of neobladder stones  Chronic Left hydronephrosis with extrarenal pelvis. Bladder cancer status post radical cystoprostatectomy with ileal neobladder urinary diversion. The vancomycin trough resulted at 7.7 mcg/ml (). Will increase the dose to 750 mg IV q12h. Pt likely can be discharged on PO abx. Note multiple drug allergies to antibiotics listed in the chart (amikacin, amoxicillin, Cipro, Keflex, sulfonamide antibiotics, tetracycline). Called patient's group home to attempt to clarify the specific reactions to these medications. I spoke with the patient's physician at the group home, and she stated that they do not have a record of the patient's specific reaction/allergy to each antibiotic.    ID consulted   BMP daily  Antimicrobial stop date TBD     Pharmacy will follow daily and adjust medications as appropriate for renal function and/or serum levels.     Thank you,  Nydia Brown, PHARMD

## 2022-08-27 LAB
ANION GAP SERPL CALC-SCNC: 3 MMOL/L (ref 5–15)
BACTERIA SPEC CULT: ABNORMAL
BACTERIA SPEC CULT: ABNORMAL
BACTERIA SPEC CULT: NORMAL
BASOPHILS # BLD: 0.1 K/UL (ref 0–0.1)
BASOPHILS NFR BLD: 1 % (ref 0–1)
BUN SERPL-MCNC: 17 MG/DL (ref 6–20)
BUN/CREAT SERPL: 28 (ref 12–20)
CALCIUM SERPL-MCNC: 8.7 MG/DL (ref 8.5–10.1)
CC UR VC: ABNORMAL
CHLORIDE SERPL-SCNC: 104 MMOL/L (ref 97–108)
CO2 SERPL-SCNC: 31 MMOL/L (ref 21–32)
CREAT SERPL-MCNC: 0.6 MG/DL (ref 0.7–1.3)
DIFFERENTIAL METHOD BLD: ABNORMAL
EOSINOPHIL # BLD: 0.3 K/UL (ref 0–0.4)
EOSINOPHIL NFR BLD: 4 % (ref 0–7)
ERYTHROCYTE [DISTWIDTH] IN BLOOD BY AUTOMATED COUNT: 13.4 % (ref 11.5–14.5)
GLUCOSE SERPL-MCNC: 89 MG/DL (ref 65–100)
HCT VFR BLD AUTO: 38 % (ref 36.6–50.3)
HGB BLD-MCNC: 12.1 G/DL (ref 12.1–17)
IMM GRANULOCYTES # BLD AUTO: 0 K/UL (ref 0–0.04)
IMM GRANULOCYTES NFR BLD AUTO: 1 % (ref 0–0.5)
LYMPHOCYTES # BLD: 1.7 K/UL (ref 0.8–3.5)
LYMPHOCYTES NFR BLD: 28 % (ref 12–49)
MCH RBC QN AUTO: 28.9 PG (ref 26–34)
MCHC RBC AUTO-ENTMCNC: 31.8 G/DL (ref 30–36.5)
MCV RBC AUTO: 90.7 FL (ref 80–99)
MONOCYTES # BLD: 0.4 K/UL (ref 0–1)
MONOCYTES NFR BLD: 7 % (ref 5–13)
NEUTS SEG # BLD: 3.8 K/UL (ref 1.8–8)
NEUTS SEG NFR BLD: 59 % (ref 32–75)
NRBC # BLD: 0 K/UL (ref 0–0.01)
NRBC BLD-RTO: 0 PER 100 WBC
PLATELET # BLD AUTO: 322 K/UL (ref 150–400)
PMV BLD AUTO: 11.1 FL (ref 8.9–12.9)
POTASSIUM SERPL-SCNC: 3.9 MMOL/L (ref 3.5–5.1)
RBC # BLD AUTO: 4.19 M/UL (ref 4.1–5.7)
SERVICE CMNT-IMP: ABNORMAL
SERVICE CMNT-IMP: NORMAL
SODIUM SERPL-SCNC: 138 MMOL/L (ref 136–145)
WBC # BLD AUTO: 6.3 K/UL (ref 4.1–11.1)

## 2022-08-27 PROCEDURE — 80048 BASIC METABOLIC PNL TOTAL CA: CPT

## 2022-08-27 PROCEDURE — 87077 CULTURE AEROBIC IDENTIFY: CPT

## 2022-08-27 PROCEDURE — 87086 URINE CULTURE/COLONY COUNT: CPT

## 2022-08-27 PROCEDURE — 36415 COLL VENOUS BLD VENIPUNCTURE: CPT

## 2022-08-27 PROCEDURE — 74011250636 HC RX REV CODE- 250/636: Performed by: INTERNAL MEDICINE

## 2022-08-27 PROCEDURE — 99223 1ST HOSP IP/OBS HIGH 75: CPT | Performed by: INTERNAL MEDICINE

## 2022-08-27 PROCEDURE — 74011250637 HC RX REV CODE- 250/637: Performed by: STUDENT IN AN ORGANIZED HEALTH CARE EDUCATION/TRAINING PROGRAM

## 2022-08-27 PROCEDURE — 74011250637 HC RX REV CODE- 250/637: Performed by: INTERNAL MEDICINE

## 2022-08-27 PROCEDURE — 74011000250 HC RX REV CODE- 250: Performed by: INTERNAL MEDICINE

## 2022-08-27 PROCEDURE — 65270000029 HC RM PRIVATE

## 2022-08-27 PROCEDURE — 51798 US URINE CAPACITY MEASURE: CPT

## 2022-08-27 PROCEDURE — 85025 COMPLETE CBC W/AUTO DIFF WBC: CPT

## 2022-08-27 RX ADMIN — FLUTICASONE PROPIONATE 2 SPRAY: 50 SPRAY, METERED NASAL at 08:56

## 2022-08-27 RX ADMIN — METOPROLOL TARTRATE 25 MG: 25 TABLET ORAL at 21:15

## 2022-08-27 RX ADMIN — MIRTAZAPINE 15 MG: 15 TABLET, FILM COATED ORAL at 08:54

## 2022-08-27 RX ADMIN — BISACODYL 10 MG: 10 SUPPOSITORY RECTAL at 08:54

## 2022-08-27 RX ADMIN — SODIUM CHLORIDE, PRESERVATIVE FREE 10 ML: 5 INJECTION INTRAVENOUS at 13:33

## 2022-08-27 RX ADMIN — METOPROLOL TARTRATE 25 MG: 25 TABLET ORAL at 08:54

## 2022-08-27 RX ADMIN — VANCOMYCIN HYDROCHLORIDE 750 MG: 750 INJECTION, POWDER, LYOPHILIZED, FOR SOLUTION INTRAVENOUS at 16:57

## 2022-08-27 RX ADMIN — OXYCODONE HYDROCHLORIDE AND ACETAMINOPHEN 500 MG: 500 TABLET ORAL at 08:54

## 2022-08-27 RX ADMIN — NORTRIPTYLINE HYDROCHLORIDE 50 MG: 25 CAPSULE ORAL at 21:15

## 2022-08-27 RX ADMIN — SODIUM CHLORIDE, PRESERVATIVE FREE 10 ML: 5 INJECTION INTRAVENOUS at 21:12

## 2022-08-27 RX ADMIN — SODIUM CHLORIDE, PRESERVATIVE FREE 10 ML: 5 INJECTION INTRAVENOUS at 14:00

## 2022-08-27 RX ADMIN — METOPROLOL TARTRATE 12.5 MG: 25 TABLET, FILM COATED ORAL at 21:11

## 2022-08-27 RX ADMIN — ENOXAPARIN SODIUM 30 MG: 100 INJECTION SUBCUTANEOUS at 08:54

## 2022-08-27 RX ADMIN — VANCOMYCIN HYDROCHLORIDE 750 MG: 750 INJECTION, POWDER, LYOPHILIZED, FOR SOLUTION INTRAVENOUS at 03:52

## 2022-08-27 RX ADMIN — METOPROLOL TARTRATE 12.5 MG: 25 TABLET, FILM COATED ORAL at 08:55

## 2022-08-27 RX ADMIN — ACETAMINOPHEN 650 MG: 325 TABLET ORAL at 21:10

## 2022-08-27 RX ADMIN — LANSOPRAZOLE 30 MG: KIT at 07:30

## 2022-08-27 RX ADMIN — SODIUM CHLORIDE, PRESERVATIVE FREE 10 ML: 5 INJECTION INTRAVENOUS at 05:33

## 2022-08-27 NOTE — PROGRESS NOTES
Hospitalist Progress Note    NAME: Meghan Sen   :  1960   MRN:  [id number]       Assessment / Plan:  Fever, Tm 101.4 likely due to recurrent UTI from urinary retention  Enterococcus UTI  E. coli ESBL UTI  Carrera irrigated yesterday yield ~2L UO. UA suggestive of UTI. With hx of ESBL E.Coli, new urine culture showed Enterococcus , patient was started on meropenem for UTI E. coli ESBL, has Enterococcus species, added vancomycin  Cont' gentle IVF  CXR neg for acute findings  Re-consult urology, discussed with urology NP, orders for BID irrigation  Currently afebrile  Urine culture showed Enterococcus faecalis, awaiting for speciation of the second gram-negative bacteria. Most likely patient can be discharged on Cipro to complete a 7-day course  Second gram-negative bacteria showed Stenotrophomonas (Xantho.) maltophilia   ID consulted due to presence of 3 bacteria in the urine, patient also have documented allergy to a number of antibiotics  L arm edema, resolved  duplex L arm neg for DVT. Discussed with ID, recommended repeat urine culture. As currently patient has not been treated for stenotrophomonas Ney Tori.) maltophilia. Patient is being treated with vancomycin, already completed a course of 12 days of meropenem (course of 7 days and second course of 5 days). Await further recommendations from ID  Sepsis due to ESBL E coli UTI  Urine culture grew ESBL E coli  Completed 7 days of meropenum on , restarted on meropenem on   Discontinue meropenem as new urine culture showed Enterococcus faecalis and patient already completed a total [id number] iv merrem      Hx bladder cancer s/p neobladder  Urinary retention  Does straight cath in group home. C/w carrera catheter here, will discharge with this  Having intermittent retention even with carrera, nursing to cont' with irrigation PRN  S/p cystolitholapaxy, b/l pyelogram, stone removal on .      Repeat CT showed stable left hydroureteronephrosis, Calculi in neobladder. Carrera irrigation daily and prn. [facility]  Appreciate urology following     Sinus tachycardia  Intermittent  Likely d/t discomfort from contipation/ urinary retention  C/w metoprolol. Constipation with fecal impaction  Had been getting aggressive bowel regimen and reslitor. Pt had TF and bile material coming out of his G tube on 8/17. Repeat KUB showed hydronephrosis and hydroureter  Gi evaluated, recommending surg evaluation. NGT placed for decompression, placed on 8/17, removed 8/19. Pt with multiple BM after sorbital, dulcolax supp. Abd remains NT and soft    Vent G-tube to gravity if has n/v.  Cont' bowel regimen as prescribed. Avoid opioids. .       Dislodged PEG tube  PEG replaced by IR 8/12  NGT placeemnt on 8/18, removal on 8/19  Tube feeding restarted    Hypernatremia  VAZQUEZ  resolved     Cerebral palsy  Anxiety/ Depression  Continue with supportive care. JESSE: 8/25 pending Ucx culture      Code Status: DO NOT RESUSCITATE. Surrogate Decision Maker: brothers and sister  DVT Prophylaxis: Lovenox. GI Prophylaxis: not indicated     Baseline: Dependent for activity of daily living. Non verbal, but can communicated with nodding/ gestures, better when family at bedside  Updated  patient brother at bedside, all questions answered     Subjective:     Chief Complaint / Reason for Physician Visit  Follow-up recurrent UTI  disCussed with the nurse  No acute issues, awaiting ID consult    Review of Systems:  Symptom Y/N Comments  Symptom Y/N Comments   Fever/Chills    Chest Pain     Poor Appetite    Edema     Cough    Abdominal Pain     Sputum    Joint Pain     SOB/FANG    Pruritis/Rash     Nausea/vomit    Tolerating PT/OT     Diarrhea    Tolerating Diet     Constipation    Other       Could NOT obtain due to: Non verbal     Objective:     VITALS:   Last 24hrs VS reviewed since prior progress note.  Most recent are:  Patient Vitals for the past 24 hrs:   Temp Pulse Resp BP SpO2   08/27/22 0745 97.2 °F (36.2 °C) 87 18 110/68 99 %   08/26/22 2238 98.2 °F (36.8 °C) 88 19 119/61 100 %   08/26/22 2153 -- 88 -- 118/60 --   08/26/22 2000 -- -- -- -- 100 %         Intake/Output Summary (Last 24 hours) at 8/27/2022 1001  Last data filed at 8/26/2022 2000  Gross per 24 hour   Intake 320 ml   Output 2200 ml   Net -1880 ml          I had a face to face encounter and independently examined this patient on 8/27/2022, as outlined below:  PHYSICAL EXAM:  General: Awake, No acute distress  EENT:  EOMI. Anicteric sclerae. MMM  Resp:  CTA bilaterally, no wheezing or rales. No accessory muscle use  CV:  Regular  rhythm,  L arm edema  GI:  Soft, Non distended, Non tender. +Bowel sounds  Neurologic:  Awake, non verbal at baseline but can make his needs known, contracted extremity  Psych:   Not anxious nor agitated  Skin:  No rashes. No jaundice    Reviewed most current lab test results and cultures  YES  Reviewed most current radiology test results   YES  Review and summation of old records today    NO  Reviewed patient's current orders and MAR    YES  PMH/ reviewed - no change compared to H&P  ________________________________________________________________________  Care Plan discussed with:    Comments   Patient y    Family      RN y    Care Manager     Consultant  y ID                      Multidiciplinary team rounds were held today with , nursing, pharmacist and clinical coordinator. Patient's plan of care was discussed; medications were reviewed and discharge planning was addressed.      ________________________________________________________________________  Total NON critical care TIME: 45 Minutes    Total CRITICAL CARE TIME Spent:   Minutes non procedure based      Comments   >50% of visit spent in counseling and coordination of care     ________________________________________________________________________  Bekah Royal MD Procedures: see electronic medical records for all procedures/Xrays and details which were not copied into this note but were reviewed prior to creation of Plan. LABS:  I reviewed today's most current labs and imaging studies.   Pertinent labs include:  Recent Labs     08/27/22  0323   WBC 6.3   HGB 12.1   HCT 38.0            Recent Labs     08/27/22  0323 08/26/22  0443 08/25/22  0455    141 143   K 3.9 4.3 3.9    106 110*   CO2 31 30 28   GLU 89 90 93   BUN 17 21* 21*   CREA 0.60* 0.57* 0.59*   CA 8.7 8.9 8.4*         Signed: Tamica Soto MD

## 2022-08-27 NOTE — PROGRESS NOTES
Pharmacy Antimicrobial Kinetic Dosing    Indication for Antimicrobials: UTI     Current Regimen of Each Antimicrobial:    Vancomycin 1000 mg IV once, then (Start Date ; Day # 4)     Previous Antimicrobial Therapy:  Meropenem 1 gm IV q8h (-; restart ; Day # 6)    Goal Level: AUC: 400-600 mg/hr/Liter/day    Date Dose & Interval Measured (mcg/mL) Predicted AUC/ARJUN    04:43 500 mg q12h 7.4 263                 Date & time of next level:     Dosing calculator used: Excel-based calculator    Significant Positive Cultures:    urine: ESBL klebsiella, sens to meropenem   blood: NG   blood: NG   blood: NG x 4 days   urine: enterococcus faecalis and stenotrophomonas     Conditions for Dosing Consideration: quadriplegic cerebral palsy, hx VAZQUEZ    Labs:  Recent Labs     22  0323 22  0443 22  0455   CREA 0.60* 0.57* 0.59*   BUN 17 21* 21*     Recent Labs     22  0323   WBC 6.3       Temp (24hrs), Av.7 °F (36.5 °C), Min:97.2 °F (36.2 °C), Max:98.2 °F (36.8 °C)    Creatinine Clearance (mL/min):   CrCl (Ideal Body Weight): 103.6   If actual weight < IBW: CrCl (Actual Body Weight) 79.6    Impression/Plan:   Continue vancomycin. Will repeat level in the AM  Would recommend trial of levofloxacin while here in the hospital. Unable to clarify allergies. ID consulted   BMP daily  Antimicrobial stop date TBD     Pharmacy will follow daily and adjust medications as appropriate for renal function and/or serum levels.     Thank you,  Abiola Holman, PHARMD

## 2022-08-27 NOTE — CONSULTS
Cross cover infectious Disease Consult Note    Reason for Consult: antibiotic recommendations for UTI  Date of Consultation: August 27, 2022  Date of Admission: 8/8/2022  Referring Physician: Dr Mil Beauchamp       HPI:  Unable to obtain history from patient was not verbal  Chart reviewed and discussed with primary team      Mr. Musa Marte is a 57-year-old gentleman with a history of bladder cancer, neobladder, cerebral palsy, PEG placement who was admitted on 8/8/2022 to [facility] for concerns of abdominal pain and vomiting. Work-up subsequently done showed urine analysis on 8/8/2022 to be positive for pyuria, bacteriuria, large blood leukocyte Estrace and nitrates. Urine culture greater than 100,000 ESBL Klebsiella pneumonia on 8/8/2022 and he has been treated with IV meropenem. He underwent scope of the neobladder with mucus evacuation and extended laser to bladder stones with a Short placement on 8/18/2022. Urine analysis was obtained on 8/22/2022 that was positive for bacteria and 10-20 white cells. Urine culture from this sample was positive for greater than 100,000 Enterococcus faecalis and stenotrophomonas. Patient has multiple allergies listed to antibiotics including amoxicillin, amikacin, ciprofloxacin, Keflex, sulfa, tetracycline. It is uncertain in regards to the reaction on her allergic response to these antibiotics but some have been reported as a rash. I spoke in person to the primary team to help clarify his allergy symptoms and per discussion with the primary team they are unaware after discussion with the family regarding the exact allergy symptoms to these antibiotics. He is currently on vancomycin and meropenem and tolerating those antibiotics and clinically is improved per the primary team.  I received a consultation request today to guide therapy for the stenotrophomonas in the urine particularly. Of note patient had a fever up to 102 on 8/22/2022.   His labs are otherwise stable with normal white count. Renal function and LFTs are not elevated. His procalcitonin was less than 0.05 on 8/22/2022. Blood culture obtained 8/8/2022 and 8/22/2022 negative. CT scan of his abdomen and pelvis on 8/16/2022 was read as severe bilateral hydroureteronephrosis and significant distention of the small bowel consistent with small bowel obstruction no pneumoperitoneum. Past Medical History:  Past Medical History:   Diagnosis Date    Anxiety     Bladder cancer ([address]     Cancer ([address]     BLADDER    CP (cerebral palsy) [address] [address]     Depression     Hernia     Nausea & vomiting     Other ill-defined conditions(799.89)     Cerebral palsy    Psychiatric disorder     ANXIETY DEPRESSION    Unspecified constipation 05/13/2013         Surgical History:  Past Surgical History:   Procedure Laterality Date    HX APPENDECTOMY      HX CHOLECYSTECTOMY      HX HERNIA REPAIR      Inc HR, PEG TUBE 2012    HX ORTHOPAEDIC  2013    Right knee surgery on 8/15 with Dr. Madhu Mercado. HX UROLOGICAL      BLADDER REMOVED, HAS NADYA-BLADDER     IR INJ NEPHRO/URETEROGRAM LT EXISTING ACCESS SI  4/1/2021    IR INSERT GASTROSTOMY TUBE PERC  8/9/2022    IR NEPHROSTOMY PERC RT PLC CATH  SI  3/19/2021    IR URETERAL STENT RT PERC PLC EXISTING SI  3/24/2021    [place] LAP PROCEDURE, UNLISTED, BLADDER      neobladder    [place] LAP,CHOLECYSTECTOMY      [place] LAP,LYSIS OF ADHESIONS      [place] REMOVE MESH FROM ABD WALL      due to infection         Family History:   Family History   Problem Relation Age of Onset    Heart Disease Mother         TACHYCARDIA    Anesth Problems Neg Hx          Social History:       Unable to obtain from patient was nonverbal    Allergies:   Allergies   Allergen Reactions    Avelox [Moxifloxacin] Rash    Amikacin Unknown (comments)    Amoxicillin Rash    Betadine Surgi-Prep Rash     Mother states this does not happen all the time and if betadine is washed off, it's ok    Ciprofloxacin Unknown (comments) Dilaudid [Hydromorphone] Unable to Obtain    Effexor [Venlafaxine] Unknown (comments)    Fentanyl Unknown (comments)    Keflex [Cephalexin] Rash    Lyrica [Pregabalin] Unknown (comments)    Morphine Unknown (comments)    Neurontin [Gabapentin] Unknown (comments)    Prozac [Fluoxetine] Unknown (comments)    Reglan [Metoclopramide] Unknown (comments)    Serzone [Nefazodone] Unknown (comments)    Sulfa (Sulfonamide Antibiotics) Unknown (comments)    Tetracycline Unknown (comments)    Tricyclamol Chloride Unknown (comments)    Valium [Diazepam] Unknown (comments)     Tolerates Lorazepam    Demerol [Meperidine] Other (comments)     Spastic movements         Review of Systems:     Unable to obtain from patient was nonverbal    Medications:  No current facility-administered medications on file prior to encounter. Current Outpatient Medications on File Prior to Encounter   Medication Sig Dispense Refill    ferrous sulfate 220 mg (44 mg iron)/5 mL solution Take 220 mg by mouth daily. mirtazapine (REMERON) 15 mg tablet 15 mg by Per G Tube route daily. nortriptyline (PAMELOR) 10 mg/5 mL solution 50 mg by Per G Tube route nightly. ascorbic acid, vitamin C, (VITAMIN C) 500 mg tablet 500 mg by Per G Tube route daily. cholecalciferol (VITAMIN D3) 1,000 unit tablet 2,000 Units by Per G Tube route daily. Indications: Vitamin D Deficiency      acetaminophen (TYLENOL) 160 mg/5 mL liquid 640 mg by Per G Tube route every four (4) hours as needed for Fever or Pain. HYDROcodone-acetaminophen (HYCET) 0.5-21.7 mg/mL oral solution 5 mL by Per G Tube route four (4) times daily as needed for Pain. LORazepam (ATIVAN) 1 mg tablet 0.75 mg by Per G Tube route every six (6) hours. fluticasone (FLONASE) 50 mcg/actuation nasal spray 2 Sprays by Both Nostrils route daily. Esomeprazole Magnesium (NEXIUM PACKET) 40 mg by Per G Tube route daily.  Indications: gastroesophageal reflux disease      rizatriptan (MAXALT) 10 mg tablet 10 mg by SubLINGual route as needed for Migraine. May repeat dose after 2 hours not to exceed 3 tablets per 24hr             Physical Exam:    Vitals: Patient Vitals for the past 24 hrs:   Temp Pulse Resp BP SpO2   08/27/22 0745 97.2 °F (36.2 °C) 87 18 110/68 99 %   08/26/22 2238 98.2 °F (36.8 °C) 88 19 119/61 100 %   08/26/22 2153 -- 88 -- 118/60 --   08/26/22 2000 -- -- -- -- 100 %     GEN: NAD  HEENT:  no scleral icterus, unable to assess oral cavity fully   CV: S1, S2 heard regularly  Lungs: Nonlabored  Abdomen: soft, no facial grimace to palpation , PEG   Genitourinary: Short  Extremities: no edema  Neuro: Awake nods to some questions  no but unclear if meaningful  Skin: no rash on face extremities or abdomen  Psych: non tearful   Lines: Peripheral  MSK contracted lower EXTR      Labs:   Recent Results (from the past 24 hour(s))   METABOLIC PANEL, BASIC    Collection Time: 08/27/22  3:23 AM   Result Value Ref Range    Sodium 138 136 - 145 mmol/L    Potassium 3.9 3.5 - 5.1 mmol/L    Chloride 104 97 - 108 mmol/L    CO2 31 21 - 32 mmol/L    Anion gap 3 (L) 5 - 15 mmol/L    Glucose 89 65 - 100 mg/dL    BUN 17 6 - 20 MG/DL    Creatinine 0.60 (L) 0.70 - 1.30 MG/DL    BUN/Creatinine ratio 28 (H) 12 - 20      GFR est AA >60 >60 ml/min/1.73m2    GFR est non-AA >60 >60 ml/min/1.73m2    Calcium 8.7 8.5 - 10.1 MG/DL   CBC WITH AUTOMATED DIFF    Collection Time: 08/27/22  3:23 AM   Result Value Ref Range    WBC 6.3 4.1 - 11.1 K/uL    RBC 4.19 4. 10 - 5.70 M/uL    HGB 12.1 12.1 - 17.0 g/dL    HCT 38.0 36.6 - 50.3 %    MCV 90.7 80.0 - 99.0 FL    MCH 28.9 26.0 - 34.0 PG    MCHC 31.8 30.0 - 36.5 g/dL    RDW 13.4 11.5 - 14.5 %    PLATELET 351 815 - 243 K/uL    MPV 11.1 8.9 - 12.9 FL    NRBC 0.0 0  WBC    ABSOLUTE NRBC 0.00 0.00 - 0.01 K/uL    NEUTROPHILS 59 32 - 75 %    LYMPHOCYTES 28 12 - 49 %    MONOCYTES 7 5 - 13 %    EOSINOPHILS 4 0 - 7 %    BASOPHILS 1 0 - 1 %    IMMATURE GRANULOCYTES 1 (H) 0.0 - 0.5 %    ABS. NEUTROPHILS 3.8 1.8 - 8.0 K/UL    ABS. LYMPHOCYTES 1.7 0.8 - 3.5 K/UL    ABS. MONOCYTES 0.4 0.0 - 1.0 K/UL    ABS. EOSINOPHILS 0.3 0.0 - 0.4 K/UL    ABS. BASOPHILS 0.1 0.0 - 0.1 K/UL    ABS. IMM. GRANS. 0.0 0.00 - 0.04 K/UL    DF AUTOMATED         Microbiology Data:       Blood: Negative      Urine:CULTURE, URINE [[id number]] (Order [id number])  Microbiology  Date: 8/8/2022 Department: \[facility] [place] [facility]\"" [facility] Tele Released By: Brian Qureshi RN (auto-released) Authorizing: Ruperto Arriola MD      Contains abnormal data CULTURE, URINE  Order: [id number]  Collected 8/8/2022 16:50    Status: Final result    Specimen Information: Clean catch; Urine        0 Result Notes  Component Ref Range & Units 8/8/22 1650  Resulting Agency   Special Requests:   NO SPECIAL REQUESTS  [facility]   [place] Count   >100,000   COLONIES/mL  [address]   Culture result:   KLEBSIELLA PNEUMONIAE ** (EXTENDED SPECTRUM BETA LACTAMASE ) ** Abnormal   [facility]        Susceptibility     Klebsiella pneumoniae     ARJUN     Amikacin ($) Susceptible     Ampicillin ($) Resistant     Ampicillin/sulbactam ($) Susceptible     Cefazolin ($) Resistant     Cefepime ($$) Resistant     Cefoxitin Susceptible     Ceftazidime ($) Resistant     Ceftriaxone ($) Resistant     Ciprofloxacin ($) Susceptible 1     Gentamicin ($) Intermediate     Levofloxacin ($) Susceptible 1     Meropenem ($$) Susceptible     Nitrofurantoin Susceptible     Piperacillin/Tazobac ($) Susceptible     Tobramycin ($) Intermediate     Trimeth/Sulfa Susceptible              1 **FDA INTERPRETATION REFLECTED, REFER TO CLSI FOR ALTERNATE INTERPRETATIONS.**            Specimen Collected: 08/08/22 16:50 Last Resulted: 08/11/22 14:22       Order Details     Lab and Collection Details     Routing     Result History     View Encounter Conversation           Result Care Coordination      Patient Communication     Add Comments   Not seen Back to Top Susceptibility    Klebsiella pneumoniae    Antibiotic Interpretation Value  Method Comment   Amikacin ($) Susceptible <=2 ug/mL ARJUN    Ampicillin ($) Resistant >=32 ug/mL ARJUN    Ampicillin/sulbactam ($) Susceptible 8 ug/mL ARJUN    Cefoxitin Susceptible <=4 ug/mL ARJUN    Ciprofloxacin ($) Susceptible 0.5 ug/mL ARJUN **FDA INTERPRETATION REFLECTED, REFER TO CLSI FOR ALTERNATE INTERPRETATIONS.**   Gentamicin ($) Intermediate 8 ug/mL ARJUN    Levofloxacin ($) Susceptible 1 ug/mL ARJUN **FDA INTERPRETATION REFLECTED, REFER TO CLSI FOR ALTERNATE INTERPRETATIONS.**   Meropenem ($$) Susceptible <=0.25 ug/mL ARJUN    Nitrofurantoin Susceptible 32 ug/mL ARJUN    Piperacillin/Tazobac ($) Susceptible <=4 ug/mL ARJUN    Tobramycin ($) Intermediate 8 ug/mL ARJUN    Trimeth/Sulfa Susceptible <=20 ug/mL ARJUN      CULTURE, URINE [[id number]] (Order [id number])  Microbiology  Date: 8/22/2022 Department: [address] Tele Released By:  (auto-released) Authorizing: Chris Dubose MD      Contains abnormal data CULTURE, URINE  Order: [id number]  Collected 8/22/2022 17:18    Status: Final result    Specimen Information: Urine        0 Result Notes  Component Ref Range & Units 8/22/22 1718  Resulting Agency   Special Requests:   NO SPECIAL REQUESTS   Reflexed from [id number]  [facility]   [place]ard Count   >100,000   COLONIES/mL  [address]   Culture result:   ENTEROCOCCUS FAECALIS Abnormal   [address]    Culture result:    Abnormal   Stenotrophomonas (Xantho.) maltophilia CLSI GUIDELINES DO NOT RECOMMEND REPORTING SUSCEPTIBILITIES FOR S. MALTOPHILIA.   TRIMETHOPRIM/SULFAMETHOXAZOLE IS THE DRUG OF CHOICE. [facility]        Susceptibility     Enterococcus faecalis Stenotrophomonas maltophilia      ARJUN ARJUN     Ampicillin ($) Susceptible      Ciprofloxacin ($) Susceptible      Daptomycin ($$$$$) Susceptible      Levofloxacin ($) Susceptible Susceptible     Linezolid ($$$$$) Susceptible      Nitrofurantoin Susceptible      Tetracycline Resistant Trimeth/Sulfa  Susceptible     Vancomycin ($) Susceptible                     Specimen Collected: 08/22/22 17:18 Last Resulted: 08/27/22 10:58       Order Details     Lab and Collection Details     Routing     Result History     View Encounter Conversation           Result Care Coordination      Patient Communication     Add Comments   Not seen Back to Top           Susceptibility    Enterococcus faecalis    Antibiotic Interpretation Value  Method Comment   Ampicillin ($) Susceptible <=2 ug/mL ARJUN    Ciprofloxacin ($) Susceptible <=0.5 ug/mL ARJUN    Nitrofurantoin Susceptible <=16 ug/mL ARJUN    Tetracycline Resistant >=16 ug/mL ARJUN    Vancomycin ($) Susceptible 1 ug/mL ARJUN    Levofloxacin ($) Susceptible 1 ug/mL ARJUN    Linezolid ($$$$$) Susceptible 2 ug/mL ARJUN    Daptomycin ($$$$$) Susceptible 2 ug/mL ARJUN      Stenotrophomonas maltophilia     Antibiotic Interpretation Value  Method Comment   Levofloxacin ($) Susceptible 0.5 ug/mL ARJUN    Trimeth/Sulfa Susceptible <=20 ug/mL ARJUN     Condensed View             Imaging:     CT ABD W CONT: Patient Communication     Add Comments   Not seen     Study Result    Narrative & Impression   EXAM: CT ABD W CONT     INDICATION: Vomiting, rigidity in abdomen but no tenderness     COMPARISON: None. IV CONTRAST: 100 mL of Isovue-370. ORAL CONTRAST: None     TECHNIQUE: CT abdomen with contrast.  Multislice helical CT was performed from the diaphragm to the iliac crest during  uneventful rapid bolus intravenous contrast administration. Contiguous 5 mm  axial images were reconstructed and lung and soft tissue windows were generated. Coronal and sagittal reformations were generated. CT dose reduction was  achieved through use of a standardized protocol tailored for this examination  and automatic exposure control for dose modulation. FINDINGS:  LOWER THORAX: No significant abnormality in the incidentally imaged lower chest.     LIVER: No mass.   BILIARY TREE: Status post cholecystectomy. CBD is not dilated. SPLEEN: within normal limits. PANCREAS: No mass or ductal dilatation. ADRENALS: Unremarkable. KIDNEYS: Severe bilateral hydroureteronephrosis. STOMACH: Massively distended distal esophagus and stomach, filled with fluid. There is a gastrostomy tube in stomach. SMALL BOWEL: Diffusely dilated loops of small bowel, partially imaged as this  study only covers the abdomen. This is similar to the prior study and consistent  mechanical bowel obstruction. COLON: Right colon is normal in caliber. Left colon is decompressed. There is  contrast in the colon from prior administration. APPENDIX: Nonvisualized  PERITONEUM: There is no pneumoperitoneum  RETROPERITONEUM: No lymphadenopathy or aortic aneurysm. BONES: No destructive bone lesion. ABDOMINAL WALL: No mass or hernia. ADDITIONAL COMMENTS: N/A     IMPRESSION  1. The study only includes the abdomen and again demonstrates significant  distention of the small bowel consistent with small bowel obstruction. No  pneumoperitoneum. 2.  Severe bilateral hydroureteronephrosis, previously only left-sided  hydroureteronephrosis. 3.  Normal caliber colon. Previously seen large rectal stool ball was not imaged  as this is located in the pelvis and the study only covers the abdomen. Procedures:   8/18/2022 CYSTOLITHOLAPAXY, BILATERAL PYELOGRAM, STONE REMOVAL. Assessment / Plan:   Mr. Coco Ortiz is a 66-year-old gentleman with a history of bladder cancer, neobladder, cerebral palsy, PEG placement who was admitted on 8/8/2022 to [facility] for concerns of abdominal pain and vomiting. Work-up subsequently done showed urine analysis on 8/8/2022 to be positive for pyuria, bacteriuria, large blood leukocyte Estrace and nitrates. Urine culture greater than 100,000 ESBL Klebsiella pneumonia on 8/8/2022 and he has been treated with IV meropenem.   He underwent scope of the neobladder with mucus evacuation and extended laser to bladder stones with a Short placement on 8/18/2022. Urine analysis was obtained on 8/22/2022 that was positive for bacteria and 10-20 white cells. Urine culture from this sample was positive for greater than 100,000 Enterococcus faecalis and stenotrophomonas. Patient has multiple allergies listed to antibiotics including amoxicillin, amikacin, ciprofloxacin, Keflex, sulfa, tetracycline. It is uncertain in regards to the reaction on her allergic response to these antibiotics but some have been reported as a rash. I spoke in person to the primary team to help clarify his allergy symptoms and per discussion with the primary team they are unaware after discussion with the family regarding the exact allergy symptoms to these antibiotics. 1) complicated UTI, Short, setting of bladder cancer status post neobladder and recent stone removal on 8/18/2022  Per operative report mucus seen in the bladder  History of ESBL Klebsiella earlier in the admission status post meropenem  Repeat culture positive for Enterococcus and stenotrophomonas. Per discussion with the primary team this was from a Short catheter. Patient was febrile up to 102 on that day of obtaining culture. He is clinically improved per primary team on vancomycin and meropenem  As he is not on targeted therapy for stenotrophomonas suspect it is likely or possibly colonization  He also has multiple antibiotic allergies listed making antibiotic choices limited  Continue current therapy with IV vancomycin and meropenem as he is clinically improving per primary team  If he has clinical deterioration will need to consider coverage for Stenotrophomnas. Minocycline/ tigecycline are potential options but patient has tetracycline reported as allergy (not specified symptoms).   Would be helpful to clarify if he is truly allergic to broaden antibiotic options if needed in the future  Recommend repeat urine culture and if negative for stenotrophomonas would not start treatment  Will need to balance risks versus benefits of antimicrobial therapy as high risk for resistance formations adverse effects of antibiotics  Final recommendations once repeat urine cultures are available to be made by [facility] ID  Antibiotic dosing by pharmacy with close renal monitoring      2) Hx of bladder cancer with neobladder  3) bladder stone  4) cerebral palsy  5) CT reports of small bowel obstruction 8/22/22, seen by general surgery with no concerns for obstruction.   6) PEG    Oren Hurley DO  3:33 PM

## 2022-08-27 NOTE — PROGRESS NOTES
Bedside and Verbal shift change report given to Toni Ireland (oncoming nurse) by Asha Macias (offgoing nurse). Short Flushed, output discussed,PEG feed discussed.

## 2022-08-28 LAB
ANION GAP SERPL CALC-SCNC: 2 MMOL/L (ref 5–15)
BACTERIA SPEC CULT: NORMAL
BUN SERPL-MCNC: 19 MG/DL (ref 6–20)
BUN/CREAT SERPL: 35 (ref 12–20)
CALCIUM SERPL-MCNC: 9.1 MG/DL (ref 8.5–10.1)
CHLORIDE SERPL-SCNC: 101 MMOL/L (ref 97–108)
CO2 SERPL-SCNC: 32 MMOL/L (ref 21–32)
CREAT SERPL-MCNC: 0.54 MG/DL (ref 0.7–1.3)
GLUCOSE SERPL-MCNC: 96 MG/DL (ref 65–100)
POTASSIUM SERPL-SCNC: 4.6 MMOL/L (ref 3.5–5.1)
SERVICE CMNT-IMP: NORMAL
SODIUM SERPL-SCNC: 135 MMOL/L (ref 136–145)
VANCOMYCIN SERPL-MCNC: 17 UG/ML

## 2022-08-28 PROCEDURE — 65270000029 HC RM PRIVATE

## 2022-08-28 PROCEDURE — 80048 BASIC METABOLIC PNL TOTAL CA: CPT

## 2022-08-28 PROCEDURE — 80202 ASSAY OF VANCOMYCIN: CPT

## 2022-08-28 PROCEDURE — 74011250637 HC RX REV CODE- 250/637: Performed by: INTERNAL MEDICINE

## 2022-08-28 PROCEDURE — 74011250636 HC RX REV CODE- 250/636: Performed by: INTERNAL MEDICINE

## 2022-08-28 PROCEDURE — 36415 COLL VENOUS BLD VENIPUNCTURE: CPT

## 2022-08-28 PROCEDURE — 74011250637 HC RX REV CODE- 250/637: Performed by: STUDENT IN AN ORGANIZED HEALTH CARE EDUCATION/TRAINING PROGRAM

## 2022-08-28 PROCEDURE — 74011000250 HC RX REV CODE- 250: Performed by: INTERNAL MEDICINE

## 2022-08-28 RX ADMIN — VANCOMYCIN HYDROCHLORIDE 750 MG: 750 INJECTION, POWDER, LYOPHILIZED, FOR SOLUTION INTRAVENOUS at 03:47

## 2022-08-28 RX ADMIN — METOPROLOL TARTRATE 25 MG: 25 TABLET ORAL at 12:26

## 2022-08-28 RX ADMIN — VANCOMYCIN HYDROCHLORIDE 600 MG: 1 INJECTION, POWDER, LYOPHILIZED, FOR SOLUTION INTRAVENOUS at 12:25

## 2022-08-28 RX ADMIN — METOPROLOL TARTRATE 12.5 MG: 25 TABLET, FILM COATED ORAL at 10:43

## 2022-08-28 RX ADMIN — BISACODYL 10 MG: 10 SUPPOSITORY RECTAL at 10:45

## 2022-08-28 RX ADMIN — SODIUM CHLORIDE, PRESERVATIVE FREE 10 ML: 5 INJECTION INTRAVENOUS at 22:02

## 2022-08-28 RX ADMIN — LANSOPRAZOLE 30 MG: KIT at 12:25

## 2022-08-28 RX ADMIN — MIRTAZAPINE 15 MG: 15 TABLET, FILM COATED ORAL at 10:43

## 2022-08-28 RX ADMIN — SODIUM CHLORIDE, PRESERVATIVE FREE 10 ML: 5 INJECTION INTRAVENOUS at 06:13

## 2022-08-28 RX ADMIN — METOPROLOL TARTRATE 12.5 MG: 25 TABLET, FILM COATED ORAL at 22:02

## 2022-08-28 RX ADMIN — METOPROLOL TARTRATE 25 MG: 25 TABLET ORAL at 22:01

## 2022-08-28 RX ADMIN — VANCOMYCIN HYDROCHLORIDE 600 MG: 1 INJECTION, POWDER, LYOPHILIZED, FOR SOLUTION INTRAVENOUS at 22:00

## 2022-08-28 RX ADMIN — SODIUM CHLORIDE, PRESERVATIVE FREE 10 ML: 5 INJECTION INTRAVENOUS at 17:32

## 2022-08-28 RX ADMIN — OXYCODONE HYDROCHLORIDE AND ACETAMINOPHEN 500 MG: 500 TABLET ORAL at 10:43

## 2022-08-28 RX ADMIN — NORTRIPTYLINE HYDROCHLORIDE 50 MG: 25 CAPSULE ORAL at 22:01

## 2022-08-28 RX ADMIN — ENOXAPARIN SODIUM 30 MG: 100 INJECTION SUBCUTANEOUS at 10:42

## 2022-08-28 RX ADMIN — FLUTICASONE PROPIONATE 2 SPRAY: 50 SPRAY, METERED NASAL at 12:25

## 2022-08-28 NOTE — PROGRESS NOTES
Bedside and Verbal shift change report given to [employer] RN (oncoming nurse) by Can Bates (offgoing nurse). Report included the following information SBAR, Kardex, Intake/Output, MAR, and Recent Results.    Short care, Q2 turn, call bell within reach, Appraised on how to use Pt own Communication board

## 2022-08-28 NOTE — PROGRESS NOTES
Hospitalist Progress Note    NAME: Kael Sow   :  1960   MRN:  [id number]       Assessment / Plan:  Fever, Tm 101.4 likely due to recurrent UTI from urinary retention  Enterococcus UTI  E. coli ESBL UTI  Carrera irrigated yesterday yield ~2L UO. UA suggestive of UTI. With hx of ESBL E.Coli, new urine culture showed Enterococcus , patient was started on meropenem for UTI E. coli ESBL, has Enterococcus species, added vancomycin  Cont' gentle IVF  CXR neg for acute findings  Re-consult urology, discussed with urology NP, orders for BID irrigation  Currently afebrile  Urine culture showed Enterococcus faecalis, awaiting for speciation of the second gram-negative bacteria. Most likely patient can be discharged on Cipro to complete a 7-day course  Second gram-negative bacteria showed Stenotrophomonas (Xantho.) maltophilia   ID consulted due to presence of 3 bacteria in the urine, patient also have documented allergy to a number of antibiotics  L arm edema, resolved  duplex L arm neg for DVT. Discussed with ID, recommended repeat urine culture. As currently patient has not been treated for stenotrophomonas [place]nee [place].) maltophilia. Patient is being treated with vancomycin, already completed a course of 12 days of meropenem (course of 7 days and second course of 5 days). Await further recommendations from ID  Sepsis due to ESBL E coli UTI  Urine culture grew ESBL E coli  Completed 7 days of meropenum on , restarted on meropenem on   Discontinue meropenem as new urine culture showed Enterococcus faecalis and patient already completed a total [id number] iv merrem      Hx bladder cancer s/p neobladder  Urinary retention  Does straight cath in group home. C/w carrera catheter here, will discharge with this  Having intermittent retention even with carrera, nursing to cont' with irrigation PRN  S/p cystolitholapaxy, b/l pyelogram, stone removal on .      Repeat CT showed stable left hydroureteronephrosis, Calculi in neobladder. Carrera irrigation daily and prn. Discharge to [facility] with carrera  Appreciate urology following     Sinus tachycardia  Intermittent  Likely d/t discomfort from contipation/ urinary retention  C/w metoprolol. Constipation with fecal impaction  Had been getting aggressive bowel regimen and reslitor. Pt had TF and bile material coming out of his G tube on 8/17. Repeat KUB showed hydronephrosis and hydroureter  Gi evaluated, recommending surg evaluation. NGT placed for decompression, placed on 8/17, removed 8/19. Pt with multiple BM after sorbital, dulcolax supp. Abd remains NT and soft    Vent G-tube to gravity if has n/v.  Cont' bowel regimen as prescribed. Avoid opioids. .       Dislodged PEG tube  PEG replaced by IR 8/12  NGT placeemnt on 8/18, removal on 8/19  Tube feeding restarted    Hypernatremia  VAZQUEZ  resolved     Cerebral palsy  Anxiety/ Depression  Continue with supportive care. JESSE: 8/29 pending  repeat Ucx culture and ID recs       Code Status: DO NOT RESUSCITATE. Surrogate Decision Maker: brothers and sister  DVT Prophylaxis: Lovenox. GI Prophylaxis: not indicated     Baseline: Dependent for activity of daily living. Non verbal, but can communicated with nodding/ gestures, better when family at bedside  Updated  patient brother at bedside, all questions answered     Subjective:     Chief Complaint / Reason for Physician Visit  Follow-up recurrent UTI  disCussed with the nurse  No acute issues,     Review of Systems:  Symptom Y/N Comments  Symptom Y/N Comments   Fever/Chills    Chest Pain     Poor Appetite    Edema     Cough    Abdominal Pain     Sputum    Joint Pain     SOB/FANG    Pruritis/Rash     Nausea/vomit    Tolerating PT/OT     Diarrhea    Tolerating Diet     Constipation    Other       Could NOT obtain due to: Non verbal     Objective:     VITALS:   Last 24hrs VS reviewed since prior progress note.  Most recent are:  Patient Vitals for the past 24 hrs:   Temp Pulse Resp BP SpO2   08/27/22 2300 98 °F (36.7 °C) 90 19 136/69 --   08/27/22 2111 -- 92 -- 134/67 --   08/27/22 1618 97.2 °F (36.2 °C) 88 17 122/67 99 %         Intake/Output Summary (Last 24 hours) at 8/28/2022 0830  Last data filed at 8/27/2022 2000  Gross per 24 hour   Intake 340 ml   Output 3000 ml   Net -2660 ml          I had a face to face encounter and independently examined this patient on 8/28/2022, as outlined below:  PHYSICAL EXAM:  General: Awake, No acute distress  EENT:  EOMI. Anicteric sclerae. MMM  Resp:  CTA bilaterally, no wheezing or rales. No accessory muscle use  CV:  Regular  rhythm,  L arm edema  GI:  Soft, Non distended, Non tender. +Bowel sounds  Neurologic:  Awake, non verbal at baseline but can make his needs known, contracted extremity  Psych:   Not anxious nor agitated  Skin:  No rashes. No jaundice    Reviewed most current lab test results and cultures  YES  Reviewed most current radiology test results   YES  Review and summation of old records today    NO  Reviewed patient's current orders and MAR    YES  PMH/SH reviewed - no change compared to H&P  ________________________________________________________________________  Care Plan discussed with:    Comments   Patient y    Family      RN y    Care Manager     Consultant  y ID                      Multidiciplinary team rounds were held today with , nursing, pharmacist and clinical coordinator. Patient's plan of care was discussed; medications were reviewed and discharge planning was addressed.      ________________________________________________________________________  Total NON critical care TIME: 45 Minutes    Total CRITICAL CARE TIME Spent:   Minutes non procedure based      Comments   >50% of visit spent in counseling and coordination of care     ________________________________________________________________________  Mp Shaw MD     Procedures: see electronic medical records for all procedures/Xrays and details which were not copied into this note but were reviewed prior to creation of Plan. LABS:  I reviewed today's most current labs and imaging studies.   Pertinent labs include:  Recent Labs     08/27/22  0323   WBC 6.3   HGB 12.1   HCT 38.0            Recent Labs     08/28/22  0129 08/27/22  0323 08/26/22  0443   * 138 141   K 4.6 3.9 4.3    104 106   CO2 32 31 30   GLU 96 89 90   BUN 19 17 21*   CREA 0.54* 0.60* 0.57*   CA 9.1 8.7 8.9         Signed: Lisa Kramer MD

## 2022-08-28 NOTE — PROGRESS NOTES
Pharmacy Antimicrobial Kinetic Dosing    Indication for Antimicrobials: UTI     Current Regimen of Each Antimicrobial:    Vancomycin 1000 mg IV once, then (Start Date ; Day # 5)     Previous Antimicrobial Therapy:  Meropenem 1 gm IV q8h (-; restart ; Day # 6)    Goal Level: AUC: 400-600 mg/hr/Liter/day    Date Dose & Interval Measured (mcg/mL) Predicted AUC/ARJUN    04:43 500 mg q12h 7.4 263    0129 750 mg q12h 17 428           Date & time of next level:     Dosing calculator used: Excel-based calculator    Significant Positive Cultures:    urine: ESBL klebsiella, sens to meropenem   blood: NG   blood: NG   blood: NG x 4 days   urine: enterococcus faecalis and stenotrophomonas     Conditions for Dosing Consideration: quadriplegic cerebral palsy, hx VAZQUEZ    Labs:  Recent Labs     22  0323 22  0443 22  0455   CREA 0.60* 0.57* 0.59*   BUN 17 21* 21*     Recent Labs     22  0323   WBC 6.3       Temp (24hrs), Av.7 °F (36.5 °C), Min:97.2 °F (36.2 °C), Max:98.2 °F (36.8 °C)    Creatinine Clearance (mL/min):   CrCl (Ideal Body Weight): 103.6   If actual weight < IBW: CrCl (Actual Body Weight) 79.6    Impression/Plan:   Continue vancomycin. Increase dose to 600 mg IV Q8H for estimate AUC of 511  Would recommend trial of levofloxacin while here in the hospital. Unable to clarify allergies. ID consulted   BMP daily  Antimicrobial stop date TBD     Pharmacy will follow daily and adjust medications as appropriate for renal function and/or serum levels.     Thank you,  Sandra Ac, PHARMD

## 2022-08-29 LAB
ANION GAP SERPL CALC-SCNC: 5 MMOL/L (ref 5–15)
BUN SERPL-MCNC: 19 MG/DL (ref 6–20)
BUN/CREAT SERPL: 38 (ref 12–20)
CALCIUM SERPL-MCNC: 8.9 MG/DL (ref 8.5–10.1)
CHLORIDE SERPL-SCNC: 103 MMOL/L (ref 97–108)
CO2 SERPL-SCNC: 30 MMOL/L (ref 21–32)
CREAT SERPL-MCNC: 0.5 MG/DL (ref 0.7–1.3)
GLUCOSE SERPL-MCNC: 99 MG/DL (ref 65–100)
POTASSIUM SERPL-SCNC: 4.2 MMOL/L (ref 3.5–5.1)
SODIUM SERPL-SCNC: 138 MMOL/L (ref 136–145)

## 2022-08-29 PROCEDURE — 74011250637 HC RX REV CODE- 250/637: Performed by: INTERNAL MEDICINE

## 2022-08-29 PROCEDURE — 65270000029 HC RM PRIVATE

## 2022-08-29 PROCEDURE — 80048 BASIC METABOLIC PNL TOTAL CA: CPT

## 2022-08-29 PROCEDURE — 74011250636 HC RX REV CODE- 250/636: Performed by: INTERNAL MEDICINE

## 2022-08-29 PROCEDURE — 74011250637 HC RX REV CODE- 250/637: Performed by: STUDENT IN AN ORGANIZED HEALTH CARE EDUCATION/TRAINING PROGRAM

## 2022-08-29 PROCEDURE — 74011000250 HC RX REV CODE- 250: Performed by: INTERNAL MEDICINE

## 2022-08-29 PROCEDURE — 99233 SBSQ HOSP IP/OBS HIGH 50: CPT | Performed by: INTERNAL MEDICINE

## 2022-08-29 PROCEDURE — 51798 US URINE CAPACITY MEASURE: CPT

## 2022-08-29 PROCEDURE — 36415 COLL VENOUS BLD VENIPUNCTURE: CPT

## 2022-08-29 RX ORDER — LEVOFLOXACIN 5 MG/ML
750 INJECTION, SOLUTION INTRAVENOUS EVERY 24 HOURS
Status: DISCONTINUED | OUTPATIENT
Start: 2022-08-29 | End: 2022-08-30 | Stop reason: CLARIF

## 2022-08-29 RX ADMIN — METOPROLOL TARTRATE 12.5 MG: 25 TABLET, FILM COATED ORAL at 11:12

## 2022-08-29 RX ADMIN — Medication 1 CAPSULE: at 11:48

## 2022-08-29 RX ADMIN — BISACODYL 10 MG: 10 SUPPOSITORY RECTAL at 11:12

## 2022-08-29 RX ADMIN — ENOXAPARIN SODIUM 30 MG: 100 INJECTION SUBCUTANEOUS at 11:11

## 2022-08-29 RX ADMIN — FLUTICASONE PROPIONATE 2 SPRAY: 50 SPRAY, METERED NASAL at 11:14

## 2022-08-29 RX ADMIN — OXYCODONE HYDROCHLORIDE AND ACETAMINOPHEN 500 MG: 500 TABLET ORAL at 11:12

## 2022-08-29 RX ADMIN — NORTRIPTYLINE HYDROCHLORIDE 50 MG: 25 CAPSULE ORAL at 21:46

## 2022-08-29 RX ADMIN — METOPROLOL TARTRATE 12.5 MG: 25 TABLET, FILM COATED ORAL at 21:10

## 2022-08-29 RX ADMIN — MIRTAZAPINE 15 MG: 15 TABLET, FILM COATED ORAL at 11:13

## 2022-08-29 RX ADMIN — LEVOFLOXACIN 750 MG: 5 INJECTION, SOLUTION INTRAVENOUS at 11:13

## 2022-08-29 RX ADMIN — SODIUM CHLORIDE, PRESERVATIVE FREE 10 ML: 5 INJECTION INTRAVENOUS at 06:10

## 2022-08-29 RX ADMIN — LANSOPRAZOLE 30 MG: KIT at 11:14

## 2022-08-29 RX ADMIN — VANCOMYCIN HYDROCHLORIDE 600 MG: 1 INJECTION, POWDER, LYOPHILIZED, FOR SOLUTION INTRAVENOUS at 04:00

## 2022-08-29 RX ADMIN — SODIUM CHLORIDE, PRESERVATIVE FREE 10 ML: 5 INJECTION INTRAVENOUS at 21:10

## 2022-08-29 RX ADMIN — SODIUM CHLORIDE, PRESERVATIVE FREE 10 ML: 5 INJECTION INTRAVENOUS at 13:54

## 2022-08-29 RX ADMIN — VANCOMYCIN HYDROCHLORIDE 600 MG: 1 INJECTION, POWDER, LYOPHILIZED, FOR SOLUTION INTRAVENOUS at 13:54

## 2022-08-29 RX ADMIN — VANCOMYCIN HYDROCHLORIDE 600 MG: 1 INJECTION, POWDER, LYOPHILIZED, FOR SOLUTION INTRAVENOUS at 20:51

## 2022-08-29 NOTE — PROGRESS NOTES
Bedside and Verbal shift change report given to Francisco Amaya (oncoming nurse) by Sonia Hernández (offgoing nurse). Report included the following information SBAR, Kardex, Intake/Output, and MAR.

## 2022-08-29 NOTE — PROGRESS NOTES
Hospitalist Progress Note    NAME: Jacki Gilmore   :  1960   MRN:  [id number]       Assessment / Plan:  Fever, Tm 101.4 likely due to recurrent UTI from urinary retention  Enterococcus UTI  E. coli ESBL UTI  Carrera irrigated yesterday yield ~2L UO. UA suggestive of UTI. With hx of ESBL E.Coli, new urine culture showed Enterococcus , patient was started on meropenem for UTI E. coli ESBL, has Enterococcus species, added vancomycin  Cont' gentle IVF  CXR neg for acute findings  Re-consult urology, discussed with urology NP, orders for BID irrigation  Currently afebrile  Urine culture showed Enterococcus faecalis, awaiting for speciation of the second gram-negative bacteria. Most likely patient can be discharged on Cipro to complete a 7-day course  Second gram-negative bacteria showed Stenotrophomonas (Xantho.) maltophilia   ID consulted due to presence of 3 bacteria in the urine, patient also have documented allergy to a number of antibiotics  L arm edema, resolved  duplex L arm neg for DVT. Discussed with ID, recommended repeat urine culture. As currently patient has not been treated for stenotrophomonas Leanberta Pantoja.) maltophilia. Patient is being treated with vancomycin, already completed a course of 12 days of meropenem (course of 7 days and second course of 5 days). Await further recommendations from ID  : Repeat urine culture showed gram-negative rods, discussed with Dr. Tammy Forrester MD, trial of IV Levaquin. If patient able to tolerate will switch to p.o. Levaquin  Stop Vancomycin tomorrow  after 7-day course  Sepsis due to ESBL E coli UTI  Urine culture grew ESBL E coli  Completed 7 days of meropenum on , restarted on meropenem on   Discontinue meropenem as new urine culture showed Enterococcus faecalis and patient already completed a total [id number] iv merrem      Hx bladder cancer s/p neobladder  Urinary retention  Does straight cath in group home.  C/w carrera catheter here, will discharge with this  Having intermittent retention even with carrera, nursing to cont' with irrigation PRN  S/p cystolitholapaxy, b/l pyelogram, stone removal on 8/18. Repeat CT showed stable left hydroureteronephrosis, Calculi in neobladder. Carrera irrigation daily and prn. Discharge to [facility] with carrera  Appreciate urology following     Sinus tachycardia  Intermittent  Likely d/t discomfort from contipation/ urinary retention  C/w metoprolol. Constipation with fecal impaction  Had been getting aggressive bowel regimen and reslitor. Pt had TF and bile material coming out of his G tube on 8/17. Repeat KUB showed hydronephrosis and hydroureter  Gi evaluated, recommending surg evaluation. NGT placed for decompression, placed on 8/17, removed 8/19. Pt with multiple BM after sorbital, dulcolax supp. Abd remains NT and soft    Vent G-tube to gravity if has n/v.  Cont' bowel regimen as prescribed. Avoid opioids. .       Dislodged PEG tube  PEG replaced by IR 8/12  NGT placeemnt on 8/18, removal on 8/19  Tube feeding restarted    Hypernatremia  VAZQUEZ  resolved     Cerebral palsy  Anxiety/ Depression  Continue with supportive care. JESSE: 8/29 pending  repeat Ucx culture and ID recs       Code Status: DO NOT RESUSCITATE. Surrogate Decision Maker: brothers and sister  DVT Prophylaxis: Lovenox. GI Prophylaxis: not indicated     Baseline: Dependent for activity of daily living.   Non verbal, but can communicated with nodding/ gestures, better when family at bedside  Updated  patient brother at bedside, all questions answered  Dated patient's sister at bedside on 08/29     Subjective:     Chief Complaint / Reason for Physician Visit  Follow-up recurrent UTI  disCussed with the nurse  No acute issues,     Review of Systems:  Symptom Y/N Comments  Symptom Y/N Comments   Fever/Chills    Chest Pain     Poor Appetite    Edema     Cough    Abdominal Pain     Sputum    Joint Pain     SOB/FANG    Pruritis/Rash Nausea/vomit    Tolerating PT/OT     Diarrhea    Tolerating Diet     Constipation    Other       Could NOT obtain due to: Non verbal     Objective:     VITALS:   Last 24hrs VS reviewed since prior progress note. Most recent are:  Patient Vitals for the past 24 hrs:   Temp Pulse Resp BP SpO2   08/29/22 0823 97.5 °F (36.4 °C) 87 18 129/72 96 %   08/28/22 2300 98.2 °F (36.8 °C) -- 19 115/64 95 %   08/28/22 2202 -- 85 -- 113/65 --   08/28/22 1728 97.7 °F (36.5 °C) 81 18 (!) 110/52 96 %         Intake/Output Summary (Last 24 hours) at 8/29/2022 1655  Last data filed at 8/29/2022 1113  Gross per 24 hour   Intake 1635 ml   Output 2000 ml   Net -365 ml          I had a face to face encounter and independently examined this patient on 8/29/2022, as outlined below:  PHYSICAL EXAM:  General: Awake, No acute distress  EENT:  EOMI. Anicteric sclerae. MMM  Resp:  CTA bilaterally, no wheezing or rales. No accessory muscle use  CV:  Regular  rhythm,  L arm edema  GI:  Soft, Non distended, Non tender. +Bowel sounds  Neurologic:  Awake, non verbal at baseline but can make his needs known, contracted extremity  Psych:   Not anxious nor agitated  Skin:  No rashes. No jaundice    Reviewed most current lab test results and cultures  YES  Reviewed most current radiology test results   YES  Review and summation of old records today    NO  Reviewed patient's current orders and MAR    YES  PMH/ reviewed - no change compared to H&P  ________________________________________________________________________  Care Plan discussed with:    Comments   Patient y    Family      RN y    Care Manager     Consultant  y ID                      Multidiciplinary team rounds were held today with , nursing, pharmacist and clinical coordinator. Patient's plan of care was discussed; medications were reviewed and discharge planning was addressed.      ________________________________________________________________________  Total NON critical care TIME: 45 Minutes    Total CRITICAL CARE TIME Spent:   Minutes non procedure based      Comments   >50% of visit spent in counseling and coordination of care     ________________________________________________________________________  Kali Carr MD     Procedures: see electronic medical records for all procedures/Xrays and details which were not copied into this note but were reviewed prior to creation of Plan. LABS:  I reviewed today's most current labs and imaging studies.   Pertinent labs include:  Recent Labs     08/27/22  0323   WBC 6.3   HGB 12.1   HCT 38.0            Recent Labs     08/29/22  0047 08/28/22  0129 08/27/22  0323    135* 138   K 4.2 4.6 3.9    101 104   CO2 30 32 31   GLU 99 96 89   BUN 19 19 17   CREA 0.50* 0.54* 0.60*   CA 8.9 9.1 8.7         Signed: Kali Carr MD

## 2022-08-29 NOTE — PROGRESS NOTES
Transition of Care Plan: Return to [facility] (group home). RUR: 13% (low)  Disposition: Return to [facility]. Follow up appointments: PCP/urology/nephrology. DME needed: None. Transportation at Discharge: [facility] on will call. Nursing call report to [facility] (group home) at [phone]. Keys or means to access home:   Facility has keys.  Medicare Letter: 2nd [facility] signed 8/22/2022 by patient's sister, Gulshan Harden. Is patient a BCPI-A Bundle:   N/A                   If yes, was Bundle Letter given?:  N/A  Is patient a [place] and connected with the [place]? N/A              If yes, was [place] transfer form completed and [facility] notified? N/A  Caregiver Contact: Gulshan Harden - sister - [phone]. Discharge Caregiver contacted prior to discharge? CM to contact family. Care Conference needed?:  No.    Patient needs ID to see. He will be started on Levaquin and if he can tolerate the medication, he may leave on PO Levaquin. Urine cultures pending. Discharge summary and updated notes to be faxed to [phone].       EWELINA Rodriguez, RN    Care Management  [phone]

## 2022-08-29 NOTE — PROGRESS NOTES
Infectious Disease Progress    Impression    Complicated UTI, Carrera dependent in setting of bladder cancer  S/p neobladder and recent stone removal on 8/18/2022  Per operative report mucus seen in the bladder  History of ESBL Klebsiella earlier in the admission status post Meropenem x 14 days total.  Repeat urine culture 8/22 +for Enterococcus and stenotrophomonas. S/p fever Tmax 102 on 8/22. Pt on Day 6 of Vancomycin IV, Stenotrophomonas not treated. ? Colonozation vs pathogen  Pt currenty afebrile, stable. S/p change of carrera & UC repeated on 8/27  UC - 8/27+ >100,000 GNR- ID, ARJUN pending. Probable colonization, pt is afebrile. Clear urine in bag. Will await final cultures. Start Levaquin IV for Stenotrophomonas, monitor closely for reaction  Change to po Levaquin for Dc if tolerated. H/o Multiple antibiotic allergies  Not specified. ? Drug side effect vs true allergy      Hx of bladder cancer with neobladder     Bladder stone      Cerebral palsy    CTT report of small bowel obstruction 8/22/22  S/p evaluation by general surgery with no concerns for obstruction. PEG present        Plan  Start Levaquin IV   Monitor closely for reaction, D/w Pharmacy  If no reaction  may transition to po for Dc   Await repeat urine culture  Adequate fluids  Daily probiotic  D/w Dr Margot Qiu. Pt seen. Awake, in no distress. Afebrile. Clear urine in bag. D/w Pharmacy   D/w Dr Margot Qiu.   Active Hospital Problems    Diagnosis Date Noted    UTI (urinary tract infection) 08/22/2018         Past Medical History:   Diagnosis Date    Anxiety     Bladder cancer ([facility] [address]     Cancer ([facility] [address]     BLADDER    CP (cerebral palsy) (HCC)     Depression     Hernia     Nausea & vomiting     Other ill-defined conditions(799.89)     Cerebral palsy    Psychiatric disorder     ANXIETY DEPRESSION    Unspecified constipation 05/13/2013       Past Surgical History:   Procedure Laterality Date    HX APPENDECTOMY      HX CHOLECYSTECTOMY      HX [address] HR, PEG TUBE     HX ORTHOPAEDIC  2013    Right knee surgery on 8/15 with Dr. Elida Nyhan. HX UROLOGICAL      BLADDER REMOVED, HAS NADYA-BLADDER     IR INJ NEPHRO/URETEROGRAM LT EXISTING ACCESS SI  2021    IR INSERT GASTROSTOMY TUBE PERC  2022    IR NEPHROSTOMY PERC RT PLC CATH  SI  3/19/2021    IR URETERAL STENT RT PERC PLC EXISTING SI  3/24/2021    DE LAP PROCEDURE, UNLISTED, BLADDER      neobladder    DE LAP,CHOLECYSTECTOMY      DE LAP,LYSIS OF ADHESIONS      DE REMOVE MESH FROM ABD WALL      due to infection       Allergies   Allergen Reactions    Avelox [Moxifloxacin] Rash    Amikacin Unknown (comments)    Amoxicillin Rash    Betadine Surgi-Prep Rash     Mother states this does not happen all the time and if betadine is washed off, it's ok    Ciprofloxacin Unknown (comments)    Dilaudid [Hydromorphone] Unable to Obtain    Effexor [Venlafaxine] Unknown (comments)    Fentanyl Unknown (comments)    Keflex [Cephalexin] Rash    Lyrica [Pregabalin] Unknown (comments)    Morphine Unknown (comments)    Neurontin [Gabapentin] Unknown (comments)    Prozac [Fluoxetine] Unknown (comments)    Reglan [Metoclopramide] Unknown (comments)    Serzone [Nefazodone] Unknown (comments)    Sulfa (Sulfonamide Antibiotics) Unknown (comments)    Tetracycline Unknown (comments)    Tricyclamol Chloride Unknown (comments)    Valium [Diazepam] Unknown (comments)     Tolerates Lorazepam    Demerol [Meperidine] Other (comments)     Spastic movements       Social Connections: Not on file       Family Status   Relation Name Status    Mother  Alive    Father          BRAIN TUMOR    Neg Hx  (Not Specified)       Medication Documentation Review Audit       Reviewed by Kenia Morales (Registered Nurse) on 22 at 0732      Medication Sig Documenting Provider Last Dose Status Taking?   acetaminophen (TYLENOL) 160 mg/5 mL liquid 640 mg by Per G Tube route every four (4) hours as needed for Fever or Pain. Provider, Historical  Active            Med Note ([facility] Mar 26, 2021 12:00 PM) PATIENT INSTRUCTED HE/SHE MAY USE DAY OF SURGERY IF NEEDED PER ANESTHESIA GUIDELINES      ascorbic acid, vitamin C, (VITAMIN C) 500 mg tablet 500 mg by Per G Tube route daily. Provider, Historical  Active            Med Note ([facility] Mar 26, 2021 12:04 PM) INSTRUCTED TO STOP TAKING NOW, PRIOR TO SURGERY, PER ANESTHESIA GUIDELINES   cholecalciferol (VITAMIN D3) 1,000 unit tablet 2,000 Units by Per G Tube route daily. Indications: Vitamin D Deficiency Provider, Historical  Active    Esomeprazole Magnesium (NEXIUM PACKET) 40 mg by Per G Tube route daily. Indications: gastroesophageal reflux disease Provider, Historical  Active            Med Note ([facility] Mar 26, 2021 12:03 PM) INSTRUCTED TO TAKE DAY OF SURGERY PER ANESTHESIA GUIDELINES   ferrous sulfate 220 mg (44 mg iron)/5 mL solution Take 220 mg by mouth daily. Provider, Historical  Active    fluticasone (FLONASE) 50 mcg/actuation nasal spray 2 Sprays by Both Nostrils route daily. Provider, Historical  Active            Med Note ([facility] Mar 26, 2021 12:02 PM) INSTRUCTED TO USE MORNING OF SURGERY PER ANESTHESIA GUIDELINES   HYDROcodone-acetaminophen (HYCET) 0.5-21.7 mg/mL oral solution 5 mL by Per G Tube route four (4) times daily as needed for Pain. Provider, Historical  Active            Med Note ([facility] Mar 26, 2021 12:03 PM) INSTRUCTED HE/SHE CAN TAKE UP TO 4 HOURS PRIOR TO ARRIVAL DAY OF SURGERY IF NEEDED, PER G-TUBE, PER ANESTHESIA GUIDELINES     LORazepam (ATIVAN) 1 mg tablet 0.75 mg by Per G Tube route every six (6) hours. Provider, Historical  Active            Med Note ([facility] Mar 26, 2021 12:01 PM) INSTRUCTED PATIENT MAY TAKE DAY OF SURGERY IF NEEDED PER ANESTHESIA GUIDELINES   mirtazapine (REMERON) 15 mg tablet 15 mg by Per G Tube route daily.  Provider, Historical  Active            Med Note ([facility]   Fri Mar 26, 2021 11:59 AM) INSTRUCTED TO TAKE DAY OF SURGERY PER ANESTHESIA GUIDELINES   nortriptyline (PAMELOR) 10 mg/5 mL solution 50 mg by Per G Tube route nightly. Provider, Historical  Active    rizatriptan (MAXALT) 10 mg tablet 10 mg by SubLINGual route as needed for Migraine. May repeat dose after 2 hours not to exceed 3 tablets per 24hr Other, MD [place]  Active            Med Note ([facility]   Fri Mar 26, 2021 12:01 PM) INSTRUCTED MAY TAKE DAY OF SURGERY PER ANESTHESIA GUIDELINES                    Review of Systems - Cannot obtain due to pt factors. PHYSICAL EXAM:  General:          WD, WN. Alert, cooperative, no acute distress    EENT:              EOMI. Anicteric sclerae. MMM  Resp:               CTA bilaterally, no wheezing or rales. No accessory muscle use  CV:                  Regular  rhythm,  No edema  GI:                   Soft, Non distended, Non tender. +Bowel sounds. Peg +   Neurologic:      Alert and oriented X 3, normal speech,   Psych:             Good insight. Not anxious nor agitated  Skin:                No rashes. No jaundice. Extremities: No C/C/ edema.  Clear urine in bag    Marsha Koo MD [place]

## 2022-08-30 VITALS
RESPIRATION RATE: 20 BRPM | OXYGEN SATURATION: 100 % | HEIGHT: 67 IN | SYSTOLIC BLOOD PRESSURE: 115 MMHG | WEIGHT: 111.99 LBS | TEMPERATURE: 98.8 F | DIASTOLIC BLOOD PRESSURE: 71 MMHG | BODY MASS INDEX: 17.58 KG/M2 | HEART RATE: 84 BPM

## 2022-08-30 LAB
ANION GAP SERPL CALC-SCNC: 4 MMOL/L (ref 5–15)
BACTERIA SPEC CULT: ABNORMAL
BUN SERPL-MCNC: 14 MG/DL (ref 6–20)
BUN/CREAT SERPL: 26 (ref 12–20)
CALCIUM SERPL-MCNC: 8.2 MG/DL (ref 8.5–10.1)
CC UR VC: ABNORMAL
CHLORIDE SERPL-SCNC: 102 MMOL/L (ref 97–108)
CO2 SERPL-SCNC: 29 MMOL/L (ref 21–32)
CREAT SERPL-MCNC: 0.54 MG/DL (ref 0.7–1.3)
GLUCOSE SERPL-MCNC: 86 MG/DL (ref 65–100)
POTASSIUM SERPL-SCNC: 4 MMOL/L (ref 3.5–5.1)
SERVICE CMNT-IMP: ABNORMAL
SODIUM SERPL-SCNC: 135 MMOL/L (ref 136–145)

## 2022-08-30 PROCEDURE — 74011250637 HC RX REV CODE- 250/637: Performed by: INTERNAL MEDICINE

## 2022-08-30 PROCEDURE — 36415 COLL VENOUS BLD VENIPUNCTURE: CPT

## 2022-08-30 PROCEDURE — 74011250636 HC RX REV CODE- 250/636: Performed by: INTERNAL MEDICINE

## 2022-08-30 PROCEDURE — 74011000250 HC RX REV CODE- 250: Performed by: INTERNAL MEDICINE

## 2022-08-30 PROCEDURE — 80048 BASIC METABOLIC PNL TOTAL CA: CPT

## 2022-08-30 PROCEDURE — 74011250637 HC RX REV CODE- 250/637: Performed by: STUDENT IN AN ORGANIZED HEALTH CARE EDUCATION/TRAINING PROGRAM

## 2022-08-30 RX ORDER — LEVOFLOXACIN 750 MG/1
750 TABLET ORAL EVERY 24 HOURS
Status: DISCONTINUED | OUTPATIENT
Start: 2022-08-31 | End: 2022-08-31 | Stop reason: HOSPADM

## 2022-08-30 RX ORDER — LEVOFLOXACIN 750 MG/1
750 TABLET ORAL DAILY
Qty: 5 TABLET | Refills: 0 | Status: SHIPPED | OUTPATIENT
Start: 2022-08-30 | End: 2022-09-04

## 2022-08-30 RX ADMIN — Medication 1 CAPSULE: at 10:33

## 2022-08-30 RX ADMIN — BISACODYL 10 MG: 10 SUPPOSITORY RECTAL at 10:34

## 2022-08-30 RX ADMIN — SODIUM CHLORIDE, PRESERVATIVE FREE 10 ML: 5 INJECTION INTRAVENOUS at 14:08

## 2022-08-30 RX ADMIN — LANSOPRAZOLE 30 MG: KIT at 10:33

## 2022-08-30 RX ADMIN — FLUTICASONE PROPIONATE 2 SPRAY: 50 SPRAY, METERED NASAL at 10:34

## 2022-08-30 RX ADMIN — MIRTAZAPINE 15 MG: 15 TABLET, FILM COATED ORAL at 10:33

## 2022-08-30 RX ADMIN — LEVOFLOXACIN 750 MG: 5 INJECTION, SOLUTION INTRAVENOUS at 11:00

## 2022-08-30 RX ADMIN — ENOXAPARIN SODIUM 30 MG: 100 INJECTION SUBCUTANEOUS at 10:32

## 2022-08-30 RX ADMIN — METOPROLOL TARTRATE 12.5 MG: 25 TABLET, FILM COATED ORAL at 10:33

## 2022-08-30 RX ADMIN — OXYCODONE HYDROCHLORIDE AND ACETAMINOPHEN 500 MG: 500 TABLET ORAL at 10:34

## 2022-08-30 RX ADMIN — VANCOMYCIN HYDROCHLORIDE 600 MG: 1 INJECTION, POWDER, LYOPHILIZED, FOR SOLUTION INTRAVENOUS at 12:47

## 2022-08-30 RX ADMIN — VANCOMYCIN HYDROCHLORIDE 600 MG: 1 INJECTION, POWDER, LYOPHILIZED, FOR SOLUTION INTRAVENOUS at 05:50

## 2022-08-30 RX ADMIN — SODIUM CHLORIDE, PRESERVATIVE FREE 10 ML: 5 INJECTION INTRAVENOUS at 05:21

## 2022-08-30 NOTE — PROGRESS NOTES
Bedside shift change report given to MANI Ny (oncoming nurse) by Aylin Barger (offgoing nurse). Report included the following information SBAR, Kardex, Intake/Output, MAR, Accordion, and Recent Results.

## 2022-08-30 NOTE — PROGRESS NOTES
[facility] contacted CM, requested updated notes. Updated notes and most recent UA sent to [facility]. Phone: [phone] Fax: [phone]. Discharge summary and any IV abx orders to be faxed over. Group home ready to accept patient back once stable.       EWELINA Humphrey, RN    Care Management  [phone]

## 2022-08-30 NOTE — DISCHARGE SUMMARY
Hospitalist Discharge Summary     Patient ID:  Mita Madera  [id number]  64 y.o.  1960 8/8/2022    PCP on record: Marisol Branch MD    Admit date: 8/8/2022  Discharge date and time: 8/30/2022    DISCHARGE DIAGNOSIS:  Fever, Tm 101.4 likely due to recurrent UTI from urinary retention  Enterococcus UTI  E. coli ESBL UTI  Stenotrophomonas Dequan Miguel.) maltophilia   Sepsis due to ESBL E coli UTI  Hx bladder cancer s/p neobladder  Urinary retention  Sinus tachycardia  Constipation with fecal impaction  Dislodged PEG tube  Hypernatremia  VAZQUEZ  cerebral palsy  Anxiety/ Depression      CONSULTATIONS:  IP CONSULT TO UROLOGY  IP CONSULT TO GASTROENTEROLOGY  IP CONSULT TO NEPHROLOGY  IP CONSULT TO GASTROENTEROLOGY  IP CONSULT TO ANESTHESIOLOGY  IP CONSULT TO UROLOGY  IP CONSULT TO INFECTIOUS DISEASES  IP CONSULT TO GENERAL SURGERY    Excerpted HPI from H&P of Derrell Raygoza MD:      ______________________________________________________________________  DISCHARGE SUMMARY/HOSPITAL COURSE:  for full details see H&P, daily progress notes, labs, consult notes. Fever, Tm 101.4 likely due to recurrent UTI from urinary retention  Sepsis POA   Enterococcus UTI  E. coli ESBL UTI  Patient was found to have a UTI E. coli ESBL UTI, was treated with IV meropenem for 7 days  , He spiked fever on 08/23, repeat urine cultures showed Enterococcus faecalis for which she was treated with IV vancomycin for 7 days, culture also showed stenotrophomonas (Xantho.) maltophilia for which he will be treated with p.o. Levaquin for 5 days     arm edema, resolved  duplex L arm neg for DVT. Hx bladder cancer s/p neobladder  Urinary retention  Does straight cath in group home. C/w carrera catheter here, will discharge with this  Having intermittent retention even with carrera, nursing to cont' with irrigation PRN  S/p cystolitholapaxy, b/l pyelogram, stone removal on 8/18.      Repeat CT showed stable left hydroureteronephrosis, Calculi in neobladder. Carrera irrigation daily and prn. Discharge to [facility] with carrera  Appreciate urology following     Sinus tachycardia  Intermittent  Likely d/t discomfort from contipation/ urinary retention  C/w metoprolol. Constipation with fecal impaction  Had been getting aggressive bowel regimen and reslitor. Pt had TF and bile material coming out of his G tube on 8/17. Repeat KUB showed hydronephrosis and hydroureter  Gi evaluated, recommending surg evaluation. NGT placed for decompression, placed on 8/17, removed 8/19. Pt with multiple BM after sorbital, dulcolax supp. Abd remains NT and soft    Vent G-tube to gravity if has n/v.  Cont' bowel regimen as prescribed. Avoid opioids. .       Dislodged PEG tube  PEG replaced by IR 8/12  NGT placeemnt on 8/18, removal on 8/19  Tube feeding restarted    Hypernatremia  VAZQUEZ  resolved     Cerebral palsy  Anxiety/ Depression  Continue with supportive care.             _______________________________________________________________________  Patient seen and examined by me on discharge day. Pertinent Findings:  Gen:    Not in distress  Chest: Clear lungs  CVS:   Regular rhythm. No edema  Abd:  Soft, not distended, not tender  Neuro:  Alert,   _______________________________________________________________________  DISCHARGE MEDICATIONS:   Current Discharge Medication List        START taking these medications    Details   levoFLOXacin (Levaquin) 750 mg tablet Take 1 Tablet by mouth daily for 5 days. Qty: 5 Tablet, Refills: 0  Start date: 8/30/2022, End date: 9/4/2022      bisacodyL (DULCOLAX) 10 mg supp Insert 10 mg into rectum daily. Qty: 30 Suppository, Refills: 0  Start date: 8/22/2022      !! metoprolol tartrate (LOPRESSOR) 25 mg tablet 1 Tablet by Per G Tube route every twelve (12) hours for 30 days.   Qty: 60 Tablet, Refills: 0  Start date: 8/22/2022, End date: 9/21/2022      !! metoprolol tartrate (LOPRESSOR) 25 mg tablet 0.5 Tablets by Per G Tube route every twelve (12) hours for 30 days. Qty: 30 Tablet, Refills: 0  Start date: 8/22/2022, End date: 9/21/2022      polyethylene glycol (Miralax) 17 gram packet Take 1 Packet by mouth two (2) times a day for 30 days. Qty: 60 Packet, Refills: 0  Start date: 8/22/2022, End date: 9/21/2022       !! - Potential duplicate medications found. Please discuss with provider. CONTINUE these medications which have NOT CHANGED    Details   ferrous sulfate 220 mg (44 mg iron)/5 mL solution Take 220 mg by mouth daily. mirtazapine (REMERON) 15 mg tablet 15 mg by Per G Tube route daily. nortriptyline (PAMELOR) 10 mg/5 mL solution 50 mg by Per G Tube route nightly. ascorbic acid, vitamin C, (VITAMIN C) 500 mg tablet 500 mg by Per G Tube route daily. cholecalciferol (VITAMIN D3) 1,000 unit tablet 2,000 Units by Per G Tube route daily. Indications: Vitamin D Deficiency      acetaminophen (TYLENOL) 160 mg/5 mL liquid 640 mg by Per G Tube route every four (4) hours as needed for Fever or Pain. LORazepam (ATIVAN) 1 mg tablet 0.75 mg by Per G Tube route every six (6) hours. fluticasone (FLONASE) 50 mcg/actuation nasal spray 2 Sprays by Both Nostrils route daily. Esomeprazole Magnesium (NEXIUM PACKET) 40 mg by Per G Tube route daily. Indications: gastroesophageal reflux disease      rizatriptan (MAXALT) 10 mg tablet 10 mg by SubLINGual route as needed for Migraine. May repeat dose after 2 hours not to exceed 3 tablets per 24hr           STOP taking these medications       HYDROcodone-acetaminophen (HYCET) 0.5-21.7 mg/mL oral solution Comments:   Reason for Stopping:                 Patient Follow Up Instructions:    Activity: Activity as tolerated  Diet: PEG feeding bolus   Wound Care: None needed      Follow-up Information       Follow up With Specialties Details Why Judd Quijano MD Geriatric Medicine Physician Follow up in 1 week(s)  [address] [place] [address] [id number]  [phone]            ________________________________________________________________    Risk of deterioration: High    Condition at Discharge:  Stable  __________________________________________________________________    Disposition  Group home     ____________________________________________________________________    Code Status: DNR/DNI  ___________________________________________________________________      Total time in minutes spent coordinating this discharge (includes going over instructions, follow-up, prescriptions, and preparing report for sign off to her PCP) :  >30 minutes    Signed:  Tamica Soto MD

## 2022-08-30 NOTE — PROGRESS NOTES
Transition of Care Plan: Return to [facility] (group home). RUR: 13% (low)  Disposition: Return to [facility]. Follow up appointments: PCP/urology/nephrology. DME needed: None. Transportation at Discharge: [facility] at 4:30 pm.  Nursing call report to [facility] (group home) at [phone]. Keys or means to access home:   Facility has keys.  Medicare Letter: 2nd IMM emailed to patient's sister, Marti [place], at [email or web address]. Is patient a BCPI-A Bundle:   N/A                   If yes, was Bundle Letter given?:  N/A  Is patient a  and connected with the [place]? N/A              If yes, was [employer] transfer form completed and [facility] notified? N/A  Caregiver Contact: Marti [place] - sister - [phone]. Discharge Caregiver contacted prior to discharge? CM contacted patient's sister, aware of discharge today. Care Conference needed?:  No.      Patient to discharge to [facility] today. [facility] is able accept patient back prior 8pm, requesting latest urine/blood cultures (already faxed over) and discharge summary. Message left for the [facility]. CM contacted sister who is agreeable to patient's discharge today with [facility] at 4:30 pm, no further questions or concerns for CM. 2nd IMM emailed to [email or web address]. CM reached nursing at [facility], made aware of patient's discharge today and faxed over discharge summary. Primary RN aware of [facility] pickup at 4:30 pm and all IVs/midlines/central lines to be removed prior to discharge. [facility] contacted again and made admissions aware of patient discharge today at 4:30 pm, information faxed over. [facility] has all necessary paperwork at this time to accept patient back. [facility] paperwork placed on chart.         ALMA WilderN, RN     Care Management  [phone]

## 2022-08-30 NOTE — DISCHARGE INSTRUCTIONS
HOSPITALIST DISCHARGE INSTRUCTIONS    NAME: Nini Alvarez   :  1960   MRN:  [id number]     Date/Time:  2022 1:54 PM    ADMIT DATE: 2022   DISCHARGE DATE: 2022     Attending Physician: David Mathis MD  Fever, Tm 101.4 likely due to recurrent UTI from urinary retention  Enterococcus UTI  E. coli ESBL UTI  Stenotrophomonas Estelle Ball.) maltophilia   Sepsis due to ESBL E coli UTI  Hx bladder cancer s/p neobladder  Urinary retention  Sinus tachycardia  Constipation with fecal impaction  Dislodged PEG tube  Hypernatremia  VAZQUEZ  cerebral palsy  Anxiety/ Depression    Medications: Per above medication reconciliation. Pain Management: per above medications    Recommended diet: PEG feeding  bolus feeding    Recommended activity: Activity as tolerated    Wound care: None    Indwelling devices:   Short catheter, chronic with care per routine    Supplemental Oxygen: None    Required Lab work: Per SNF routine    Glucose management:  None    Code status: DNR

## 2022-08-30 NOTE — PROGRESS NOTES
TRANSFER - OUT REPORT report given to Milbert Bernheim  being transferred to [address] [place] [address]   routine progression of care       Report consisted of patients Situation, Background, Assessment and   Recommendations(SBAR). Information from the following report(s) SBAR was reviewed with the receiving nurse. Lines:  DISCONTINUED, TRANSFERRED MARADIAGA LEFT IN PLACE  Peripheral IV Left;Upper (Active)   Site Assessment Clean, dry, & intact 08/30/22 1204   Phlebitis Assessment 0 08/30/22 1204   Infiltration Assessment 0 08/30/22 0745   Dressing Status Clean, dry, & intact 08/30/22 1204   Dressing Type Transparent 08/30/22 1204   Hub Color/Line Status Green 08/30/22 1204   Action Taken Open ports on tubing capped 08/29/22 1000   Alcohol Cap Used No 08/30/22 1204        Opportunity for questions and clarification was provided.       Patient transported [facility]   Registered Nurse

## 2022-08-31 ENCOUNTER — TELEPHONE (OUTPATIENT)
Dept: CASE MANAGEMENT | Age: 62
End: 2022-08-31

## 2022-08-31 NOTE — TELEPHONE ENCOUNTER
[facility] contacted requesting [facility] information for patient. MAR information sent over and [facility] notified via phone call that this information was sent over in three separate faxed. Dee with [facility] confirmed that she saw the faxed coming through.       ALMA NavarroN, RN    Care Management  [phone]

## 2022-09-07 PROBLEM — N39.0 UTI (URINARY TRACT INFECTION): Status: RESOLVED | Noted: 2018-08-22 | Resolved: 2022-09-07

## 2022-09-07 NOTE — PROGRESS NOTES
Physician Progress Note      PATIENT:               Jani Hitchcock  [facility] #:                  [id number]  :                       1960  ADMIT DATE:       2022 12:41 PM  [address] [place] DATE:        2022 9:01 PM  RESPONDING  PROVIDER #:        Erman Litten MD          QUERY TEXT:    Pt admitted with UTI, sepsis . Pt noted to have IC at [place] home. If possible, please document in the progress notes and discharge summary if you are evaluating and/or treating any of the following: The medical record reflects the following:  Risk Factors: Sepsis due to Escherichia coli , neobladder  Clinical Indicators:  urology- I/O caths 4x daily  Treatment: urology cx, meropenem, Short  Thanks,  Eriberto Boyd RN/CDI   Options provided:  -- UTI due to self catheterization  -- UTI not due to self catheterization  -- Other - I will add my own diagnosis  -- Disagree - Not applicable / Not valid  -- Disagree - Clinically unable to determine / Unknown  -- Refer to Clinical Documentation Reviewer    PROVIDER RESPONSE TEXT:    UTI is due to self catheterization.     Query created by: Dionte Mccarthy on 2022 2:06 PM      Electronically signed by:  Erman Litten MD 2022 8:25 AM

## 2022-12-14 NOTE — PERIOP NOTES
Care Transitions Program Week 3 Follow-up Call    Current Issues/Problems reviewed on call:   - Pt. Had Lung biopsy yesterday   - Pt. Scheduled for diagnostic mammogram and US left breast in January   - SOB on exertion   - Sore throat   - Fatigue     Details of Interventions/Education completed on call:     Review of Systems:   Care Transition System Evaluation: Pt. had lung biopsy yesterday  BP- Patient Reported:     Pulse - Patient Reported :     Temp - Patient Reported :    Fever:    Pain:   0  Pain Location: None  Weight-Patient Reported :    Resp - Patient Reported :    SpO2% - Patient Reported :   Peakflow - Patient Reported :    General Symptoms: Fatigue  Neurologic/Neuromuscular Symptoms: Fatigue  ENT Symptoms: Sore throat  Respiratory Symptoms: Shortness of breath  Shortness of Breath: SOB with mild exertion; SOB with moderate exercise  Cardiovascular Symptoms: WDL (absence of symptoms)  GI Symptoms: WDL (absence of symptoms)  Abdominal Tenderness:    Bowel Ostomy Type:     Symptoms: WDL (absence of symptoms) (Duarte removed)  Urinary Elimination:    Skin Symptoms: WDL (absence of symptoms)  Wound Type:    Hours of Uninterrupted Sleep:   Sleeping Behaviors:Difficulty sleeping at night; Middle of night awakening  Current Lines/Drains:No  Line/Drain Type:   Line/Drain Status:   Description of Line/Drain Concern:   Feeding Tube Type:    Patient's reported confidence in appropriate use of line/drain:   Description of patients lack of confidence in use of line/drain:     The patient istaking all medications as prescribed. The patient did not have questions or concerns regarding the medications prescribed.    Transitional Care Management (TCM) appointment was completed.  TCM appointment plan of care and upcoming appointments were reviewed with patient.  Transportation to upcoming appointments to be provided by patient and daughter.    Next Care Transitions follow-up call will be within 1 week.    Plan for next  SPOKE WITH Shawna Alfaro IN DR. Jesus Reinoso OFFICE INQUIRING ABOUT ORDERS FOR DAY OF SURGERY. SHE WILL SEND MESSAGE TO NURSE. follow-up call:    (4) no limitation

## 2023-10-10 ENCOUNTER — APPOINTMENT (OUTPATIENT)
Facility: HOSPITAL | Age: 63
DRG: 871 | End: 2023-10-10
Payer: MEDICARE

## 2023-10-10 ENCOUNTER — HOSPITAL ENCOUNTER (INPATIENT)
Facility: HOSPITAL | Age: 63
LOS: 8 days | Discharge: NURSING, REHAB OR LONG-TERM CARE | DRG: 871 | End: 2023-10-18
Attending: EMERGENCY MEDICINE | Admitting: STUDENT IN AN ORGANIZED HEALTH CARE EDUCATION/TRAINING PROGRAM
Payer: MEDICARE

## 2023-10-10 DIAGNOSIS — T83.511A URINARY TRACT INFECTION ASSOCIATED WITH INDWELLING URETHRAL CATHETER, INITIAL ENCOUNTER (HCC): ICD-10-CM

## 2023-10-10 DIAGNOSIS — A41.9 SEPTICEMIA (HCC): Primary | ICD-10-CM

## 2023-10-10 DIAGNOSIS — N39.0 URINARY TRACT INFECTION ASSOCIATED WITH INDWELLING URETHRAL CATHETER, INITIAL ENCOUNTER (HCC): ICD-10-CM

## 2023-10-10 LAB
ALBUMIN SERPL-MCNC: 3.5 G/DL (ref 3.5–5)
ALBUMIN/GLOB SERPL: 0.8 (ref 1.1–2.2)
ALP SERPL-CCNC: 79 U/L (ref 45–117)
ALT SERPL-CCNC: 18 U/L (ref 12–78)
ANION GAP SERPL CALC-SCNC: 7 MMOL/L (ref 5–15)
APPEARANCE UR: ABNORMAL
AST SERPL-CCNC: 15 U/L (ref 15–37)
BACTERIA URNS QL MICRO: NEGATIVE /HPF
BASOPHILS # BLD: 0.1 K/UL (ref 0–0.1)
BASOPHILS NFR BLD: 1 % (ref 0–1)
BILIRUB SERPL-MCNC: 0.4 MG/DL (ref 0.2–1)
BILIRUB UR QL: NEGATIVE
BUN SERPL-MCNC: 20 MG/DL (ref 6–20)
BUN/CREAT SERPL: 23 (ref 12–20)
CALCIUM SERPL-MCNC: 9.9 MG/DL (ref 8.5–10.1)
CHLORIDE SERPL-SCNC: 107 MMOL/L (ref 97–108)
CO2 SERPL-SCNC: 25 MMOL/L (ref 21–32)
COLOR UR: ABNORMAL
CREAT SERPL-MCNC: 0.87 MG/DL (ref 0.7–1.3)
DIFFERENTIAL METHOD BLD: ABNORMAL
EOSINOPHIL # BLD: 0 K/UL (ref 0–0.4)
EOSINOPHIL NFR BLD: 0 % (ref 0–7)
EPITH CASTS URNS QL MICRO: ABNORMAL /LPF
ERYTHROCYTE [DISTWIDTH] IN BLOOD BY AUTOMATED COUNT: 14.2 % (ref 11.5–14.5)
GLOBULIN SER CALC-MCNC: 4.6 G/DL (ref 2–4)
GLUCOSE BLD STRIP.AUTO-MCNC: 113 MG/DL (ref 65–117)
GLUCOSE SERPL-MCNC: 115 MG/DL (ref 65–100)
GLUCOSE UR STRIP.AUTO-MCNC: NEGATIVE MG/DL
HCT VFR BLD AUTO: 46.3 % (ref 36.6–50.3)
HGB BLD-MCNC: 14.2 G/DL (ref 12.1–17)
HGB UR QL STRIP: ABNORMAL
HYALINE CASTS URNS QL MICRO: ABNORMAL /LPF (ref 0–5)
IMM GRANULOCYTES # BLD AUTO: 0 K/UL (ref 0–0.04)
IMM GRANULOCYTES NFR BLD AUTO: 0 % (ref 0–0.5)
KETONES UR QL STRIP.AUTO: NEGATIVE MG/DL
LACTATE BLD-SCNC: 2.15 MMOL/L (ref 0.4–2)
LACTATE SERPL-SCNC: 2 MMOL/L (ref 0.4–2)
LEUKOCYTE ESTERASE UR QL STRIP.AUTO: ABNORMAL
LYMPHOCYTES # BLD: 1.5 K/UL (ref 0.8–3.5)
LYMPHOCYTES NFR BLD: 13 % (ref 12–49)
MCH RBC QN AUTO: 27.4 PG (ref 26–34)
MCHC RBC AUTO-ENTMCNC: 30.7 G/DL (ref 30–36.5)
MCV RBC AUTO: 89.2 FL (ref 80–99)
MONOCYTES # BLD: 0.8 K/UL (ref 0–1)
MONOCYTES NFR BLD: 7 % (ref 5–13)
NEUTS SEG # BLD: 8.7 K/UL (ref 1.8–8)
NEUTS SEG NFR BLD: 79 % (ref 32–75)
NITRITE UR QL STRIP.AUTO: NEGATIVE
NRBC # BLD: 0 K/UL (ref 0–0.01)
NRBC BLD-RTO: 0 PER 100 WBC
PH UR STRIP: 8 (ref 5–8)
PLATELET # BLD AUTO: 379 K/UL (ref 150–400)
PMV BLD AUTO: 10 FL (ref 8.9–12.9)
POTASSIUM SERPL-SCNC: 3.8 MMOL/L (ref 3.5–5.1)
PROCALCITONIN SERPL-MCNC: <0.05 NG/ML
PROCALCITONIN SERPL-MCNC: <0.05 NG/ML
PROT SERPL-MCNC: 8.1 G/DL (ref 6.4–8.2)
PROT UR STRIP-MCNC: 30 MG/DL
RBC # BLD AUTO: 5.19 M/UL (ref 4.1–5.7)
RBC #/AREA URNS HPF: ABNORMAL /HPF (ref 0–5)
SERVICE CMNT-IMP: NORMAL
SODIUM SERPL-SCNC: 139 MMOL/L (ref 136–145)
SP GR UR REFRACTOMETRY: 1.01 (ref 1–1.03)
TROPONIN I SERPL HS-MCNC: 6 NG/L (ref 0–76)
URINE CULTURE IF INDICATED: ABNORMAL
UROBILINOGEN UR QL STRIP.AUTO: 0.2 EU/DL (ref 0.2–1)
WBC # BLD AUTO: 11 K/UL (ref 4.1–11.1)
WBC URNS QL MICRO: >100 /HPF (ref 0–4)

## 2023-10-10 PROCEDURE — 85025 COMPLETE CBC W/AUTO DIFF WBC: CPT

## 2023-10-10 PROCEDURE — 36415 COLL VENOUS BLD VENIPUNCTURE: CPT

## 2023-10-10 PROCEDURE — 2580000003 HC RX 258: Performed by: EMERGENCY MEDICINE

## 2023-10-10 PROCEDURE — 6360000002 HC RX W HCPCS: Performed by: EMERGENCY MEDICINE

## 2023-10-10 PROCEDURE — 96366 THER/PROPH/DIAG IV INF ADDON: CPT

## 2023-10-10 PROCEDURE — 2580000003 HC RX 258: Performed by: STUDENT IN AN ORGANIZED HEALTH CARE EDUCATION/TRAINING PROGRAM

## 2023-10-10 PROCEDURE — 6370000000 HC RX 637 (ALT 250 FOR IP): Performed by: EMERGENCY MEDICINE

## 2023-10-10 PROCEDURE — 83605 ASSAY OF LACTIC ACID: CPT

## 2023-10-10 PROCEDURE — 87086 URINE CULTURE/COLONY COUNT: CPT

## 2023-10-10 PROCEDURE — 96365 THER/PROPH/DIAG IV INF INIT: CPT

## 2023-10-10 PROCEDURE — 93005 ELECTROCARDIOGRAM TRACING: CPT | Performed by: EMERGENCY MEDICINE

## 2023-10-10 PROCEDURE — 81001 URINALYSIS AUTO W/SCOPE: CPT

## 2023-10-10 PROCEDURE — 82962 GLUCOSE BLOOD TEST: CPT

## 2023-10-10 PROCEDURE — 84484 ASSAY OF TROPONIN QUANT: CPT

## 2023-10-10 PROCEDURE — 87040 BLOOD CULTURE FOR BACTERIA: CPT

## 2023-10-10 PROCEDURE — 80053 COMPREHEN METABOLIC PANEL: CPT

## 2023-10-10 PROCEDURE — 99285 EMERGENCY DEPT VISIT HI MDM: CPT

## 2023-10-10 PROCEDURE — 2060000000 HC ICU INTERMEDIATE R&B

## 2023-10-10 PROCEDURE — 74176 CT ABD & PELVIS W/O CONTRAST: CPT

## 2023-10-10 PROCEDURE — 71045 X-RAY EXAM CHEST 1 VIEW: CPT

## 2023-10-10 PROCEDURE — 84145 PROCALCITONIN (PCT): CPT

## 2023-10-10 PROCEDURE — 87150 DNA/RNA AMPLIFIED PROBE: CPT

## 2023-10-10 RX ORDER — SODIUM CHLORIDE 9 MG/ML
INJECTION, SOLUTION INTRAVENOUS PRN
Status: DISCONTINUED | OUTPATIENT
Start: 2023-10-10 | End: 2023-10-18 | Stop reason: HOSPADM

## 2023-10-10 RX ORDER — POLYETHYLENE GLYCOL 3350 17 G/17G
17 POWDER, FOR SOLUTION ORAL DAILY PRN
Status: DISCONTINUED | OUTPATIENT
Start: 2023-10-10 | End: 2023-10-15

## 2023-10-10 RX ORDER — SODIUM CHLORIDE 0.9 % (FLUSH) 0.9 %
5-40 SYRINGE (ML) INJECTION EVERY 12 HOURS SCHEDULED
Status: DISCONTINUED | OUTPATIENT
Start: 2023-10-10 | End: 2023-10-18 | Stop reason: HOSPADM

## 2023-10-10 RX ORDER — SODIUM CHLORIDE 9 MG/ML
INJECTION, SOLUTION INTRAVENOUS CONTINUOUS
Status: DISCONTINUED | OUTPATIENT
Start: 2023-10-10 | End: 2023-10-11

## 2023-10-10 RX ORDER — ONDANSETRON 4 MG/1
4 TABLET, ORALLY DISINTEGRATING ORAL EVERY 8 HOURS PRN
Status: DISCONTINUED | OUTPATIENT
Start: 2023-10-10 | End: 2023-10-18 | Stop reason: HOSPADM

## 2023-10-10 RX ORDER — ACETAMINOPHEN 650 MG/1
650 SUPPOSITORY RECTAL EVERY 6 HOURS PRN
Status: DISCONTINUED | OUTPATIENT
Start: 2023-10-10 | End: 2023-10-18 | Stop reason: HOSPADM

## 2023-10-10 RX ORDER — ACETAMINOPHEN 325 MG/1
650 TABLET ORAL EVERY 6 HOURS PRN
Status: DISCONTINUED | OUTPATIENT
Start: 2023-10-10 | End: 2023-10-18 | Stop reason: HOSPADM

## 2023-10-10 RX ORDER — ENOXAPARIN SODIUM 100 MG/ML
40 INJECTION SUBCUTANEOUS DAILY
Status: DISCONTINUED | OUTPATIENT
Start: 2023-10-11 | End: 2023-10-18 | Stop reason: HOSPADM

## 2023-10-10 RX ORDER — ACETAMINOPHEN 650 MG/1
650 SUPPOSITORY RECTAL
Status: COMPLETED | OUTPATIENT
Start: 2023-10-10 | End: 2023-10-10

## 2023-10-10 RX ORDER — ONDANSETRON 2 MG/ML
4 INJECTION INTRAMUSCULAR; INTRAVENOUS EVERY 6 HOURS PRN
Status: DISCONTINUED | OUTPATIENT
Start: 2023-10-10 | End: 2023-10-18 | Stop reason: HOSPADM

## 2023-10-10 RX ORDER — SODIUM CHLORIDE 0.9 % (FLUSH) 0.9 %
5-40 SYRINGE (ML) INJECTION PRN
Status: DISCONTINUED | OUTPATIENT
Start: 2023-10-10 | End: 2023-10-18 | Stop reason: HOSPADM

## 2023-10-10 RX ORDER — 0.9 % SODIUM CHLORIDE 0.9 %
30 INTRAVENOUS SOLUTION INTRAVENOUS ONCE
Status: COMPLETED | OUTPATIENT
Start: 2023-10-10 | End: 2023-10-10

## 2023-10-10 RX ADMIN — VANCOMYCIN HYDROCHLORIDE 1500 MG: 1.5 INJECTION, POWDER, LYOPHILIZED, FOR SOLUTION INTRAVENOUS at 19:45

## 2023-10-10 RX ADMIN — MEROPENEM 1000 MG: 1 INJECTION, POWDER, FOR SOLUTION INTRAVENOUS at 19:36

## 2023-10-10 RX ADMIN — SODIUM CHLORIDE 1719 ML: 9 INJECTION, SOLUTION INTRAVENOUS at 19:43

## 2023-10-10 RX ADMIN — ACETAMINOPHEN 650 MG: 650 SUPPOSITORY RECTAL at 19:51

## 2023-10-10 RX ADMIN — SODIUM CHLORIDE: 9 INJECTION, SOLUTION INTRAVENOUS at 22:50

## 2023-10-10 NOTE — ED PROVIDER NOTES
[address] EMERGENCY DEP  EMERGENCY DEPARTMENT ENCOUNTER      Pt Name: Brianna Chavez  MRN: [id number]  [facility] 1960  Date of evaluation: 10/10/2023  Provider: Corrie King MD      HISTORY OF PRESENT ILLNESS      HPI  59-year-old male with a past medical history of bladder cancer, cerebral palsy, PEG tube dependence, and multiple UTIs as well as a baclofen pump presenting to the emergency department due to abdominal pain. History is limited given the patient is nonverbal and can only communicate via spelling words with a head apparatus. Is reported that he developed abdominal pain today. He was seeing one of his doctors at [facility] to get his baclofen pump refilled. He reportedly was experiencing abdominal pain and was sent to the emergency department. Caregivers are unable to provide much else of the history. Nursing Notes were reviewed.     REVIEW OF SYSTEMS         Review of Systems  Unable to perform a complete review of systems as the patient is nonverbal      PAST MEDICAL HISTORY     Past Medical History:   Diagnosis Date    Anxiety     Bladder cancer ([address])     Cancer ([address])     BLADDER    CP (cerebral palsy) ([address])     Depression     Hernia     Nausea & vomiting     Other ill-defined conditions(799[address])     Cerebral palsy    Psychiatric disorder     ANXIETY DEPRESSION    Unspecified constipation 05/13/2013         SURGICAL HISTORY       Past Surgical History:   Procedure Laterality Date    APPENDECTOMY      CHOLECYSTECTOMY      HERNIA REPAIR      Inc HR, PEG TUBE 2012    IR INSERT GASTROSTOMY TUBE PERC  8/9/2022    IR NEPHROSTOGRAM EXISTING ACCESS  4/1/2021    IR NEPHROSTOMY PERCUTANEOUS RIGHT  3/19/2021    IR NEPHROSTOMY PERCUTANEOUS RIGHT 3/19/2021 [address] RAD ANGIO IR    IR NEPHROSTOMY PERCUTANEOUS RIGHT  3/19/2021    IR URETERAL STENT PLACEMENT THRU EXIST TRACT  3/24/2021    IR URETERAL STENT PLACEMENT THRU EXIST TRACT 3/24/2021 [address] RAD ANGIO IR    IR URETERAL STENT PLACEMENT THRU EXIST TRACT (Hypotension despite IVF resuscitation): No Vasopressors: Not indicated as septic shock is not present  Disposition: Admit to Step Down        CONSULTS:  None    PROCEDURES:     Critical Care    Performed by: Earl Navarro MD  Authorized by: Earl Navarro MD    Critical care provider statement:     Critical care time (minutes):  45    Critical care time was exclusive of:  Separately billable procedures and treating other patients    Critical care was necessary to treat or prevent imminent or life-threatening deterioration of the following conditions: Sepsis.     Critical care was time spent personally by me on the following activities:  Blood draw for specimens, development of treatment plan with patient or surrogate, evaluation of patient's response to treatment, examination of patient, obtaining history from patient or surrogate, ordering and performing treatments and interventions, ordering and review of laboratory studies, ordering and review of radiographic studies, pulse oximetry, re-evaluation of patient's condition and review of old charts    I assumed direction of critical care for this patient from another provider in my specialty: no      Care discussed with: admitting provider                (Please note that portions of this note were completed with a voice recognition program.  Efforts were made to edit the dictations but occasionally words are mis-transcribed.)    Earl Navarro MD (electronically signed)  Emergency Attending Physician              Earl Navarro MD  10/10/23 2047

## 2023-10-10 NOTE — ED TRIAGE NOTES
Pt arrives with EMS from his [address] office appt for baclofen pump refill (in abdomen). While at office pt indicated to staff that he was having abdominal pain. HR and RR elevated. Temp 101. 3. Code sepsis called.

## 2023-10-11 LAB
ACCESSION NUMBER, LLC1M: ABNORMAL
ACINETOBACTER CALCOAC BAUMANNII COMPLEX BY PCR: NOT DETECTED
ALBUMIN SERPL-MCNC: 2.7 G/DL (ref 3.5–5)
ALBUMIN/GLOB SERPL: 0.7 (ref 1.1–2.2)
ALP SERPL-CCNC: 58 U/L (ref 45–117)
ALT SERPL-CCNC: 16 U/L (ref 12–78)
ANION GAP SERPL CALC-SCNC: 3 MMOL/L (ref 5–15)
AST SERPL-CCNC: 13 U/L (ref 15–37)
B FRAGILIS DNA BLD POS QL NAA+NON-PROBE: NOT DETECTED
BACTERIA SPEC CULT: ABNORMAL
BASOPHILS # BLD: 0 K/UL (ref 0–0.1)
BASOPHILS NFR BLD: 1 % (ref 0–1)
BILIRUB SERPL-MCNC: 0.6 MG/DL (ref 0.2–1)
BIOFIRE TEST COMMENT: ABNORMAL
BUN SERPL-MCNC: 14 MG/DL (ref 6–20)
BUN/CREAT SERPL: 22 (ref 12–20)
C ALBICANS DNA BLD POS QL NAA+NON-PROBE: NOT DETECTED
C AURIS DNA BLD POS QL NAA+NON-PROBE: NOT DETECTED
C GATTII+NEOFOR DNA BLD POS QL NAA+N-PRB: NOT DETECTED
C GLABRATA DNA BLD POS QL NAA+NON-PROBE: NOT DETECTED
C KRUSEI DNA BLD POS QL NAA+NON-PROBE: NOT DETECTED
C PARAP DNA BLD POS QL NAA+NON-PROBE: NOT DETECTED
C TROPICLS DNA BLD POS QL NAA+NON-PROBE: NOT DETECTED
CALCIUM SERPL-MCNC: 8.8 MG/DL (ref 8.5–10.1)
CC UR VC: ABNORMAL
CHLORIDE SERPL-SCNC: 113 MMOL/L (ref 97–108)
CO2 SERPL-SCNC: 24 MMOL/L (ref 21–32)
COMMENT:: NORMAL
CREAT SERPL-MCNC: 0.64 MG/DL (ref 0.7–1.3)
DIFFERENTIAL METHOD BLD: ABNORMAL
E CLOAC COMP DNA BLD POS NAA+NON-PROBE: NOT DETECTED
E COLI DNA BLD POS QL NAA+NON-PROBE: NOT DETECTED
E FAECALIS DNA BLD POS QL NAA+NON-PROBE: NOT DETECTED
E FAECIUM DNA BLD POS QL NAA+NON-PROBE: NOT DETECTED
EKG ATRIAL RATE: 131 BPM
EKG DIAGNOSIS: NORMAL
EKG P AXIS: 76 DEGREES
EKG P-R INTERVAL: 130 MS
EKG Q-T INTERVAL: 296 MS
EKG QRS DURATION: 64 MS
EKG QTC CALCULATION (BAZETT): 437 MS
EKG R AXIS: 69 DEGREES
EKG T AXIS: 21 DEGREES
EKG VENTRICULAR RATE: 131 BPM
ENTEROBACTERALES DNA BLD POS NAA+N-PRB: NOT DETECTED
EOSINOPHIL # BLD: 0.1 K/UL (ref 0–0.4)
EOSINOPHIL NFR BLD: 1 % (ref 0–7)
ERYTHROCYTE [DISTWIDTH] IN BLOOD BY AUTOMATED COUNT: 14.5 % (ref 11.5–14.5)
GLOBULIN SER CALC-MCNC: 3.9 G/DL (ref 2–4)
GLUCOSE BLD STRIP.AUTO-MCNC: 90 MG/DL (ref 65–117)
GLUCOSE SERPL-MCNC: 99 MG/DL (ref 65–100)
GP B STREP DNA BLD POS QL NAA+NON-PROBE: NOT DETECTED
HAEM INFLU DNA BLD POS QL NAA+NON-PROBE: NOT DETECTED
HCT VFR BLD AUTO: 37.7 % (ref 36.6–50.3)
HGB BLD-MCNC: 11.9 G/DL (ref 12.1–17)
IMM GRANULOCYTES # BLD AUTO: 0 K/UL (ref 0–0.04)
IMM GRANULOCYTES NFR BLD AUTO: 0 % (ref 0–0.5)
K OXYTOCA DNA BLD POS QL NAA+NON-PROBE: NOT DETECTED
KLEBSIELLA SP DNA BLD POS QL NAA+NON-PRB: NOT DETECTED
KLEBSIELLA SP DNA BLD POS QL NAA+NON-PRB: NOT DETECTED
L MONOCYTOG DNA BLD POS QL NAA+NON-PROBE: NOT DETECTED
LACTATE SERPL-SCNC: 0.9 MMOL/L (ref 0.4–2)
LYMPHOCYTES # BLD: 1.8 K/UL (ref 0.8–3.5)
LYMPHOCYTES NFR BLD: 21 % (ref 12–49)
MCH RBC QN AUTO: 27.7 PG (ref 26–34)
MCHC RBC AUTO-ENTMCNC: 31.6 G/DL (ref 30–36.5)
MCV RBC AUTO: 87.7 FL (ref 80–99)
MONOCYTES # BLD: 0.8 K/UL (ref 0–1)
MONOCYTES NFR BLD: 10 % (ref 5–13)
N MEN DNA BLD POS QL NAA+NON-PROBE: NOT DETECTED
NEUTS SEG # BLD: 5.8 K/UL (ref 1.8–8)
NEUTS SEG NFR BLD: 67 % (ref 32–75)
NRBC # BLD: 0 K/UL (ref 0–0.01)
NRBC BLD-RTO: 0 PER 100 WBC
P AERUGINOSA DNA BLD POS NAA+NON-PROBE: NOT DETECTED
PLATELET # BLD AUTO: 314 K/UL (ref 150–400)
PMV BLD AUTO: 10.3 FL (ref 8.9–12.9)
POTASSIUM SERPL-SCNC: 3.9 MMOL/L (ref 3.5–5.1)
PROT SERPL-MCNC: 6.6 G/DL (ref 6.4–8.2)
PROTEUS SP DNA BLD POS QL NAA+NON-PROBE: NOT DETECTED
RBC # BLD AUTO: 4.3 M/UL (ref 4.1–5.7)
RESISTANT GENE TARGETS: ABNORMAL
S AUREUS DNA BLD POS QL NAA+NON-PROBE: NOT DETECTED
S AUREUS+CONS DNA BLD POS NAA+NON-PROBE: DETECTED
S EPIDERMIDIS DNA BLD POS QL NAA+NON-PRB: NOT DETECTED
S LUGDUNENSIS DNA BLD POS QL NAA+NON-PRB: NOT DETECTED
S MALTOPHILIA DNA BLD POS QL NAA+NON-PRB: NOT DETECTED
S MARCESCENS DNA BLD POS NAA+NON-PROBE: NOT DETECTED
S PNEUM DNA BLD POS QL NAA+NON-PROBE: NOT DETECTED
S PYO DNA BLD POS QL NAA+NON-PROBE: NOT DETECTED
SALMONELLA DNA BLD POS QL NAA+NON-PROBE: NOT DETECTED
SERVICE CMNT-IMP: ABNORMAL
SERVICE CMNT-IMP: NORMAL
SODIUM SERPL-SCNC: 140 MMOL/L (ref 136–145)
SPECIMEN HOLD: NORMAL
STREPTOCOCCUS DNA BLD POS NAA+NON-PROBE: NOT DETECTED
WBC # BLD AUTO: 8.5 K/UL (ref 4.1–11.1)

## 2023-10-11 PROCEDURE — 36415 COLL VENOUS BLD VENIPUNCTURE: CPT

## 2023-10-11 PROCEDURE — 6370000000 HC RX 637 (ALT 250 FOR IP): Performed by: INTERNAL MEDICINE

## 2023-10-11 PROCEDURE — 2580000003 HC RX 258: Performed by: STUDENT IN AN ORGANIZED HEALTH CARE EDUCATION/TRAINING PROGRAM

## 2023-10-11 PROCEDURE — C9113 INJ PANTOPRAZOLE SODIUM, VIA: HCPCS | Performed by: STUDENT IN AN ORGANIZED HEALTH CARE EDUCATION/TRAINING PROGRAM

## 2023-10-11 PROCEDURE — 85025 COMPLETE CBC W/AUTO DIFF WBC: CPT

## 2023-10-11 PROCEDURE — 82962 GLUCOSE BLOOD TEST: CPT

## 2023-10-11 PROCEDURE — 6360000002 HC RX W HCPCS: Performed by: STUDENT IN AN ORGANIZED HEALTH CARE EDUCATION/TRAINING PROGRAM

## 2023-10-11 PROCEDURE — 80053 COMPREHEN METABOLIC PANEL: CPT

## 2023-10-11 PROCEDURE — A4216 STERILE WATER/SALINE, 10 ML: HCPCS | Performed by: STUDENT IN AN ORGANIZED HEALTH CARE EDUCATION/TRAINING PROGRAM

## 2023-10-11 PROCEDURE — 6370000000 HC RX 637 (ALT 250 FOR IP): Performed by: STUDENT IN AN ORGANIZED HEALTH CARE EDUCATION/TRAINING PROGRAM

## 2023-10-11 PROCEDURE — 87040 BLOOD CULTURE FOR BACTERIA: CPT

## 2023-10-11 PROCEDURE — 83605 ASSAY OF LACTIC ACID: CPT

## 2023-10-11 PROCEDURE — 93010 ELECTROCARDIOGRAM REPORT: CPT | Performed by: INTERNAL MEDICINE

## 2023-10-11 PROCEDURE — 2060000000 HC ICU INTERMEDIATE R&B

## 2023-10-11 RX ORDER — MIRTAZAPINE 15 MG/1
15 TABLET, FILM COATED ORAL DAILY
Status: DISCONTINUED | OUTPATIENT
Start: 2023-10-11 | End: 2023-10-18 | Stop reason: HOSPADM

## 2023-10-11 RX ORDER — LACTULOSE 10 G/15ML
20 SOLUTION ORAL 3 TIMES DAILY
Status: DISCONTINUED | OUTPATIENT
Start: 2023-10-11 | End: 2023-10-18 | Stop reason: HOSPADM

## 2023-10-11 RX ORDER — BISACODYL 10 MG
10 SUPPOSITORY, RECTAL RECTAL DAILY
Status: DISCONTINUED | OUTPATIENT
Start: 2023-10-11 | End: 2023-10-18 | Stop reason: HOSPADM

## 2023-10-11 RX ORDER — VITAMIN B COMPLEX
2000 TABLET ORAL DAILY
Status: DISCONTINUED | OUTPATIENT
Start: 2023-10-11 | End: 2023-10-18 | Stop reason: HOSPADM

## 2023-10-11 RX ORDER — LACTULOSE 10 G/15ML
20 SOLUTION ORAL 3 TIMES DAILY
Status: ON HOLD | COMMUNITY
End: 2023-10-18 | Stop reason: HOSPADM

## 2023-10-11 RX ORDER — ENEMA 19; 7 G/133ML; G/133ML
1 ENEMA RECTAL DAILY
Status: ON HOLD | COMMUNITY
End: 2023-10-18 | Stop reason: HOSPADM

## 2023-10-11 RX ORDER — FLUTICASONE PROPIONATE 50 MCG
2 SPRAY, SUSPENSION (ML) NASAL DAILY
Status: DISCONTINUED | OUTPATIENT
Start: 2023-10-11 | End: 2023-10-18 | Stop reason: HOSPADM

## 2023-10-11 RX ORDER — LIDOCAINE HYDROCHLORIDE 20 MG/ML
1 JELLY TOPICAL EVERY 6 HOURS
COMMUNITY

## 2023-10-11 RX ORDER — LIDOCAINE HYDROCHLORIDE 20 MG/ML
1 JELLY TOPICAL EVERY 6 HOURS
Status: DISCONTINUED | OUTPATIENT
Start: 2023-10-11 | End: 2023-10-11

## 2023-10-11 RX ORDER — ANORECTAL OINTMENT 15.7; .44; 24; 20.6 G/100G; G/100G; G/100G; G/100G
OINTMENT TOPICAL 2 TIMES DAILY
COMMUNITY

## 2023-10-11 RX ORDER — LORAZEPAM 0.5 MG/1
0.75 TABLET ORAL EVERY 6 HOURS
Status: DISCONTINUED | OUTPATIENT
Start: 2023-10-11 | End: 2023-10-18 | Stop reason: HOSPADM

## 2023-10-11 RX ORDER — ONDANSETRON 4 MG/1
4 TABLET, FILM COATED ORAL EVERY 8 HOURS PRN
COMMUNITY

## 2023-10-11 RX ORDER — TOPIRAMATE 25 MG/1
50 TABLET ORAL DAILY
Status: DISCONTINUED | OUTPATIENT
Start: 2023-10-11 | End: 2023-10-18 | Stop reason: HOSPADM

## 2023-10-11 RX ORDER — SIMETHICONE 20 MG/.3ML
120 EMULSION ORAL EVERY 6 HOURS PRN
Status: DISCONTINUED | OUTPATIENT
Start: 2023-10-11 | End: 2023-10-18 | Stop reason: HOSPADM

## 2023-10-11 RX ORDER — ENEMA 19; 7 G/133ML; G/133ML
1 ENEMA RECTAL DAILY
Status: DISCONTINUED | OUTPATIENT
Start: 2023-10-11 | End: 2023-10-18 | Stop reason: HOSPADM

## 2023-10-11 RX ORDER — NORTRIPTYLINE HYDROCHLORIDE 10 MG/5ML
50 SOLUTION ORAL NIGHTLY
Status: DISCONTINUED | OUTPATIENT
Start: 2023-10-11 | End: 2023-10-11

## 2023-10-11 RX ORDER — TOPIRAMATE 50 MG/1
50 TABLET, FILM COATED ORAL DAILY
COMMUNITY

## 2023-10-11 RX ADMIN — MIRTAZAPINE 15 MG: 15 TABLET, FILM COATED ORAL at 09:42

## 2023-10-11 RX ADMIN — MEROPENEM 1000 MG: 1 INJECTION, POWDER, FOR SOLUTION INTRAVENOUS at 04:18

## 2023-10-11 RX ADMIN — MEROPENEM 1000 MG: 1 INJECTION, POWDER, FOR SOLUTION INTRAVENOUS at 15:57

## 2023-10-11 RX ADMIN — DOCUSATE SODIUM LIQUID 50 MG: 100 LIQUID ORAL at 22:28

## 2023-10-11 RX ADMIN — ENOXAPARIN SODIUM 40 MG: 100 INJECTION SUBCUTANEOUS at 09:12

## 2023-10-11 RX ADMIN — LORAZEPAM 0.75 MG: 0.5 TABLET ORAL at 13:56

## 2023-10-11 RX ADMIN — LORAZEPAM 0.75 MG: 0.5 TABLET ORAL at 09:42

## 2023-10-11 RX ADMIN — Medication 10 ML: at 16:29

## 2023-10-11 RX ADMIN — SODIUM PHOSPHATE, DIBASIC AND SODIUM PHOSPHATE, MONOBASIC 1 ENEMA: 7; 19 ENEMA RECTAL at 16:02

## 2023-10-11 RX ADMIN — LACTULOSE 20 G: 20 SOLUTION ORAL at 15:59

## 2023-10-11 RX ADMIN — DOCUSATE SODIUM LIQUID 50 MG: 100 LIQUID ORAL at 15:59

## 2023-10-11 RX ADMIN — Medication 10 ML: at 22:30

## 2023-10-11 RX ADMIN — FLUTICASONE PROPIONATE 2 SPRAY: 50 SPRAY, METERED NASAL at 18:00

## 2023-10-11 RX ADMIN — LORAZEPAM 0.75 MG: 0.5 TABLET ORAL at 22:27

## 2023-10-11 RX ADMIN — MEROPENEM 1000 MG: 1 INJECTION, POWDER, FOR SOLUTION INTRAVENOUS at 22:29

## 2023-10-11 RX ADMIN — TOPIRAMATE 50 MG: 25 TABLET, FILM COATED ORAL at 22:31

## 2023-10-11 RX ADMIN — PANTOPRAZOLE SODIUM 40 MG: 40 INJECTION, POWDER, FOR SOLUTION INTRAVENOUS at 09:12

## 2023-10-11 RX ADMIN — MIRTAZAPINE 15 MG: 15 TABLET, FILM COATED ORAL at 22:30

## 2023-10-11 RX ADMIN — Medication 2000 UNITS: at 15:59

## 2023-10-11 RX ADMIN — LACTULOSE 20 G: 20 SOLUTION ORAL at 22:28

## 2023-10-11 ASSESSMENT — PAIN SCALES - GENERAL: PAINLEVEL_OUTOF10: 6

## 2023-10-11 ASSESSMENT — PAIN DESCRIPTION - LOCATION: LOCATION: ABDOMEN

## 2023-10-11 NOTE — ED NOTES
[place] [facility] contacted in order to receive pt records and orders.  Contacted back at 21 [phone] with plan to fax information      Alonzo Wood RN  10/11/23 2963

## 2023-10-11 NOTE — PROGRESS NOTES
[address] [facility]  Hospitalist Group                                                                                          Hospitalist Progress Note  Arsalan Cullen MD  Office Phone: [phone]        Date of Service:  10/11/2023  NAME:  Kacey Jain  :  1960  MRN:  [id number]       Admission Summary:   Kacey Jain is a 58 y.o. male with hx of bladder cancer status post neobladder, urinary retention requiring self caths, recurrent hospitalization for urosepsis cerebral palsy, PEG tube feed dependent who presented to hospital with complaints of abdominal pain. Patient reportedly had been in his usual state of health until this morning when he developed some abdominal pain. Additional history is difficult to obtain due to patient's communication deficits. He was apparently on his baclofen pump refilled when he mentioned his abdominal symptoms and was sent to hospital for admission. Remarkable vitals on ER Presentation: t-101.4, hr to 136  Labs Remarkable for: UA; cloudy, lg bld, LES, WBC > 100  ER Images: CT abd et pelvis: Bilateral hydronephrosis and diffuse bilateral hydroureter, together with distended bladder. Query chronic outflow obstruction and/or neurogenic bladder. Multiple nonobstructing bilateral renal stones. ER Rx: meropenem       Interval history / Subjective:   Patient is seen and examined at bedside this AM. He is alert but non verbal - appears at baseline. Discussed with nursing. Spoke with brother Davida Paulino on phone and updated pt's status - Sepsis with complicated UTI - on IV abx - CT showed Bilateral hydroureteronephrosis -urology consult pending.        Assessment & Plan:      Severe sepsis secondary to complicated cystitis  - febrile , tachycardia on poa  - normal wbc.  lactic acidosis resolved   - CT: Bilateral hydronephrosis and diffuse bilateral hydroureter, together with  distended bladder.   - blood cx 101/10: Staph species in 1 of

## 2023-10-11 NOTE — ED NOTES
Change of shift report given to John Argueta RN by Zulma Frey RN.      Stacia Pollard RN  10/11/23 2322

## 2023-10-11 NOTE — PROGRESS NOTES
Occupational Therapy    Chart reviewed. Note patient has history of cerebral palsy and functional quadriplegia and requires total care at baseline. Acute care OT is not indicated, will complete OT order.     Lise Bean OTR/L

## 2023-10-11 NOTE — ED NOTES
This RN was flushing pts PEG tube and pt started dry heaving. This RN sat pt up in the bed and gave pt cold rag that he asked for. Pt stated he is feeling better - no other concerns at this time. Pt sister is at bedside - call leon in reach.      Kamla Gaston RN  10/11/23 1980

## 2023-10-11 NOTE — PROGRESS NOTES
Physical Therapy 10/11/2023    Orders received and chart reviewed up to date. Per chart, pt from [facility] and total assist requiring opal lift for transfers. Will complete orders as no acute PT needs. Thank you.   Kwaku Spivey, PT, DPT

## 2023-10-11 NOTE — ED NOTES
Bedside and Verbal shift change report given to  HANNAH Zaman (oncoming nurse) by Britney Stuart RN (offgoing nurse). Report included the following information Nurse Handoff Report, ED SBAR, [facility], Recent Results, Cardiac Rhythm SR, and Neuro Assessment.          Mahogany Deutsch RN  10/11/23 5891

## 2023-10-12 LAB
ALBUMIN SERPL-MCNC: 3.1 G/DL (ref 3.5–5)
ALBUMIN/GLOB SERPL: 0.8 (ref 1.1–2.2)
ALP SERPL-CCNC: 64 U/L (ref 45–117)
ALT SERPL-CCNC: 19 U/L (ref 12–78)
ANION GAP SERPL CALC-SCNC: 6 MMOL/L (ref 5–15)
AST SERPL-CCNC: 18 U/L (ref 15–37)
BACTERIA SPEC CULT: ABNORMAL
BACTERIA SPEC CULT: ABNORMAL
BILIRUB DIRECT SERPL-MCNC: 0.1 MG/DL (ref 0–0.2)
BILIRUB SERPL-MCNC: 0.5 MG/DL (ref 0.2–1)
BUN SERPL-MCNC: 17 MG/DL (ref 6–20)
BUN/CREAT SERPL: 22 (ref 12–20)
CALCIUM SERPL-MCNC: 9.1 MG/DL (ref 8.5–10.1)
CHLORIDE SERPL-SCNC: 118 MMOL/L (ref 97–108)
CO2 SERPL-SCNC: 19 MMOL/L (ref 21–32)
CREAT SERPL-MCNC: 0.76 MG/DL (ref 0.7–1.3)
ERYTHROCYTE [DISTWIDTH] IN BLOOD BY AUTOMATED COUNT: 14.4 % (ref 11.5–14.5)
GLOBULIN SER CALC-MCNC: 3.7 G/DL (ref 2–4)
GLUCOSE SERPL-MCNC: 69 MG/DL (ref 65–100)
HCT VFR BLD AUTO: 43.1 % (ref 36.6–50.3)
HGB BLD-MCNC: 13.4 G/DL (ref 12.1–17)
MAGNESIUM SERPL-MCNC: 2.2 MG/DL (ref 1.6–2.4)
MCH RBC QN AUTO: 27.8 PG (ref 26–34)
MCHC RBC AUTO-ENTMCNC: 31.1 G/DL (ref 30–36.5)
MCV RBC AUTO: 89.4 FL (ref 80–99)
NRBC # BLD: 0 K/UL (ref 0–0.01)
NRBC BLD-RTO: 0 PER 100 WBC
PHOSPHATE SERPL-MCNC: 2.6 MG/DL (ref 2.6–4.7)
PLATELET # BLD AUTO: 370 K/UL (ref 150–400)
PMV BLD AUTO: 9.8 FL (ref 8.9–12.9)
POTASSIUM SERPL-SCNC: 3.8 MMOL/L (ref 3.5–5.1)
PROT SERPL-MCNC: 6.8 G/DL (ref 6.4–8.2)
RBC # BLD AUTO: 4.82 M/UL (ref 4.1–5.7)
SERVICE CMNT-IMP: ABNORMAL
SODIUM SERPL-SCNC: 143 MMOL/L (ref 136–145)
TRIGL SERPL-MCNC: 86 MG/DL
WBC # BLD AUTO: 11.2 K/UL (ref 4.1–11.1)

## 2023-10-12 PROCEDURE — A4216 STERILE WATER/SALINE, 10 ML: HCPCS | Performed by: STUDENT IN AN ORGANIZED HEALTH CARE EDUCATION/TRAINING PROGRAM

## 2023-10-12 PROCEDURE — 80048 BASIC METABOLIC PNL TOTAL CA: CPT

## 2023-10-12 PROCEDURE — 84478 ASSAY OF TRIGLYCERIDES: CPT

## 2023-10-12 PROCEDURE — 2580000003 HC RX 258: Performed by: STUDENT IN AN ORGANIZED HEALTH CARE EDUCATION/TRAINING PROGRAM

## 2023-10-12 PROCEDURE — 6370000000 HC RX 637 (ALT 250 FOR IP): Performed by: INTERNAL MEDICINE

## 2023-10-12 PROCEDURE — 2060000000 HC ICU INTERMEDIATE R&B

## 2023-10-12 PROCEDURE — 85027 COMPLETE CBC AUTOMATED: CPT

## 2023-10-12 PROCEDURE — 6370000000 HC RX 637 (ALT 250 FOR IP): Performed by: STUDENT IN AN ORGANIZED HEALTH CARE EDUCATION/TRAINING PROGRAM

## 2023-10-12 PROCEDURE — 6360000002 HC RX W HCPCS: Performed by: STUDENT IN AN ORGANIZED HEALTH CARE EDUCATION/TRAINING PROGRAM

## 2023-10-12 PROCEDURE — 6370000000 HC RX 637 (ALT 250 FOR IP): Performed by: FAMILY MEDICINE

## 2023-10-12 PROCEDURE — 36415 COLL VENOUS BLD VENIPUNCTURE: CPT

## 2023-10-12 PROCEDURE — 83735 ASSAY OF MAGNESIUM: CPT

## 2023-10-12 PROCEDURE — 84100 ASSAY OF PHOSPHORUS: CPT

## 2023-10-12 PROCEDURE — C9113 INJ PANTOPRAZOLE SODIUM, VIA: HCPCS | Performed by: STUDENT IN AN ORGANIZED HEALTH CARE EDUCATION/TRAINING PROGRAM

## 2023-10-12 PROCEDURE — 80076 HEPATIC FUNCTION PANEL: CPT

## 2023-10-12 RX ORDER — CASTOR OIL AND BALSAM, PERU 788; 87 MG/G; MG/G
OINTMENT TOPICAL DAILY
Status: DISCONTINUED | OUTPATIENT
Start: 2023-10-12 | End: 2023-10-18 | Stop reason: HOSPADM

## 2023-10-12 RX ADMIN — Medication: at 18:28

## 2023-10-12 RX ADMIN — Medication: at 22:22

## 2023-10-12 RX ADMIN — Medication 2000 UNITS: at 09:36

## 2023-10-12 RX ADMIN — LORAZEPAM 0.75 MG: 0.5 TABLET ORAL at 06:11

## 2023-10-12 RX ADMIN — MEROPENEM 1000 MG: 1 INJECTION, POWDER, FOR SOLUTION INTRAVENOUS at 15:28

## 2023-10-12 RX ADMIN — LORAZEPAM 0.75 MG: 0.5 TABLET ORAL at 10:00

## 2023-10-12 RX ADMIN — FLUTICASONE PROPIONATE 2 SPRAY: 50 SPRAY, METERED NASAL at 09:36

## 2023-10-12 RX ADMIN — Medication 10 ML: at 09:37

## 2023-10-12 RX ADMIN — MEROPENEM 1000 MG: 1 INJECTION, POWDER, FOR SOLUTION INTRAVENOUS at 22:19

## 2023-10-12 RX ADMIN — Medication: at 09:37

## 2023-10-12 RX ADMIN — DOCUSATE SODIUM LIQUID 50 MG: 100 LIQUID ORAL at 09:36

## 2023-10-12 RX ADMIN — TOPIRAMATE 50 MG: 25 TABLET, FILM COATED ORAL at 09:36

## 2023-10-12 RX ADMIN — LACTULOSE 20 G: 20 SOLUTION ORAL at 22:18

## 2023-10-12 RX ADMIN — PANTOPRAZOLE SODIUM 40 MG: 40 INJECTION, POWDER, FOR SOLUTION INTRAVENOUS at 09:35

## 2023-10-12 RX ADMIN — DOCUSATE SODIUM LIQUID 50 MG: 100 LIQUID ORAL at 22:21

## 2023-10-12 RX ADMIN — MIRTAZAPINE 15 MG: 15 TABLET, FILM COATED ORAL at 22:19

## 2023-10-12 RX ADMIN — MEROPENEM 1000 MG: 1 INJECTION, POWDER, FOR SOLUTION INTRAVENOUS at 06:11

## 2023-10-12 RX ADMIN — ENOXAPARIN SODIUM 40 MG: 100 INJECTION SUBCUTANEOUS at 09:35

## 2023-10-12 RX ADMIN — LORAZEPAM 0.75 MG: 0.5 TABLET ORAL at 18:27

## 2023-10-12 RX ADMIN — Medication 10 ML: at 22:22

## 2023-10-12 ASSESSMENT — PAIN SCALES - GENERAL
PAINLEVEL_OUTOF10: 0
PAINLEVEL_OUTOF10: 0

## 2023-10-12 NOTE — PROGRESS NOTES
reviewed since prior progress note. Most recent are:  Vitals:    10/12/23 0600   BP:    Pulse: (!) 104   Resp:    Temp:    SpO2:          Intake/Output Summary (Last 24 hours) at 10/12/2023 3346  Last data filed at 10/12/2023 0630  Gross per 24 hour   Intake 2480 ml   Output 4425 ml   Net -1945 ml        Physical Examination:     I had a face to face encounter with this patient and independently examined them on 10/12/2023 as outlined below:          General : alert   HEENT: PEERL, EOMI, moist mucus membrane, TM clear  Neck: supple, no JVD, no meningeal signs  Chest: Clear to auscultation bilaterally   CVS: S1 S2 heard, Capillary refill less than 2 seconds  Abd: soft/ non tender, non distended, BS physiological,   Ext: no clubbing, no cyanosis, no edema, brisk 2+ DP pulses  Neuro/Psych: Contractures present  Skin: warm            Data Review:    Review and/or order of clinical lab test    I have independently reviewed and interpreted patient's lab and all other diagnostic data    Notes reviewed from all clinical/nonclinical/nursing services involved in patient's clinical care. Care coordination discussions were held with appropriate clinical/nonclinical/ nursing providers based on care coordination needs. Labs:     Recent Labs     10/11/23  0413 10/12/23  0156   WBC 8.5 11.2*   HGB 11.9* 13.4   HCT 37.7 43.1    370     Recent Labs     10/10/23  1835 10/11/23  0413 10/12/23  0156    140 143   K 3.8 3.9 3.8    113* 118*   CO2 25 24 19*   BUN 20 14 17   MG  --   --  2.2   PHOS  --   --  2.6     Recent Labs     10/10/23  1835 10/11/23  0413 10/12/23  0156   ALT 18 16 19   GLOB 4.6* 3.9 3.7     No results for input(s): \"INR\", \"APTT\" in the last 72 hours. Invalid input(s): \"PTP\"   No results for input(s): \"TIBC\", \"FERR\" in the last 72 hours. Invalid input(s): \"FE\", \"PSAT\"   No results found for: \"FOL\", \"RBCF\"   No results for input(s): \"PH\", \"PCO2\", \"PO2\" in the last 72 hours.   No results for

## 2023-10-12 NOTE — CONSULTS
Comprehensive Nutrition Assessment    Type and Reason for Visit: Initial, Consult    Nutrition Recommendations/Plan:   Modify feedings to: Jevity 1.5 355 ml bolus feeding x 3 feedings per day. Free water flush 360 ml Q 6 hrs. 2.   PO diet per MD.        Malnutrition Assessment:  Malnutrition Status: Moderate malnutrition (10/12/23 1220)    Context:  Chronic Illness     Findings of the 6 clinical characteristics of malnutrition:  Energy Intake:  Mild decrease in energy intake (Comment)  Weight Loss:  No significant weight loss     Body Fat Loss:  Mild body fat loss Buccal region, Triceps   Muscle Mass Loss:  Mild muscle mass loss Clavicles (pectoralis & deltoids), Thigh (quadraceps), Hand (interosseous)  Fluid Accumulation:  No significant fluid accumulation     Strength:  Not Performed       Nutrition Assessment:    PMH includes cerebral palsy, functional quadriplegia, PEG-tube dependent, hx bladder Ca and neurogenic bladder. Admitted for severe urosepsis. Bowel regimen started as suspect generalized ileus. Nutrition consult received for tube feeding recommendations. Pt is non-verbal, information gathered from chart review. Peeked in on patient this morning, wound nurse at bedside. RD notes from August 2022 showing pt weight as 111#- current wt 117# indicating stable weight. He was receiving bolus feedings and able to tolerate some liquids previously. Has Regular diet ordered by MD. Tube feedings ordered providing 2200 kcal, 94 g protein - which appears to be overfeeding him a bit. Will modify feedings to prior regimen of Jevity 1.5 355 ml bolus x 3 feedings/day, free water 360 ml Q 6 hrs-  to provide 1600 kcals, 68g protein, 2249 mL water    AM labs reviewed. Will return to [facility] when stable.      Nutritionally Significant Medications:  Colace, Lactulose, Protonix, Remeron, Vitamin D, IV Merrem       Estimated Daily Nutrient Needs:  Energy Requirements Based On: Kcal/kg  Weight Used for [employer] or ext [phone]

## 2023-10-12 NOTE — CARE COORDINATION
Care Management Initial Assessment       RUR:calculating  Readmission? No  1st IM letter given? No- unable to obtain  1st  letter given: No    Transition of Care: return back to [facility] (where the patient normally lives)  [address] [facility].; [phone]: stretcher (not set up yet)    Main contacts: the [facility]- [phone] and patients family- Bruce Bailey- [phone] and Noreen Servin- [phone]    Discharge pending:  -patient continues on IV antibiotics  -pending final recs from urology, internal medicine  -pending medical progress and care recommendations    1500: this CM noted from medical chart that patient is a resident of the [facility]; is nonverbal, has a history of cerebral palsy,  total assist; uses a opal lift; nonverbal; peg tube; this CM called the [facility] to do the initial assessment; [phone]; no answer in the nursing office; will try again         10/12/23 1501   Service Assessment   Patient Orientation Unable to Assess   History Provided By Medical Record   Support Systems Other (Comment)  (lives at [address] [facility])   [address] is: Named in [address] [facility]   PCP Verified by CM Yes   Last Visit to PCP Within last 3 months   Prior Functional Level Assistance with the following:;Bathing;Dressing; Toileting;Feeding;Mobility   Current Functional Level Assistance with the following:;Bathing;Dressing; Toileting;Feeding;Mobility   Can patient return to prior living arrangement Yes   Ability to make needs known: Poor   Family able to assist with home care needs: Other (comment)  (lives at [facility])   [address] [facility]  (lives at the [facility])   [address] Real History   Lives With Other (comment)  ([facility])   Discharge Planning   Type of [place]   Patient expects to be discharged to: Long-term care     CM following   Leonel Joseph RN

## 2023-10-12 NOTE — WOUND CARE
WOCN Note:     New consult for redness    Kennedy Skelton is a 58 y.o. male with hx of bladder cancer status post neobladder, urinary retention requiring self caths, recurrent hospitalization for urosepsis cerebral palsy, PEG tube feed dependent who presented to hospital with complaints of abdominal pain. Admitted on 10/10/23    Lab Results   Component Value Date/Time    WBC 11.2 (H) 10/12/2023 01:56 AM    POCGLU 90 10/11/2023 10:01 PM    POCGLU 113 10/10/2023 07:38 PM    HGB 13.4 10/12/2023 01:56 AM    HCT 43.1 10/12/2023 01:56 AM     10/12/2023 01:56 AM        Tobacco Use      Smoking status: Never      Smokeless tobacco: Never     ADULT DIET; Regular  ADULT TUBE FEEDING; PEG; Other Tube Feeding (specify); Jevity 1.5; Bolus; 4 Times Daily; 370; Infusion; 300; Q 6 hours     Dietitian following    Assessment:   Alert and nonverbal.  Total assist in repositioning for visual assessment of sacrum  Turned onto right side. Has a Cerna  Surface: EDUARDO mattress  Bilateral heels intact and with blanching erythema. Heels offloaded with pillows and blankets as patient keeps knees bent. Buttocks and sacrum intact with blanching erythema, foam applied  Blanching erythema to right posterior elbow, foam applied     Wound Recommendations:    Heels, sacrum, elbows: Daily - apply Balsam peru-castor oil (Venelex), maintain foam dressings on sacrum and any other bony prominences that need protection    PI Prevention:  Turn/reposition approximately every 2 hours  Offload heels with heels hanging off end of pillow at all times while in bed. Sacral Foam dressing: lift to assess regularly; change as needed. Discontinue if incontinence is frequently soiling dressing. Low Air Loss mattress  in place. Use only flat sheet and one incontinence pad.      Discussed assessment and plan with Dr. Claudia Oakley     Transition of Care: Plan to follow weekly and as needed while

## 2023-10-13 LAB
ANION GAP SERPL CALC-SCNC: 9 MMOL/L (ref 5–15)
BUN SERPL-MCNC: 26 MG/DL (ref 6–20)
BUN/CREAT SERPL: 27 (ref 12–20)
CALCIUM SERPL-MCNC: 8.8 MG/DL (ref 8.5–10.1)
CHLORIDE SERPL-SCNC: 120 MMOL/L (ref 97–108)
CO2 SERPL-SCNC: 18 MMOL/L (ref 21–32)
CREAT SERPL-MCNC: 0.98 MG/DL (ref 0.7–1.3)
ERYTHROCYTE [DISTWIDTH] IN BLOOD BY AUTOMATED COUNT: 14.7 % (ref 11.5–14.5)
GLUCOSE SERPL-MCNC: 192 MG/DL (ref 65–100)
HCT VFR BLD AUTO: 41.7 % (ref 36.6–50.3)
HGB BLD-MCNC: 12.7 G/DL (ref 12.1–17)
MAGNESIUM SERPL-MCNC: 2.2 MG/DL (ref 1.6–2.4)
MCH RBC QN AUTO: 27.7 PG (ref 26–34)
MCHC RBC AUTO-ENTMCNC: 30.5 G/DL (ref 30–36.5)
MCV RBC AUTO: 91 FL (ref 80–99)
NRBC # BLD: 0 K/UL (ref 0–0.01)
NRBC BLD-RTO: 0 PER 100 WBC
PHOSPHATE SERPL-MCNC: 2.2 MG/DL (ref 2.6–4.7)
PLATELET # BLD AUTO: 369 K/UL (ref 150–400)
PMV BLD AUTO: 10.1 FL (ref 8.9–12.9)
POTASSIUM SERPL-SCNC: 3.8 MMOL/L (ref 3.5–5.1)
RBC # BLD AUTO: 4.58 M/UL (ref 4.1–5.7)
SODIUM SERPL-SCNC: 147 MMOL/L (ref 136–145)
WBC # BLD AUTO: 8.9 K/UL (ref 4.1–11.1)

## 2023-10-13 PROCEDURE — 36415 COLL VENOUS BLD VENIPUNCTURE: CPT

## 2023-10-13 PROCEDURE — 6370000000 HC RX 637 (ALT 250 FOR IP): Performed by: STUDENT IN AN ORGANIZED HEALTH CARE EDUCATION/TRAINING PROGRAM

## 2023-10-13 PROCEDURE — 80048 BASIC METABOLIC PNL TOTAL CA: CPT

## 2023-10-13 PROCEDURE — 6370000000 HC RX 637 (ALT 250 FOR IP): Performed by: INTERNAL MEDICINE

## 2023-10-13 PROCEDURE — 84100 ASSAY OF PHOSPHORUS: CPT

## 2023-10-13 PROCEDURE — A4216 STERILE WATER/SALINE, 10 ML: HCPCS | Performed by: STUDENT IN AN ORGANIZED HEALTH CARE EDUCATION/TRAINING PROGRAM

## 2023-10-13 PROCEDURE — 2580000003 HC RX 258: Performed by: STUDENT IN AN ORGANIZED HEALTH CARE EDUCATION/TRAINING PROGRAM

## 2023-10-13 PROCEDURE — 6360000002 HC RX W HCPCS: Performed by: STUDENT IN AN ORGANIZED HEALTH CARE EDUCATION/TRAINING PROGRAM

## 2023-10-13 PROCEDURE — C9113 INJ PANTOPRAZOLE SODIUM, VIA: HCPCS | Performed by: STUDENT IN AN ORGANIZED HEALTH CARE EDUCATION/TRAINING PROGRAM

## 2023-10-13 PROCEDURE — 85027 COMPLETE CBC AUTOMATED: CPT

## 2023-10-13 PROCEDURE — 83735 ASSAY OF MAGNESIUM: CPT

## 2023-10-13 PROCEDURE — 2060000000 HC ICU INTERMEDIATE R&B

## 2023-10-13 RX ADMIN — LORAZEPAM 0.75 MG: 0.5 TABLET ORAL at 13:14

## 2023-10-13 RX ADMIN — MEROPENEM 1000 MG: 1 INJECTION, POWDER, FOR SOLUTION INTRAVENOUS at 05:50

## 2023-10-13 RX ADMIN — LORAZEPAM 0.75 MG: 0.5 TABLET ORAL at 17:17

## 2023-10-13 RX ADMIN — ACETAMINOPHEN 650 MG: 325 TABLET ORAL at 17:17

## 2023-10-13 RX ADMIN — DOCUSATE SODIUM LIQUID 50 MG: 100 LIQUID ORAL at 14:52

## 2023-10-13 RX ADMIN — Medication 10 ML: at 22:07

## 2023-10-13 RX ADMIN — LACTULOSE 20 G: 20 SOLUTION ORAL at 14:52

## 2023-10-13 RX ADMIN — Medication: at 22:07

## 2023-10-13 RX ADMIN — PANTOPRAZOLE SODIUM 40 MG: 40 INJECTION, POWDER, FOR SOLUTION INTRAVENOUS at 09:13

## 2023-10-13 RX ADMIN — LORAZEPAM 0.75 MG: 0.5 TABLET ORAL at 05:50

## 2023-10-13 RX ADMIN — Medication 10 ML: at 09:14

## 2023-10-13 RX ADMIN — MIRTAZAPINE 15 MG: 15 TABLET, FILM COATED ORAL at 22:09

## 2023-10-13 RX ADMIN — MEROPENEM 1000 MG: 1 INJECTION, POWDER, FOR SOLUTION INTRAVENOUS at 14:50

## 2023-10-13 RX ADMIN — Medication 2000 UNITS: at 09:13

## 2023-10-13 RX ADMIN — MEROPENEM 1000 MG: 1 INJECTION, POWDER, FOR SOLUTION INTRAVENOUS at 22:06

## 2023-10-13 RX ADMIN — LACTULOSE 20 G: 20 SOLUTION ORAL at 09:13

## 2023-10-13 RX ADMIN — DOCUSATE SODIUM LIQUID 50 MG: 100 LIQUID ORAL at 09:13

## 2023-10-13 RX ADMIN — ENOXAPARIN SODIUM 40 MG: 100 INJECTION SUBCUTANEOUS at 09:12

## 2023-10-13 RX ADMIN — LORAZEPAM 0.75 MG: 0.5 TABLET ORAL at 00:15

## 2023-10-13 RX ADMIN — Medication: at 09:13

## 2023-10-13 RX ADMIN — TOPIRAMATE 50 MG: 25 TABLET, FILM COATED ORAL at 09:13

## 2023-10-13 RX ADMIN — Medication: at 09:14

## 2023-10-13 ASSESSMENT — PAIN SCALES - GENERAL
PAINLEVEL_OUTOF10: 2
PAINLEVEL_OUTOF10: 0

## 2023-10-13 NOTE — CARE COORDINATION
Transition of Care: return back to [facility] (where the patient normally lives)  [address] [facility].; [phone]: stretcher (not set up yet)     Main contacts: the [facility]- [phone]; DON is [address]   fax- [phone]   patients family- Jessica Mckeon- [phone] and Dana Fitch- [phone]     Discharge pending:  -patient continues on IV antibiotics  -pending final recs from urology, internal medicine  -pending medical progress and care recommendations     7512: this CM called the [facility] and spoke to the [address] [place] [address]- the patient has lived at the [facility] since 1984; he is a total assist; nonverbal; uses opal lift; does intermittent caths; has baclofen pump; has peg tube, has CP; this CM faxed the the [place] to [phone] and H&P      Prior Level of Functioning: total assist; lives at [facility]  Disposition: return to [facility]  DME needed: none  Transportation at discharge: stretcher  IM/IMM Medicare/ letter given:   Is patient a [employer] and connected with [place]? no   If yes, was [employer] transfer form completed and [place] notified? N/a  Caregiver Contact: the [facility]- director of [address] and family (see contacts above)   Discharge Caregiver contacted prior to discharge?  Not yet  Care Conference needed? no  Barriers to discharge:  medical progress      CM following   Daria Mcdonald RN          Revision History

## 2023-10-13 NOTE — PROGRESS NOTES
Hospitalist Progress Note  Maty Gandhi MD  Answering service: [phone]        Date of Service:  10/13/2023  NAME:  Leslie Rincon  :  1960  MRN:  [id number]      Admission Summary:     Patient presents with sepsis due to cystitis. Interval history / Subjective:     Patient doing well. No acute complaint. Sister present at bedside. Assessment & Plan:     Sepsis  -Patient presents with fever, tachycardia and lactic acidosis  -Sepsis due to complicated cystitis  -Sepsis reassessment completed, follow cultures, monitor hemodynamics    Complicated cystitis  -Patient with known history of bladder cancer, s/p neobladder and requiring self-catheterization  -UA shows large leukocyte esterase, pyuria, urine culture growing gram-negative rods  -Initial blood cultures with coag negative staph and repeat blood cultures negative, likely contamination  -CT abdomen and pelvis shows bilateral hydronephrosis and diffuse bilateral hydroureter and distended bladder  -Continue meropenem, de-escalate based on sensitivity    Bilateral hydroureteronephrosis  -Patient with bilateral hydroureteronephrosis, also with multiple right renal calculi  -History of bladder cancer, s/p radical cystoprostatectomy with neobladder  -Urology following, continue Cerna, plan for reimaging next week    Hypernatremia  -Increase water flushes through PEG tube to every 4 hours  -Repeat labs in a.m.     Hiatal hernia  -Continue PPI    Cerebral palsy  -Patient with cerebral palsy and functional quadriplegia  -Continue supportive care, patient with PEG tube feeding    Anxiety disorder, depression  -Continue Remeron and Ativan    Constipation  -Bowel regimen    Nutrition  -Patient with PEG tube feeding, however sister states that patient is allowed p.o. at his LTC  -Patient asking for ice tea here, RN to confirm with patient's LTC regarding his

## 2023-10-14 LAB
ANION GAP SERPL CALC-SCNC: 6 MMOL/L (ref 5–15)
BUN SERPL-MCNC: 30 MG/DL (ref 6–20)
BUN/CREAT SERPL: 42 (ref 12–20)
CALCIUM SERPL-MCNC: 8.7 MG/DL (ref 8.5–10.1)
CHLORIDE SERPL-SCNC: 121 MMOL/L (ref 97–108)
CO2 SERPL-SCNC: 21 MMOL/L (ref 21–32)
COMMENT:: NORMAL
CREAT SERPL-MCNC: 0.72 MG/DL (ref 0.7–1.3)
ERYTHROCYTE [DISTWIDTH] IN BLOOD BY AUTOMATED COUNT: 15.1 % (ref 11.5–14.5)
GLUCOSE SERPL-MCNC: 107 MG/DL (ref 65–100)
HCT VFR BLD AUTO: 37.9 % (ref 36.6–50.3)
HGB BLD-MCNC: 11.7 G/DL (ref 12.1–17)
MAGNESIUM SERPL-MCNC: 2.6 MG/DL (ref 1.6–2.4)
MCH RBC QN AUTO: 27.7 PG (ref 26–34)
MCHC RBC AUTO-ENTMCNC: 30.9 G/DL (ref 30–36.5)
MCV RBC AUTO: 89.6 FL (ref 80–99)
NRBC # BLD: 0 K/UL (ref 0–0.01)
NRBC BLD-RTO: 0 PER 100 WBC
PHOSPHATE SERPL-MCNC: 2.5 MG/DL (ref 2.6–4.7)
PLATELET # BLD AUTO: 369 K/UL (ref 150–400)
PMV BLD AUTO: 9.9 FL (ref 8.9–12.9)
POTASSIUM SERPL-SCNC: 4.4 MMOL/L (ref 3.5–5.1)
RBC # BLD AUTO: 4.23 M/UL (ref 4.1–5.7)
SODIUM SERPL-SCNC: 148 MMOL/L (ref 136–145)
SPECIMEN HOLD: NORMAL
WBC # BLD AUTO: 7.9 K/UL (ref 4.1–11.1)

## 2023-10-14 PROCEDURE — 94760 N-INVAS EAR/PLS OXIMETRY 1: CPT

## 2023-10-14 PROCEDURE — 83735 ASSAY OF MAGNESIUM: CPT

## 2023-10-14 PROCEDURE — 6370000000 HC RX 637 (ALT 250 FOR IP): Performed by: INTERNAL MEDICINE

## 2023-10-14 PROCEDURE — 84100 ASSAY OF PHOSPHORUS: CPT

## 2023-10-14 PROCEDURE — 36415 COLL VENOUS BLD VENIPUNCTURE: CPT

## 2023-10-14 PROCEDURE — 6360000002 HC RX W HCPCS: Performed by: STUDENT IN AN ORGANIZED HEALTH CARE EDUCATION/TRAINING PROGRAM

## 2023-10-14 PROCEDURE — 2060000000 HC ICU INTERMEDIATE R&B

## 2023-10-14 PROCEDURE — A4216 STERILE WATER/SALINE, 10 ML: HCPCS | Performed by: STUDENT IN AN ORGANIZED HEALTH CARE EDUCATION/TRAINING PROGRAM

## 2023-10-14 PROCEDURE — 80048 BASIC METABOLIC PNL TOTAL CA: CPT

## 2023-10-14 PROCEDURE — C9113 INJ PANTOPRAZOLE SODIUM, VIA: HCPCS | Performed by: STUDENT IN AN ORGANIZED HEALTH CARE EDUCATION/TRAINING PROGRAM

## 2023-10-14 PROCEDURE — 6370000000 HC RX 637 (ALT 250 FOR IP): Performed by: STUDENT IN AN ORGANIZED HEALTH CARE EDUCATION/TRAINING PROGRAM

## 2023-10-14 PROCEDURE — 2580000003 HC RX 258: Performed by: FAMILY MEDICINE

## 2023-10-14 PROCEDURE — 85027 COMPLETE CBC AUTOMATED: CPT

## 2023-10-14 PROCEDURE — 2580000003 HC RX 258: Performed by: STUDENT IN AN ORGANIZED HEALTH CARE EDUCATION/TRAINING PROGRAM

## 2023-10-14 RX ORDER — DEXTROSE AND SODIUM CHLORIDE 5; .45 G/100ML; G/100ML
INJECTION, SOLUTION INTRAVENOUS CONTINUOUS
Status: DISCONTINUED | OUTPATIENT
Start: 2023-10-14 | End: 2023-10-17

## 2023-10-14 RX ADMIN — Medication 2000 UNITS: at 10:59

## 2023-10-14 RX ADMIN — Medication: at 11:05

## 2023-10-14 RX ADMIN — TOPIRAMATE 50 MG: 25 TABLET, FILM COATED ORAL at 10:59

## 2023-10-14 RX ADMIN — MIRTAZAPINE 15 MG: 15 TABLET, FILM COATED ORAL at 21:41

## 2023-10-14 RX ADMIN — LORAZEPAM 0.75 MG: 0.5 TABLET ORAL at 10:59

## 2023-10-14 RX ADMIN — PANTOPRAZOLE SODIUM 40 MG: 40 INJECTION, POWDER, FOR SOLUTION INTRAVENOUS at 10:58

## 2023-10-14 RX ADMIN — FLUTICASONE PROPIONATE 2 SPRAY: 50 SPRAY, METERED NASAL at 11:05

## 2023-10-14 RX ADMIN — ENOXAPARIN SODIUM 40 MG: 100 INJECTION SUBCUTANEOUS at 10:58

## 2023-10-14 RX ADMIN — DEXTROSE AND SODIUM CHLORIDE: 5; 450 INJECTION, SOLUTION INTRAVENOUS at 13:48

## 2023-10-14 RX ADMIN — LORAZEPAM 0.75 MG: 0.5 TABLET ORAL at 23:34

## 2023-10-14 RX ADMIN — LORAZEPAM 0.75 MG: 0.5 TABLET ORAL at 17:14

## 2023-10-14 RX ADMIN — Medication: at 11:03

## 2023-10-14 RX ADMIN — MEROPENEM 1000 MG: 1 INJECTION, POWDER, FOR SOLUTION INTRAVENOUS at 13:51

## 2023-10-14 RX ADMIN — LORAZEPAM 0.75 MG: 0.5 TABLET ORAL at 06:33

## 2023-10-14 RX ADMIN — MEROPENEM 1000 MG: 1 INJECTION, POWDER, FOR SOLUTION INTRAVENOUS at 06:30

## 2023-10-14 RX ADMIN — Medication 10 ML: at 21:41

## 2023-10-14 RX ADMIN — ACETAMINOPHEN 650 MG: 325 TABLET ORAL at 10:59

## 2023-10-14 RX ADMIN — Medication: at 21:41

## 2023-10-14 RX ADMIN — LORAZEPAM 0.75 MG: 0.5 TABLET ORAL at 00:45

## 2023-10-14 RX ADMIN — SIMETHICONE 120 MG: 20 SUSPENSION/ DROPS ORAL at 21:41

## 2023-10-14 RX ADMIN — Medication 10 ML: at 11:03

## 2023-10-14 RX ADMIN — MEROPENEM 1000 MG: 1 INJECTION, POWDER, FOR SOLUTION INTRAVENOUS at 21:40

## 2023-10-14 ASSESSMENT — PAIN SCALES - GENERAL
PAINLEVEL_OUTOF10: 1
PAINLEVEL_OUTOF10: 2

## 2023-10-14 ASSESSMENT — PAIN DESCRIPTION - LOCATION
LOCATION: ABDOMEN

## 2023-10-14 NOTE — PLAN OF CARE
Problem: Discharge Planning  Goal: Discharge to home or other facility with appropriate resources  10/14/2023 1446 by Bryce Estevez RN  Outcome: Progressing  Flowsheets (Taken 10/14/2023 0800)  Discharge to home or other facility with appropriate resources: Identify barriers to discharge with patient and caregiver  10/14/2023 0143 by Fred Zhang RN  Outcome: Progressing  Flowsheets (Taken 10/13/2023 2024)  Discharge to home or other facility with appropriate resources:   Identify barriers to discharge with patient and caregiver   Identify discharge learning needs (meds, wound care, etc)     Problem: Safety - Adult  Goal: Free from fall injury  10/14/2023 1446 by Bryce Estevez RN  Outcome: Progressing  10/14/2023 0143 by Fred Zhang RN  Outcome: Progressing     Problem: Pain  Goal: Verbalizes/displays adequate comfort level or baseline comfort level  10/14/2023 1446 by Bryce Estevez RN  Outcome: Progressing  10/14/2023 0143 by Fred Zhang RN  Outcome: Progressing     Problem: Skin/Tissue Integrity  Goal: Absence of new skin breakdown  Description: 1. Monitor for areas of redness and/or skin breakdown  2. Assess vascular access sites hourly  3. Every 4-6 hours minimum:  Change oxygen saturation probe site  4. Every 4-6 hours:  If on nasal continuous positive airway pressure, respiratory therapy assess nares and determine need for appliance change or resting period.   10/14/2023 1446 by Bryce Estevez RN  Outcome: Progressing  10/14/2023 0143 by Fred Zhang RN  Outcome: Progressing     Problem: Chronic Conditions and Co-morbidities  Goal: Patient's chronic conditions and co-morbidity symptoms are monitored and maintained or improved  10/14/2023 1446 by Bryce Estevez RN  Outcome: Progressing  Flowsheets (Taken 10/14/2023 0800)  Care Plan - Patient's Chronic Conditions and Co-Morbidity Symptoms are Monitored and Maintained or Improved: Monitor and assess patient's chronic conditions and

## 2023-10-14 NOTE — PROGRESS NOTES
Hospitalist Progress Note  Randal Cloud MD  Answering service: [phone]        Date of Service:  10/14/2023  NAME:  Kacey Jain  :  1960  MRN:  [id number]      Admission Summary:     Patient presents with sepsis due to cystitis. Interval history / Subjective:     Patient doing well. No acute complaint.        Assessment & Plan:     Sepsis  -Patient presents with fever, tachycardia and lactic acidosis  -Sepsis due to complicated cystitis  -Sepsis reassessment completed, follow cultures, monitor hemodynamics    Complicated cystitis  -Patient with known history of bladder cancer, s/p neobladder and requiring self-catheterization  -UA shows large leukocyte esterase, pyuria, urine culture growing gram-negative rods  -Initial blood cultures with coag negative staph and repeat blood cultures negative, likely contamination  -CT abdomen and pelvis shows bilateral hydronephrosis and diffuse bilateral hydroureter and distended bladder  -Continue meropenem, de-escalate based on sensitivity    Bilateral hydroureteronephrosis  -Patient with bilateral hydroureteronephrosis, also with multiple right renal calculi  -History of bladder cancer, s/p radical cystoprostatectomy with neobladder  -Urology following, continue Cerna, plan for reimaging next week    Hypernatremia  -Increased water flushes through PEG tube to every 4 hours  -Start D5 half-normal saline  -Follow labs    Hiatal hernia  -Continue PPI    Cerebral palsy  -Patient with cerebral palsy and functional quadriplegia  -Continue supportive care, patient with PEG tube feeding    Anxiety disorder, depression  -Continue Remeron and Ativan    Constipation  -Bowel regimen    Nutrition  -Patient with PEG tube feeding, however sister states that patient is allowed p.o. at his LTC  -Patient asking for ice tea here, RN to confirm with patient's LTC regarding his

## 2023-10-14 NOTE — PROGRESS NOTES
Physician Progress Note      PATIENT:               Zofia Man  [facility] #:                  [id number]  :                       1960  ADMIT DATE:       10/10/2023 6:03 PM  [address] [facility] DATE:  RESPONDING  PROVIDER #:        Louisa Enriquez MD          QUERY TEXT:    Patient admitted with sepsis. Noted to have dietician assessment with   malnutrition diagnosis. If possible, please document in progress notes and   discharge summary if you are evaluating and /or treating any of the following: The medical record reflects the following:  Risk Factors: malnutrition    Clinical Indicators:  nutritional assessment 10/12  Malnutrition Assessment:  Malnutrition Status: Moderate malnutrition (10/12/23 1220)  Context:  Chronic Illness  Findings of the 6 clinical characteristics of malnutrition:  Energy Intake:  Mild decrease in energy intake (Comment)  Weight Loss:  No significant weight loss  Body Fat Loss:  Mild body fat loss Buccal region, Triceps  Muscle Mass Loss:  Mild muscle mass loss Clavicles (pectoralis & deltoids),   Thigh (quadraceps), Hand (interosseous)  Fluid Accumulation:  No significant fluid accumulation   Strength:  Not Performed    Anthropometric Measures:  Height: 165.1 cm (5' 5\")  Ideal Body Weight (IBW): 136 lbs (62 kg)  Current Body Weight: 117 lb (53.1 kg), 86 % IBW. Current BMI (kg/m2): 19.5  Weight Adjustment For: Quadriplegia  Total Adjusted Percentage (Calculated): 12.5  Adjusted Ideal Body Weight (lbs) (Calculated): 119 lbs  Adjusted Ideal Body Weight (kg) (Calculated): 54.09 kg  Adjusted % Ideal Body Weight (Calculated): 98.3  Adjusted BMI (kg/m2) (Calculated): 21.9  BMI Categories: Normal Weight (BMI 18.5-24. 9)    Treatment:  Nutrition Recommendations/Plan:  1. Modify feedings to: Jevity 1.5 355 ml bolus feeding x 3 feedings per day. Free water flush 360 ml Q 6 hrs.   2.   PO diet per MD    Thank you,  Rush Montoya RN CDI [facility]  Clinical Documentation  [phone] or via [employer]

## 2023-10-14 NOTE — PLAN OF CARE
Problem: Discharge Planning  Goal: Discharge to home or other facility with appropriate resources  10/14/2023 0143 by Tracy Faustin RN  Outcome: Progressing  Flowsheets (Taken 10/13/2023 2024)  Discharge to home or other facility with appropriate resources:   Identify barriers to discharge with patient and caregiver   Identify discharge learning needs (meds, wound care, etc)  10/13/2023 1146 by Lucía Escobedo RN  Outcome: Progressing     Problem: Safety - Adult  Goal: Free from fall injury  10/14/2023 0143 by Tracy Faustin RN  Outcome: Progressing  10/13/2023 1146 by Lucía Escobedo RN  Outcome: Progressing     Problem: Pain  Goal: Verbalizes/displays adequate comfort level or baseline comfort level  10/14/2023 0143 by Tracy Faustin RN  Outcome: Progressing  10/13/2023 1146 by Lucía Escobedo RN  Outcome: Progressing     Problem: Skin/Tissue Integrity  Goal: Absence of new skin breakdown  Description: 1. Monitor for areas of redness and/or skin breakdown  2. Assess vascular access sites hourly  3. Every 4-6 hours minimum:  Change oxygen saturation probe site  4. Every 4-6 hours:  If on nasal continuous positive airway pressure, respiratory therapy assess nares and determine need for appliance change or resting period.   10/14/2023 0143 by Tracy Faustin RN  Outcome: Progressing  10/13/2023 1146 by Lucía Escobedo RN  Outcome: Progressing     Problem: Chronic Conditions and Co-morbidities  Goal: Patient's chronic conditions and co-morbidity symptoms are monitored and maintained or improved  10/14/2023 0143 by Tracy Faustin RN  Outcome: Progressing  Flowsheets (Taken 10/13/2023 2024)  Care Plan - Patient's Chronic Conditions and Co-Morbidity Symptoms are Monitored and Maintained or Improved: Monitor and assess patient's chronic conditions and comorbid symptoms for stability, deterioration, or improvement  10/13/2023 1146 by Lucía Escobedo RN  Outcome: Progressing

## 2023-10-15 LAB
ANION GAP SERPL CALC-SCNC: 4 MMOL/L (ref 5–15)
BACTERIA SPEC CULT: NORMAL
BUN SERPL-MCNC: 25 MG/DL (ref 6–20)
BUN/CREAT SERPL: 37 (ref 12–20)
CALCIUM SERPL-MCNC: 8.8 MG/DL (ref 8.5–10.1)
CHLORIDE SERPL-SCNC: 118 MMOL/L (ref 97–108)
CO2 SERPL-SCNC: 16 MMOL/L (ref 21–32)
CREAT SERPL-MCNC: 0.68 MG/DL (ref 0.7–1.3)
GLUCOSE SERPL-MCNC: 195 MG/DL (ref 65–100)
POTASSIUM SERPL-SCNC: 4.5 MMOL/L (ref 3.5–5.1)
SERVICE CMNT-IMP: NORMAL
SODIUM SERPL-SCNC: 138 MMOL/L (ref 136–145)

## 2023-10-15 PROCEDURE — A4216 STERILE WATER/SALINE, 10 ML: HCPCS | Performed by: STUDENT IN AN ORGANIZED HEALTH CARE EDUCATION/TRAINING PROGRAM

## 2023-10-15 PROCEDURE — 2060000000 HC ICU INTERMEDIATE R&B

## 2023-10-15 PROCEDURE — 6360000002 HC RX W HCPCS: Performed by: STUDENT IN AN ORGANIZED HEALTH CARE EDUCATION/TRAINING PROGRAM

## 2023-10-15 PROCEDURE — 2580000003 HC RX 258: Performed by: STUDENT IN AN ORGANIZED HEALTH CARE EDUCATION/TRAINING PROGRAM

## 2023-10-15 PROCEDURE — 6370000000 HC RX 637 (ALT 250 FOR IP): Performed by: FAMILY MEDICINE

## 2023-10-15 PROCEDURE — 80048 BASIC METABOLIC PNL TOTAL CA: CPT

## 2023-10-15 PROCEDURE — 36415 COLL VENOUS BLD VENIPUNCTURE: CPT

## 2023-10-15 PROCEDURE — 2580000003 HC RX 258: Performed by: FAMILY MEDICINE

## 2023-10-15 PROCEDURE — 6370000000 HC RX 637 (ALT 250 FOR IP): Performed by: STUDENT IN AN ORGANIZED HEALTH CARE EDUCATION/TRAINING PROGRAM

## 2023-10-15 PROCEDURE — C9113 INJ PANTOPRAZOLE SODIUM, VIA: HCPCS | Performed by: STUDENT IN AN ORGANIZED HEALTH CARE EDUCATION/TRAINING PROGRAM

## 2023-10-15 PROCEDURE — 6370000000 HC RX 637 (ALT 250 FOR IP): Performed by: INTERNAL MEDICINE

## 2023-10-15 RX ORDER — POLYETHYLENE GLYCOL 3350 17 G/17G
17 POWDER, FOR SOLUTION ORAL DAILY
Status: DISCONTINUED | OUTPATIENT
Start: 2023-10-15 | End: 2023-10-18 | Stop reason: HOSPADM

## 2023-10-15 RX ADMIN — MEROPENEM 1000 MG: 1 INJECTION, POWDER, FOR SOLUTION INTRAVENOUS at 14:55

## 2023-10-15 RX ADMIN — DEXTROSE AND SODIUM CHLORIDE: 5; 450 INJECTION, SOLUTION INTRAVENOUS at 17:40

## 2023-10-15 RX ADMIN — FLUTICASONE PROPIONATE 2 SPRAY: 50 SPRAY, METERED NASAL at 14:57

## 2023-10-15 RX ADMIN — LORAZEPAM 0.75 MG: 0.5 TABLET ORAL at 17:38

## 2023-10-15 RX ADMIN — Medication 2000 UNITS: at 10:05

## 2023-10-15 RX ADMIN — MIRTAZAPINE 15 MG: 15 TABLET, FILM COATED ORAL at 20:49

## 2023-10-15 RX ADMIN — Medication: at 10:07

## 2023-10-15 RX ADMIN — Medication: at 10:04

## 2023-10-15 RX ADMIN — POLYETHYLENE GLYCOL 3350 17 G: 17 POWDER, FOR SOLUTION ORAL at 13:01

## 2023-10-15 RX ADMIN — Medication: at 20:49

## 2023-10-15 RX ADMIN — PANTOPRAZOLE SODIUM 40 MG: 40 INJECTION, POWDER, FOR SOLUTION INTRAVENOUS at 10:06

## 2023-10-15 RX ADMIN — MEROPENEM 1000 MG: 1 INJECTION, POWDER, FOR SOLUTION INTRAVENOUS at 07:14

## 2023-10-15 RX ADMIN — MEROPENEM 1000 MG: 1 INJECTION, POWDER, FOR SOLUTION INTRAVENOUS at 22:31

## 2023-10-15 RX ADMIN — LORAZEPAM 0.75 MG: 0.5 TABLET ORAL at 07:15

## 2023-10-15 RX ADMIN — Medication 20 ML: at 10:06

## 2023-10-15 RX ADMIN — TOPIRAMATE 50 MG: 25 TABLET, FILM COATED ORAL at 10:05

## 2023-10-15 RX ADMIN — ACETAMINOPHEN 650 MG: 325 TABLET ORAL at 07:15

## 2023-10-15 RX ADMIN — LORAZEPAM 0.75 MG: 0.5 TABLET ORAL at 10:51

## 2023-10-15 RX ADMIN — ENOXAPARIN SODIUM 40 MG: 100 INJECTION SUBCUTANEOUS at 10:05

## 2023-10-15 ASSESSMENT — PAIN SCALES - GENERAL
PAINLEVEL_OUTOF10: 0
PAINLEVEL_OUTOF10: 1

## 2023-10-15 ASSESSMENT — PAIN DESCRIPTION - LOCATION: LOCATION: ABDOMEN

## 2023-10-15 NOTE — PLAN OF CARE
Problem: Discharge Planning  Goal: Discharge to home or other facility with appropriate resources  Outcome: Progressing  Flowsheets (Taken 10/15/2023 0800)  Discharge to home or other facility with appropriate resources: Identify barriers to discharge with patient and caregiver     Problem: Safety - Adult  Goal: Free from fall injury  Outcome: Progressing     Problem: Pain  Goal: Verbalizes/displays adequate comfort level or baseline comfort level  Outcome: Progressing     Problem: Skin/Tissue Integrity  Goal: Absence of new skin breakdown  Description: 1. Monitor for areas of redness and/or skin breakdown  2. Assess vascular access sites hourly  3. Every 4-6 hours minimum:  Change oxygen saturation probe site  4. Every 4-6 hours:  If on nasal continuous positive airway pressure, respiratory therapy assess nares and determine need for appliance change or resting period.   Outcome: Progressing     Problem: Chronic Conditions and Co-morbidities  Goal: Patient's chronic conditions and co-morbidity symptoms are monitored and maintained or improved  Outcome: Progressing  Flowsheets (Taken 10/15/2023 0800)  Care Plan - Patient's Chronic Conditions and Co-Morbidity Symptoms are Monitored and Maintained or Improved: Monitor and assess patient's chronic conditions and comorbid symptoms for stability, deterioration, or improvement

## 2023-10-15 NOTE — PROGRESS NOTES
Bedside and Verbal shift change report given to magalys robertson (oncoming nurse) by deneen franco rn (offgoing nurse). Report included the following information Nurse Handoff Report, Intake/Output, MAR, Recent Results, and Cardiac Rhythm NSR to sinus tach .

## 2023-10-15 NOTE — PROGRESS NOTES
Hospitalist Progress Note  Mae Leiva MD  Answering service: [phone]        Date of Service:  10/15/2023  NAME:  Jacqueline Mauricio  :  1960  MRN:  [id number]      Admission Summary:     Patient presents with sepsis due to cystitis. Interval history / Subjective:     Patient doing well. No acute complaint.        Assessment & Plan:     Sepsis  -Patient presents with fever, tachycardia and lactic acidosis  -Sepsis due to complicated cystitis  -Sepsis reassessment completed, follow cultures, monitor hemodynamics    Complicated cystitis  -Patient with known history of bladder cancer, s/p neobladder and requiring self-catheterization  -UA shows large leukocyte esterase, pyuria, urine culture growing gram-negative rods  -Initial blood cultures with coag negative staph and repeat blood cultures negative, likely contamination  -CT abdomen and pelvis shows bilateral hydronephrosis and diffuse bilateral hydroureter and distended bladder  -Continue meropenem, de-escalate based on sensitivity    Bilateral hydroureteronephrosis  -Patient with bilateral hydroureteronephrosis, also with multiple right renal calculi  -History of bladder cancer, s/p radical cystoprostatectomy with neobladder  -Urology following, continue Cerna, plan for reimaging next week    Hypernatremia  -Increased water flushes through PEG tube to every 4 hours  -Continue D5 for another 24 hours  -Follow labs    Hiatal hernia  -Continue PPI    Cerebral palsy  -Patient with cerebral palsy and functional quadriplegia  -Continue supportive care, patient with PEG tube feeding    Anxiety disorder, depression  -Continue Remeron and Ativan    Constipation  -Bowel regimen    Nutrition  -Patient with PEG tube feeding, however sister states that patient is allowed p.o. at his LTC  -Patient asking for ice tea here, RN to confirm with patient's LTC regarding his

## 2023-10-16 ENCOUNTER — APPOINTMENT (OUTPATIENT)
Facility: HOSPITAL | Age: 63
DRG: 871 | End: 2023-10-16
Payer: MEDICARE

## 2023-10-16 LAB
BACTERIA SPEC CULT: NORMAL
MAGNESIUM SERPL-MCNC: 2.4 MG/DL (ref 1.6–2.4)
PHOSPHATE SERPL-MCNC: 2.8 MG/DL (ref 2.6–4.7)
SERVICE CMNT-IMP: NORMAL

## 2023-10-16 PROCEDURE — 2060000000 HC ICU INTERMEDIATE R&B

## 2023-10-16 PROCEDURE — 84100 ASSAY OF PHOSPHORUS: CPT

## 2023-10-16 PROCEDURE — 36415 COLL VENOUS BLD VENIPUNCTURE: CPT

## 2023-10-16 PROCEDURE — C9113 INJ PANTOPRAZOLE SODIUM, VIA: HCPCS | Performed by: STUDENT IN AN ORGANIZED HEALTH CARE EDUCATION/TRAINING PROGRAM

## 2023-10-16 PROCEDURE — 6360000002 HC RX W HCPCS: Performed by: STUDENT IN AN ORGANIZED HEALTH CARE EDUCATION/TRAINING PROGRAM

## 2023-10-16 PROCEDURE — 2580000003 HC RX 258: Performed by: FAMILY MEDICINE

## 2023-10-16 PROCEDURE — 74018 RADEX ABDOMEN 1 VIEW: CPT

## 2023-10-16 PROCEDURE — 83735 ASSAY OF MAGNESIUM: CPT

## 2023-10-16 PROCEDURE — 6370000000 HC RX 637 (ALT 250 FOR IP): Performed by: STUDENT IN AN ORGANIZED HEALTH CARE EDUCATION/TRAINING PROGRAM

## 2023-10-16 PROCEDURE — 2580000003 HC RX 258: Performed by: STUDENT IN AN ORGANIZED HEALTH CARE EDUCATION/TRAINING PROGRAM

## 2023-10-16 PROCEDURE — 6370000000 HC RX 637 (ALT 250 FOR IP): Performed by: INTERNAL MEDICINE

## 2023-10-16 PROCEDURE — A4216 STERILE WATER/SALINE, 10 ML: HCPCS | Performed by: STUDENT IN AN ORGANIZED HEALTH CARE EDUCATION/TRAINING PROGRAM

## 2023-10-16 PROCEDURE — 6370000000 HC RX 637 (ALT 250 FOR IP): Performed by: FAMILY MEDICINE

## 2023-10-16 PROCEDURE — 76770 US EXAM ABDO BACK WALL COMP: CPT

## 2023-10-16 RX ADMIN — DEXTROSE AND SODIUM CHLORIDE: 5; 450 INJECTION, SOLUTION INTRAVENOUS at 13:20

## 2023-10-16 RX ADMIN — ENOXAPARIN SODIUM 40 MG: 100 INJECTION SUBCUTANEOUS at 09:15

## 2023-10-16 RX ADMIN — LORAZEPAM 0.75 MG: 0.5 TABLET ORAL at 05:43

## 2023-10-16 RX ADMIN — FLUTICASONE PROPIONATE 2 SPRAY: 50 SPRAY, METERED NASAL at 09:16

## 2023-10-16 RX ADMIN — Medication: at 09:17

## 2023-10-16 RX ADMIN — Medication: at 21:54

## 2023-10-16 RX ADMIN — LORAZEPAM 0.75 MG: 0.5 TABLET ORAL at 12:00

## 2023-10-16 RX ADMIN — PANTOPRAZOLE SODIUM 40 MG: 40 INJECTION, POWDER, FOR SOLUTION INTRAVENOUS at 09:16

## 2023-10-16 RX ADMIN — Medication 10 ML: at 09:16

## 2023-10-16 RX ADMIN — MIRTAZAPINE 15 MG: 15 TABLET, FILM COATED ORAL at 21:54

## 2023-10-16 RX ADMIN — POLYETHYLENE GLYCOL 3350 17 G: 17 POWDER, FOR SOLUTION ORAL at 09:15

## 2023-10-16 RX ADMIN — Medication: at 09:18

## 2023-10-16 RX ADMIN — LORAZEPAM 0.75 MG: 0.5 TABLET ORAL at 00:32

## 2023-10-16 RX ADMIN — TOPIRAMATE 50 MG: 25 TABLET, FILM COATED ORAL at 09:15

## 2023-10-16 RX ADMIN — Medication 2000 UNITS: at 09:16

## 2023-10-16 RX ADMIN — LORAZEPAM 0.75 MG: 0.5 TABLET ORAL at 18:07

## 2023-10-16 ASSESSMENT — PAIN SCALES - GENERAL
PAINLEVEL_OUTOF10: 0
PAINLEVEL_OUTOF10: 0

## 2023-10-16 NOTE — PROGRESS NOTES
Chart reviewed. Renal US ordered this AM to re-eval hydro. Resolved right and nearly resolved left hydronephrosis after tx of constipation. Continue biggs until ready for dc then remove catheter and return to intermittent caths 4x daily at [facility]. Flush biggs q shift to keep patent while in place. Call with questions.

## 2023-10-16 NOTE — CARE COORDINATION
Transition of Care Plan:    RUR: 14 %  Prior Level of Functioning: Total Assist  Disposition: [facility]  DME needed: none  Transportation at discharge: [address]  IM/IMM Medicare/ letter given: 10/12/2023 will need 2nd IMM  Caregiver Contact: Main contacts: the [facility]- [phone]; JHON is [phone]   fax- [phone]   patients family- Marcus Chemo- [phone] and Theresa Delaney- [phone]  Discharge Caregiver contacted prior to discharge?  yes  Barriers to discharge: Medical stability- EDMOND Wednesday 10/18/2023 per IDR's 10/16/2023    Will follow for transition of care    LACY Hernandez

## 2023-10-16 NOTE — PLAN OF CARE
Problem: Discharge Planning  Goal: Discharge to home or other facility with appropriate resources  Outcome: Progressing  Flowsheets (Taken 10/16/2023 0800)  Discharge to home or other facility with appropriate resources: Identify barriers to discharge with patient and caregiver     Problem: Safety - Adult  Goal: Free from fall injury  Outcome: Progressing     Problem: Pain  Goal: Verbalizes/displays adequate comfort level or baseline comfort level  Outcome: Progressing     Problem: Skin/Tissue Integrity  Goal: Absence of new skin breakdown  Description: 1. Monitor for areas of redness and/or skin breakdown  2. Assess vascular access sites hourly  3. Every 4-6 hours minimum:  Change oxygen saturation probe site  4. Every 4-6 hours:  If on nasal continuous positive airway pressure, respiratory therapy assess nares and determine need for appliance change or resting period.   Outcome: Progressing     Problem: Chronic Conditions and Co-morbidities  Goal: Patient's chronic conditions and co-morbidity symptoms are monitored and maintained or improved  Outcome: Progressing  Flowsheets (Taken 10/16/2023 0800)  Care Plan - Patient's Chronic Conditions and Co-Morbidity Symptoms are Monitored and Maintained or Improved: Monitor and assess patient's chronic conditions and comorbid symptoms for stability, deterioration, or improvement

## 2023-10-16 NOTE — PROGRESS NOTES
Hospitalist Progress Note  Lacho Flanagan MD  Answering service: [phone]        Date of Service:  10/16/2023  NAME:  Davide Gonzales  :  1960  MRN:  [id number]      Admission Summary:     Patient presents with sepsis due to cystitis. Interval history / Subjective:     Patient doing well. No acute complaint.        Assessment & Plan:     Sepsis  -Patient presents with fever, tachycardia and lactic acidosis  -Sepsis due to complicated cystitis  -Sepsis reassessment completed, follow cultures, monitor hemodynamics    Complicated cystitis  -Patient with known history of bladder cancer, s/p neobladder and requiring self-catheterization  -UA shows large leukocyte esterase, pyuria, urine culture growing gram-negative rods  -Initial blood cultures with coag negative staph and repeat blood cultures negative, likely contamination  -CT abdomen and pelvis shows bilateral hydronephrosis and diffuse bilateral hydroureter and distended bladder  -Continue meropenem, de-escalate based on sensitivity    Bilateral hydroureteronephrosis  -Patient with bilateral hydroureteronephrosis, also with multiple right renal calculi  -History of bladder cancer, s/p radical cystoprostatectomy with neobladder  -Urology following, continue Cerna, plan for reimaging next week    Hypernatremia  -Increased water flushes through PEG tube to every 4 hours  -Continue D5 for another 24 hours  -Follow labs    Hiatal hernia  -Continue PPI    Cerebral palsy  -Patient with cerebral palsy and functional quadriplegia  -Continue supportive care, patient with PEG tube feeding    Anxiety disorder, depression  -Continue Remeron and Ativan    Diarrhea  -Bowel regimen on hold  -Check KUB    Nutrition  -Patient with PEG tube feeding, however sister states that patient is allowed p.o. at his LTC  -Patient asking for ice tea here, RN to confirm with patient's \"GGT\", \"AML\" in the last 72 hours. Invalid input(s): \"SGOT\", \"GPT\", \"AP\", \"TBIL\", \"TBILI\", \"AMYP\", \"LPSE\", \"HLPSE\"    No results for input(s): \"INR\", \"APTT\" in the last 72 hours. Invalid input(s): \"PTP\"   No results for input(s): \"TIBC\", \"FERR\" in the last 72 hours. Invalid input(s): \"FE\", \"PSAT\"   No results found for: \"FOL\", \"RBCF\"   No results for input(s): \"PH\", \"PCO2\", \"PO2\" in the last 72 hours. No results for input(s): \"CPK\" in the last 72 hours.     Invalid input(s): \"CPKMB\", \"CKNDX\", \"TROIQ\"  No results found for: \"CHOL\", \"CHOLX\", \"CHLST\", \"CHOLV\", \"HDL\", \"HDLC\", \"LDL\", \"LDLC\", \"TGLX\", \"TRIGL\"  No results found for: \"GLUCPOC\"  [unfilled]      Medications Reviewed:     Current Facility-Administered Medications   Medication Dose Route Frequency    polyethylene glycol (GLYCOLAX) packet 17 g  17 g Oral Daily    dextrose 5 % and 0.45 % sodium chloride infusion   IntraVENous Continuous    zinc oxide 40 % paste   Topical BID    balsum peru-castor oil (VENELEX) ointment   Topical Daily    LORazepam (ATIVAN) tablet 0.75 mg  0.75 mg PEG Tube Q6H    mirtazapine (REMERON) tablet 15 mg  15 mg PEG Tube Daily    pantoprazole (PROTONIX) 40 mg in sodium chloride (PF) 0.9 % 10 mL injection  40 mg IntraVENous Daily    [Held by provider] bisacodyl (DULCOLAX) suppository 10 mg  10 mg Rectal Daily    diclofenac sodium (VOLTAREN) 1 % gel 2 g  2 g Topical 4x Daily PRN    [Held by provider] docusate (COLACE) 50 MG/5ML liquid 50 mg  50 mg Per G Tube TID    fluticasone (FLONASE) 50 MCG/ACT nasal spray 2 spray  2 spray Nasal Daily    [Held by provider] lactulose (CHRONULAC) 10 GM/15ML solution 20 g  20 g Per G Tube TID    simethicone (MYLICON) 40 HW/8.2ZC suspension drops 120 mg  120 mg Per G Tube Q6H PRN    sodium phosphate (FLEET) rectal enema 1 enema  1 enema Rectal Daily    topiramate (TOPAMAX) tablet 50 mg  50 mg Oral Daily    Vitamin D (CHOLECALCIFEROL) tablet 2,000 Units  2,000 Units Per G Tube Daily    magnesium

## 2023-10-17 LAB
ANION GAP SERPL CALC-SCNC: 10 MMOL/L (ref 5–15)
ANION GAP SERPL CALC-SCNC: 9 MMOL/L (ref 5–15)
BUN SERPL-MCNC: 25 MG/DL (ref 6–20)
BUN SERPL-MCNC: 25 MG/DL (ref 6–20)
BUN/CREAT SERPL: 34 (ref 12–20)
BUN/CREAT SERPL: 41 (ref 12–20)
CALCIUM SERPL-MCNC: 8.7 MG/DL (ref 8.5–10.1)
CALCIUM SERPL-MCNC: 9.1 MG/DL (ref 8.5–10.1)
CHLORIDE SERPL-SCNC: 108 MMOL/L (ref 97–108)
CHLORIDE SERPL-SCNC: 109 MMOL/L (ref 97–108)
CO2 SERPL-SCNC: 21 MMOL/L (ref 21–32)
CO2 SERPL-SCNC: 22 MMOL/L (ref 21–32)
CREAT SERPL-MCNC: 0.61 MG/DL (ref 0.7–1.3)
CREAT SERPL-MCNC: 0.73 MG/DL (ref 0.7–1.3)
GLUCOSE BLD STRIP.AUTO-MCNC: 92 MG/DL (ref 65–117)
GLUCOSE SERPL-MCNC: 102 MG/DL (ref 65–100)
GLUCOSE SERPL-MCNC: 116 MG/DL (ref 65–100)
MAGNESIUM SERPL-MCNC: 2.4 MG/DL (ref 1.6–2.4)
PHOSPHATE SERPL-MCNC: 2.8 MG/DL (ref 2.6–4.7)
POTASSIUM SERPL-SCNC: 4.1 MMOL/L (ref 3.5–5.1)
POTASSIUM SERPL-SCNC: 4.5 MMOL/L (ref 3.5–5.1)
SERVICE CMNT-IMP: NORMAL
SODIUM SERPL-SCNC: 139 MMOL/L (ref 136–145)
SODIUM SERPL-SCNC: 140 MMOL/L (ref 136–145)

## 2023-10-17 PROCEDURE — 6370000000 HC RX 637 (ALT 250 FOR IP): Performed by: INTERNAL MEDICINE

## 2023-10-17 PROCEDURE — 6370000000 HC RX 637 (ALT 250 FOR IP): Performed by: FAMILY MEDICINE

## 2023-10-17 PROCEDURE — 80048 BASIC METABOLIC PNL TOTAL CA: CPT

## 2023-10-17 PROCEDURE — 83735 ASSAY OF MAGNESIUM: CPT

## 2023-10-17 PROCEDURE — 36415 COLL VENOUS BLD VENIPUNCTURE: CPT

## 2023-10-17 PROCEDURE — 84100 ASSAY OF PHOSPHORUS: CPT

## 2023-10-17 PROCEDURE — 6370000000 HC RX 637 (ALT 250 FOR IP): Performed by: STUDENT IN AN ORGANIZED HEALTH CARE EDUCATION/TRAINING PROGRAM

## 2023-10-17 PROCEDURE — 51798 US URINE CAPACITY MEASURE: CPT

## 2023-10-17 PROCEDURE — 6360000002 HC RX W HCPCS: Performed by: STUDENT IN AN ORGANIZED HEALTH CARE EDUCATION/TRAINING PROGRAM

## 2023-10-17 PROCEDURE — 82962 GLUCOSE BLOOD TEST: CPT

## 2023-10-17 PROCEDURE — 2060000000 HC ICU INTERMEDIATE R&B

## 2023-10-17 RX ADMIN — LORAZEPAM 0.75 MG: 0.5 TABLET ORAL at 06:33

## 2023-10-17 RX ADMIN — LORAZEPAM 0.75 MG: 0.5 TABLET ORAL at 17:18

## 2023-10-17 RX ADMIN — Medication 2000 UNITS: at 08:57

## 2023-10-17 RX ADMIN — LORAZEPAM 0.75 MG: 0.5 TABLET ORAL at 23:38

## 2023-10-17 RX ADMIN — Medication: at 20:55

## 2023-10-17 RX ADMIN — LORAZEPAM 0.75 MG: 0.5 TABLET ORAL at 11:45

## 2023-10-17 RX ADMIN — ENOXAPARIN SODIUM 40 MG: 100 INJECTION SUBCUTANEOUS at 08:57

## 2023-10-17 RX ADMIN — Medication: at 08:59

## 2023-10-17 RX ADMIN — POLYETHYLENE GLYCOL 3350 17 G: 17 POWDER, FOR SOLUTION ORAL at 08:57

## 2023-10-17 RX ADMIN — SIMETHICONE 120 MG: 20 SUSPENSION/ DROPS ORAL at 08:59

## 2023-10-17 RX ADMIN — FLUTICASONE PROPIONATE 2 SPRAY: 50 SPRAY, METERED NASAL at 08:59

## 2023-10-17 RX ADMIN — Medication: at 08:56

## 2023-10-17 RX ADMIN — LORAZEPAM 0.75 MG: 0.5 TABLET ORAL at 01:13

## 2023-10-17 RX ADMIN — TOPIRAMATE 50 MG: 25 TABLET, FILM COATED ORAL at 08:57

## 2023-10-17 RX ADMIN — MIRTAZAPINE 15 MG: 15 TABLET, FILM COATED ORAL at 20:55

## 2023-10-17 NOTE — PLAN OF CARE
Problem: Discharge Planning  Goal: Discharge to home or other facility with appropriate resources  10/17/2023 0944 by Wendy Landry  Outcome: Progressing  Flowsheets (Taken 10/17/2023 0800)  Discharge to home or other facility with appropriate resources: Identify barriers to discharge with patient and caregiver  10/16/2023 2218 by Yogi Ortega RN  Outcome: Progressing     Problem: Safety - Adult  Goal: Free from fall injury  10/17/2023 0944 by Wendy Landry  Outcome: Progressing  10/16/2023 2218 by Yogi Ortega RN  Outcome: Progressing     Problem: Pain  Goal: Verbalizes/displays adequate comfort level or baseline comfort level  10/17/2023 0944 by Wendy Landry  Outcome: Progressing  10/16/2023 2218 by Yogi Ortega RN  Outcome: Progressing     Problem: Skin/Tissue Integrity  Goal: Absence of new skin breakdown  Description: 1. Monitor for areas of redness and/or skin breakdown  2. Assess vascular access sites hourly  3. Every 4-6 hours minimum:  Change oxygen saturation probe site  4. Every 4-6 hours:  If on nasal continuous positive airway pressure, respiratory therapy assess nares and determine need for appliance change or resting period.   10/17/2023 0944 by Wendy Landry  Outcome: Progressing  10/16/2023 2218 by Yogi Ortega RN  Outcome: Progressing     Problem: Chronic Conditions and Co-morbidities  Goal: Patient's chronic conditions and co-morbidity symptoms are monitored and maintained or improved  10/17/2023 0944 by Wendy Landry  Outcome: Progressing  Flowsheets (Taken 10/17/2023 0800)  Care Plan - Patient's Chronic Conditions and Co-Morbidity Symptoms are Monitored and Maintained or Improved: Monitor and assess patient's chronic conditions and comorbid symptoms for stability, deterioration, or improvement  10/16/2023 2218 by Yogi Ortega RN  Outcome: Progressing     Problem: Neurosensory - Adult  Goal: Absence of seizures  10/17/2023 0944 by Jonatan Nj  Outcome: Progressing  Flowsheets (Taken 10/17/2023 0800)  Absence of seizures: Monitor for seizure activity.   If seizure occurs, document type and location of movements and any associated apnea  10/16/2023 2218 by Elliott Nicole RN  Outcome: Progressing  Goal: Remains free of injury related to seizures activity  10/17/2023 0944 by Jonatan Nj  Outcome: Progressing  Flowsheets (Taken 10/17/2023 0800)  Remains free of injury related to seizure activity:   Maintain airway, patient safety  and administer oxygen as ordered   Monitor patient for seizure activity, document and report duration and description of seizure to Licensed Independent Practitioner  10/16/2023 2218 by Elliott Nicole RN  Outcome: Progressing     Problem: Respiratory - Adult  Goal: Achieves optimal ventilation and oxygenation  10/17/2023 0944 by Jonatan Nj  Outcome: Progressing  Flowsheets (Taken 10/17/2023 0800)  Achieves optimal ventilation and oxygenation: Assess and instruct to report shortness of breath or any respiratory difficulty  10/16/2023 2218 by Elliott Nicole RN  Outcome: Progressing     Problem: Cardiovascular - Adult  Goal: Maintains optimal cardiac output and hemodynamic stability  10/17/2023 0944 by Jonatan Nj  Outcome: Progressing  Flowsheets (Taken 10/17/2023 0800)  Maintains optimal cardiac output and hemodynamic stability: Monitor blood pressure and heart rate  10/16/2023 2218 by Elliott Nicole RN  Outcome: Progressing  Goal: Absence of cardiac dysrhythmias or at baseline  10/17/2023 0944 by Jonatan Nj  Outcome: Progressing  Flowsheets (Taken 10/17/2023 0800)  Absence of cardiac dysrhythmias or at baseline:   Monitor cardiac rate and rhythm   Assess for signs of decreased cardiac output  10/16/2023 2218 by Elliott Nicole RN  Outcome: Progressing     Problem: Skin/Tissue Integrity - Adult  Goal: Skin integrity remains

## 2023-10-17 NOTE — PLAN OF CARE
2000 Report received from Terell Bynum RN and [employer], Student RN. Patient alert and resting in bed. Shift summary. No acute events noted. Pt resting in bed comfortably, call bell left within reach. Unsuccessful with IV sticks several times, veins blow easily and pt becoming agitated. 3813 Bedside shift change report given to HANNAH Patterson and [employer], Student RN by Miguel Fitzpatrick RN. Report included the following information SBAR, Kardex, MAR, Accordion, and Recent Results Cardiac Rhythm ST/SR. Problem: Safety - Adult  Goal: Free from fall injury  10/16/2023 2218 by Sebastian Benitez RN  Outcome: Progressing  10/16/2023 1103 by Jacqueline Avendano  Outcome: Progressing     Problem: Pain  Goal: Verbalizes/displays adequate comfort level or baseline comfort level  10/16/2023 2218 by Sebastian Benitez RN  Outcome: Progressing  10/16/2023 1103 by Jacqueline Avendano  Outcome: Progressing     Problem: Skin/Tissue Integrity  Goal: Absence of new skin breakdown  Description: 1. Monitor for areas of redness and/or skin breakdown  2. Assess vascular access sites hourly  3. Every 4-6 hours minimum:  Change oxygen saturation probe site  4. Every 4-6 hours:  If on nasal continuous positive airway pressure, respiratory therapy assess nares and determine need for appliance change or resting period.   10/16/2023 2218 by Sebastian Benitez RN  Outcome: Progressing  10/16/2023 1103 by Jacqueline Avendano  Outcome: Progressing     Problem: Chronic Conditions and Co-morbidities  Goal: Patient's chronic conditions and co-morbidity symptoms are monitored and maintained or improved  10/16/2023 2218 by Sebastian Bneitez RN  Outcome: Progressing  10/16/2023 1103 by Jacqueline Avendano  Outcome: Progressing  Flowsheets (Taken 10/16/2023 0800)  Care Plan - Patient's Chronic Conditions and Co-Morbidity Symptoms are Monitored and Maintained or Improved: Monitor and assess patient's chronic conditions and comorbid symptoms for stability, deterioration, or improvement     Problem: Neurosensory - Adult  Goal: Absence of seizures  Outcome: Progressing  Goal: Remains free of injury related to seizures activity  Outcome: Progressing     Problem: Respiratory - Adult  Goal: Achieves optimal ventilation and oxygenation  Outcome: Progressing     Problem: Cardiovascular - Adult  Goal: Maintains optimal cardiac output and hemodynamic stability  Outcome: Progressing  Goal: Absence of cardiac dysrhythmias or at baseline  Outcome: Progressing     Problem: Skin/Tissue Integrity - Adult  Goal: Skin integrity remains intact  Outcome: Progressing  Goal: Oral mucous membranes remain intact  Outcome: Progressing     Problem: Musculoskeletal - Adult  Goal: Return mobility to safest level of function  Outcome: Progressing  Goal: Maintain proper alignment of affected body part  Outcome: Progressing     Problem: Gastrointestinal - Adult  Goal: Maintains or returns to baseline bowel function  Outcome: Progressing  Goal: Maintains adequate nutritional intake  Outcome: Progressing     Problem: Genitourinary - Adult  Goal: Urinary catheter remains patent  Outcome: Progressing

## 2023-10-17 NOTE — PROGRESS NOTES
Hospitalist Progress Note  Vicente Epps MD  Answering service:         Date of Service:  10/17/2023  NAME:  Brianna Chavez  :  1960  MRN:  [id number]      Admission Summary:     Patient presents with sepsis due to cystitis. Interval history / Subjective:     Patient doing well. No acute complaint. Assessment & Plan:     Sepsis  -Patient presents with fever, tachycardia and lactic acidosis  -Sepsis due to complicated cystitis  -Sepsis reassessment completed, follow cultures, monitor hemodynamics    Complicated cystitis  -Patient with known history of bladder cancer, s/p neobladder and requiring self-catheterization  -UA shows large leukocyte esterase, pyuria, urine culture growing gram-negative rods  -Initial blood cultures with coag negative staph and repeat blood cultures negative, likely contamination  -Initial CT abdomen and pelvis shows bilateral hydronephrosis and diffuse bilateral hydroureter and distended bladder  -Completed antibiotics    Bilateral hydroureteronephrosis  -Patient with bilateral hydroureteronephrosis, also with multiple right renal calculi  -History of bladder cancer, s/p radical cystoprostatectomy with neobladder  -Follow-up renal ultrasound shows that right hydronephrosis is resolved and left hydronephrosis is nearly resolved  -Discontinue Cerna and resume straight cath, possible discharge in a.m.     Hypernatremia  -Increased water flushes through PEG tube to every 4 hours  -Continue D5 for another 24 hours  -Follow labs    Hiatal hernia  -Continue PPI    Cerebral palsy  -Patient with cerebral palsy and functional quadriplegia  -Continue supportive care, patient with PEG tube feeding    Anxiety disorder, depression  -Continue Remeron and Ativan    Diarrhea  -Bowel regimen on hold  -KUB unremarkable    Nutrition  -Patient with PEG tube feeding, however sister

## 2023-10-17 NOTE — CARE COORDINATION
Transition of Care: return back to [facility] (where the patient normally lives)  [address] [facility].; [phone]: stretcher (not set up yet)     Main contacts: [facility]- [phone]; DON is [address]   fax- [phone]   patients family- Sade Bennett- [phone] and Corwin Rangel- [phone]     Discharge pending:  -pending KUB (to be done today 10/17)  -patient continues on IV antibiotics  -pending final recs from urology, internal medicine  -pending medical progress and care recommendations     0920: this CM faxed to The [address] [place] [address] to [phone]        Prior Level of Functioning: total assist; lives at [facility]  Disposition: return to [facility]  DME needed: none  Transportation at discharge: stretcher  IM/IMM Medicare/ letter given:   Is patient a [employer] and connected with [facility]? no              If yes, was [employer] transfer form completed and [facility] notified? N/a  Caregiver Contact: [facility]- director of [address] and family (see contacts above)   Discharge Caregiver contacted prior to discharge?  Not yet  Care Conference needed? no  Barriers to discharge:  medical progress      CM following   Mortimer Brisker, RN

## 2023-10-18 VITALS
RESPIRATION RATE: 25 BRPM | HEIGHT: 65 IN | TEMPERATURE: 98.9 F | DIASTOLIC BLOOD PRESSURE: 82 MMHG | BODY MASS INDEX: 19.58 KG/M2 | WEIGHT: 117.5 LBS | SYSTOLIC BLOOD PRESSURE: 156 MMHG | OXYGEN SATURATION: 94 % | HEART RATE: 122 BPM

## 2023-10-18 LAB
GLUCOSE BLD STRIP.AUTO-MCNC: 108 MG/DL (ref 65–117)
MAGNESIUM SERPL-MCNC: 2.5 MG/DL (ref 1.6–2.4)
PHOSPHATE SERPL-MCNC: 3.8 MG/DL (ref 2.6–4.7)
SERVICE CMNT-IMP: NORMAL

## 2023-10-18 PROCEDURE — 51701 INSERT BLADDER CATHETER: CPT

## 2023-10-18 PROCEDURE — 83735 ASSAY OF MAGNESIUM: CPT

## 2023-10-18 PROCEDURE — 6370000000 HC RX 637 (ALT 250 FOR IP): Performed by: INTERNAL MEDICINE

## 2023-10-18 PROCEDURE — 84100 ASSAY OF PHOSPHORUS: CPT

## 2023-10-18 PROCEDURE — 6370000000 HC RX 637 (ALT 250 FOR IP): Performed by: STUDENT IN AN ORGANIZED HEALTH CARE EDUCATION/TRAINING PROGRAM

## 2023-10-18 PROCEDURE — 6360000002 HC RX W HCPCS: Performed by: STUDENT IN AN ORGANIZED HEALTH CARE EDUCATION/TRAINING PROGRAM

## 2023-10-18 PROCEDURE — 36415 COLL VENOUS BLD VENIPUNCTURE: CPT

## 2023-10-18 PROCEDURE — 6370000000 HC RX 637 (ALT 250 FOR IP): Performed by: FAMILY MEDICINE

## 2023-10-18 PROCEDURE — 82962 GLUCOSE BLOOD TEST: CPT

## 2023-10-18 RX ADMIN — LORAZEPAM 0.75 MG: 0.5 TABLET ORAL at 06:08

## 2023-10-18 RX ADMIN — TOPIRAMATE 50 MG: 25 TABLET, FILM COATED ORAL at 10:55

## 2023-10-18 RX ADMIN — SIMETHICONE 120 MG: 20 SUSPENSION/ DROPS ORAL at 11:14

## 2023-10-18 RX ADMIN — LORAZEPAM 0.75 MG: 0.5 TABLET ORAL at 10:57

## 2023-10-18 RX ADMIN — FLUTICASONE PROPIONATE 2 SPRAY: 50 SPRAY, METERED NASAL at 11:13

## 2023-10-18 RX ADMIN — Medication: at 11:05

## 2023-10-18 RX ADMIN — Medication 2000 UNITS: at 10:56

## 2023-10-18 RX ADMIN — ENOXAPARIN SODIUM 40 MG: 100 INJECTION SUBCUTANEOUS at 10:55

## 2023-10-18 RX ADMIN — Medication: at 11:04

## 2023-10-18 RX ADMIN — POLYETHYLENE GLYCOL 3350 17 G: 17 POWDER, FOR SOLUTION ORAL at 10:55

## 2023-10-18 ASSESSMENT — PAIN SCALES - GENERAL
PAINLEVEL_OUTOF10: 0

## 2023-10-18 NOTE — DISCHARGE SUMMARY
Discharge Summary       PATIENT ID: Emelia Raymond  MRN: [id number]   YOB: 1960    DATE OF ADMISSION: 10/10/2023  6:03 PM    DATE OF DISCHARGE: 10/18/2023   PRIMARY CARE PROVIDER: An James MD     ATTENDING PHYSICIAN: Sukhdeep Capps  DISCHARGING PROVIDER: Sukhdeep Capps MD      CONSULTATIONS: IP CONSULT TO UROLOGY  IP CONSULT TO DIETITIAN  IP WOUND CARE NURSE CONSULT TO EVAL    PROCEDURES/SURGERIES: * No surgery found *    ADMISSION SUMMARY AND HOSPITAL COURSE:     HPI  Emelia Raymond is a 58 y.o. male with hx of bladder cancer status post neobladder, urinary retention requiring self caths, recurrent hospitalization for urosepsis cerebral palsy, PEG tube feed dependent who presented to hospital with complaints of abdominal pain. Patient reportedly had been in his usual state of health until this morning when he developed some abdominal pain. Additional history is difficult to obtain due to patient's communication deficits. He was apparently on his baclofen pump refilled when he mentioned his abdominal symptoms and was sent to hospital for admission. Hospital Course  Patient presented with sepsis, patient with fever, tachycardia and lactic acidosis on presentation. Patient with stable hemodynamics. Blood cultures show coag negative staph in 1 of 2 bottles and repeat blood cultures negative, this is likely contamination. .  Patient was started on meropenem, urine culture grew gram-negative rods but only 2000 colonies and therefore culture was not finalized. Patient has completed antibiotic this admission. Patient also was noted to have bilateral hydronephrosis and diffuse bilateral hydroureter with distended neobladder. Patient with known history of bladder cancer, s/p radical cystoprostatectomy with neobladder, at baseline does intermittent catheterization. Upon admission patient was evaluated by urology and Cerna catheter was placed.   Follow-up repeat renal ultrasound

## 2023-10-18 NOTE — PROGRESS NOTES
Discharge instructions reviewed with patient and he demonstrated understanding. Report called to Yuval Hudson RN Supervisor at [facility].

## 2023-10-18 NOTE — CARE COORDINATION
Transition of Care: return back to [facility] (where the patient normally lives)  [address] [facility].; [phone]; Room #209     Transport [address]- scheduled for 3:30pm      Main contacts: the [facility]; J[facility] dorota Mccallum fax- [phone]   patients family- Suszanne Apley- [phone] and Alexys Sherman- [phone]: this CM noted discharge order and was informed by attending that patient is ready for discharge today; this CM called the [facility] and spoke to Shabbir (nursing) who requesting the attending speak to Dr. Jorge Hernandez at [facility] prior to patients ts discharge; this CM sent perfectserve message to attending to inform; he verbalized understanding; this CM faxed to the [facility] the discharge summary; and set up stretcher transport with [facility]    1310: this CM called back Xena- (nursing) at [facility] to update her on the transport time of  today and to confirm that the two attendings have spoken; she verbalized understanding; this CM also called patients kat Sherman to update on discharge plan today and transport time; she verbalized understanding; patient and bedside RN both updated on discharge plan        Prior Level of Functioning: total assist; lives at [facility]  Disposition: return to [facility] room #209  DME needed: none  Transportation at discharge: stretcher with [facility]- scheduled for 3:30pm  IM/IMM Medicare/ letter given: 2nd one on 10/18/23  Is patient a [employer] and connected with [place]? no              If yes, was [employer] transfer form completed and VA notified? N/a  Caregiver Contact: [facility]- [facility]- Xena [facility] Jogre Hernandez at the [facility])  and sister- Alexys Sherman  Discharge Caregiver contacted prior to discharge?  yes- by phone  Care Conference needed? no  Barriers to discharge:  none      CM following   Kirk Mccarthy RN

## 2023-10-18 NOTE — PLAN OF CARE
Symptoms are Monitored and Maintained or Improved: Monitor and assess patient's chronic conditions and comorbid symptoms for stability, deterioration, or improvement     Problem: Neurosensory - Adult  Goal: Absence of seizures  10/17/2023 2135 by Rosa Maradiaga RN  Outcome: Progressing  10/17/2023 0944 by Kemi Polo  Outcome: Progressing  Flowsheets (Taken 10/17/2023 0800)  Absence of seizures: Monitor for seizure activity.   If seizure occurs, document type and location of movements and any associated apnea  Goal: Remains free of injury related to seizures activity  10/17/2023 2135 by Rosa Maradiaga RN  Outcome: Progressing  10/17/2023 0944 by Kemi Polo  Outcome: Progressing  Flowsheets (Taken 10/17/2023 0800)  Remains free of injury related to seizure activity:   Maintain airway, patient safety  and administer oxygen as ordered   Monitor patient for seizure activity, document and report duration and description of seizure to Licensed Independent Practitioner     Problem: Respiratory - Adult  Goal: Achieves optimal ventilation and oxygenation  10/17/2023 2135 by Rosa Maradiaga RN  Outcome: Progressing  10/17/2023 0944 by Kemi Polo  Outcome: Progressing  Flowsheets (Taken 10/17/2023 0800)  Achieves optimal ventilation and oxygenation: Assess and instruct to report shortness of breath or any respiratory difficulty     Problem: Cardiovascular - Adult  Goal: Maintains optimal cardiac output and hemodynamic stability  10/17/2023 2135 by Rosa Maradiaga RN  Outcome: Progressing  10/17/2023 0944 by Kemi Polo  Outcome: Progressing  Flowsheets (Taken 10/17/2023 0800)  Maintains optimal cardiac output and hemodynamic stability: Monitor blood pressure and heart rate  Goal: Absence of cardiac dysrhythmias or at baseline  10/17/2023 2135 by Rosa Maradiaga RN  Outcome: Progressing  10/17/2023 0944 by Kemi Polo  Outcome: Progressing  Flowsheets (Taken

## 2023-10-18 NOTE — PLAN OF CARE
Problem: Discharge Planning  Goal: Discharge to home or other facility with appropriate resources  Outcome: Adequate for Discharge     Problem: Safety - Adult  Goal: Free from fall injury  Outcome: Adequate for Discharge     Problem: Pain  Goal: Verbalizes/displays adequate comfort level or baseline comfort level  Outcome: Adequate for Discharge  Flowsheets (Taken 10/18/2023 9793)  Verbalizes/displays adequate comfort level or baseline comfort level:   Encourage patient to monitor pain and request assistance   Assess pain using appropriate pain scale   Administer analgesics based on type and severity of pain and evaluate response   Implement non-pharmacological measures as appropriate and evaluate response   Consider cultural and social influences on pain and pain management   Notify Licensed Independent Practitioner if interventions unsuccessful or patient reports new pain     Problem: Skin/Tissue Integrity  Goal: Absence of new skin breakdown  Description: 1. Monitor for areas of redness and/or skin breakdown  2. Assess vascular access sites hourly  3. Every 4-6 hours minimum:  Change oxygen saturation probe site  4. Every 4-6 hours:  If on nasal continuous positive airway pressure, respiratory therapy assess nares and determine need for appliance change or resting period.   Outcome: Adequate for Discharge     Problem: Chronic Conditions and Co-morbidities  Goal: Patient's chronic conditions and co-morbidity symptoms are monitored and maintained or improved  Outcome: Adequate for Discharge     Problem: Neurosensory - Adult  Goal: Absence of seizures  Outcome: Adequate for Discharge  Goal: Remains free of injury related to seizures activity  Outcome: Adequate for Discharge     Problem: Respiratory - Adult  Goal: Achieves optimal ventilation and oxygenation  Outcome: Adequate for Discharge     Problem: Cardiovascular - Adult  Goal: Maintains optimal cardiac output and hemodynamic stability  Outcome: Adequate care

## 2023-10-26 NOTE — PROGRESS NOTES
Physician Progress Note      PATIENT:               Chang Ontiveros  [facility] #:                  [id number]  :                       1960  ADMIT DATE:       10/10/2023 6:03 PM  [address] [facility] DATE:        10/18/2023 4:35 PM  RESPONDING  PROVIDER #:        Wilton Souza MD          QUERY TEXT:    Pt admitted with UTI. Pt noted to have self catheterization. If possible,   please document in the progress notes and discharge summary if you are   evaluating and/or treating any of the following: The medical record reflects the following:  Risk Factors: UTI  Clinical Indicators:  Patient hx of bladder cancer status post neobladder, urinary retention   requiring self caths was documented to have a UTI. Urine grew GNR. patient was   treated with IV antibiotics. Treatment:  urology consult  0.9 % sodium chloride IV bolus 1,719 mL  meropenem (MERREM) 1,000 mg in sodium chloride 0.9 % 100 mL IVPB (mini-bag)  vancomycin (VANCOCIN) 1,500 mg in sodium chloride 0.9 % 250 mL IVPB ([id number])      Thank you,  Andrew Meredith RN [facility]  Clinical Documentation  [phone] or via Perfect Serve  Options provided:  -- UTI due to self catheterization  -- UTI not due to self catheterization  -- Other - I will add my own diagnosis  -- Disagree - Not applicable / Not valid  -- Disagree - Clinically unable to determine / Unknown  -- Refer to Clinical Documentation Reviewer    PROVIDER RESPONSE TEXT:    UTI is due to self catheterization.     Query created by: Andrew Meredith on 10/25/2023 6:39 AM      Electronically signed by:  Wilton Souza MD 10/26/2023 7:39 AM

## 2024-10-23 NOTE — PROGRESS NOTES
Chronic, at goal (stable), continue current treatment plan,  continue with jardiance and metformin.    Orders:    Lipid, Fasting; Future    Lipid, Fasting     Nutrition Note    Consult received to resume TF. Start with half of usual bolus volume today to ensure tolerance and advance to full/usual bolus regimen tomorrow. Jevity 1.5:  Today (8/21) Give 175mL bolus 3 times/day (9am, 12pm, 5pm per home regimen)  +480mL water flush per bolus    Tomorrow (8/22) If half boluses were well tolerated, resume home regimen:  355mL bolus 3 times/day (9am,12pm, 5pm)  + 480mL water flush per bolus    Re-consult for tolerance issues, otherwise will follow up Monday 8/22 if he is still here.      Electronically signed by Sherrie Singletary RD on 8/20/2022 at 10:10 AM    Contact: [phone]

## (undated) DEVICE — SOLUTION IRRIGATION H2O 0797305] ICU MEDICAL INC]

## (undated) DEVICE — OPEN-END URETERAL CATHETER: Brand: COOK

## (undated) DEVICE — SYR 10ML LUER LOK 1/5ML GRAD --

## (undated) DEVICE — FLEXOR, URETERAL ACCESS SHEATH WITH AQ, HYDROPHILIC COATING: Brand: FLEXOR

## (undated) DEVICE — CATHETER,FOLEY,SILI-ELAST,LTX,22FR,10ML: Brand: MEDLINE

## (undated) DEVICE — CATH URETH FOL 2W MED 18FRX5 --

## (undated) DEVICE — GDWIRE AMPLTZ 0.035INX145CM SS -- BX/5

## (undated) DEVICE — SOL IRR SOD CL 0.9% 3000ML --

## (undated) DEVICE — POUCH DRNGE FLX BND INTEGR RAIL CLMP DISP EZ CTCH

## (undated) DEVICE — GDWIRE UROL STR 150CM FLX TP -- BX/5 SENSOR

## (undated) DEVICE — TOWEL SURG W17XL27IN STD BLU COT NONFENESTRATED PREWASHED

## (undated) DEVICE — GOWN,SIRUS,FABRNF,XL,20/CS: Brand: MEDLINE

## (undated) DEVICE — PACK,CYSTOSCOPY,PK III,SIRUS: Brand: MEDLINE

## (undated) DEVICE — CATHETER,URETHRAL,REDRUBBER,STERILE,22FR: Brand: MEDLINE

## (undated) DEVICE — SYR IRR CATH TIP LR ADPT 70ML -- CONVERT TO ITEM 363120

## (undated) DEVICE — INFECTION CONTROL KIT SYS

## (undated) DEVICE — BAG PRESSURE INFUSION 3 W STOPCOCK 3000 CC PREMIERPRO DISP

## (undated) DEVICE — SOLUTION IRRIG 3000ML 0.9% SOD CHL USP UROMATIC PLAS CONT

## (undated) DEVICE — Device: Brand: FLEXIVA

## (undated) DEVICE — GARMENT,MEDLINE,DVT,INT,CALF,MED, GEN2: Brand: MEDLINE

## (undated) DEVICE — STERILE POLYISOPRENE POWDER-FREE SURGICAL GLOVES WITH EMOLLIENT COATING: Brand: PROTEXIS

## (undated) DEVICE — TUBING, SUCTION, 1/4" X 12', STRAIGHT: Brand: MEDLINE

## (undated) DEVICE — SOL IRR STRL H2O 1000ML BTL --

## (undated) DEVICE — MARKER,SKIN,WI/RULER AND LABELS: Brand: MEDLINE

## (undated) DEVICE — DUAL LUMEN URETERAL CATHETER

## (undated) DEVICE — SPONGE GZ W4XL4IN COT 12 PLY TYP VII WVN C FLD DSGN

## (undated) DEVICE — SOLUTION SCRB 2OZ 10% POVIDONE IOD ANTIMIC BTL

## (undated) DEVICE — CONTAINER,SPECIMEN,4OZ,OR STRL: Brand: MEDLINE

## (undated) DEVICE — JELLY,LUBE,STERILE,FLIP TOP,TUBE,4-OZ: Brand: MEDLINE

## (undated) DEVICE — TIDISHIELD UROLOGY DRAIN BAGS FROSTY VINYL STERILE FITS SIEMENS UROSKOP ACCESS 20 PER CASE: Brand: TIDISHIELD

## (undated) DEVICE — FLEXOR, DL DUAL LUMEN URETERAL ACCESS SHEATH WITH AQ, COATING: Brand: FLEXOR

## (undated) DEVICE — Y-TYPE TUR/BLADDER IRRIGATION SET, REGULATING CLAMP

## (undated) DEVICE — DRAINBAG,ANTI-REFLUX TOWER,L/F,2000ML,LL: Brand: MEDLINE